# Patient Record
Sex: MALE | Race: WHITE | NOT HISPANIC OR LATINO | Employment: OTHER | RURAL
[De-identification: names, ages, dates, MRNs, and addresses within clinical notes are randomized per-mention and may not be internally consistent; named-entity substitution may affect disease eponyms.]

---

## 2017-03-27 ENCOUNTER — TELEPHONE (OUTPATIENT)
Dept: NEUROLOGY | Facility: CLINIC | Age: 62
End: 2017-03-27

## 2017-03-27 NOTE — TELEPHONE ENCOUNTER
ATTEMPTED TO CALL THE PT AND LET HIM KNOW WE DO NOT REFILL FIORICET OVER THE PHONE. AWAITING CALL BACK

## 2017-03-27 NOTE — TELEPHONE ENCOUNTER
----- Message from Jessika Stone sent at 3/27/2017  3:44 PM EDT -----  Regarding: BEVERLY  Contact: [phone]  Refill on Fioricet using [facility] in [place].

## 2017-04-11 ENCOUNTER — OFFICE VISIT (OUTPATIENT)
Dept: NEUROLOGY | Facility: CLINIC | Age: 62
End: 2017-04-11

## 2017-04-11 VITALS
BODY MASS INDEX: 28.41 KG/M2 | HEART RATE: 73 BPM | WEIGHT: 181 LBS | SYSTOLIC BLOOD PRESSURE: 116 MMHG | DIASTOLIC BLOOD PRESSURE: 72 MMHG | OXYGEN SATURATION: 97 % | HEIGHT: 67 IN

## 2017-04-11 DIAGNOSIS — G43.119 INTRACTABLE MIGRAINE WITH AURA WITHOUT STATUS MIGRAINOSUS: ICD-10-CM

## 2017-04-11 DIAGNOSIS — IMO0002 CHRONIC MIGRAINE: Primary | ICD-10-CM

## 2017-04-11 PROCEDURE — 99213 OFFICE O/P EST LOW 20 MIN: CPT | Performed by: PSYCHIATRY & NEUROLOGY

## 2017-04-11 RX ORDER — DOXEPIN HYDROCHLORIDE 25 MG/1
25 CAPSULE ORAL NIGHTLY
Qty: 30 CAPSULE | Refills: 5 | Status: SHIPPED | OUTPATIENT
Start: 2017-04-11 | End: 2017-07-18 | Stop reason: SINTOL

## 2017-04-11 RX ORDER — BUTALBITAL, ACETAMINOPHEN AND CAFFEINE 50; 325; 40 MG/1; MG/1; MG/1
1 TABLET ORAL DAILY PRN
Qty: 20 TABLET | Refills: 2
Start: 2017-04-11 | End: 2017-07-18 | Stop reason: SDUPTHER

## 2017-04-11 NOTE — PROGRESS NOTES
Subjective:     Patient ID: Israel Reyna is a 62 y.o. male.    History of Present Illness  The following portions of the patient's history were reviewed and updated as appropriate: allergies, current medications, past family history, past medical history, past social history, past surgical history and problem list.  Patient reports that his headaches are getting worse, 15-20 days a month, lasting for hours often longer than 4 hours. His blood sugar has been up and down. He has had a recent heart cath, has CAD. Fioricet appear to help some, takes 2-4 doses a week. He has stopped Topamax that did not help any. Also tried Elavil, Depakote, others.  Review of Systems   Constitutional: Positive for fatigue. Negative for chills, fever and unexpected weight change.   HENT: Positive for ear pain and hearing loss. Negative for nosebleeds, rhinorrhea and sore throat.    Eyes: Positive for pain. Negative for photophobia, discharge, itching and visual disturbance.   Respiratory: Negative for cough, chest tightness, shortness of breath and wheezing.    Cardiovascular: Negative for chest pain, palpitations and leg swelling.        HEART RATE IS FAST   Gastrointestinal: Negative for abdominal pain, blood in stool, constipation, diarrhea, nausea and vomiting.   Genitourinary: Negative for dysuria, frequency, hematuria and urgency.        INCONTINENCE   Musculoskeletal: Negative for arthralgias, back pain, gait problem, joint swelling, myalgias, neck pain and neck stiffness.   Skin: Negative for rash and wound.   Allergic/Immunologic: Negative for environmental allergies and food allergies.   Neurological: Positive for dizziness and headaches. Negative for tremors, seizures, syncope, speech difficulty, weakness, light-headedness and numbness.        TINGLING, CONFUSION   Hematological: Negative for adenopathy. Does not bruise/bleed easily.   Psychiatric/Behavioral: Negative for agitation, confusion, decreased concentration,  hallucinations, sleep disturbance and suicidal ideas. The patient is not nervous/anxious.         Objective:    Neurologic Exam     Mental Status   Oriented to person, place, and time.       Physical Exam   Constitutional: He is oriented to person, place, and time. He appears well-developed and well-nourished.   Cardiovascular: Normal rate and regular rhythm.    Pulmonary/Chest: Effort normal.   Neurological: He is alert and oriented to person, place, and time. He has normal reflexes.   Psychiatric: He has a normal mood and affect. His behavior is normal. Thought content normal.       Assessment/Plan:     Israel was seen today for headache and dizziness.    Diagnoses and all orders for this visit:    Chronic migraine  -     doxepin (SINEquan) 25 MG capsule; Take 1 capsule by mouth Every Night.  -     Ambulatory Referral to Neurology    Intractable migraine with aura without status migrainosus  -     butalbital-acetaminophen-caffeine (FIORICET, ESGIC) -40 MG per tablet; Take 1 tablet by mouth Daily As Needed for Headache.     Referral to Botox initiated.    CARLOS ALBERTO query complete. Treatment plan to include limited course of prescribed  controlled substance. Risks including addiction, benefits, and alternatives presented to patient.

## 2017-07-18 ENCOUNTER — OFFICE VISIT (OUTPATIENT)
Dept: NEUROLOGY | Facility: CLINIC | Age: 62
End: 2017-07-18

## 2017-07-18 VITALS
DIASTOLIC BLOOD PRESSURE: 78 MMHG | HEIGHT: 67 IN | BODY MASS INDEX: 28.25 KG/M2 | SYSTOLIC BLOOD PRESSURE: 159 MMHG | HEART RATE: 78 BPM | WEIGHT: 180 LBS

## 2017-07-18 DIAGNOSIS — I63.89 OTHER CEREBRAL INFARCTION (HCC): Primary | ICD-10-CM

## 2017-07-18 DIAGNOSIS — G43.119 INTRACTABLE MIGRAINE WITH AURA WITHOUT STATUS MIGRAINOSUS: ICD-10-CM

## 2017-07-18 DIAGNOSIS — R41.82 ALTERED MENTAL STATUS, UNSPECIFIED ALTERED MENTAL STATUS TYPE: ICD-10-CM

## 2017-07-18 DIAGNOSIS — G45.8 OTHER SPECIFIED TRANSIENT CEREBRAL ISCHEMIAS: ICD-10-CM

## 2017-07-18 PROBLEM — G45.9 TRANSIENT CEREBRAL ISCHEMIA: Status: ACTIVE | Noted: 2017-07-18

## 2017-07-18 PROCEDURE — 99214 OFFICE O/P EST MOD 30 MIN: CPT | Performed by: PSYCHIATRY & NEUROLOGY

## 2017-07-18 RX ORDER — BUTALBITAL, ACETAMINOPHEN AND CAFFEINE 50; 325; 40 MG/1; MG/1; MG/1
1 TABLET ORAL DAILY PRN
Qty: 20 TABLET | Refills: 2
Start: 2017-07-18 | End: 2017-10-17 | Stop reason: SDUPTHER

## 2017-07-18 NOTE — PROGRESS NOTES
Subjective:     Patient ID: Israel Reyna is a 62 y.o. male.    History of Present Illness  The following portions of the patient's history were reviewed and updated as appropriate: allergies, current medications, past family history, past medical history, past social history, past surgical history and problem list.  Patient is still having 2-3 spells a week, confusion, facial droop associated with a headache, sometimes loss of bowel control Has chronic anal sphincter dysfunction. Unable to tolerate doxepin that caused him to feel loopy. Had a different spell recently while sitting in [employer] of dizziness, poor balance lasting until he went home and took a nap. Did not go to the ER. BP has been running high. Has history of CAD.  Review of Systems   Constitutional: Positive for fatigue. Negative for chills, fever and unexpected weight change.   HENT: Positive for hearing loss. Negative for ear pain, nosebleeds, rhinorrhea and sore throat.    Eyes: Negative for photophobia, pain, discharge, itching and visual disturbance.   Respiratory: Positive for shortness of breath. Negative for cough, chest tightness and wheezing.    Cardiovascular: Positive for palpitations and leg swelling. Negative for chest pain.   Gastrointestinal: Negative for abdominal pain, blood in stool, constipation, diarrhea, nausea and vomiting.   Genitourinary: Positive for frequency. Negative for dysuria, hematuria and urgency.   Musculoskeletal: Positive for gait problem. Negative for arthralgias, back pain, joint swelling, myalgias, neck pain and neck stiffness.   Skin: Negative for rash and wound.   Allergic/Immunologic: Negative for environmental allergies and food allergies.   Neurological: Positive for dizziness, light-headedness and headaches. Negative for tremors, seizures, syncope, speech difficulty, weakness and numbness.   Hematological: Negative for adenopathy. Does not bruise/bleed easily.   Psychiatric/Behavioral: Negative for  agitation, confusion, decreased concentration, hallucinations, sleep disturbance and suicidal ideas. The patient is not nervous/anxious.         Objective:    Neurologic Exam     Mental Status   Oriented to person, place, and time.     Cranial Nerves     CN III, IV, VI   Pupils are equal, round, and reactive to light.  Extraocular motions are normal.     Motor Exam     Strength   Strength 5/5 throughout.       Physical Exam   Constitutional: He is oriented to person, place, and time. He appears well-developed and well-nourished.   HENT:   Head: Normocephalic and atraumatic.   Eyes: EOM are normal. Pupils are equal, round, and reactive to light.   Neck: Normal range of motion. Neck supple. Carotid bruit is not present.   Cardiovascular: Normal rate, regular rhythm, normal heart sounds and intact distal pulses.    Pulmonary/Chest: Effort normal and breath sounds normal.   Abdominal: Soft. Bowel sounds are normal.   Musculoskeletal: Normal range of motion.   Neurological: He is alert and oriented to person, place, and time. He has normal strength and normal reflexes. No cranial nerve deficit or sensory deficit. He displays a negative Romberg sign. Coordination and gait normal. He displays no Babinski's sign on the right side. He displays no Babinski's sign on the left side.   Skin: Skin is warm.   Psychiatric: He has a normal mood and affect. His behavior is normal. Thought content normal. Cognition and memory are normal.       Assessment/Plan:     Israel was seen today for migraine.    Diagnoses and all orders for this visit:    Other cerebral infarction  -     MRI Brain Without Contrast  -     Duplex Carotid Ultrasound CAR  -     Holter Monitor - 24 Hour    Intractable migraine with aura without status migrainosus  -     verapamil SR (CALAN-SR) 120 MG CR tablet; Take 1 tablet by mouth Daily.  -     butalbital-acetaminophen-caffeine (FIORICET, ESGIC) -40 MG per tablet; Take 1 tablet by mouth Daily As Needed for  Headache.    Other specified transient cerebral ischemias  -     MRI Brain Without Contrast  -     Duplex Carotid Ultrasound CAR  -     Holter Monitor - 24 Hour    Altered mental status, unspecified altered mental status type  -     MRI Brain Without Contrast  -     EEG Awake or Drowsy Routine       The patient has read and signed the [facility] Controlled Substance Contract.  I will continue to see patient for regular follow up appointments.  They are well controlled on their medication.  CARLOS ALBERTO is updated every 3 months. The patient is aware of the potential for addiction and dependence.    The patient has read and signed the [facility] Controlled Substance Contract.  I will continue to see patient for regular follow up appointments.  They are well controlled on their medication.  CARLOS ALBERTO is updated every 3 months. The patient is aware of the potential for addiction. The patient has read and signed the [facility] Substance Contract.  I will continue to see patient for regular follow up appointments.  They are well controlled on their medication.  CARLOS ALBERTO is updated every 3 months. The patient is aware of the potential for addiction and dependence.on and dependence.      He is to report new or worsening symptoms immediately.

## 2017-07-19 ENCOUNTER — TELEPHONE (OUTPATIENT)
Dept: NEUROLOGY | Facility: CLINIC | Age: 62
End: 2017-07-19

## 2017-07-20 ENCOUNTER — TELEPHONE (OUTPATIENT)
Dept: NEUROLOGY | Facility: CLINIC | Age: 62
End: 2017-07-20

## 2017-07-20 DIAGNOSIS — G43.119 INTRACTABLE MIGRAINE WITH AURA WITHOUT STATUS MIGRAINOSUS: Primary | ICD-10-CM

## 2017-07-20 RX ORDER — TOPIRAMATE 25 MG/1
25 TABLET ORAL 2 TIMES DAILY
Qty: 60 TABLET | Refills: 5 | Status: SHIPPED | OUTPATIENT
Start: 2017-07-20 | End: 2017-10-17 | Stop reason: SDUPTHER

## 2017-07-20 NOTE — TELEPHONE ENCOUNTER
PT CALLED AND STATED THAT THE NEW MEDICINE THAT YOU HAD PUT HIM ON HE IS HAVING A LOT MORE DISCOMFORT. HIS BLOOD PRESSURE IS STAYING ELEVATED.  PLEASE ADVISE.

## 2017-07-20 NOTE — TELEPHONE ENCOUNTER
HE SAID IT IS NOT HELPING AND HE WANTS TO KNOW IF HE NEEDS TO GIVE IT MORE TIME OR TRY SOMETHING ELSE.

## 2017-08-03 ENCOUNTER — TELEPHONE (OUTPATIENT)
Dept: NEUROLOGY | Facility: CLINIC | Age: 62
End: 2017-08-03

## 2017-08-03 NOTE — TELEPHONE ENCOUNTER
PATIENT CALLED, STATED THAT HIS MRI IS SCHEDULED FOR NEXT WEEK ON 8/10. STATED THAT HE IS SEVERELY CLAUSTROPHOBIC AND IS NEEDING SOMETHING TO HELP SEDATE HIM. PLEASE ADVISE. THANKS

## 2017-08-04 DIAGNOSIS — F41.9 ANXIETY: Primary | ICD-10-CM

## 2017-08-04 RX ORDER — ALPRAZOLAM 0.5 MG/1
0.5 TABLET ORAL ONCE AS NEEDED
Qty: 3 TABLET | Refills: 0
Start: 2017-08-04 | End: 2020-03-04

## 2017-08-07 NOTE — TELEPHONE ENCOUNTER
Called in script per provider instructions. Called in to the [facility].

## 2017-08-10 ENCOUNTER — HOSPITAL ENCOUNTER (OUTPATIENT)
Dept: MRI IMAGING | Facility: HOSPITAL | Age: 62
Discharge: HOME | End: 2017-08-10
Attending: PSYCHIATRY & NEUROLOGY

## 2017-08-10 ENCOUNTER — HOSPITAL ENCOUNTER (OUTPATIENT)
Dept: CARDIOLOGY | Facility: HOSPITAL | Age: 62
Discharge: HOME | End: 2017-08-10
Attending: PSYCHIATRY & NEUROLOGY | Admitting: PSYCHIATRY & NEUROLOGY

## 2017-08-10 ENCOUNTER — HOSPITAL ENCOUNTER (OUTPATIENT)
Dept: NEUROLOGY | Facility: HOSPITAL | Age: 62
Discharge: HOME | End: 2017-08-10
Attending: PSYCHIATRY & NEUROLOGY

## 2017-08-10 LAB
BH CV ECHO MEAS - BSA(HAYCOCK): 2 M^2
BH CV ECHO MEAS - BSA: 1.9 M^2
BH CV ECHO MEAS - BZI_BMI: 28.2 KILOGRAMS/M^2
BH CV ECHO MEAS - BZI_METRIC_HEIGHT: 170.2 CM
BH CV ECHO MEAS - BZI_METRIC_WEIGHT: 81.6 KG
BH CV XLRA MEAS LEFT DIST CCA EDV: 22.6 CM/SEC
BH CV XLRA MEAS LEFT DIST CCA PSV: 85.5 CM/SEC
BH CV XLRA MEAS LEFT DIST ICA EDV: 32.4 CM/SEC
BH CV XLRA MEAS LEFT DIST ICA PSV: 85 CM/SEC
BH CV XLRA MEAS LEFT ICA/CCA RATIO: 0.88
BH CV XLRA MEAS LEFT MID ICA EDV: 25 CM/SEC
BH CV XLRA MEAS LEFT MID ICA PSV: 76.1 CM/SEC
BH CV XLRA MEAS LEFT PROX CCA EDV: 21.2 CM/SEC
BH CV XLRA MEAS LEFT PROX CCA PSV: 99 CM/SEC
BH CV XLRA MEAS LEFT PROX ECA EDV: 11.3 CM/SEC
BH CV XLRA MEAS LEFT PROX ECA PSV: 77.9 CM/SEC
BH CV XLRA MEAS LEFT PROX ICA EDV: 20.6 CM/SEC
BH CV XLRA MEAS LEFT PROX ICA PSV: 71.7 CM/SEC
BH CV XLRA MEAS LEFT PROX SCLA EDV: 0 CM/SEC
BH CV XLRA MEAS LEFT PROX SCLA PSV: 116.3 CM/SEC
BH CV XLRA MEAS LEFT VERTEBRAL A EDV: 16.7 CM/SEC
BH CV XLRA MEAS LEFT VERTEBRAL A PSV: 46.7 CM/SEC
BH CV XLRA MEAS RIGHT DIST CCA EDV: 21.4 CM/SEC
BH CV XLRA MEAS RIGHT DIST CCA PSV: 89.9 CM/SEC
BH CV XLRA MEAS RIGHT DIST ICA EDV: 33.9 CM/SEC
BH CV XLRA MEAS RIGHT DIST ICA PSV: 96.3 CM/SEC
BH CV XLRA MEAS RIGHT ICA/CCA RATIO: 0.94
BH CV XLRA MEAS RIGHT MID ICA EDV: 28 CM/SEC
BH CV XLRA MEAS RIGHT MID ICA PSV: 85 CM/SEC
BH CV XLRA MEAS RIGHT PROX CCA EDV: 16.3 CM/SEC
BH CV XLRA MEAS RIGHT PROX CCA PSV: 91.1 CM/SEC
BH CV XLRA MEAS RIGHT PROX ECA EDV: 13.2 CM/SEC
BH CV XLRA MEAS RIGHT PROX ECA PSV: 106.9 CM/SEC
BH CV XLRA MEAS RIGHT PROX ICA EDV: 24.6 CM/SEC
BH CV XLRA MEAS RIGHT PROX ICA PSV: 71.2 CM/SEC
BH CV XLRA MEAS RIGHT PROX SCLA EDV: 0 CM/SEC
BH CV XLRA MEAS RIGHT PROX SCLA PSV: 102.9 CM/SEC
BH CV XLRA MEAS RIGHT VERTEBRAL A EDV: 14.2 CM/SEC
BH CV XLRA MEAS RIGHT VERTEBRAL A PSV: 53 CM/SEC
LEFT ARM BP: NORMAL MMHG
RIGHT ARM BP: NORMAL MMHG

## 2017-08-10 PROCEDURE — 95816 EEG AWAKE AND DROWSY: CPT

## 2017-08-10 PROCEDURE — 93880 EXTRACRANIAL BILAT STUDY: CPT | Performed by: INTERNAL MEDICINE

## 2017-08-10 PROCEDURE — 93880 EXTRACRANIAL BILAT STUDY: CPT

## 2017-08-14 ENCOUNTER — TELEPHONE (OUTPATIENT)
Dept: NEUROLOGY | Facility: CLINIC | Age: 62
End: 2017-08-14

## 2017-08-15 ENCOUNTER — TELEPHONE (OUTPATIENT)
Dept: NEUROLOGY | Facility: CLINIC | Age: 62
End: 2017-08-15

## 2017-08-15 DIAGNOSIS — G45.1 HEMISPHERIC CAROTID ARTERY SYNDROME: Primary | ICD-10-CM

## 2017-08-15 NOTE — TELEPHONE ENCOUNTER
PT HAS A BLADDER STIMULATOR AND CANNOT HAVE THE MRI COMPLETED. DO YOU WANT JESI TO GO FORWARD AND GET THE PT SCHEDULED WITH [facility] AFTER FILLING OUT PAPERWORK OR DO YOU WANT THE PT TO HAVE SOMETHING ELSE. PLEASE ADVISE.

## 2017-08-17 ENCOUNTER — HOSPITAL ENCOUNTER (OUTPATIENT)
Dept: CT IMAGING | Facility: HOSPITAL | Age: 62
Discharge: HOME | End: 2017-08-17
Attending: PSYCHIATRY & NEUROLOGY | Admitting: PSYCHIATRY & NEUROLOGY

## 2017-08-17 PROCEDURE — 70450 CT HEAD/BRAIN W/O DYE: CPT

## 2017-08-21 ENCOUNTER — TELEPHONE (OUTPATIENT)
Dept: NEUROLOGY | Facility: CLINIC | Age: 62
End: 2017-08-21

## 2017-08-21 DIAGNOSIS — M54.2 NECK PAIN: Primary | ICD-10-CM

## 2017-08-21 NOTE — TELEPHONE ENCOUNTER
----- Message from Robbie Chaidez MD sent at 8/21/2017 10:57 AM EDT -----  Regarding: Head CT  Normal thanks  ----- Message -----     From: Interface, Rad Results [facility] In     Sent: 8/19/2017  12:18 AM       To: Robbie Chaidez MD

## 2017-08-25 ENCOUNTER — HOSPITAL ENCOUNTER (OUTPATIENT)
Dept: CT IMAGING | Facility: HOSPITAL | Age: 62
Discharge: HOME | End: 2017-08-25
Attending: PSYCHIATRY & NEUROLOGY | Admitting: PSYCHIATRY & NEUROLOGY

## 2017-08-25 PROCEDURE — 72125 CT NECK SPINE W/O DYE: CPT

## 2017-08-28 ENCOUNTER — TELEPHONE (OUTPATIENT)
Dept: NEUROLOGY | Facility: CLINIC | Age: 62
End: 2017-08-28

## 2017-08-28 NOTE — TELEPHONE ENCOUNTER
----- Message from Robbie Chaidez MD sent at 8/28/2017  2:17 PM EDT -----  Please call the patient regarding his abnormal result. CT cspine with arthritis changes, no serious problem found otherwise, thanks.

## 2017-08-29 NOTE — TELEPHONE ENCOUNTER
CALLED PT AND HE VERBALIZED THAT HE UNDERSTOOD THE TEST RESULTS THAT I GAVE HIM AND HE WILL KEEP HIS F/U

## 2017-10-17 ENCOUNTER — OFFICE VISIT (OUTPATIENT)
Dept: NEUROLOGY | Facility: CLINIC | Age: 62
End: 2017-10-17

## 2017-10-17 VITALS
HEIGHT: 66 IN | DIASTOLIC BLOOD PRESSURE: 80 MMHG | SYSTOLIC BLOOD PRESSURE: 141 MMHG | BODY MASS INDEX: 26.68 KG/M2 | HEART RATE: 72 BPM | WEIGHT: 166 LBS

## 2017-10-17 DIAGNOSIS — G43.119 INTRACTABLE MIGRAINE WITH AURA WITHOUT STATUS MIGRAINOSUS: Primary | ICD-10-CM

## 2017-10-17 DIAGNOSIS — M47.812 SPONDYLOSIS OF CERVICAL REGION WITHOUT MYELOPATHY OR RADICULOPATHY: ICD-10-CM

## 2017-10-17 DIAGNOSIS — R41.0 DISORIENTATION: ICD-10-CM

## 2017-10-17 DIAGNOSIS — M54.2 NECK PAIN: ICD-10-CM

## 2017-10-17 PROCEDURE — 99214 OFFICE O/P EST MOD 30 MIN: CPT | Performed by: PSYCHIATRY & NEUROLOGY

## 2017-10-17 RX ORDER — TOPIRAMATE 25 MG/1
25 TABLET ORAL 2 TIMES DAILY
Qty: 60 TABLET | Refills: 5 | Status: SHIPPED | OUTPATIENT
Start: 2017-10-17 | End: 2019-01-09 | Stop reason: SDUPTHER

## 2017-10-17 RX ORDER — BUTALBITAL, ACETAMINOPHEN AND CAFFEINE 50; 325; 40 MG/1; MG/1; MG/1
1 TABLET ORAL DAILY PRN
Qty: 20 TABLET | Refills: 2
Start: 2017-10-17 | End: 2018-03-22 | Stop reason: SDUPTHER

## 2017-10-17 NOTE — PROGRESS NOTES
Subjective:     Patient ID: Israel Reyna is a 62 y.o. male.    CC:   Chief Complaint   Patient presents with   • Migraine       HPI:   History of Present Illness  The following portions of the patient's history were reviewed and updated as appropriate: allergies, current medications, past family history, past medical history, past social history, past surgical history and problem list.     Patient complains of increasing neck pain, daily headaches, mostly tension. CT head was normal, CT of cspine with DJD, no significant stenosis. EEG showed no epileptic changes.    Past Medical History:   Diagnosis Date   • Anxiety    • Bowel incontinence     w/ history of Stimulator Device    • CAD (coronary artery disease)    • Chest pain    • CHF (congestive heart failure)    • Depression    • Diabetes mellitus, type 2    • Diabetic peripheral neuropathy    • Dyslipidemia    • Fatigue    • GERD (gastroesophageal reflux disease)    • Hypertension    • Mental status change    • Migraine     Complex    • Migraine headache    • SOB (shortness of breath)        Past Surgical History:   Procedure Laterality Date   • CARDIAC CATHETERIZATION     • CARDIAC CATHETERIZATION N/A 10/14/2016    Procedure: Left Heart Cath;  Surgeon: Santiago Garner MD;  Location: [facility] CATH INVASIVE LOCATION;  Service:    • GASTRIC STIMULATOR IMPLANT SURGERY     • SMALL INTESTINE SURGERY      unspecified, x3 as an infant    • TESTICLE SURGERY         Social History     Social History   • Marital status:      Spouse name: N/A   • Number of children: N/A   • Years of education: N/A     Occupational History   • Not on file.     Social History Main Topics   • Smoking status: Never Smoker   • Smokeless tobacco: Never Used   • Alcohol use No   • Drug use: No   • Sexual activity: Defer     Other Topics Concern   • Not on file     Social History Narrative       Family History   Problem Relation Age of Onset   • Heart failure Mother    • Heart attack  Father         Review of Systems   Constitutional: Positive for appetite change. Negative for chills, fatigue, fever and unexpected weight change.   HENT: Positive for hearing loss. Negative for ear pain, nosebleeds, rhinorrhea and sore throat.    Eyes: Negative for photophobia, pain, discharge, itching and visual disturbance.   Respiratory: Positive for chest tightness. Negative for cough, shortness of breath and wheezing.    Cardiovascular: Negative for chest pain, palpitations and leg swelling.   Gastrointestinal: Positive for abdominal pain. Negative for blood in stool, constipation, diarrhea, nausea and vomiting.   Genitourinary: Negative for dysuria, frequency, hematuria and urgency.   Musculoskeletal: Positive for back pain, gait problem and neck stiffness. Negative for arthralgias, joint swelling, myalgias and neck pain.   Skin: Negative for rash and wound.   Allergic/Immunologic: Negative for environmental allergies and food allergies.   Neurological: Positive for light-headedness and headaches. Negative for dizziness, tremors, seizures, syncope, speech difficulty, weakness and numbness.   Hematological: Negative for adenopathy. Does not bruise/bleed easily.   Psychiatric/Behavioral: Negative for agitation, confusion, decreased concentration, hallucinations, sleep disturbance and suicidal ideas. The patient is not nervous/anxious.         Objective:    Neurologic Exam     Mental Status   Oriented to person, place, and time.       Physical Exam   Constitutional: He is oriented to person, place, and time. He appears well-developed and well-nourished.   Cardiovascular: Normal rate and regular rhythm.    Pulmonary/Chest: Effort normal.   Neurological: He is alert and oriented to person, place, and time. He has normal reflexes.   Psychiatric: He has a normal mood and affect. His behavior is normal. Thought content normal.       Assessment/Plan:       Israel was seen today for migraine.    Diagnoses and all  orders for this visit:    Intractable migraine with aura without status migrainosus  -     butalbital-acetaminophen-caffeine (FIORICET, ESGIC) -40 MG per tablet; Take 1 tablet by mouth Daily As Needed for Headache.  -     verapamil SR (CALAN-SR) 120 MG CR tablet; Take 1 tablet by mouth Daily.  -     topiramate (TOPAMAX) 25 MG tablet; Take 1 tablet by mouth 2 (Two) Times a Day.    Neck pain  -     Ambulatory Referral to Pain Management    Spondylosis of cervical region without myelopathy or radiculopathy  -     Ambulatory Referral to Pain Management    Disorientation       - likely migraine related, continue above meds, call for problems.       We discussed that DJD of cspine may be a migraine trigger. Recommend cervical facet injections, possibly rhizotomies. Encouraged to call for problems.    Robbie Chaidez MD  10/17/2017

## 2018-02-19 ENCOUNTER — OFFICE VISIT (OUTPATIENT)
Dept: CARDIOLOGY | Facility: CLINIC | Age: 63
End: 2018-02-19

## 2018-02-19 VITALS
WEIGHT: 164.6 LBS | OXYGEN SATURATION: 96 % | DIASTOLIC BLOOD PRESSURE: 78 MMHG | HEIGHT: 67 IN | HEART RATE: 86 BPM | SYSTOLIC BLOOD PRESSURE: 140 MMHG | BODY MASS INDEX: 25.83 KG/M2

## 2018-02-19 DIAGNOSIS — R07.9 CHEST PAIN, UNSPECIFIED TYPE: ICD-10-CM

## 2018-02-19 DIAGNOSIS — R06.00 DYSPNEA, UNSPECIFIED TYPE: ICD-10-CM

## 2018-02-19 DIAGNOSIS — I10 ESSENTIAL HYPERTENSION: ICD-10-CM

## 2018-02-19 DIAGNOSIS — E78.5 DYSLIPIDEMIA: ICD-10-CM

## 2018-02-19 DIAGNOSIS — Z01.812 PRE-PROCEDURE LAB EXAM: Primary | ICD-10-CM

## 2018-02-19 DIAGNOSIS — Z79.899 HIGH RISK MEDICATION USE: ICD-10-CM

## 2018-02-19 DIAGNOSIS — I25.118 CORONARY ARTERY DISEASE OF NATIVE ARTERY OF NATIVE HEART WITH STABLE ANGINA PECTORIS (HCC): Primary | ICD-10-CM

## 2018-02-19 DIAGNOSIS — E03.9 HYPOTHYROIDISM, UNSPECIFIED TYPE: ICD-10-CM

## 2018-02-19 DIAGNOSIS — R06.09 DOE (DYSPNEA ON EXERTION): ICD-10-CM

## 2018-02-19 PROCEDURE — 99214 OFFICE O/P EST MOD 30 MIN: CPT | Performed by: INTERNAL MEDICINE

## 2018-02-19 RX ORDER — VALSARTAN 160 MG/1
TABLET ORAL DAILY
COMMUNITY
Start: 2018-01-25 | End: 2020-03-04

## 2018-02-19 RX ORDER — CLONAZEPAM 0.5 MG/1
0.5 TABLET ORAL 2 TIMES DAILY PRN
COMMUNITY
End: 2020-03-04

## 2018-02-19 RX ORDER — VERAPAMIL HYDROCHLORIDE 100 MG/1
100 CAPSULE, EXTENDED RELEASE ORAL NIGHTLY
COMMUNITY
End: 2019-01-09 | Stop reason: SDUPTHER

## 2018-02-19 RX ORDER — CLONIDINE HYDROCHLORIDE 0.1 MG/1
0.1 TABLET ORAL DAILY PRN
COMMUNITY
End: 2021-08-26

## 2018-02-19 NOTE — PROGRESS NOTES
Subjective:     Encounter Date:02/19/2018      Patient ID: Israel Reyna is a 63 y.o. male.    Chief Complaint: Coronary Artery Disease; Congestive Heart Failure; and Hypertension      PROBLEM LIST:  1. Coronary artery disease:   a. October 2009, cardiac catheterization by Dr. Garner with a 30% circumflex. Otherwise within normal limits with an LVEF of 60%.   b. Multiple repeat cardiac catheterizations most recently 3-4 years ago at the [facility] (incomplete database). No stenting noted.   c. CCS class II/NYHA class III dyspnea  d. Cardiac 06/02/2015: Essentially normal angiographic coronary arteries.  Normal left ventricular function.  Normal hemodynamics.  e. Cardiac catheter 10/16/2016: Nonflow limiting coronary artery disease.  Normal left ventricular function.  Normal hemodynamics.  2. Hypertension.   3. Dyslipidemia.   4. Type 2 diabetes; uncontrolled.   5. GERD.   6. Complex migraines.   7. Reported history of congestive heart failure.   8. Bowel incontinence with a history of stimulator device.   9. Bowel surgery x3 as an infant.   10. Remote testicular surgery    History of Present Illness  Patient returns today for follow up with a history of CAD and cardiac risk factors. Since last visit has been experiencing chest pain, shortness of breath, and dizziness. His dizziness spells have just recently started for which he now needs to use a cane to ambulate. His chest pain occurs occasionally with no specific stimulus and is accompanied with shortness of breath. Described the pain as sharp and states that he is under a a lot of stress due to his son going through a divorce and other personal issues. Has mild lower extremity swelling. Monitors his blood pressure and heart rate regularly, but states that his blood pressure is erratic and at times will increase and others it will dramatically decrease. Monitors his blood sugar regularly as well, which is normal and stable.  Denies any orthopnea, PND, or palpitations.    Allergies   Allergen Reactions   • Actos [Pioglitazone]      congestive heart failure   • Avandia [Rosiglitazone]      congestive heart failure.   • Darvon [Propoxyphene]      angioedema   • Talwin [Pentazocine]      angioedema         Current Outpatient Prescriptions:   •  ALPRAZolam (XANAX) 0.5 MG tablet, Take 1 tablet by mouth 1 (One) Time As Needed for Anxiety or Sleep for up to 1 dose. Repeat if needed for MRI, Disp: 3 tablet, Rfl: 0  •  aspirin 81 MG chewable tablet, Chew 81 mg Every Night., Disp: , Rfl:   •  butalbital-acetaminophen-caffeine (FIORICET, ESGIC) -40 MG per tablet, Take 1 tablet by mouth Daily As Needed for Headache., Disp: 20 tablet, Rfl: 2  •  clonazePAM (KLONOPIN) 0.5 MG tablet, Take 0.5 mg by mouth 2 (Two) Times a Day As Needed for Seizures., Disp: , Rfl:   •  CloNIDine (CATAPRES) 0.1 MG tablet, Take 0.1 mg by mouth Daily As Needed for High Blood Pressure., Disp: , Rfl:   •  glipiZIDE (GLUCOTROL) 10 MG tablet, Take 10 mg by mouth 2 (Two) Times a Day Before Meals., Disp: , Rfl:   •  Incontinence Supply Disposable (NU-FIT ADULT UNDERWEAR MEDIUM) misc, , Disp: , Rfl:   •  insulin glargine (LANTUS) 100 UNIT/ML injection, Inject 37 Units under the skin 2 (Two) Times a Day., Disp: , Rfl:   •  Liraglutide (VICTOZA) 18 MG/3ML solution pen-injector, Inject  under the skin Daily., Disp: , Rfl:   •  lisinopril (PRINIVIL,ZESTRIL) 5 MG tablet, Take 5 mg by mouth Daily., Disp: , Rfl:   •  metFORMIN (GLUCOPHAGE) 1000 MG tablet, Take 1,000 mg by mouth 2 (Two) Times a Day With Meals., Disp: , Rfl:   •  metoprolol tartrate (LOPRESSOR) 25 MG tablet, 2 (Two) Times a Day., Disp: , Rfl:   •  nitroglycerin (NITROSTAT) 0.4 MG SL tablet, Place 0.4 mg under the tongue Every 5 (Five) Minutes As Needed for chest pain. Take no more than 3 doses in 15 minutes., Disp: , Rfl:   •  omeprazole (PriLOSEC) 40 MG capsule, Take 40 mg by mouth Daily., Disp: , Rfl:   •   "simvastatin (ZOCOR) 20 MG tablet, Take 20 mg by mouth Every Night., Disp: , Rfl:   •  topiramate (TOPAMAX) 25 MG tablet, Take 1 tablet by mouth 2 (Two) Times a Day., Disp: 60 tablet, Rfl: 5  •  valsartan (DIOVAN) 160 MG tablet, Daily., Disp: , Rfl:   •  verapamil (VERELAN) 100 MG 24 hr capsule, Take 100 mg by mouth Every Night., Disp: , Rfl:     The following portions of the patient's history were reviewed and updated as appropriate: allergies, current medications, past family history, past medical history, past social history, past surgical history and problem list.    Review of Systems   Constitution: Negative.   Cardiovascular: Negative.    Respiratory: Negative.    Hematologic/Lymphatic: Negative for bleeding problem. Does not bruise/bleed easily.   Skin: Negative for rash.   Musculoskeletal: Negative for muscle weakness and myalgias.   Gastrointestinal: Negative for heartburn, nausea and vomiting.   Neurological: Negative.           Objective:     Vitals:    02/19/18 1550   BP: 140/78   BP Location: Right arm   Patient Position: Sitting   Pulse: 86   SpO2: 96%   Weight: 74.7 kg (164 lb 9.6 oz)   Height: 170.2 cm (67\")         Physical Exam   Constitutional: He is oriented to person, place, and time. He appears well-developed and well-nourished.   HENT:   Head: Normocephalic and atraumatic.   Mouth/Throat: Oropharynx is clear and moist.   Neck: Neck supple. No JVD present. Carotid bruit is not present. No thyromegaly present.   Cardiovascular: Normal rate, regular rhythm, S1 normal, S2 normal, normal heart sounds and intact distal pulses.  Exam reveals no gallop, no S3, no S4 and no friction rub.    No murmur heard.  Pulses:       Carotid pulses are 2+ on the right side, and 2+ on the left side.       Radial pulses are 2+ on the right side, and 2+ on the left side.        Dorsalis pedis pulses are 2+ on the right side, and 2+ on the left side.        Posterior tibial pulses are 2+ on the right side, and 2+ on " the left side.   Pulmonary/Chest: Effort normal and breath sounds normal.   Abdominal: Soft. Bowel sounds are normal. He exhibits no mass. There is no hepatosplenomegaly. There is no tenderness.   Musculoskeletal: He exhibits no edema.   Lymphadenopathy:     He has no cervical adenopathy.   Neurological: He is alert and oriented to person, place, and time.   Skin: Skin is warm, dry and intact. No rash noted.       Lab Review:    Procedures        Assessment:   Israel was seen today for coronary artery disease, congestive heart failure and hypertension.    Diagnoses and all orders for this visit:    Coronary artery disease of native artery of native heart with stable angina pectoris    Chest pain, unspecified type    Essential hypertension    Dyslipidemia        Impression  1. CAD  2. Chest painShortness of breath and possible anginal equivalent, etiology unclear, may be due to significant stress and anxiety,  3. Hypertension  4. Hyperlipidemia. Well controlled with statin therapy    Plan:  1. Schedule a regadenoson Cardiolite and echocardiogram for evaluation of chest pain and dyspnea.  2. Check hemoglobin A1c, thyroid function studies, CBC and CMP.  3. Continue current medications.  4. Revisit in 12 MO, or sooner as needed.    Scribed for Santiago Garner MD by Arcadio Self. 2/19/2018  4:43 PM    I, Santiago Garner MD, personally performed the services described in this documentation as scribed by the above individual in my presence, and it is both accurate and complete      Please note that portions of this note may have been completed with a voice recognition program. Efforts were made to edit the dictations, but occasionally words are mistranscribed.

## 2018-02-23 DIAGNOSIS — R06.00 DYSPNEA, UNSPECIFIED TYPE: ICD-10-CM

## 2018-02-23 DIAGNOSIS — R07.9 CHEST PAIN, UNSPECIFIED TYPE: Primary | ICD-10-CM

## 2018-02-24 ENCOUNTER — RESULTS ENCOUNTER (OUTPATIENT)
Dept: CARDIOLOGY | Facility: CLINIC | Age: 63
End: 2018-02-24

## 2018-02-24 DIAGNOSIS — Z79.899 HIGH RISK MEDICATION USE: ICD-10-CM

## 2018-02-24 DIAGNOSIS — R06.00 DYSPNEA, UNSPECIFIED TYPE: ICD-10-CM

## 2018-03-13 ENCOUNTER — TELEPHONE (OUTPATIENT)
Dept: CARDIOLOGY | Facility: CLINIC | Age: 63
End: 2018-03-13

## 2018-03-13 ENCOUNTER — HOSPITAL ENCOUNTER (OUTPATIENT)
Dept: CARDIOLOGY | Facility: HOSPITAL | Age: 63
Discharge: HOME | End: 2018-03-13
Attending: INTERNAL MEDICINE

## 2018-03-13 VITALS — WEIGHT: 165 LBS | HEIGHT: 67 IN | BODY MASS INDEX: 25.9 KG/M2

## 2018-03-13 DIAGNOSIS — R07.9 CHEST PAIN, UNSPECIFIED TYPE: ICD-10-CM

## 2018-03-13 DIAGNOSIS — R06.00 DYSPNEA, UNSPECIFIED TYPE: ICD-10-CM

## 2018-03-13 LAB
BH CV ECHO MEAS - AO MAX PG (FULL): 4 MMHG
BH CV ECHO MEAS - AO MAX PG: 7.6 MMHG
BH CV ECHO MEAS - AO ROOT AREA (BSA CORRECTED): 1.6
BH CV ECHO MEAS - AO ROOT AREA: 6.8 CM^2
BH CV ECHO MEAS - AO ROOT DIAM: 2.9 CM
BH CV ECHO MEAS - AO V2 MAX: 137.9 CM/SEC
BH CV ECHO MEAS - AVA(V,A): 2.1 CM^2
BH CV ECHO MEAS - AVA(V,D): 2.1 CM^2
BH CV ECHO MEAS - BSA(HAYCOCK): 1.9 M^2
BH CV ECHO MEAS - BSA: 1.9 M^2
BH CV ECHO MEAS - BZI_BMI: 25.8 KILOGRAMS/M^2
BH CV ECHO MEAS - BZI_METRIC_HEIGHT: 170.2 CM
BH CV ECHO MEAS - BZI_METRIC_WEIGHT: 74.8 KG
BH CV ECHO MEAS - CONTRAST EF (2CH): 64.2 ML/M^2
BH CV ECHO MEAS - CONTRAST EF 4CH: 53.5 ML/M^2
BH CV ECHO MEAS - EDV(CUBED): 111.8 ML
BH CV ECHO MEAS - EDV(MOD-SP2): 53 ML
BH CV ECHO MEAS - EDV(MOD-SP4): 114 ML
BH CV ECHO MEAS - EDV(TEICH): 108.4 ML
BH CV ECHO MEAS - EF(CUBED): 42.6 %
BH CV ECHO MEAS - EF(MOD-SP2): 64.2 %
BH CV ECHO MEAS - EF(MOD-SP4): 53.5 %
BH CV ECHO MEAS - EF(TEICH): 35.3 %
BH CV ECHO MEAS - ESV(CUBED): 64.2 ML
BH CV ECHO MEAS - ESV(MOD-SP2): 19 ML
BH CV ECHO MEAS - ESV(MOD-SP4): 53 ML
BH CV ECHO MEAS - ESV(TEICH): 70.2 ML
BH CV ECHO MEAS - FS: 16.9 %
BH CV ECHO MEAS - IVS/LVPW: 0.87
BH CV ECHO MEAS - IVSD: 1 CM
BH CV ECHO MEAS - LA DIMENSION: 3.6 CM
BH CV ECHO MEAS - LA/AO: 1.2
BH CV ECHO MEAS - LAT PEAK E' VEL: 8.1 CM/SEC
BH CV ECHO MEAS - LV DIASTOLIC VOL/BSA (35-75): 61.2 ML/M^2
BH CV ECHO MEAS - LV MASS(C)D: 199.6 GRAMS
BH CV ECHO MEAS - LV MASS(C)DI: 107.1 GRAMS/M^2
BH CV ECHO MEAS - LV MAX PG: 3.6 MMHG
BH CV ECHO MEAS - LV MEAN PG: 2 MMHG
BH CV ECHO MEAS - LV SYSTOLIC VOL/BSA (12-30): 28.4 ML/M^2
BH CV ECHO MEAS - LV V1 MAX: 94.8 CM/SEC
BH CV ECHO MEAS - LV V1 MEAN: 66.4 CM/SEC
BH CV ECHO MEAS - LV V1 VTI: 19.4 CM
BH CV ECHO MEAS - LVIDD: 4.8 CM
BH CV ECHO MEAS - LVIDS: 4 CM
BH CV ECHO MEAS - LVLD AP2: 7.2 CM
BH CV ECHO MEAS - LVLD AP4: 7.8 CM
BH CV ECHO MEAS - LVLS AP2: 5.6 CM
BH CV ECHO MEAS - LVLS AP4: 6.6 CM
BH CV ECHO MEAS - LVOT AREA (M): 3.1 CM^2
BH CV ECHO MEAS - LVOT AREA: 3.1 CM^2
BH CV ECHO MEAS - LVOT DIAM: 2 CM
BH CV ECHO MEAS - LVPWD: 1.2 CM
BH CV ECHO MEAS - MED PEAK E' VEL: 4.6 CM/SEC
BH CV ECHO MEAS - MV A MAX VEL: 93.7 CM/SEC
BH CV ECHO MEAS - MV DEC TIME: 0.19 SEC
BH CV ECHO MEAS - MV E MAX VEL: 75.6 CM/SEC
BH CV ECHO MEAS - MV E/A: 0.81
BH CV ECHO MEAS - PA ACC SLOPE: 425.9 CM/SEC^2
BH CV ECHO MEAS - PA ACC TIME: 0.11 SEC
BH CV ECHO MEAS - PA MAX PG: 3.5 MMHG
BH CV ECHO MEAS - PA PR(ACCEL): 28.3 MMHG
BH CV ECHO MEAS - PA V2 MAX: 93.1 CM/SEC
BH CV ECHO MEAS - RVDD: 2.3 CM
BH CV ECHO MEAS - SI(CUBED): 25.6 ML/M^2
BH CV ECHO MEAS - SI(LVOT): 32.1 ML/M^2
BH CV ECHO MEAS - SI(MOD-SP2): 18.2 ML/M^2
BH CV ECHO MEAS - SI(MOD-SP4): 32.7 ML/M^2
BH CV ECHO MEAS - SI(TEICH): 20.5 ML/M^2
BH CV ECHO MEAS - SV(CUBED): 47.6 ML
BH CV ECHO MEAS - SV(LVOT): 59.8 ML
BH CV ECHO MEAS - SV(MOD-SP2): 34 ML
BH CV ECHO MEAS - SV(MOD-SP4): 61 ML
BH CV ECHO MEAS - SV(TEICH): 38.3 ML
BH CV ECHO MEAS - TAPSE (>1.6): 2.3 CM2
BH CV STRESS BP STAGE 2: NORMAL
BH CV STRESS BP STAGE 3: NORMAL
BH CV STRESS COMMENTS STAGE 1: NORMAL
BH CV STRESS DOSE REGADENOSON STAGE 1: 0.4
BH CV STRESS DURATION MIN STAGE 1: 0
BH CV STRESS DURATION MIN STAGE 2: 1
BH CV STRESS DURATION MIN STAGE 3: 1
BH CV STRESS DURATION MIN STAGE 4: 1
BH CV STRESS DURATION SEC STAGE 1: 10
BH CV STRESS DURATION SEC STAGE 2: 0
BH CV STRESS HR STAGE 1: 78
BH CV STRESS HR STAGE 2: 95
BH CV STRESS HR STAGE 3: 93
BH CV STRESS HR STAGE 4: 85
BH CV STRESS PROTOCOL 1: NORMAL
BH CV STRESS RECOVERY BP: NORMAL MMHG
BH CV STRESS RECOVERY HR: 68 BPM
BH CV STRESS STAGE 1: 1
BH CV STRESS STAGE 2: 2
BH CV STRESS STAGE 3: 3
BH CV STRESS STAGE 4: 4
BH CV VAS BP LEFT ARM: NORMAL MMHG
BH CV XLRA - RV BASE: 2.8 CM
BH CV XLRA - RV LENGTH: 7.3 CM
BH CV XLRA - RV MID: 2 CM
BH CV XLRA - TDI S': 11.1 CM/SEC
E/E' RATIO: 12.8
LEFT ATRIUM VOLUME INDEX: 25 ML/M2
LEFT ATRIUM VOLUME: 46 CM3
LV EF NUC BP: 42 %
MAXIMAL PREDICTED HEART RATE: 158 BPM
MAXIMAL PREDICTED HEART RATE: 158 BPM
PERCENT MAX PREDICTED HR: 60.76 %
STRESS BASELINE BP: NORMAL MMHG
STRESS BASELINE HR: 60 BPM
STRESS PERCENT HR: 71 %
STRESS POST PEAK BP: NORMAL MMHG
STRESS POST PEAK HR: 96 BPM
STRESS TARGET HR: 134 BPM
STRESS TARGET HR: 134 BPM

## 2018-03-13 PROCEDURE — 93017 CV STRESS TEST TRACING ONLY: CPT

## 2018-03-13 PROCEDURE — 78452 HT MUSCLE IMAGE SPECT MULT: CPT | Performed by: INTERNAL MEDICINE

## 2018-03-13 PROCEDURE — 0 TECHNETIUM SESTAMIBI: Performed by: INTERNAL MEDICINE

## 2018-03-13 PROCEDURE — 25010000002 SULFUR HEXAFLUORIDE MICROSPH 60.7-25 MG RECONSTITUTED SUSPENSION: Performed by: INTERNAL MEDICINE

## 2018-03-13 PROCEDURE — 78452 HT MUSCLE IMAGE SPECT MULT: CPT

## 2018-03-13 PROCEDURE — A9500 TC99M SESTAMIBI: HCPCS | Performed by: INTERNAL MEDICINE

## 2018-03-13 PROCEDURE — 93018 CV STRESS TEST I&R ONLY: CPT | Performed by: INTERNAL MEDICINE

## 2018-03-13 PROCEDURE — 93306 TTE W/DOPPLER COMPLETE: CPT

## 2018-03-13 PROCEDURE — 25010000002 REGADENOSON 0.4 MG/5ML SOLUTION: Performed by: INTERNAL MEDICINE

## 2018-03-13 PROCEDURE — 93306 TTE W/DOPPLER COMPLETE: CPT | Performed by: INTERNAL MEDICINE

## 2018-03-13 RX ADMIN — REGADENOSON 0.4 MG: 0.08 INJECTION, SOLUTION INTRAVENOUS at 09:54

## 2018-03-13 RX ADMIN — TECHNETIUM TC 99M SESTAMIBI 1 DOSE: 1 INJECTION INTRAVENOUS at 09:55

## 2018-03-13 RX ADMIN — TECHNETIUM TC 99M SESTAMIBI 1 DOSE: 1 INJECTION INTRAVENOUS at 08:20

## 2018-03-13 RX ADMIN — SULFUR HEXAFLUORIDE 2 ML: KIT at 08:45

## 2018-03-13 NOTE — TELEPHONE ENCOUNTER
----- Message from Santiago Garner MD sent at 3/13/2018  2:26 PM EDT -----  No ischemia.  LVEF, although abnormal on this study, was normal on his echo (more accurate).  Therefore overall no cardiovascular assessment further is needed.    Called and advised of above, he verbalized understanding.

## 2018-03-19 DIAGNOSIS — G43.119 INTRACTABLE MIGRAINE WITH AURA WITHOUT STATUS MIGRAINOSUS: ICD-10-CM

## 2018-03-19 RX ORDER — BUTALBITAL, ACETAMINOPHEN AND CAFFEINE 50; 325; 40 MG/1; MG/1; MG/1
TABLET ORAL
Qty: 20 TABLET | Refills: 2 | OUTPATIENT
Start: 2018-03-19

## 2018-03-22 ENCOUNTER — OFFICE VISIT (OUTPATIENT)
Dept: NEUROLOGY | Facility: CLINIC | Age: 63
End: 2018-03-22

## 2018-03-22 VITALS
HEART RATE: 75 BPM | SYSTOLIC BLOOD PRESSURE: 108 MMHG | WEIGHT: 169 LBS | BODY MASS INDEX: 26.46 KG/M2 | OXYGEN SATURATION: 98 % | DIASTOLIC BLOOD PRESSURE: 78 MMHG

## 2018-03-22 DIAGNOSIS — M47.812 SPONDYLOSIS OF CERVICAL REGION WITHOUT MYELOPATHY OR RADICULOPATHY: Primary | ICD-10-CM

## 2018-03-22 DIAGNOSIS — G43.119 INTRACTABLE MIGRAINE WITH AURA WITHOUT STATUS MIGRAINOSUS: ICD-10-CM

## 2018-03-22 DIAGNOSIS — M54.2 NECK PAIN: ICD-10-CM

## 2018-03-22 DIAGNOSIS — R51.9 FACIAL PAIN: ICD-10-CM

## 2018-03-22 PROCEDURE — 99214 OFFICE O/P EST MOD 30 MIN: CPT | Performed by: PSYCHIATRY & NEUROLOGY

## 2018-03-22 RX ORDER — GABAPENTIN 300 MG/1
300 CAPSULE ORAL 3 TIMES DAILY
Qty: 90 CAPSULE | Refills: 5
Start: 2018-03-22 | End: 2019-01-08 | Stop reason: SDUPTHER

## 2018-03-22 RX ORDER — BUTALBITAL, ACETAMINOPHEN AND CAFFEINE 50; 325; 40 MG/1; MG/1; MG/1
1 TABLET ORAL DAILY PRN
Qty: 20 TABLET | Refills: 2
Start: 2018-03-22 | End: 2019-01-08 | Stop reason: SDUPTHER

## 2018-03-22 NOTE — PROGRESS NOTES
Subjective:     Patient ID: Israel Reyna is a 63 y.o. male.    CC:   Chief Complaint   Patient presents with   • Migraine     needs refills       HPI:   History of Present Illness  The following portions of the patient's history were reviewed and updated as appropriate: allergies, current medications, past family history, past medical history, past social history, past surgical history and problem list.     Patient reports increase in headaches, neck pain, spells of facial drawing and pain. He has been to pain management once, had to cancel follow up because of his chronic bowel problems, had some dispute and withdrew from care. BP under better control.      Past Medical History:   Diagnosis Date   • Anxiety    • Bowel incontinence     w/ history of Stimulator Device    • CAD (coronary artery disease)    • Chest pain    • CHF (congestive heart failure)    • Depression    • Diabetes mellitus, type 2    • Diabetic peripheral neuropathy    • Dyslipidemia    • Fatigue    • GERD (gastroesophageal reflux disease)    • Hypertension    • Mental status change    • Migraine     Complex    • Migraine headache    • SOB (shortness of breath)        Past Surgical History:   Procedure Laterality Date   • CARDIAC CATHETERIZATION     • CARDIAC CATHETERIZATION N/A 10/14/2016    Procedure: Left Heart Cath;  Surgeon: Santiago Garner MD;  Location: [facility] CATH INVASIVE LOCATION;  Service:    • GASTRIC STIMULATOR IMPLANT SURGERY     • SMALL INTESTINE SURGERY      unspecified, x3 as an infant    • TESTICLE SURGERY         Social History     Social History   • Marital status:      Spouse name: N/A   • Number of children: N/A   • Years of education: N/A     Occupational History   • Not on file.     Social History Main Topics   • Smoking status: Never Smoker   • Smokeless tobacco: Never Used   • Alcohol use No   • Drug use: No   • Sexual activity: Defer     Other Topics Concern   • Not on file     Social History Narrative   •  No narrative on file       Family History   Problem Relation Age of Onset   • Heart failure Mother    • Heart attack Father         Review of Systems   Constitutional: Positive for fatigue. Negative for activity change.   HENT: Positive for nosebleeds. Negative for congestion and sinus pain.    Eyes: Negative for pain and itching.   Respiratory: Positive for chest tightness and shortness of breath.    Cardiovascular: Positive for chest pain and leg swelling.   Gastrointestinal: Positive for abdominal pain.   Endocrine: Negative for cold intolerance, heat intolerance and polydipsia.   Genitourinary: Negative for difficulty urinating and dysuria.   Musculoskeletal: Positive for back pain.   Skin: Negative.    Allergic/Immunologic: Negative.    Neurological: Positive for dizziness, speech difficulty, weakness, numbness and headaches.   Hematological: Does not bruise/bleed easily.   Psychiatric/Behavioral: Positive for confusion. Negative for sleep disturbance.        Objective:    Neurologic Exam     Mental Status   Oriented to person, place, and time.       Physical Exam   Constitutional: He is oriented to person, place, and time. He appears well-developed and well-nourished.   Cardiovascular: Normal rate and regular rhythm.    Pulmonary/Chest: Effort normal.   Neurological: He is alert and oriented to person, place, and time. He has normal reflexes.   Psychiatric: He has a normal mood and affect. His behavior is normal. Thought content normal.       Assessment/Plan:       Israel was seen today for migraine.    Diagnoses and all orders for this visit:    Spondylosis of cervical region without myelopathy or radiculopathy  -     gabapentin (NEURONTIN) 300 MG capsule; Take 1 capsule by mouth 3 (Three) Times a Day.    Intractable migraine with aura without status migrainosus  -     butalbital-acetaminophen-caffeine (FIORICET, ESGIC) -40 MG per tablet; Take 1 tablet by mouth Daily As Needed for Headache.  -      gabapentin (NEURONTIN) 300 MG capsule; Take 1 capsule by mouth 3 (Three) Times a Day.    Neck pain  -     gabapentin (NEURONTIN) 300 MG capsule; Take 1 capsule by mouth 3 (Three) Times a Day.    Facial pain  -     gabapentin (NEURONTIN) 300 MG capsule; Take 1 capsule by mouth 3 (Three) Times a Day.         The patient has read and signed the [facility] Controlled Substance Contract.  I will continue to see patient for regular follow up appointments.  They are well controlled on their medication.  CARLOS ALBERTO is updated every 3 months. The patient is aware of the potential for addiction and dependence.    Robbie Chaidez MD  3/22/2018

## 2018-06-01 ENCOUNTER — TELEPHONE (OUTPATIENT)
Dept: NEUROLOGY | Facility: CLINIC | Age: 63
End: 2018-06-01

## 2018-06-01 NOTE — TELEPHONE ENCOUNTER
----- Message from Esperanza Barry sent at 6/1/2018 12:33 PM EDT -----  Regarding: DR. PAUL  PATIENT CALLED ABOUT HIS PAIN MEDICATION NOT BEING APPROVED BY HIS INSURANCE.  HE WOULD LIKE TO TRY ANOTHER.

## 2018-06-05 NOTE — TELEPHONE ENCOUNTER
Pt called in and informed the  that he contacted his insurance and wanted us to talk to them about getting his Fioricet approved.    Completed the PA. Attemepted to contacted the pt to inform him. No answer.

## 2018-07-26 DIAGNOSIS — G43.119 INTRACTABLE MIGRAINE WITH AURA WITHOUT STATUS MIGRAINOSUS: ICD-10-CM

## 2018-07-26 RX ORDER — TOPIRAMATE 25 MG/1
TABLET ORAL
Qty: 60 TABLET | Refills: 5 | OUTPATIENT
Start: 2018-07-26

## 2018-08-27 DIAGNOSIS — G43.119 INTRACTABLE MIGRAINE WITH AURA WITHOUT STATUS MIGRAINOSUS: ICD-10-CM

## 2018-08-27 RX ORDER — TOPIRAMATE 25 MG/1
TABLET ORAL
Qty: 60 TABLET | Refills: 5 | Status: SHIPPED | OUTPATIENT
Start: 2018-08-27 | End: 2020-03-04

## 2018-10-06 DIAGNOSIS — M47.812 SPONDYLOSIS OF CERVICAL REGION WITHOUT MYELOPATHY OR RADICULOPATHY: ICD-10-CM

## 2018-10-06 DIAGNOSIS — G43.119 INTRACTABLE MIGRAINE WITH AURA WITHOUT STATUS MIGRAINOSUS: ICD-10-CM

## 2018-10-06 DIAGNOSIS — M54.2 NECK PAIN: ICD-10-CM

## 2018-10-06 DIAGNOSIS — R51.9 FACIAL PAIN: ICD-10-CM

## 2018-10-08 RX ORDER — BUTALBITAL, ACETAMINOPHEN AND CAFFEINE 50; 325; 40 MG/1; MG/1; MG/1
TABLET ORAL
Qty: 30 TABLET | Refills: 2 | OUTPATIENT
Start: 2018-10-08

## 2018-10-08 RX ORDER — GABAPENTIN 300 MG/1
CAPSULE ORAL
Qty: 90 CAPSULE | Refills: 5 | OUTPATIENT
Start: 2018-10-08

## 2019-01-08 ENCOUNTER — OFFICE VISIT (OUTPATIENT)
Dept: NEUROLOGY | Facility: CLINIC | Age: 64
End: 2019-01-08

## 2019-01-08 VITALS — DIASTOLIC BLOOD PRESSURE: 80 MMHG | SYSTOLIC BLOOD PRESSURE: 130 MMHG | WEIGHT: 185 LBS | BODY MASS INDEX: 28.97 KG/M2

## 2019-01-08 DIAGNOSIS — M54.2 NECK PAIN: ICD-10-CM

## 2019-01-08 DIAGNOSIS — M47.812 SPONDYLOSIS OF CERVICAL REGION WITHOUT MYELOPATHY OR RADICULOPATHY: ICD-10-CM

## 2019-01-08 DIAGNOSIS — R51.9 FACIAL PAIN: ICD-10-CM

## 2019-01-08 DIAGNOSIS — G43.119 INTRACTABLE MIGRAINE WITH AURA WITHOUT STATUS MIGRAINOSUS: ICD-10-CM

## 2019-01-08 PROCEDURE — 99214 OFFICE O/P EST MOD 30 MIN: CPT | Performed by: PSYCHIATRY & NEUROLOGY

## 2019-01-08 RX ORDER — GABAPENTIN 300 MG/1
300 CAPSULE ORAL 3 TIMES DAILY
Qty: 90 CAPSULE | Refills: 5
Start: 2019-01-08 | End: 2019-09-04 | Stop reason: SDUPTHER

## 2019-01-08 RX ORDER — BUTALBITAL, ACETAMINOPHEN AND CAFFEINE 50; 325; 40 MG/1; MG/1; MG/1
1 TABLET ORAL DAILY PRN
Qty: 50 TABLET | Refills: 2
Start: 2019-01-08 | End: 2019-09-03

## 2019-01-08 NOTE — PROGRESS NOTES
.Subjective:     Patient ID: Israel Reyna is a 63 y.o. male.    CC:   Chief Complaint   Patient presents with   • Med Refill       HPI:   History of Present Illness  The following portions of the patient's history were reviewed and updated as appropriate: allergies, current medications, past family history, past medical history, past social history, past surgical history and problem list.     Patient reports that he is having more frequent headaches, some a/w confusion, incontinence since has been out of gabapentin and Fioricet. BP is up and down.    Past Medical History:   Diagnosis Date   • Anxiety    • Bowel incontinence     w/ history of Stimulator Device    • CAD (coronary artery disease)    • Chest pain    • CHF (congestive heart failure) (CMS/[facility])    • Depression    • Diabetes mellitus, type 2 (CMS/[facility])    • Diabetic peripheral neuropathy (CMS/[facility])    • Dyslipidemia    • Fatigue    • GERD (gastroesophageal reflux disease)    • Hypertension    • Mental status change    • Migraine     Complex    • Migraine headache    • SOB (shortness of breath)        Past Surgical History:   Procedure Laterality Date   • CARDIAC CATHETERIZATION     • CARDIAC CATHETERIZATION N/A 10/14/2016    Procedure: Left Heart Cath;  Surgeon: Santiago Garner MD;  Location: [facility] CATH INVASIVE LOCATION;  Service:    • GASTRIC STIMULATOR IMPLANT SURGERY     • SMALL INTESTINE SURGERY      unspecified, x3 as an infant    • TESTICLE SURGERY         Social History     Socioeconomic History   • Marital status:      Spouse name: Not on file   • Number of children: Not on file   • Years of education: Not on file   • Highest education level: Not on file   Social Needs   • Financial resource strain: Not on file   • Food insecurity - worry: Not on file   • Food insecurity - inability: Not on file   • Transportation needs - medical: Not on file   • Transportation needs - non-medical: Not on file   Occupational History   • Not on file    Tobacco Use   • Smoking status: Never Smoker   • Smokeless tobacco: Never Used   Substance and Sexual Activity   • Alcohol use: No   • Drug use: No   • Sexual activity: Defer   Other Topics Concern   • Not on file   Social History Narrative   • Not on file       Family History   Problem Relation Age of Onset   • Heart failure Mother    • Heart attack Father         Review of Systems   Constitutional: Negative for chills, fatigue, fever and unexpected weight change.   HENT: Negative for ear pain, hearing loss, nosebleeds, rhinorrhea and sore throat.    Eyes: Positive for visual disturbance. Negative for photophobia, pain, discharge and itching.   Respiratory: Positive for shortness of breath. Negative for cough, chest tightness and wheezing.    Cardiovascular: Negative for chest pain, palpitations and leg swelling.   Gastrointestinal: Positive for diarrhea. Negative for abdominal pain, blood in stool, constipation, nausea and vomiting.   Genitourinary: Negative for dysuria, frequency, hematuria and urgency.   Musculoskeletal: Positive for neck pain and neck stiffness. Negative for arthralgias, back pain, gait problem, joint swelling and myalgias.   Skin: Negative for rash and wound.   Allergic/Immunologic: Negative for environmental allergies and food allergies.   Neurological: Positive for headaches. Negative for dizziness, tremors, seizures, syncope, speech difficulty, weakness, light-headedness and numbness.   Hematological: Negative for adenopathy. Does not bruise/bleed easily.   Psychiatric/Behavioral: Positive for sleep disturbance. Negative for agitation, confusion, decreased concentration, hallucinations and suicidal ideas. The patient is not nervous/anxious.    All other systems reviewed and are negative.       Objective:    Neurologic Exam     Mental Status   Oriented to person, place, and time.       Physical Exam   Constitutional: He is oriented to person, place, and time. He appears well-developed and  well-nourished.   Cardiovascular: Normal rate and regular rhythm.   Pulmonary/Chest: Effort normal.   Neurological: He is alert and oriented to person, place, and time. He has normal reflexes.   Psychiatric: He has a normal mood and affect. His behavior is normal. Thought content normal.       Assessment/Plan:       Israel was seen today for med refill.    Diagnoses and all orders for this visit:    Intractable migraine with aura without status migrainosus  -     butalbital-acetaminophen-caffeine (FIORICET, ESGIC) -40 MG per tablet; Take 1 tablet by mouth Daily As Needed for Headache.  -     gabapentin (NEURONTIN) 300 MG capsule; Take 1 capsule by mouth 3 (Three) Times a Day.    Spondylosis of cervical region without myelopathy or radiculopathy  -     gabapentin (NEURONTIN) 300 MG capsule; Take 1 capsule by mouth 3 (Three) Times a Day.    Neck pain  -     gabapentin (NEURONTIN) 300 MG capsule; Take 1 capsule by mouth 3 (Three) Times a Day.    Facial pain  -     gabapentin (NEURONTIN) 300 MG capsule; Take 1 capsule by mouth 3 (Three) Times a Day.    CARLOS ALBERTO query unsuccessful secondary to prolonged retrieval time/inoperable CARLOS ALBERTO system.          Robbie Chaidez MD  1/8/2019

## 2019-01-09 ENCOUNTER — TELEPHONE (OUTPATIENT)
Dept: NEUROLOGY | Facility: CLINIC | Age: 64
End: 2019-01-09

## 2019-01-09 DIAGNOSIS — G43.119 INTRACTABLE MIGRAINE WITH AURA WITHOUT STATUS MIGRAINOSUS: ICD-10-CM

## 2019-01-09 RX ORDER — VERAPAMIL HYDROCHLORIDE 100 MG/1
100 CAPSULE, EXTENDED RELEASE ORAL NIGHTLY
Qty: 90 CAPSULE | Refills: 1 | Status: SHIPPED | OUTPATIENT
Start: 2019-01-09 | End: 2019-01-28 | Stop reason: ALTCHOICE

## 2019-01-09 RX ORDER — TOPIRAMATE 25 MG/1
25 TABLET ORAL 2 TIMES DAILY
Qty: 180 TABLET | Refills: 1 | Status: SHIPPED | OUTPATIENT
Start: 2019-01-09 | End: 2019-06-30 | Stop reason: SDUPTHER

## 2019-01-09 NOTE — TELEPHONE ENCOUNTER
----- Message from Esperanza Barry sent at 1/9/2019  8:39 AM EST -----  Regarding: DR. PAUL  PATIENT CALLED, AND HAD FORGOT THAT HE NEEDED 2 REFILLS ON topiramate AND ON verapamil WHEN HE WAS IN YESTERDAY.  I THINK THAT IS THE 2.  HE IS DOING THE 90 DAYS AT [facility].

## 2019-01-18 ENCOUNTER — TELEPHONE (OUTPATIENT)
Dept: NEUROLOGY | Facility: CLINIC | Age: 64
End: 2019-01-18

## 2019-01-18 NOTE — TELEPHONE ENCOUNTER
PATIENT CALLED IN AND STATED THAT HE CAN'T AFFORD THE VERAPAMIL NOW DUE TO NEW INSURANCE AND IS WONDERING IF THERE IS ANYTHING CHEAPER

## 2019-01-28 RX ORDER — AMLODIPINE BESYLATE 10 MG/1
10 TABLET ORAL DAILY
Qty: 90 TABLET | Refills: 3 | Status: SHIPPED | OUTPATIENT
Start: 2019-01-28 | End: 2020-07-01

## 2019-02-25 ENCOUNTER — TELEPHONE (OUTPATIENT)
Dept: NEUROLOGY | Facility: CLINIC | Age: 64
End: 2019-02-25

## 2019-02-25 NOTE — TELEPHONE ENCOUNTER
----- Message from Esperanza Barry sent at 2/25/2019  1:29 PM EST -----  Regarding: DR. PAUL  PATIENT CALLED AND WAS VERY UPSET.  HE WANTS TO KNOW WHAT KIND OF MEDICATION CHANGE/OR NOT CHANGE THAT WAS DONE ON LAST VISIT.  HE IS IN PAIN, AND HAVING MEMORY ISSUES.  PLEASE CALL.  I CONFIRMED PHONE.

## 2019-02-28 NOTE — TELEPHONE ENCOUNTER
Pt called back after talking to insurance.  They advise him to try Sumatriptan Succinate BID in place of the Butalbital because of medication being expensive.

## 2019-03-14 ENCOUNTER — TELEPHONE (OUTPATIENT)
Dept: NEUROLOGY | Facility: CLINIC | Age: 64
End: 2019-03-14

## 2019-03-14 NOTE — TELEPHONE ENCOUNTER
Pt called wanting a tier reduction for his fioricet. I completed the tier reduction however the pt stated that he still cannot afford the medication. He wants to know if you can give him something else that is related to the fioricet that he can tolerate because he has nothing right now. Please advise.

## 2019-05-10 ENCOUNTER — HOSPITAL ENCOUNTER (OUTPATIENT)
Dept: GENERAL RADIOLOGY | Facility: HOSPITAL | Age: 64
Discharge: HOME | End: 2019-05-10
Admitting: INTERNAL MEDICINE

## 2019-05-10 ENCOUNTER — TRANSCRIBE ORDERS (OUTPATIENT)
Dept: ADMINISTRATIVE | Facility: HOSPITAL | Age: 64
End: 2019-05-10

## 2019-05-10 DIAGNOSIS — R07.9 CHEST PAIN, UNSPECIFIED TYPE: Primary | ICD-10-CM

## 2019-05-10 DIAGNOSIS — R07.9 CHEST PAIN, UNSPECIFIED TYPE: ICD-10-CM

## 2019-05-10 PROCEDURE — 71046 X-RAY EXAM CHEST 2 VIEWS: CPT

## 2019-06-30 DIAGNOSIS — G43.119 INTRACTABLE MIGRAINE WITH AURA WITHOUT STATUS MIGRAINOSUS: ICD-10-CM

## 2019-07-01 RX ORDER — TOPIRAMATE 25 MG/1
TABLET ORAL
Qty: 180 TABLET | Refills: 1 | Status: SHIPPED | OUTPATIENT
Start: 2019-07-01 | End: 2020-01-29

## 2019-08-29 DIAGNOSIS — G43.119 INTRACTABLE MIGRAINE WITH AURA WITHOUT STATUS MIGRAINOSUS: ICD-10-CM

## 2019-08-29 DIAGNOSIS — M54.2 NECK PAIN: ICD-10-CM

## 2019-08-29 DIAGNOSIS — R51.9 FACIAL PAIN: ICD-10-CM

## 2019-08-29 DIAGNOSIS — M47.812 SPONDYLOSIS OF CERVICAL REGION WITHOUT MYELOPATHY OR RADICULOPATHY: ICD-10-CM

## 2019-08-29 RX ORDER — GABAPENTIN 300 MG/1
CAPSULE ORAL
Qty: 90 CAPSULE | Refills: 5 | OUTPATIENT
Start: 2019-08-29

## 2019-09-03 ENCOUNTER — OFFICE VISIT (OUTPATIENT)
Dept: NEUROSURGERY | Facility: CLINIC | Age: 64
End: 2019-09-03

## 2019-09-03 VITALS
BODY MASS INDEX: 28.75 KG/M2 | RESPIRATION RATE: 16 BRPM | HEIGHT: 67 IN | DIASTOLIC BLOOD PRESSURE: 72 MMHG | SYSTOLIC BLOOD PRESSURE: 100 MMHG | WEIGHT: 183.2 LBS

## 2019-09-03 DIAGNOSIS — I65.23 BILATERAL CAROTID ARTERY STENOSIS: Primary | ICD-10-CM

## 2019-09-03 PROCEDURE — 99203 OFFICE O/P NEW LOW 30 MIN: CPT | Performed by: NEUROLOGICAL SURGERY

## 2019-09-03 RX ORDER — ACETAMINOPHEN AND CODEINE PHOSPHATE 300; 30 MG/1; MG/1
1 TABLET ORAL EVERY 4 HOURS PRN
COMMUNITY
End: 2019-09-04 | Stop reason: SDUPTHER

## 2019-09-03 NOTE — PROGRESS NOTES
NAME: ROHITH GODINEZ   DOS: 9/3/2019  : 1955  PCP: Edilberto Cartwright DO    Chief Complaint:    Chief Complaint   Patient presents with   • Balance issue   • Speech difficulties   • Dizziness       History of Present Illness:  64 y.o. male   I saw this very pleasant 64-year-old gentleman with a complex history of hemiplegic migraines and headaches for history of carotid ultrasound reviewed.  He reports multiple complaints of intermittent visual, daily headache, speech and walking disorders and memory issues.  He denies any other significant symptomatologies.  He is followed by neurology for treatment of his migraines he has not seen them recently he has not had a any ictus to suggest amaurosis    PMHX  Allergies:  Allergies   Allergen Reactions   • Actos [Pioglitazone]      congestive heart failure   • Avandia [Rosiglitazone]      congestive heart failure.   • Darvon [Propoxyphene]      angioedema   • Talwin [Pentazocine]      angioedema     Medications    Current Outpatient Medications:   •  acetaminophen-codeine (TYLENOL #3) 300-30 MG per tablet, Take 1 tablet by mouth Every 4 (Four) Hours As Needed for Moderate Pain ., Disp: , Rfl:   •  ALPRAZolam (XANAX) 0.5 MG tablet, Take 1 tablet by mouth 1 (One) Time As Needed for Anxiety or Sleep for up to 1 dose. Repeat if needed for MRI, Disp: 3 tablet, Rfl: 0  •  amLODIPine (NORVASC) 10 MG tablet, Take 1 tablet by mouth Daily., Disp: 90 tablet, Rfl: 3  •  aspirin 81 MG chewable tablet, Chew 81 mg Every Night., Disp: , Rfl:   •  clonazePAM (KLONOPIN) 0.5 MG tablet, Take 0.5 mg by mouth 2 (Two) Times a Day As Needed for Seizures., Disp: , Rfl:   •  CloNIDine (CATAPRES) 0.1 MG tablet, Take 0.1 mg by mouth Daily As Needed for High Blood Pressure., Disp: , Rfl:   •  gabapentin (NEURONTIN) 300 MG capsule, Take 1 capsule by mouth 3 (Three) Times a Day., Disp: 90 capsule, Rfl: 5  •  glipiZIDE (GLUCOTROL) 10 MG tablet, Take 10 mg by mouth 2 (Two) Times a Day  Before Meals., Disp: , Rfl:   •  Incontinence Supply Disposable (NU-FIT ADULT UNDERWEAR MEDIUM) misc, , Disp: , Rfl:   •  insulin glargine (LANTUS) 100 UNIT/ML injection, Inject 75 Units under the skin 2 (Two) Times a Day., Disp: , Rfl:   •  lisinopril (PRINIVIL,ZESTRIL) 5 MG tablet, Take 5 mg by mouth Daily., Disp: , Rfl:   •  metFORMIN (GLUCOPHAGE) 1000 MG tablet, Take 1,000 mg by mouth 2 (Two) Times a Day With Meals., Disp: , Rfl:   •  metoprolol tartrate (LOPRESSOR) 25 MG tablet, 2 (Two) Times a Day., Disp: , Rfl:   •  nitroglycerin (NITROSTAT) 0.4 MG SL tablet, Place 0.4 mg under the tongue Every 5 (Five) Minutes As Needed for chest pain. Take no more than 3 doses in 15 minutes., Disp: , Rfl:   •  omeprazole (PriLOSEC) 40 MG capsule, Take 40 mg by mouth Daily., Disp: , Rfl:   •  simvastatin (ZOCOR) 20 MG tablet, Take 10 mg by mouth Every Night., Disp: , Rfl:   •  topiramate (TOPAMAX) 25 MG tablet, TAKE ONE TABLET BY MOUTH TWICE DAILY, Disp: 60 tablet, Rfl: 5  •  topiramate (TOPAMAX) 25 MG tablet, TAKE 1 TABLET TWICE A DAY, Disp: 180 tablet, Rfl: 1  •  valsartan (DIOVAN) 160 MG tablet, Daily., Disp: , Rfl:   Past Medical History:  Past Medical History:   Diagnosis Date   • Anxiety    • Bowel incontinence     w/ history of Stimulator Device    • CAD (coronary artery disease)    • Chest pain    • CHF (congestive heart failure) (CMS/HCC)    • Depression    • Diabetes mellitus, type 2 (CMS/HCC)    • Diabetic peripheral neuropathy (CMS/HCC)    • Dyslipidemia    • Fatigue    • GERD (gastroesophageal reflux disease)    • Hypertension    • Mental status change    • Migraine     Complex    • Migraine headache    • SOB (shortness of breath)      Past Surgical History:  Past Surgical History:   Procedure Laterality Date   • CARDIAC CATHETERIZATION     • CARDIAC CATHETERIZATION N/A 10/14/2016    Procedure: Left Heart Cath;  Surgeon: Santiago Garner MD;  Location: [facility] CATH INVASIVE LOCATION;  Service:    • GASTRIC  STIMULATOR IMPLANT SURGERY     • SMALL INTESTINE SURGERY      unspecified, x3 as an infant    • TESTICLE SURGERY       Social Hx:  Social History     Tobacco Use   • Smoking status: Never Smoker   • Smokeless tobacco: Never Used   Substance Use Topics   • Alcohol use: No   • Drug use: No     Family Hx:  Family History   Problem Relation Age of Onset   • Heart failure Mother    • Heart attack Father      Review of Systems:        Review of Systems   Constitutional: Positive for fatigue. Negative for activity change, appetite change, chills, diaphoresis, fever and unexpected weight change.   HENT: Positive for hearing loss, trouble swallowing and voice change. Negative for congestion, dental problem, drooling, ear discharge, ear pain, facial swelling, mouth sores, nosebleeds, postnasal drip, rhinorrhea, sinus pressure, sneezing, sore throat and tinnitus.    Eyes: Negative for photophobia, pain, discharge, redness, itching and visual disturbance.   Respiratory: Positive for shortness of breath. Negative for apnea, cough, choking, chest tightness, wheezing and stridor.    Cardiovascular: Positive for chest pain and leg swelling. Negative for palpitations.   Gastrointestinal: Negative for abdominal distention, abdominal pain, anal bleeding, blood in stool, constipation, diarrhea, nausea, rectal pain and vomiting.   Endocrine: Negative for cold intolerance, heat intolerance, polydipsia, polyphagia and polyuria.   Genitourinary: Negative for decreased urine volume, difficulty urinating, dysuria, enuresis, flank pain, frequency, genital sores, hematuria and urgency.   Musculoskeletal: Positive for gait problem, neck pain and neck stiffness. Negative for arthralgias, back pain, joint swelling and myalgias.   Skin: Negative for color change, pallor, rash and wound.   Allergic/Immunologic: Negative for environmental allergies, food allergies and immunocompromised state.   Neurological: Positive for dizziness, facial asymmetry,  speech difficulty, light-headedness, numbness and headaches. Negative for tremors, seizures, syncope and weakness.   Hematological: Negative for adenopathy. Does not bruise/bleed easily.   Psychiatric/Behavioral: Negative for agitation, behavioral problems, confusion, decreased concentration, dysphoric mood, hallucinations, self-injury, sleep disturbance and suicidal ideas. The patient is not nervous/anxious and is not hyperactive.    All other systems reviewed and are negative.       I have reviewed this note template and all pertinent parts of the review of systems social, family history, surgical history and medication list    Physical Examination:  Vitals:    09/03/19 1503   BP: 100/72   Resp: 16      General Appearance:   Well developed, well nourished, well groomed, alert, and cooperative.  Neurological examination:  Neurologic Exam  Vital signs were reviewed and documented in the chart  Patient appeared in good neurologic function with normal comprehension fluent speech  Mood and affect are normal  Sense of smell deferred    Pupils symmetric equally reactive funduscopic exam not visualized   Visual fields intact to confrontation  Extraocular movements intact  Face motor function is symmetric  Facial sensations normal  Hearing intact to finger rub hearing intact to finger rub  Tongue is midline  Palate symmetric  Swallowing normal  Shoulder shrug normal    Muscle bulk and tone normal  5 out of 5 strength no motor drift  Gait normal intact-wide-based  Negative Romberg  No clonus long tract signs or myelopathy    Reflexes symmetric  No edema noted and extremities skin appears normal  His neck is supple with no lesions        Review of Imaging/DATA:  From my standpoint I reviewed the CT I see no evidence of abnormal hydrocephalus, mass-effect midline shift or old significant stroke, his carotid ultrasound was unremarkable in terms of stenosis as mild to moderate plaque less than 50% bilateral on my read I  reviewed these films personally  Diagnoses/Plan:    Mr. Reyna is a 64 y.o. male this gentleman presents he 64 he has a history of a   Sphincter stimulator and he cannot get an MRI I have recommended that he follow-up with his physician who put that into see about possible removal given the complexity of his complaints.  We will set a neurology follow-up as well as a CTA of the head neck to check the vascular regions of the head and neck.  He has no evidence of any significant stenosis of the carotid bulbs bilaterally that I would recommend surgery but does carry a high stroke risk in his family reportedly.  He is to continue aspirin.  We made appropriate follow-ups.  We will review the CTA and make further recommendations

## 2019-09-04 ENCOUNTER — OFFICE VISIT (OUTPATIENT)
Dept: NEUROLOGY | Facility: CLINIC | Age: 64
End: 2019-09-04

## 2019-09-04 VITALS
DIASTOLIC BLOOD PRESSURE: 74 MMHG | BODY MASS INDEX: 28.09 KG/M2 | WEIGHT: 179 LBS | HEIGHT: 67 IN | SYSTOLIC BLOOD PRESSURE: 110 MMHG | OXYGEN SATURATION: 98 % | HEART RATE: 83 BPM

## 2019-09-04 DIAGNOSIS — R51.9 FACIAL PAIN: ICD-10-CM

## 2019-09-04 DIAGNOSIS — M54.2 NECK PAIN: ICD-10-CM

## 2019-09-04 DIAGNOSIS — G43.119 INTRACTABLE MIGRAINE WITH AURA WITHOUT STATUS MIGRAINOSUS: ICD-10-CM

## 2019-09-04 DIAGNOSIS — M47.812 SPONDYLOSIS OF CERVICAL REGION WITHOUT MYELOPATHY OR RADICULOPATHY: ICD-10-CM

## 2019-09-04 PROCEDURE — 99214 OFFICE O/P EST MOD 30 MIN: CPT | Performed by: PSYCHIATRY & NEUROLOGY

## 2019-09-04 RX ORDER — GABAPENTIN 300 MG/1
300 CAPSULE ORAL 3 TIMES DAILY
Qty: 90 CAPSULE | Refills: 5
Start: 2019-09-04 | End: 2020-03-04 | Stop reason: SDUPTHER

## 2019-09-04 RX ORDER — ACETAMINOPHEN AND CODEINE PHOSPHATE 300; 30 MG/1; MG/1
1 TABLET ORAL DAILY PRN
Qty: 15 TABLET | Refills: 2
Start: 2019-09-04 | End: 2020-02-04 | Stop reason: SDUPTHER

## 2019-09-04 NOTE — PROGRESS NOTES
Subjective:     Patient ID: Israel Reyna is a 64 y.o. male.    CC:   Chief Complaint   Patient presents with   • Migraine       HPI:   History of Present Illness  The following portions of the patient's history were reviewed and updated as appropriate: allergies, current medications, past family history, past medical history, past social history, past surgical history and problem list.     Patient reports that his headaches and spells of confusion have gotten worse since he ran out of gabapentin, reports that medication appeared to help. His insurance denied Fioricet. He has found Tylenol #3 somewhat helpful. He has recently had a f/u CT head and carotid duplex that was stable from 2017 with <50% LICA stenosis, has seen Dr. Gavin, scheduled for a CTA of head and neck, cannot have an MRI because of a anal sphincter stimulator.    Past Medical History:   Diagnosis Date   • Anxiety    • Bowel incontinence     w/ history of Stimulator Device    • CAD (coronary artery disease)    • Chest pain    • CHF (congestive heart failure) (CMS/[facility])    • Depression    • Diabetes mellitus, type 2 (CMS/[facility])    • Diabetic peripheral neuropathy (CMS/[facility])    • Dyslipidemia    • Fatigue    • GERD (gastroesophageal reflux disease)    • Hypertension    • Mental status change    • Migraine     Complex    • Migraine headache    • SOB (shortness of breath)        Past Surgical History:   Procedure Laterality Date   • CARDIAC CATHETERIZATION     • CARDIAC CATHETERIZATION N/A 10/14/2016    Procedure: Left Heart Cath;  Surgeon: Santiago Garner MD;  Location: [facility] CATH INVASIVE LOCATION;  Service:    • GASTRIC STIMULATOR IMPLANT SURGERY     • SMALL INTESTINE SURGERY      unspecified, x3 as an infant    • TESTICLE SURGERY         Social History     Socioeconomic History   • Marital status:      Spouse name: Not on file   • Number of children: Not on file   • Years of education: Not on file   • Highest education level: Not on file    Tobacco Use   • Smoking status: Never Smoker   • Smokeless tobacco: Never Used   Substance and Sexual Activity   • Alcohol use: No   • Drug use: No   • Sexual activity: Defer       Family History   Problem Relation Age of Onset   • Heart failure Mother    • Heart attack Father         Review of Systems   Constitutional: Negative for chills, fatigue, fever and unexpected weight change.   HENT: Negative for ear pain, hearing loss, nosebleeds, rhinorrhea and sore throat.    Eyes: Negative for photophobia, pain, discharge, itching and visual disturbance.   Respiratory: Negative for cough, chest tightness, shortness of breath and wheezing.    Cardiovascular: Negative for chest pain, palpitations and leg swelling.   Gastrointestinal: Negative for abdominal pain, blood in stool, constipation, diarrhea, nausea and vomiting.   Genitourinary: Negative for dysuria, frequency, hematuria and urgency.   Musculoskeletal: Negative for arthralgias, back pain, gait problem, joint swelling, myalgias, neck pain and neck stiffness.   Skin: Negative for rash and wound.   Allergic/Immunologic: Negative for environmental allergies and food allergies.   Neurological: Negative for dizziness, tremors, seizures, syncope, speech difficulty, weakness, light-headedness, numbness and headaches.   Hematological: Negative for adenopathy. Does not bruise/bleed easily.   Psychiatric/Behavioral: Negative for agitation, confusion, decreased concentration, hallucinations, sleep disturbance and suicidal ideas. The patient is not nervous/anxious.         Objective:    Neurologic Exam     Mental Status   Oriented to person, place, and time.       Physical Exam   Constitutional: He is oriented to person, place, and time. He appears well-developed and well-nourished.   Cardiovascular: Normal rate and regular rhythm.   Pulmonary/Chest: Effort normal.   Neurological: He is alert and oriented to person, place, and time. He has normal reflexes.   Psychiatric:  He has a normal mood and affect. His behavior is normal. Thought content normal.       Assessment/Plan:       Israel was seen today for migraine.    Diagnoses and all orders for this visit:    Intractable migraine with aura without status migrainosus  -     gabapentin (NEURONTIN) 300 MG capsule; Take 1 capsule by mouth 3 (Three) Times a Day.  -     acetaminophen-codeine (TYLENOL #3) 300-30 MG per tablet; Take 1 tablet by mouth Daily As Needed for Moderate Pain .    Spondylosis of cervical region without myelopathy or radiculopathy  -     gabapentin (NEURONTIN) 300 MG capsule; Take 1 capsule by mouth 3 (Three) Times a Day.    Neck pain  -     gabapentin (NEURONTIN) 300 MG capsule; Take 1 capsule by mouth 3 (Three) Times a Day.    Facial pain  -     gabapentin (NEURONTIN) 300 MG capsule; Take 1 capsule by mouth 3 (Three) Times a Day.    The patient has read and signed the [facility] Controlled Substance Contract.  I will continue to see patient for regular follow up appointments.  They are well controlled on their medication.  CARLOS ALBERTO is updated every 3 months. The patient is aware of the potential for addiction and dependence.         Robbie Chaidez MD  9/4/2019

## 2019-09-09 ENCOUNTER — HOSPITAL ENCOUNTER (OUTPATIENT)
Dept: CT IMAGING | Facility: HOSPITAL | Age: 64
Discharge: HOME | End: 2019-09-09
Admitting: NEUROLOGICAL SURGERY

## 2019-09-09 DIAGNOSIS — I65.23 BILATERAL CAROTID ARTERY STENOSIS: ICD-10-CM

## 2019-09-09 LAB — CREAT BLDA-MCNC: 1.2 MG/DL (ref 0.6–1.3)

## 2019-09-09 PROCEDURE — 82565 ASSAY OF CREATININE: CPT

## 2019-09-09 PROCEDURE — 70498 CT ANGIOGRAPHY NECK: CPT

## 2019-09-09 PROCEDURE — 0 IOPAMIDOL PER 1 ML: Performed by: NEUROLOGICAL SURGERY

## 2019-09-09 PROCEDURE — 70496 CT ANGIOGRAPHY HEAD: CPT

## 2019-09-09 RX ADMIN — IOPAMIDOL 85 ML: 755 INJECTION, SOLUTION INTRAVENOUS at 15:20

## 2019-09-11 ENCOUNTER — TELEPHONE (OUTPATIENT)
Dept: NEUROSURGERY | Facility: CLINIC | Age: 64
End: 2019-09-11

## 2019-09-11 NOTE — TELEPHONE ENCOUNTER
Pt called wanting the results of his CTA. Please let him know that Dr. Gavin would like to review these at his next follow up appt.

## 2019-09-13 NOTE — TELEPHONE ENCOUNTER
Pt called today to schedule an appointment with Dr. Gavin.  During our conversation he mentioned that he had called several times to get his results but had not received a call back.  He is anxious to get the results before he goes out of town on Tuesday (9/17).  I told him that Dr. Gavin wants to go over the results in the office but he was hopeful that he could receive a call about them anyway.  He wants this before he goes out of town because he doesn't get good reception where they are going.      His VM isn't currently set up so if you are unable to leave a message please try calling again.

## 2019-12-20 DIAGNOSIS — G43.119 INTRACTABLE MIGRAINE WITH AURA WITHOUT STATUS MIGRAINOSUS: ICD-10-CM

## 2019-12-20 RX ORDER — TOPIRAMATE 25 MG/1
TABLET ORAL
Qty: 180 TABLET | Refills: 1 | OUTPATIENT
Start: 2019-12-20

## 2020-01-29 ENCOUNTER — TELEPHONE (OUTPATIENT)
Dept: NEUROLOGY | Facility: CLINIC | Age: 65
End: 2020-01-29

## 2020-01-29 DIAGNOSIS — G43.119 INTRACTABLE MIGRAINE WITH AURA WITHOUT STATUS MIGRAINOSUS: ICD-10-CM

## 2020-01-29 RX ORDER — TOPIRAMATE 25 MG/1
TABLET ORAL
Qty: 180 TABLET | Refills: 1 | Status: SHIPPED | OUTPATIENT
Start: 2020-01-29 | End: 2020-03-04 | Stop reason: SDUPTHER

## 2020-02-04 RX ORDER — ACETAMINOPHEN AND CODEINE PHOSPHATE 300; 30 MG/1; MG/1
1 TABLET ORAL DAILY PRN
Qty: 15 TABLET | Refills: 0 | Status: SHIPPED | OUTPATIENT
Start: 2020-02-04 | End: 2020-05-22 | Stop reason: SDUPTHER

## 2020-02-04 NOTE — TELEPHONE ENCOUNTER
I still don't see an updated arlen in media. Do you mind to have Dr. Chaidez address this medication refill since he is in the office today? Otherwise, I will have to address it when I return to the office tomorrow. Thanks, Norma

## 2020-02-04 NOTE — TELEPHONE ENCOUNTER
SPOKE WITH PT AND LET HIM KNOW HIS MEDICATION WAS SENT TO PHARMACY AND HE WILL NEED A F/U FOR FURTHER REFILLS.  PT IS TO CALL BACK TO RESCHEDULE DUE TO CHECKING WITH WIFE'S SCHEDULE

## 2020-02-04 NOTE — TELEPHONE ENCOUNTER
Syed#[id number] reviewed. Last filled 11/19/19 #15. Will send in 1 #15 script until he is seen in office. Thanks.

## 2020-03-04 ENCOUNTER — OFFICE VISIT (OUTPATIENT)
Dept: NEUROLOGY | Facility: CLINIC | Age: 65
End: 2020-03-04

## 2020-03-04 VITALS
WEIGHT: 186 LBS | OXYGEN SATURATION: 98 % | HEART RATE: 72 BPM | BODY MASS INDEX: 29.19 KG/M2 | DIASTOLIC BLOOD PRESSURE: 80 MMHG | SYSTOLIC BLOOD PRESSURE: 128 MMHG | HEIGHT: 67 IN

## 2020-03-04 DIAGNOSIS — M54.2 NECK PAIN: ICD-10-CM

## 2020-03-04 DIAGNOSIS — R51.9 FACIAL PAIN: ICD-10-CM

## 2020-03-04 DIAGNOSIS — G43.119 INTRACTABLE MIGRAINE WITH AURA WITHOUT STATUS MIGRAINOSUS: Primary | ICD-10-CM

## 2020-03-04 DIAGNOSIS — M47.812 SPONDYLOSIS OF CERVICAL REGION WITHOUT MYELOPATHY OR RADICULOPATHY: ICD-10-CM

## 2020-03-04 PROCEDURE — 99213 OFFICE O/P EST LOW 20 MIN: CPT | Performed by: NURSE PRACTITIONER

## 2020-03-04 RX ORDER — ASCORBIC ACID 500 MG
500 TABLET ORAL 2 TIMES DAILY
COMMUNITY

## 2020-03-04 RX ORDER — TOPIRAMATE 25 MG/1
25 TABLET ORAL 2 TIMES DAILY
Qty: 180 TABLET | Refills: 3 | Status: SHIPPED | OUTPATIENT
Start: 2020-03-04 | End: 2020-07-01

## 2020-03-04 RX ORDER — BUTALBITAL, ACETAMINOPHEN AND CAFFEINE 50; 325; 40 MG/1; MG/1; MG/1
1-2 TABLET ORAL EVERY 6 HOURS PRN
Qty: 15 TABLET | Refills: 0 | Status: SHIPPED | OUTPATIENT
Start: 2020-03-04 | End: 2020-08-20 | Stop reason: SDUPTHER

## 2020-03-04 RX ORDER — GABAPENTIN 300 MG/1
300 CAPSULE ORAL 3 TIMES DAILY
Qty: 90 CAPSULE | Refills: 5 | Status: SHIPPED | OUTPATIENT
Start: 2020-03-04 | End: 2020-08-20 | Stop reason: SDUPTHER

## 2020-03-04 RX ORDER — HYDROCHLOROTHIAZIDE 25 MG/1
25 TABLET ORAL DAILY
COMMUNITY
Start: 2020-01-29 | End: 2020-03-04

## 2020-03-04 RX ORDER — AMLODIPINE BESYLATE 5 MG/1
TABLET ORAL
COMMUNITY
Start: 2019-12-12 | End: 2020-09-08

## 2020-03-04 NOTE — PROGRESS NOTES
Subjective:     Patient ID: Israel Reyna is a 65 y.o. male.    CC:   Chief Complaint   Patient presents with   • Migraine       HPI:   History of Present Illness     This is a 65-year-old male who presents for 6-month neurology follow-up on intractable migraines present since 2013.  He has been followed by Dr. Chaidez long-term for management of his headaches.  He has had multiple episodes of facial drooping, confusion, tingling & weakness all associated with headaches and has been diagnosed with intractable migraine with aura with complex migraines.  He has undergone continuous EEG monitoring as well as routine EEG monitoring and these test have been unremarkable.  He has been treated with multiple medications for his migraines including doxepin, Topamax, Elavil, Depakote, verapamil and currently is on gabapentin 300 mg 3 times a day as needed as well as Topamax 25 mg twice a day and he takes Tylenol 3 as needed.  He had been using Fioricet as needed for many years and this is been the most effective during his symptoms, however last year insurance would not cover this.  He would like to see if they will cover this with updated insurance plan.  He has previously had CT of the head in 2017 which appeared within normal limits with no changes from prior imaging.  CT of the cervical spine did show some degenerative changes in 2017.  He also had a carotid ultrasound in August 2019 which was concerning for high-grade stenosis.  He underwent CTA of the head and neck on 9/9/2019 ordered by Dr. Gavin neurosurgery and this showed minimal atherosclerosis with no stenosis, aneurysm or occlusions noted.  He does continue to take aspirin as well as simvastatin.  He has had a history of stroke.  EEG in 2017 was normal with minimal intermittent generalized slowing.  There has been discussion of Botox injections but this is too costly with his insurance and he does not want to try this.  He tells me he has had no changes  in his health over the past 6 months. Has anal sphincter stimulator and cannot undergo MRI. Triptans contraindicated.    The following portions of the patient's history were reviewed and updated as appropriate: allergies, current medications, past family history, past medical history, past social history, past surgical history and problem list.    Past Medical History:   Diagnosis Date   • Anxiety    • Bowel incontinence     w/ history of Stimulator Device    • CAD (coronary artery disease)    • Chest pain    • CHF (congestive heart failure) (CMS/[facility])    • Depression    • Diabetes mellitus, type 2 (CMS/[facility])    • Diabetic peripheral neuropathy (CMS/[facility])    • Dyslipidemia    • Fatigue    • GERD (gastroesophageal reflux disease)    • Hypertension    • Mental status change    • Migraine     Complex    • Migraine headache    • SOB (shortness of breath)        Past Surgical History:   Procedure Laterality Date   • CARDIAC CATHETERIZATION     • CARDIAC CATHETERIZATION N/A 10/14/2016    Procedure: Left Heart Cath;  Surgeon: Santiago Garner MD;  Location: [facility] CATH INVASIVE LOCATION;  Service:    • GASTRIC STIMULATOR IMPLANT SURGERY     • SMALL INTESTINE SURGERY      unspecified, x3 as an infant    • TESTICLE SURGERY         Social History     Socioeconomic History   • Marital status:      Spouse name: Not on file   • Number of children: Not on file   • Years of education: Not on file   • Highest education level: Not on file   Tobacco Use   • Smoking status: Never Smoker   • Smokeless tobacco: Never Used   Substance and Sexual Activity   • Alcohol use: No   • Drug use: No   • Sexual activity: Not Currently       Family History   Problem Relation Age of Onset   • Heart failure Mother    • Heart attack Father         Review of Systems   Constitutional: Negative for chills, fatigue, fever and unexpected weight change.   HENT: Negative for ear pain, hearing loss, nosebleeds, rhinorrhea and sore throat.    Eyes: Negative  "for photophobia, pain, discharge, itching and visual disturbance.   Respiratory: Negative for cough, chest tightness, shortness of breath and wheezing.    Cardiovascular: Negative for chest pain, palpitations and leg swelling.   Gastrointestinal: Negative for abdominal pain, blood in stool, constipation, diarrhea, nausea and vomiting.   Genitourinary: Negative for dysuria, frequency, hematuria and urgency.   Musculoskeletal: Negative for arthralgias, back pain, gait problem, joint swelling, myalgias, neck pain and neck stiffness.   Skin: Negative for rash and wound.   Allergic/Immunologic: Negative for environmental allergies and food allergies.   Neurological: Positive for headaches. Negative for dizziness, tremors, seizures, syncope, speech difficulty, weakness, light-headedness and numbness.   Hematological: Negative for adenopathy. Does not bruise/bleed easily.   Psychiatric/Behavioral: Negative for agitation, confusion, decreased concentration, hallucinations, sleep disturbance and suicidal ideas. The patient is not nervous/anxious.    All other systems reviewed and are negative.       Objective:  /80   Pulse 72   Ht 170.2 cm (67\")   Wt 84.4 kg (186 lb)   SpO2 98%   BMI 29.13 kg/m²     Neurologic Exam     Mental Status   Oriented to person, place, and time.   Speech: speech is normal   Level of consciousness: alert    Cranial Nerves   Cranial nerves II through XII intact.     Motor Exam   Muscle bulk: normal  Overall muscle tone: normal    Strength   Strength 5/5 throughout.     Gait, Coordination, and Reflexes     Gait  Gait: normal    Coordination   Finger to nose coordination: normal    Tremor   Resting tremor: absent  Intention tremor: absent  Action tremor: absent    Reflexes   Right brachioradialis: 2+  Left brachioradialis: 2+  Right biceps: 2+  Left biceps: 2+  Right triceps: 2+  Left triceps: 2+  Right : 2+  Left : 2+      Physical Exam   Constitutional: He is oriented to person, " place, and time.   Neurological: He is oriented to person, place, and time. He has normal strength. He has a normal Finger-Nose-Finger Test. Gait normal.   Reflex Scores:       Tricep reflexes are 2+ on the right side and 2+ on the left side.       Bicep reflexes are 2+ on the right side and 2+ on the left side.       Brachioradialis reflexes are 2+ on the right side and 2+ on the left side.  Psychiatric: His speech is normal.       Assessment/Plan:       Israel was seen today for migraine.    Diagnoses and all orders for this visit:    Intractable migraine with aura without status migrainosus  -     gabapentin (NEURONTIN) 300 MG capsule; Take 1 capsule by mouth 3 (Three) Times a Day.  -     butalbital-acetaminophen-caffeine (FIORICET, ESGIC) -40 MG per tablet; Take 1-2 tablets by mouth Every 6 (Six) Hours As Needed for Migraine.  -     topiramate (TOPAMAX) 25 MG tablet; Take 1 tablet by mouth 2 (Two) Times a Day.    Spondylosis of cervical region without myelopathy or radiculopathy  -     gabapentin (NEURONTIN) 300 MG capsule; Take 1 capsule by mouth 3 (Three) Times a Day.    Neck pain  -     gabapentin (NEURONTIN) 300 MG capsule; Take 1 capsule by mouth 3 (Three) Times a Day.    Facial pain  -     gabapentin (NEURONTIN) 300 MG capsule; Take 1 capsule by mouth 3 (Three) Times a Day.             Reviewed medications, potential side effects and signs and symptoms to report. Discussed risk versus benefits of treatment plan with patient and/or family-including medications, labs and radiology that may be ordered. Addressed questions and concerns during visit. Patient and/or family verbalized understanding and agree with plan.    As part of this patient's treatment plan I am prescribing controlled substances. The patient has been made aware of appropriate use of such medications, including potential risk of somnolence, limited ability to drive and/or work safely, and potential for dependence or overdose. It has  also been made clear that these medications are for use by the patient only, without concomitant use of alcohol or other substances unless prescribed. Keep out of reach of children.  Syde report has been reviewed. If this is going to be prescribed long term, [facility] Controlled Substance Agreement Contract has also been read and signed by patient and myself.  Syed #[id number] reviewed.    During this visit the following were done:  Labs Reviewed []    Labs Ordered []    Radiology Reports Reviewed [x]    Radiology Ordered []    PCP Records Reviewed []    Referring Provider Records Reviewed []    ER Records Reviewed []    Hospital Records Reviewed []    History Obtained From Family []    Radiology Images Reviewed []    Other Reviewed [x] neurosurgery notes and CTA reviewed   Records Requested []      EMR Dragon/Transcription Disclaimer:  Much of this encounter note is an electronic transcription of spoken language to printed text. Electronic transcription of spoken language may permit erroneous words or phrases to be inadvertently transcribed. Although I have reviewed the note for such errors, some may still exist in this documentation.    Norma Degroot, APRN  3/4/2020

## 2020-03-12 RX ORDER — AMLODIPINE BESYLATE 10 MG/1
TABLET ORAL
Qty: 90 TABLET | Refills: 3 | OUTPATIENT
Start: 2020-03-12

## 2020-05-22 DIAGNOSIS — G43.119 INTRACTABLE MIGRAINE WITH AURA WITHOUT STATUS MIGRAINOSUS: ICD-10-CM

## 2020-05-22 RX ORDER — ACETAMINOPHEN AND CODEINE PHOSPHATE 300; 30 MG/1; MG/1
1 TABLET ORAL DAILY PRN
Qty: 15 TABLET | Refills: 0 | Status: SHIPPED | OUTPATIENT
Start: 2020-05-22 | End: 2020-06-12 | Stop reason: SDUPTHER

## 2020-05-22 NOTE — TELEPHONE ENCOUNTER
PT CALLED BACK IN AND STATES THE [facility] WILL ONLY FILL A PRESCRIPTION FOR 3 DAYS.  AND THAT MAYBE WE SHOULD WORD THE  PRESCRIPTION DIFFERENT OR SEND TO THE [facility]  IN [place] . HIS BEST CALL BACK NUMBER [phone]

## 2020-06-12 DIAGNOSIS — G43.119 INTRACTABLE MIGRAINE WITH AURA WITHOUT STATUS MIGRAINOSUS: ICD-10-CM

## 2020-06-12 NOTE — TELEPHONE ENCOUNTER
PT CALLED TO REQUEST A REFILL OF TYLENOL 3 RX.  HE TOOK THE LAST ONE ON Wednesday 6-10-20.    PT LAST SEEN: 03-04-20    PHARMACY:  [place] [address] [address]  [phone]    PLEASE CALL IF ANY QUESTIONS: [phone], LEAVE VM IF NO ANSWER.

## 2020-06-15 RX ORDER — ACETAMINOPHEN AND CODEINE PHOSPHATE 300; 30 MG/1; MG/1
1 TABLET ORAL DAILY PRN
Qty: 15 TABLET | Refills: 0 | Status: SHIPPED | OUTPATIENT
Start: 2020-06-15 | End: 2021-08-26

## 2020-07-01 DIAGNOSIS — G43.119 INTRACTABLE MIGRAINE WITH AURA WITHOUT STATUS MIGRAINOSUS: ICD-10-CM

## 2020-07-01 RX ORDER — TOPIRAMATE 25 MG/1
TABLET ORAL
Qty: 180 TABLET | Refills: 1 | Status: SHIPPED | OUTPATIENT
Start: 2020-07-01 | End: 2020-08-20 | Stop reason: SDUPTHER

## 2020-07-01 RX ORDER — AMLODIPINE BESYLATE 10 MG/1
TABLET ORAL
Qty: 90 TABLET | Refills: 3 | Status: SHIPPED | OUTPATIENT
Start: 2020-07-01 | End: 2021-08-11

## 2020-09-08 ENCOUNTER — HOSPITAL ENCOUNTER (OUTPATIENT)
Dept: CARDIOLOGY | Facility: HOSPITAL | Age: 65
Discharge: HOME | End: 2020-09-08

## 2020-09-08 ENCOUNTER — OFFICE VISIT (OUTPATIENT)
Dept: CARDIOLOGY | Facility: HOSPITAL | Age: 65
End: 2020-09-08

## 2020-09-08 VITALS
TEMPERATURE: 97.1 F | BODY MASS INDEX: 28.11 KG/M2 | DIASTOLIC BLOOD PRESSURE: 67 MMHG | HEIGHT: 67 IN | HEART RATE: 69 BPM | WEIGHT: 179.13 LBS | SYSTOLIC BLOOD PRESSURE: 119 MMHG | OXYGEN SATURATION: 97 % | RESPIRATION RATE: 20 BRPM

## 2020-09-08 DIAGNOSIS — R00.0 TACHYCARDIA: ICD-10-CM

## 2020-09-08 DIAGNOSIS — R06.09 DOE (DYSPNEA ON EXERTION): ICD-10-CM

## 2020-09-08 DIAGNOSIS — E78.2 MIXED HYPERLIPIDEMIA: ICD-10-CM

## 2020-09-08 DIAGNOSIS — I10 ESSENTIAL HYPERTENSION: ICD-10-CM

## 2020-09-08 DIAGNOSIS — E11.65 CONTROLLED TYPE 2 DIABETES MELLITUS WITH HYPERGLYCEMIA, WITH LONG-TERM CURRENT USE OF INSULIN (HCC): ICD-10-CM

## 2020-09-08 DIAGNOSIS — R00.0 TACHYCARDIA: Primary | ICD-10-CM

## 2020-09-08 DIAGNOSIS — R07.2 PRECORDIAL PAIN: ICD-10-CM

## 2020-09-08 DIAGNOSIS — I25.118 CORONARY ARTERY DISEASE OF NATIVE ARTERY OF NATIVE HEART WITH STABLE ANGINA PECTORIS (HCC): ICD-10-CM

## 2020-09-08 DIAGNOSIS — R53.83 OTHER FATIGUE: ICD-10-CM

## 2020-09-08 DIAGNOSIS — Z79.4 CONTROLLED TYPE 2 DIABETES MELLITUS WITH HYPERGLYCEMIA, WITH LONG-TERM CURRENT USE OF INSULIN (HCC): ICD-10-CM

## 2020-09-08 PROCEDURE — 99214 OFFICE O/P EST MOD 30 MIN: CPT | Performed by: NURSE PRACTITIONER

## 2020-09-08 RX ORDER — HYDROCHLOROTHIAZIDE 25 MG/1
25 TABLET ORAL DAILY
COMMUNITY
Start: 2020-07-29 | End: 2021-08-26

## 2020-09-08 NOTE — PROGRESS NOTES
[facility] Heart Monitor Documentation    Israel Reyna  1955  [id number]  09/08/20    LILIANE Mazariegos    [] ZIO XT Patch  Model [id number] Prescribed for  Days    · Serial Number: (N + 9 Digits) N   · Apply-By Date on Box:   · USPS Tracking Number:   · USPS Tracking        [x] [employer]ian MINI PLUS Mobile Cardiac Telemetry  Model BGMINIPLUS Prescribed for  Days    · Serial Number: (BGM + 7 Digits) [id number]  · Shipped-By Date on Box: 09/21/2020  · UPS Tracking Number: [id number]  · UPS Tracking      [] [employer]ian MINI Holter Monitor  Model BGMINIEL Prescribed for N/A Days    · Serial Number: (7 Digits)   · Shipped-By Date on Box:   · UPS Tracking Number: [id number]  · UPS Tracking        This monitor was applied to the patient's chest and checked for proper functioning.  Mr. Israel Reyna was instructed in the proper use of this monitor.  He was given the opportunity to ask questions and left the office with the device 's instruction manual.    Norma Vallejo CMA, 9:07 AM, 09/08/20                  [facility]

## 2020-09-08 NOTE — PROGRESS NOTES
[facility]  [facility]      09/08/2020         Israel JA Maribell  [address] [place] [address] [address] [place] [address]  [unfilled]    1955    Edilberto Cartwright DO Denpratima Reyna is a 65 y.o. male.      Subjective:     Chief Complaint:  Rapid Heart Rate and Establish Care           HPI 65 year old male presents at the request of his PCP for ongoing evaluation of his tachycardia. He reports that he is has been monitoring his fitbit and notes that his heart rate has been as high as 110-120 at rest. He notes he has associated chest tightness when his heart is beating fast. He also notes dyspnea, dizziness and lightheadedness. Symptoms have been present for intermittently for the past few weeks.Hx of [facility] 2016 that showed 10 to 20% plaque in the mid LAD and 30 to 40% stenosis in the circumflex.  Hx of Htn (well controlled on medications), DM II, HLP      Patient Active Problem List   Diagnosis   • Bowel incontinence   • Classic migraine with aura   • Depression   • Anxiety   • Mental status change   • Disorientation   • Facial droop   • Difficulty walking   • Intractable migraine with aura without status migrainosus   • Chest pain   • CAD (coronary artery disease)   • Hypertension   • Dyslipidemia   • Diabetes mellitus, type 2 (CMS/HCC)   • GERD (gastroesophageal reflux disease)   • CHF (congestive heart failure) (CMS/HCC)   • Chest pain   • SOB (shortness of breath)   • Fatigue   • Chronic migraine   • Other cerebral infarction (CMS/HCC)   • Transient cerebral ischemia   • Neck pain   • Spondylosis of cervical region without myelopathy or radiculopathy   • Facial pain       Past Medical History:   Diagnosis Date   • Anxiety    • Bowel incontinence     w/ history of Stimulator Device    • CAD (coronary artery disease)    • Chest pain    • CHF (congestive heart failure) (CMS/HCC)    • Depression    • Diabetes mellitus, type 2 (CMS/HCC)    • Diabetic peripheral neuropathy (CMS/HCC)    •  Dyslipidemia    • Fatigue    • GERD (gastroesophageal reflux disease)    • Hypertension    • Mental status change    • Migraine     Complex    • Migraine headache    • SOB (shortness of breath)        Past Surgical History:   Procedure Laterality Date   • CARDIAC CATHETERIZATION     • CARDIAC CATHETERIZATION N/A 10/14/2016    Procedure: Left Heart Cath;  Surgeon: Santiago Garner MD;  Location: [facility] CATH INVASIVE LOCATION;  Service:    • GASTRIC STIMULATOR IMPLANT SURGERY     • SMALL INTESTINE SURGERY      unspecified, x3 as an infant    • TESTICLE SURGERY         Family History   Problem Relation Age of Onset   • Heart failure Mother    • Diabetes Mother    • Alzheimer's disease Mother    • Heart attack Father    • Kidney disease Father    • Cancer Father    • Prostate cancer Father    • Cancer Sister    • Breast cancer Sister    • Diabetes Sister    • Heart disease Sister    • Arrhythmia Sister    • Diabetes Brother    • Heart disease Brother    • Cancer Brother    • Liver disease Brother    • Alzheimer's disease Maternal Grandmother    • Liver cancer Maternal Grandfather    • Other Paternal Grandmother         [place] flu   • Other Paternal Grandfather         Greek flu       Social History     Socioeconomic History   • Marital status:      Spouse name: Not on file   • Number of children: Not on file   • Years of education: Not on file   • Highest education level: Not on file   Tobacco Use   • Smoking status: Never Smoker   • Smokeless tobacco: Never Used   Substance and Sexual Activity   • Alcohol use: No   • Drug use: No   • Sexual activity: Not Currently   Social History Narrative    Caffeine: 1 cup coffee daily, red bull rarely       Allergies   Allergen Reactions   • Actos [Pioglitazone]      congestive heart failure   • Avandia [Rosiglitazone]      congestive heart failure.   • Darvon [Propoxyphene]      angioedema   • Talwin [Pentazocine]      angioedema         Current Outpatient Medications:    •  acetaminophen-codeine (TYLENOL #3) 300-30 MG per tablet, Take 1 tablet by mouth Daily As Needed for Moderate Pain ., Disp: 15 tablet, Rfl: 0  •  amLODIPine (NORVASC) 10 MG tablet, TAKE 1 TABLET DAILY, Disp: 90 tablet, Rfl: 3  •  aspirin 81 MG chewable tablet, Chew 81 mg Every Night., Disp: , Rfl:   •  butalbital-acetaminophen-caffeine (FIORICET, ESGIC) -40 MG per tablet, Take 1-2 tablets by mouth Every 6 (Six) Hours As Needed for Migraine., Disp: 30 tablet, Rfl: 2  •  CloNIDine (CATAPRES) 0.1 MG tablet, Take 0.1 mg by mouth Daily As Needed for High Blood Pressure., Disp: , Rfl:   •  Dulaglutide (Trulicity) 1.5 MG/0.5ML solution pen-injector, Inject  under the skin into the appropriate area as directed 1 (One) Time Per Week., Disp: , Rfl:   •  gabapentin (NEURONTIN) 300 MG capsule, Take 1 capsule by mouth 3 (Three) Times a Day., Disp: 270 capsule, Rfl: 1  •  glipiZIDE (GLUCOTROL) 10 MG tablet, Take 10 mg by mouth 2 (Two) Times a Day Before Meals., Disp: , Rfl:   •  hydroCHLOROthiazide (HYDRODIURIL) 25 MG tablet, Take 25 mg by mouth Daily., Disp: , Rfl:   •  insulin glargine (LANTUS) 100 UNIT/ML injection, Inject 75 Units under the skin 2 (Two) Times a Day., Disp: , Rfl:   •  lisinopril (PRINIVIL,ZESTRIL) 20 MG tablet, Take 20 mg by mouth Daily., Disp: , Rfl:   •  metFORMIN (GLUCOPHAGE) 1000 MG tablet, Take 1,000 mg by mouth 2 (Two) Times a Day With Meals., Disp: , Rfl:   •  omeprazole (PriLOSEC) 40 MG capsule, Take 40 mg by mouth Daily., Disp: , Rfl:   •  simvastatin (ZOCOR) 20 MG tablet, Take 10 mg by mouth Every Night., Disp: , Rfl:   •  topiramate (TOPAMAX) 25 MG tablet, Take 1 tablet by mouth 2 (Two) Times a Day., Disp: 180 tablet, Rfl: 3  •  vitamin C (ASCORBIC ACID) 500 MG tablet, Take 500 mg by mouth Daily., Disp: , Rfl:   •  Incontinence Supply Disposable (NU-FIT ADULT UNDERWEAR MEDIUM) misc, , Disp: , Rfl:     The following portions of the patient's history were reviewed today and updated as  "appropriate: allergies, current medications, past family history, past medical history, past social history, past surgical history and problem list     Review of Systems   Constitution: Positive for malaise/fatigue. Negative for chills, decreased appetite, diaphoresis, fever, night sweats, weight gain and weight loss.   HENT: Negative for congestion, hearing loss, hoarse voice and nosebleeds.    Eyes: Negative for blurred vision, visual disturbance and visual halos.   Cardiovascular: Positive for chest pain, dyspnea on exertion and irregular heartbeat. Negative for claudication, cyanosis, leg swelling, near-syncope, orthopnea, palpitations, paroxysmal nocturnal dyspnea and syncope.   Respiratory: Negative for cough, hemoptysis, shortness of breath, sleep disturbances due to breathing, snoring, sputum production and wheezing.    Hematologic/Lymphatic: Negative for bleeding problem. Does not bruise/bleed easily.   Skin: Negative for dry skin, itching and rash.   Musculoskeletal: Negative for arthritis, falls, joint pain, joint swelling and myalgias.   Gastrointestinal: Negative for bloating, abdominal pain, constipation, diarrhea, flatus, heartburn, hematemesis, hematochezia, melena, nausea and vomiting.   Genitourinary: Negative for dysuria, frequency, hematuria, nocturia and urgency.   Neurological: Positive for dizziness and light-headedness. Negative for excessive daytime sleepiness, headaches, loss of balance and weakness.   Psychiatric/Behavioral: Negative for depression. The patient does not have insomnia and is not nervous/anxious.        Objective:     Vitals:    09/08/20 0829 09/08/20 0830 09/08/20 0831   BP: 114/63 128/63 119/67   BP Location: Left arm Left arm Right arm   Patient Position: Standing Sitting Sitting   Cuff Size: Adult Adult Adult   Pulse: 78 67 69   Resp:   20   Temp:   97.1 °F (36.2 °C)   TempSrc:   Temporal   SpO2: 95% 98% 97%   Weight:   81.3 kg (179 lb 2 oz)   Height:   170.2 cm (67\") "       Body mass index is 28.05 kg/m².    Physical Exam   Constitutional: He is oriented to person, place, and time. He appears well-developed and well-nourished. He is active and cooperative. No distress.   HENT:   Head: Normocephalic and atraumatic.   Mouth/Throat: Oropharynx is clear and moist.   Eyes: Pupils are equal, round, and reactive to light. Conjunctivae and EOM are normal.   Neck: Normal range of motion. Neck supple. No JVD present. No tracheal deviation present. No thyromegaly present.   Cardiovascular: Normal rate, regular rhythm, normal heart sounds and intact distal pulses.   Pulmonary/Chest: Effort normal and breath sounds normal.   Abdominal: Soft. Bowel sounds are normal. He exhibits no distension. There is no tenderness.   Musculoskeletal: Normal range of motion.   Neurological: He is alert and oriented to person, place, and time.   Skin: Skin is warm, dry and intact.   Psychiatric: He has a normal mood and affect. His behavior is normal.   Nursing note and vitals reviewed.      Lab and Diagnostic Review:  Results for orders placed or performed during the hospital encounter of 09/09/19   POC Creatinine   Result Value Ref Range    Creatinine 1.20 0.60 - 1.30 mg/dL     EKG NSR 9/1/2020  · Echo: 2018: Left ventricular systolic function is low normal.  · Estimated EF appears to be in the range of 51 - 55%  · Left ventricular diastolic dysfunction (grade I a) consistent with impaired relaxation.   stress test 2018:  · Myocardial perfusion imaging indicates no evidence of ischemia.  · Left ventricular ejection fraction is mildly reduced (Calculated EF = 42%).         Assessment and Plan:   1. Tachycardia    - Mobile Cardiac Outpatient Telemetry; Future  - Stress Test With Myocardial Perfusion (1 Day); Future    2. Coronary artery disease of native artery of native heart with stable angina pectoris (CMS/HCC)  Mild nonobstructive by [facility] 2016  - Stress Test With Myocardial Perfusion (1 Day); Future    3.  Precordial pain  carlos score: 2  - Stress Test With Myocardial Perfusion (1 Day); Future    4. ARANDA (dyspnea on exertion)    - Stress Test With Myocardial Perfusion (1 Day); Future    5. Other fatigue    - Stress Test With Myocardial Perfusion (1 Day); Future    6. Essential hypertension  Well controlled on norvasc, HCTZ, clonidine, lisinopril  - Stress Test With Myocardial Perfusion (1 Day); Future    7. Controlled type 2 diabetes mellitus with hyperglycemia, with long-term current use of insulin (CMS/[facility])    - Stress Test With Myocardial Perfusion (1 Day); Future    8. Mixed hyperlipidemia  Statin therapy   - Stress Test With Myocardial Perfusion (1 Day); Future        It has been a pleasure to participate in the care of this patient.  Patient was instructed to call the [facility] Center with any questions, concerns, or worsening symptoms.    *Please note that portions of this note were completed with a voice recognition program. Efforts were made to edit the dictations, but occasionally words are mistranscribed.

## 2020-09-23 DIAGNOSIS — G43.119 INTRACTABLE MIGRAINE WITH AURA WITHOUT STATUS MIGRAINOSUS: ICD-10-CM

## 2020-09-23 RX ORDER — BUTALBITAL, ACETAMINOPHEN AND CAFFEINE 50; 325; 40 MG/1; MG/1; MG/1
1-2 TABLET ORAL EVERY 6 HOURS PRN
Qty: 30 TABLET | Refills: 2 | OUTPATIENT
Start: 2020-09-23

## 2020-09-24 DIAGNOSIS — E78.2 MIXED HYPERLIPIDEMIA: ICD-10-CM

## 2020-09-24 DIAGNOSIS — R07.2 PRECORDIAL PAIN: ICD-10-CM

## 2020-09-24 DIAGNOSIS — I10 ESSENTIAL HYPERTENSION: ICD-10-CM

## 2020-09-24 DIAGNOSIS — R06.09 DOE (DYSPNEA ON EXERTION): ICD-10-CM

## 2020-09-24 DIAGNOSIS — R53.83 OTHER FATIGUE: ICD-10-CM

## 2020-09-24 DIAGNOSIS — Z79.4 CONTROLLED TYPE 2 DIABETES MELLITUS WITH HYPERGLYCEMIA, WITH LONG-TERM CURRENT USE OF INSULIN (HCC): ICD-10-CM

## 2020-09-24 DIAGNOSIS — I25.118 CORONARY ARTERY DISEASE OF NATIVE ARTERY OF NATIVE HEART WITH STABLE ANGINA PECTORIS (HCC): Primary | ICD-10-CM

## 2020-09-24 DIAGNOSIS — E11.65 CONTROLLED TYPE 2 DIABETES MELLITUS WITH HYPERGLYCEMIA, WITH LONG-TERM CURRENT USE OF INSULIN (HCC): ICD-10-CM

## 2020-09-25 ENCOUNTER — APPOINTMENT (OUTPATIENT)
Dept: PREADMISSION TESTING | Facility: HOSPITAL | Age: 65
End: 2020-09-25

## 2020-09-28 ENCOUNTER — APPOINTMENT (OUTPATIENT)
Dept: CARDIOLOGY | Facility: HOSPITAL | Age: 65
End: 2020-09-28

## 2020-10-06 ENCOUNTER — TELEMEDICINE (OUTPATIENT)
Dept: CARDIOLOGY | Facility: HOSPITAL | Age: 65
End: 2020-10-06

## 2020-10-06 VITALS
DIASTOLIC BLOOD PRESSURE: 69 MMHG | WEIGHT: 173 LBS | HEART RATE: 80 BPM | HEIGHT: 67 IN | SYSTOLIC BLOOD PRESSURE: 132 MMHG | BODY MASS INDEX: 27.15 KG/M2

## 2020-10-06 DIAGNOSIS — I25.118 CORONARY ARTERY DISEASE OF NATIVE ARTERY OF NATIVE HEART WITH STABLE ANGINA PECTORIS (HCC): Primary | ICD-10-CM

## 2020-10-06 DIAGNOSIS — R00.0 TACHYCARDIA: ICD-10-CM

## 2020-10-06 DIAGNOSIS — R06.09 DOE (DYSPNEA ON EXERTION): ICD-10-CM

## 2020-10-06 DIAGNOSIS — I10 ESSENTIAL HYPERTENSION: ICD-10-CM

## 2020-10-06 PROCEDURE — 99213 OFFICE O/P EST LOW 20 MIN: CPT | Performed by: NURSE PRACTITIONER

## 2020-10-06 NOTE — PROGRESS NOTES
[facility]  Heart and Valve Center  Telemedicine note    10/06/2020         Israel Reyna  [address] [place] [address] [address] [place] [address]  [unfilled]    1955    Edilberto Cartwright,     Israel Reyna is a 65 y.o. male.      Subjective:     Chief Complaint:  Follow-up (dyspnea, palpitations)     You have chosen to receive care through the use of telemedicine. Telemedicine enables health care providers at different locations to provide safe, effective, and convenient care through the use of technology. As with any health care service, there are risks associated with the use of telemedicine, including equipment failure, poor connections, and  issues.    • Do you understand the risks and benefits of telemedicine as I have explained them to you? Yes  • Have your questions regarding telemedicine been answered? Yes  • Do you consent to the use of telemedicine in your medical care today? Yes    This was an telephone enabled telemedicine encounter.  Unable to complete visit using a video connection to the patient. A phone visit was used to complete this visits. Total time of discussion was 17 minutes.    HPI   65-year-old male with telephone visit today due to inability to establish connection via video.  Patient reports ongoing dyspnea on exertion that improves with rest.  Notes intermittent occasional chest pain.  Chest pain sometimes starts at rest and sometimes occurs with exertion.  Always improves with rest.  Patient notes that he did reschedule his cardiac PET secondary to other health conditions.  Is now scheduled for 10/22.  Denies palpitations, presyncope, syncope, orthopnea, PND or pedal edema.  Patient reports he did experience palpitations while wearing the monitor but has not experienced any recently.    Patient Active Problem List   Diagnosis   • Bowel incontinence   • Classic migraine with aura   • Depression   • Anxiety   • Mental status change   •  Disorientation   • Facial droop   • Difficulty walking   • Intractable migraine with aura without status migrainosus   • Chest pain   • CAD (coronary artery disease)   • Hypertension   • Dyslipidemia   • Diabetes mellitus, type 2 (CMS/HCC)   • GERD (gastroesophageal reflux disease)   • CHF (congestive heart failure) (CMS/HCC)   • Chest pain   • SOB (shortness of breath)   • Fatigue   • Chronic migraine   • Other cerebral infarction (CMS/HCC)   • Transient cerebral ischemia   • Neck pain   • Spondylosis of cervical region without myelopathy or radiculopathy   • Facial pain       Past Medical History:   Diagnosis Date   • Anxiety    • Bowel incontinence     w/ history of Stimulator Device    • CAD (coronary artery disease)    • Chest pain    • CHF (congestive heart failure) (CMS/HCC)    • Depression    • Diabetes mellitus, type 2 (CMS/HCC)    • Diabetic peripheral neuropathy (CMS/HCC)    • Dyslipidemia    • Fatigue    • GERD (gastroesophageal reflux disease)    • Hypertension    • Mental status change    • Migraine     Complex    • Migraine headache    • SOB (shortness of breath)        Past Surgical History:   Procedure Laterality Date   • CARDIAC CATHETERIZATION     • CARDIAC CATHETERIZATION N/A 10/14/2016    Procedure: Left Heart Cath;  Surgeon: Santiago Garner MD;  Location: [facility] CATH INVASIVE LOCATION;  Service:    • GASTRIC STIMULATOR IMPLANT SURGERY     • SMALL INTESTINE SURGERY      unspecified, x3 as an infant    • TESTICLE SURGERY         Family History   Problem Relation Age of Onset   • Heart failure Mother    • Diabetes Mother    • Alzheimer's disease Mother    • Heart attack Father    • Kidney disease Father    • Cancer Father    • Prostate cancer Father    • Cancer Sister    • Breast cancer Sister    • Diabetes Sister    • Heart disease Sister    • Arrhythmia Sister    • Diabetes Brother    • Heart disease Brother    • Cancer Brother    • Liver disease Brother    • Alzheimer's disease Maternal  Grandmother    • Liver cancer Maternal Grandfather    • Other Paternal Grandmother         [place] flu   • Other Paternal Grandfather         [place]ian flu       Social History     Socioeconomic History   • Marital status:      Spouse name: Not on file   • Number of children: Not on file   • Years of education: Not on file   • Highest education level: Not on file   Tobacco Use   • Smoking status: Never Smoker   • Smokeless tobacco: Never Used   Substance and Sexual Activity   • Alcohol use: No   • Drug use: No   • Sexual activity: Not Currently   Social History Narrative    Caffeine: 1 cup coffee daily, red bull rarely       Allergies   Allergen Reactions   • Actos [Pioglitazone]      congestive heart failure   • Avandia [Rosiglitazone]      congestive heart failure.   • Darvon [Propoxyphene]      angioedema   • Talwin [Pentazocine]      angioedema         Current Outpatient Medications:   •  acetaminophen-codeine (TYLENOL #3) 300-30 MG per tablet, Take 1 tablet by mouth Daily As Needed for Moderate Pain ., Disp: 15 tablet, Rfl: 0  •  amLODIPine (NORVASC) 10 MG tablet, TAKE 1 TABLET DAILY, Disp: 90 tablet, Rfl: 3  •  aspirin 81 MG chewable tablet, Chew 81 mg Every Night., Disp: , Rfl:   •  butalbital-acetaminophen-caffeine (FIORICET, ESGIC) -40 MG per tablet, Take 1-2 tablets by mouth Every 6 (Six) Hours As Needed for Migraine., Disp: 30 tablet, Rfl: 2  •  CloNIDine (CATAPRES) 0.1 MG tablet, Take 0.1 mg by mouth Daily As Needed for High Blood Pressure., Disp: , Rfl:   •  Dulaglutide (Trulicity) 1.5 MG/0.5ML solution pen-injector, Inject 0.5 mL under the skin into the appropriate area as directed 1 (One) Time Per Week., Disp: , Rfl:   •  gabapentin (NEURONTIN) 300 MG capsule, Take 1 capsule by mouth 3 (Three) Times a Day., Disp: 270 capsule, Rfl: 1  •  glipiZIDE (GLUCOTROL) 10 MG tablet, Take 10 mg by mouth 2 (Two) Times a Day Before Meals., Disp: , Rfl:   •  hydroCHLOROthiazide (HYDRODIURIL) 25 MG tablet,  Take 25 mg by mouth Daily., Disp: , Rfl:   •  Incontinence Supply Disposable (NU-FIT ADULT UNDERWEAR MEDIUM) misc, , Disp: , Rfl:   •  insulin glargine (LANTUS) 100 UNIT/ML injection, Inject 75 Units under the skin 2 (Two) Times a Day., Disp: , Rfl:   •  lisinopril (PRINIVIL,ZESTRIL) 20 MG tablet, Take 20 mg by mouth Daily., Disp: , Rfl:   •  metFORMIN (GLUCOPHAGE) 1000 MG tablet, Take 1,000 mg by mouth 2 (Two) Times a Day With Meals., Disp: , Rfl:   •  Multiple Vitamins-Minerals (PRESERVISION AREDS 2 PO), Take  by mouth Daily., Disp: , Rfl:   •  omeprazole (PriLOSEC) 40 MG capsule, Take 40 mg by mouth Daily., Disp: , Rfl:   •  simvastatin (ZOCOR) 20 MG tablet, Take 10 mg by mouth Every Night., Disp: , Rfl:   •  topiramate (TOPAMAX) 25 MG tablet, Take 1 tablet by mouth 2 (Two) Times a Day., Disp: 180 tablet, Rfl: 3  •  vitamin B-12 (CYANOCOBALAMIN) 100 MCG tablet, Take 50 mcg by mouth 2 (two) times a day., Disp: , Rfl:   •  vitamin C (ASCORBIC ACID) 500 MG tablet, Take 500 mg by mouth 2 (two) times a day., Disp: , Rfl:     The following portions of the patient's history were reviewed today and updated as appropriate: allergies, current medications, past family history, past medical history, past social history, past surgical history and problem list     Review of Systems   Constitution: Positive for malaise/fatigue. Negative for chills, decreased appetite, diaphoresis, fever, night sweats, weight gain and weight loss.   HENT: Negative for congestion, hearing loss, hoarse voice and nosebleeds.    Eyes: Negative for blurred vision, visual disturbance and visual halos.   Cardiovascular: Positive for chest pain, dyspnea on exertion and palpitations. Negative for claudication, cyanosis, irregular heartbeat, leg swelling, near-syncope, orthopnea, paroxysmal nocturnal dyspnea and syncope.   Respiratory: Negative for cough, hemoptysis, shortness of breath, sleep disturbances due to breathing, snoring, sputum production and  "wheezing.    Hematologic/Lymphatic: Negative for bleeding problem. Does not bruise/bleed easily.   Skin: Negative for dry skin, itching and rash.   Musculoskeletal: Negative for arthritis, falls, joint pain, joint swelling and myalgias.   Gastrointestinal: Negative for bloating, abdominal pain, constipation, diarrhea, flatus, heartburn, hematemesis, hematochezia, melena, nausea and vomiting.   Genitourinary: Negative for dysuria, frequency, hematuria, nocturia and urgency.   Neurological: Negative for excessive daytime sleepiness, dizziness, headaches, light-headedness, loss of balance and weakness.   Psychiatric/Behavioral: Negative for depression. The patient does not have insomnia and is not nervous/anxious.        Objective:     Vitals:    10/06/20 1107   BP: 132/69   BP Location: Left arm   Patient Position: Sitting   Pulse: 80   Weight: 78.5 kg (173 lb)   Height: 170.2 cm (67.01\")       Body mass index is 27.09 kg/m².    Vitals signs and nursing note reviewed.   Pulmonary:      Effort: Pulmonary effort is normal.   Neurological:      Mental Status: Alert and oriented to person, place and time.         Lab and Diagnostic Review:  Results for orders placed or performed during the hospital encounter of 09/09/19   POC Creatinine    Specimen: Blood   Result Value Ref Range    Creatinine 1.20 0.60 - 1.30 mg/dL     Extended Holter worn for 16 days and 20 hours showed an average heart rate 81 bpm, PAC burden 1%, PVC burden less than 1% no arrhythmias noted.  Manually triggered events showed sinus rhythm and sinus tachycardia.  This has been reviewed with the patient during visit today    Assessment and Plan:   1. Coronary artery disease of native artery of native heart with stable angina pectoris (CMS/[facility])  Upcoming PET in a few weeks  Continue aspirin, Zocor    2. ARANDA (dyspnea on exertion)  PET scheduled on 10/22/2020    3. Essential hypertension  Well-controlled on amlodipine, hydrochlorothiazide, lisinopril    4. " Tachycardia  No evidence of tachycardia on recent extended Holter    This visit has been scheduled as a telephone visit to comply with patient safety concerns in accordance with CDC recommendations. Total time of discussion was 17 minutes.      It has been a pleasure to participate in the care of this patient.  Patient was instructed to call the [facility] with any questions, concerns, or worsening symptoms.    *Please note that portions of this note were completed with a voice recognition program. Efforts were made to edit the dictations, but occasionally words are mistranscribed.

## 2020-10-15 ENCOUNTER — TELEPHONE (OUTPATIENT)
Dept: NEUROLOGY | Facility: CLINIC | Age: 65
End: 2020-10-15

## 2020-10-15 NOTE — TELEPHONE ENCOUNTER
PT CALLED STATING HE RECENTLY SAW DR. GARCIA, A [facility] SPECIALIST , ABOUT A WEEK AGO AND GOT AN INJECTION IN HIS EYE LAST Wednesday. Thursday, HE GOT A SEVERE PAIN IN HIS HEAD AND HE SAID HIS PAIN HAS NOT GONE AWAY EVEN WITH HIS PRESCRIBED MEDICATION THAT DR. PAUL HAS GIVEN HIM. HE STATES DR. GARCIA WANTED HIM TO HAVE AN EYE INJECTION EVERY QUARTER AND HE IS SUPPOSED TO COME BACK THE FIRST OF THE YEAR FOR ANOTHER ONE. HE STATES HE IS ALSO SCHEDULED FOR A STRESS TEST NEXT WEEK TO HAVE DONE AND HE STATES HE DOESN'T KNOW IF THE PAIN IN HIS HEAD IS RELATED TO THE INJECTIONS BUT SAID HE THOUGHT IT WAS STRANGE THAT HE HAD HIS FIRST INJECTION Wednesday AND Thursday IS WHEN THE PAIN STARTED. PLEASE ADVISE PT ON WHAT NEXT STEPS SHOULD BE. RELAYED THAT THE MA SHOULD BE IN TOUCH WITH PT SOON AND I BELIEVE THERE WAS BAD SIGNAL AS THE PT NO LONGER ANSWERED ME. PHONE CALL WAS THEN DISCONNECTED.      CALL BACK- [phone]

## 2020-10-16 NOTE — TELEPHONE ENCOUNTER
Patient called back in and stated that he has spoke with Dr Nelson office and due to the migraine issues that he has they have recommended for patient to contact us to see if we could help with the migraine pain issues.     He would like to speak to someone about this if possible.

## 2020-10-19 ENCOUNTER — APPOINTMENT (OUTPATIENT)
Dept: PREADMISSION TESTING | Facility: HOSPITAL | Age: 65
End: 2020-10-19

## 2020-10-19 PROCEDURE — U0004 COV-19 TEST NON-CDC HGH THRU: HCPCS

## 2020-10-19 PROCEDURE — C9803 HOPD COVID-19 SPEC COLLECT: HCPCS

## 2020-10-20 LAB — SARS-COV-2 RNA RESP QL NAA+PROBE: NOT DETECTED

## 2020-10-21 PROCEDURE — 93228 REMOTE 30 DAY ECG REV/REPORT: CPT | Performed by: INTERNAL MEDICINE

## 2020-10-22 ENCOUNTER — HOSPITAL ENCOUNTER (OUTPATIENT)
Dept: CARDIOLOGY | Facility: HOSPITAL | Age: 65
Discharge: HOME | End: 2020-10-22
Admitting: NURSE PRACTITIONER

## 2020-10-22 VITALS
BODY MASS INDEX: 26.53 KG/M2 | SYSTOLIC BLOOD PRESSURE: 123 MMHG | WEIGHT: 169 LBS | HEART RATE: 71 BPM | HEIGHT: 67 IN | DIASTOLIC BLOOD PRESSURE: 75 MMHG

## 2020-10-22 DIAGNOSIS — Z79.4 CONTROLLED TYPE 2 DIABETES MELLITUS WITH HYPERGLYCEMIA, WITH LONG-TERM CURRENT USE OF INSULIN (HCC): ICD-10-CM

## 2020-10-22 DIAGNOSIS — E78.2 MIXED HYPERLIPIDEMIA: ICD-10-CM

## 2020-10-22 DIAGNOSIS — R07.2 PRECORDIAL PAIN: ICD-10-CM

## 2020-10-22 DIAGNOSIS — R06.09 DOE (DYSPNEA ON EXERTION): ICD-10-CM

## 2020-10-22 DIAGNOSIS — R53.83 OTHER FATIGUE: ICD-10-CM

## 2020-10-22 DIAGNOSIS — I25.118 CORONARY ARTERY DISEASE OF NATIVE ARTERY OF NATIVE HEART WITH STABLE ANGINA PECTORIS (HCC): ICD-10-CM

## 2020-10-22 DIAGNOSIS — E11.65 CONTROLLED TYPE 2 DIABETES MELLITUS WITH HYPERGLYCEMIA, WITH LONG-TERM CURRENT USE OF INSULIN (HCC): ICD-10-CM

## 2020-10-22 DIAGNOSIS — I10 ESSENTIAL HYPERTENSION: ICD-10-CM

## 2020-10-22 LAB
BH CV STRESS BP STAGE 2: NORMAL
BH CV STRESS BP STAGE 3: NORMAL
BH CV STRESS BP STAGE 4: NORMAL
BH CV STRESS COMMENTS STAGE 1: NORMAL
BH CV STRESS DOSE REGADENOSON STAGE 1: 0.4
BH CV STRESS DURATION MIN STAGE 1: 1
BH CV STRESS DURATION MIN STAGE 2: 1
BH CV STRESS DURATION MIN STAGE 3: 1
BH CV STRESS DURATION MIN STAGE 4: 1
BH CV STRESS HR STAGE 1: 96
BH CV STRESS HR STAGE 2: 101
BH CV STRESS HR STAGE 3: 90
BH CV STRESS HR STAGE 4: 85
BH CV STRESS O2 STAGE 3: 96
BH CV STRESS O2 STAGE 4: 97
BH CV STRESS PROTOCOL 1: NORMAL
BH CV STRESS RECOVERY BP: NORMAL MMHG
BH CV STRESS RECOVERY HR: 76 BPM
BH CV STRESS STAGE 1: 1
BH CV STRESS STAGE 2: 2
BH CV STRESS STAGE 3: 3
BH CV STRESS STAGE 4: 4
LV EF NUC BP: 64 %
MAXIMAL PREDICTED HEART RATE: 156 BPM
PERCENT MAX PREDICTED HR: 64.74 %
STRESS BASELINE BP: NORMAL MMHG
STRESS BASELINE HR: 71 BPM
STRESS PERCENT HR: 76 %
STRESS POST EXERCISE DUR MIN: 4 MIN
STRESS POST EXERCISE DUR SEC: 0 SEC
STRESS POST PEAK BP: NORMAL MMHG
STRESS POST PEAK HR: 101 BPM
STRESS TARGET HR: 133 BPM

## 2020-10-22 PROCEDURE — 25010000002 LORAZEPAM PER 2 MG: Performed by: PHYSICIAN ASSISTANT

## 2020-10-22 PROCEDURE — A9555 RB82 RUBIDIUM: HCPCS | Performed by: NURSE PRACTITIONER

## 2020-10-22 PROCEDURE — 93018 CV STRESS TEST I&R ONLY: CPT | Performed by: INTERNAL MEDICINE

## 2020-10-22 PROCEDURE — 0 RUBIDIUM CHLORIDE: Performed by: NURSE PRACTITIONER

## 2020-10-22 PROCEDURE — 25010000002 REGADENOSON 0.4 MG/5ML SOLUTION: Performed by: NURSE PRACTITIONER

## 2020-10-22 PROCEDURE — 78492 MYOCRD IMG PET MLT RST&STRS: CPT | Performed by: INTERNAL MEDICINE

## 2020-10-22 PROCEDURE — 93017 CV STRESS TEST TRACING ONLY: CPT

## 2020-10-22 PROCEDURE — 78492 MYOCRD IMG PET MLT RST&STRS: CPT

## 2020-10-22 RX ORDER — CAFFEINE CITRATE 20 MG/ML
60 SOLUTION INTRAVENOUS ONCE
Status: COMPLETED | OUTPATIENT
Start: 2020-10-22 | End: 2020-10-22

## 2020-10-22 RX ORDER — LORAZEPAM 2 MG/ML
0.5 INJECTION INTRAMUSCULAR ONCE
Status: COMPLETED | OUTPATIENT
Start: 2020-10-22 | End: 2020-10-22

## 2020-10-22 RX ADMIN — CAFFEINE CITRATE 60 MG: 20 INJECTION, SOLUTION INTRAVENOUS at 10:56

## 2020-10-22 RX ADMIN — REGADENOSON 0.4 MG: 0.08 INJECTION, SOLUTION INTRAVENOUS at 10:45

## 2020-10-22 RX ADMIN — LORAZEPAM 0.5 MG: 2 INJECTION, SOLUTION INTRAMUSCULAR; INTRAVENOUS at 09:52

## 2020-10-22 RX ADMIN — RUBIDIUM CHLORIDE RB-82 1 DOSE: 150 INJECTION, SOLUTION INTRAVENOUS at 10:30

## 2020-10-22 RX ADMIN — RUBIDIUM CHLORIDE RB-82 1 DOSE: 150 INJECTION, SOLUTION INTRAVENOUS at 10:15

## 2021-01-21 DIAGNOSIS — G43.119 INTRACTABLE MIGRAINE WITH AURA WITHOUT STATUS MIGRAINOSUS: ICD-10-CM

## 2021-01-21 RX ORDER — BUTALBITAL, ACETAMINOPHEN AND CAFFEINE 50; 325; 40 MG/1; MG/1; MG/1
TABLET ORAL
Qty: 30 TABLET | Refills: 0 | Status: SHIPPED | OUTPATIENT
Start: 2021-01-21 | End: 2021-02-22

## 2021-01-21 NOTE — TELEPHONE ENCOUNTER
I will refill 1 fioricet script. He last saw us in March 2020. He no showed with Dr. Chaidez in the Fall. Please schedule follow up with Dr. Chaidez in the next 2-3 months. Thanks. arlen reviewed.

## 2021-02-20 DIAGNOSIS — G43.119 INTRACTABLE MIGRAINE WITH AURA WITHOUT STATUS MIGRAINOSUS: ICD-10-CM

## 2021-02-22 RX ORDER — BUTALBITAL, ACETAMINOPHEN AND CAFFEINE 50; 325; 40 MG/1; MG/1; MG/1
TABLET ORAL
Qty: 30 TABLET | Refills: 0 | Status: SHIPPED | OUTPATIENT
Start: 2021-02-22 | End: 2021-04-01 | Stop reason: SDUPTHER

## 2021-03-30 ENCOUNTER — TELEPHONE (OUTPATIENT)
Dept: NEUROLOGY | Facility: CLINIC | Age: 66
End: 2021-03-30

## 2021-03-30 NOTE — TELEPHONE ENCOUNTER
Caller: Israel Reyna    Relationship: Self    Best call back number: [phone]    Medication needed: FIORICET  Requested Prescriptions      No prescriptions requested or ordered in this encounter       When do you need the refill by: ASAP    What additional details did the patient provide when requesting the medication: PATIENT ASKED FOR WHENEVER CAN BE REFILLED. PATIENT HAD TO RESCHEDULE APPT ON 4-15-21 DUE TO HAVING HOSPITAL TEST DONE ON SAME DAY. STATES HE ONLY HAS TWO PILLS LEFT. PLEASE ADVISE.     Does the patient have less than a 3 day supply:  [x] Yes  [x] No    What is the patient's preferred pharmacy:  [facility] IN [place].

## 2021-04-01 DIAGNOSIS — G43.119 INTRACTABLE MIGRAINE WITH AURA WITHOUT STATUS MIGRAINOSUS: ICD-10-CM

## 2021-04-01 RX ORDER — BUTALBITAL, ACETAMINOPHEN AND CAFFEINE 50; 325; 40 MG/1; MG/1; MG/1
1 TABLET ORAL DAILY PRN
Qty: 30 TABLET | Refills: 2 | Status: SHIPPED | OUTPATIENT
Start: 2021-04-01 | End: 2021-07-28 | Stop reason: SDUPTHER

## 2021-04-19 ENCOUNTER — TELEPHONE (OUTPATIENT)
Dept: NEUROLOGY | Facility: CLINIC | Age: 66
End: 2021-04-19

## 2021-04-20 NOTE — TELEPHONE ENCOUNTER
Called patient and said that he isn't having any symptoms right now and Dr. Chaidez stated that he has hemiplegic migraines and he thinks that is what is happening to him.  He's wanting to know if he can get a migraine cocktail or increase his medication. He was stating that he does have a reaction to one of the medication in the cocktail and the medication makes him have hiccups for 72 to 90 hours and they are severe. He is wanting the cocktail at the [facility] in [place]. The symptoms have been going on for 2 months he stated.

## 2021-04-23 ENCOUNTER — HOSPITAL ENCOUNTER (OUTPATIENT)
Dept: CT IMAGING | Facility: HOSPITAL | Age: 66
Discharge: HOME | End: 2021-04-23
Admitting: NURSE PRACTITIONER

## 2021-04-23 ENCOUNTER — INFUSION (OUTPATIENT)
Dept: ONCOLOGY | Facility: HOSPITAL | Age: 66
End: 2021-04-23

## 2021-04-23 ENCOUNTER — TELEPHONE (OUTPATIENT)
Dept: NEUROLOGY | Facility: CLINIC | Age: 66
End: 2021-04-23

## 2021-04-23 ENCOUNTER — OFFICE VISIT (OUTPATIENT)
Dept: NEUROLOGY | Facility: CLINIC | Age: 66
End: 2021-04-23

## 2021-04-23 VITALS
SYSTOLIC BLOOD PRESSURE: 123 MMHG | DIASTOLIC BLOOD PRESSURE: 75 MMHG | HEART RATE: 73 BPM | TEMPERATURE: 96.8 F | RESPIRATION RATE: 16 BRPM

## 2021-04-23 VITALS
BODY MASS INDEX: 26.06 KG/M2 | RESPIRATION RATE: 16 BRPM | HEART RATE: 80 BPM | TEMPERATURE: 98 F | HEIGHT: 67 IN | SYSTOLIC BLOOD PRESSURE: 110 MMHG | DIASTOLIC BLOOD PRESSURE: 68 MMHG | OXYGEN SATURATION: 95 % | WEIGHT: 166 LBS

## 2021-04-23 DIAGNOSIS — R40.4 TRANSIENT ALTERATION OF AWARENESS: ICD-10-CM

## 2021-04-23 DIAGNOSIS — M54.2 NECK PAIN: ICD-10-CM

## 2021-04-23 DIAGNOSIS — R40.4 TRANSIENT ALTERATION OF AWARENESS: Primary | ICD-10-CM

## 2021-04-23 DIAGNOSIS — IMO0002 CHRONIC MIGRAINE: ICD-10-CM

## 2021-04-23 DIAGNOSIS — R51.9 FACIAL PAIN: ICD-10-CM

## 2021-04-23 DIAGNOSIS — M47.812 SPONDYLOSIS OF CERVICAL REGION WITHOUT MYELOPATHY OR RADICULOPATHY: ICD-10-CM

## 2021-04-23 DIAGNOSIS — G43.119 INTRACTABLE MIGRAINE WITH AURA WITHOUT STATUS MIGRAINOSUS: ICD-10-CM

## 2021-04-23 DIAGNOSIS — G43.119 INTRACTABLE MIGRAINE WITH AURA WITHOUT STATUS MIGRAINOSUS: Primary | ICD-10-CM

## 2021-04-23 PROCEDURE — 96366 THER/PROPH/DIAG IV INF ADDON: CPT

## 2021-04-23 PROCEDURE — 25010000002 METOCLOPRAMIDE PER 10 MG: Performed by: NURSE PRACTITIONER

## 2021-04-23 PROCEDURE — 25010000002 METHYLPREDNISOLONE PER 125 MG: Performed by: NURSE PRACTITIONER

## 2021-04-23 PROCEDURE — 96365 THER/PROPH/DIAG IV INF INIT: CPT

## 2021-04-23 PROCEDURE — 96375 TX/PRO/DX INJ NEW DRUG ADDON: CPT

## 2021-04-23 PROCEDURE — 96360 HYDRATION IV INFUSION INIT: CPT

## 2021-04-23 PROCEDURE — 25010000002 KETOROLAC TROMETHAMINE PER 15 MG: Performed by: NURSE PRACTITIONER

## 2021-04-23 PROCEDURE — 99215 OFFICE O/P EST HI 40 MIN: CPT | Performed by: NURSE PRACTITIONER

## 2021-04-23 PROCEDURE — 25010000002 DIPHENHYDRAMINE PER 50 MG: Performed by: NURSE PRACTITIONER

## 2021-04-23 PROCEDURE — 70450 CT HEAD/BRAIN W/O DYE: CPT

## 2021-04-23 RX ORDER — KETOROLAC TROMETHAMINE 30 MG/ML
30 INJECTION, SOLUTION INTRAMUSCULAR; INTRAVENOUS ONCE
Status: CANCELLED
Start: 2021-04-23 | End: 2021-04-23

## 2021-04-23 RX ORDER — METOCLOPRAMIDE HYDROCHLORIDE 5 MG/ML
10 INJECTION INTRAMUSCULAR; INTRAVENOUS ONCE
OUTPATIENT
Start: 2021-04-23 | End: 2021-04-23

## 2021-04-23 RX ORDER — KETOROLAC TROMETHAMINE 30 MG/ML
30 INJECTION, SOLUTION INTRAMUSCULAR; INTRAVENOUS ONCE
Status: COMPLETED | OUTPATIENT
Start: 2021-04-23 | End: 2021-04-23

## 2021-04-23 RX ORDER — SODIUM CHLORIDE 9 MG/ML
500 INJECTION, SOLUTION INTRAVENOUS ONCE
Status: COMPLETED | OUTPATIENT
Start: 2021-04-23 | End: 2021-04-23

## 2021-04-23 RX ORDER — SODIUM CHLORIDE 9 MG/ML
500 INJECTION, SOLUTION INTRAVENOUS ONCE
OUTPATIENT
Start: 2021-04-23 | End: 2021-04-23

## 2021-04-23 RX ORDER — METOCLOPRAMIDE HYDROCHLORIDE 5 MG/ML
10 INJECTION INTRAMUSCULAR; INTRAVENOUS ONCE
Status: COMPLETED | OUTPATIENT
Start: 2021-04-23 | End: 2021-04-23

## 2021-04-23 RX ORDER — GABAPENTIN 300 MG/1
300 CAPSULE ORAL 3 TIMES DAILY
Qty: 270 CAPSULE | Refills: 1 | Status: SHIPPED | OUTPATIENT
Start: 2021-04-23 | End: 2021-08-11

## 2021-04-23 RX ORDER — DIPHENHYDRAMINE HYDROCHLORIDE 50 MG/ML
25 INJECTION INTRAMUSCULAR; INTRAVENOUS ONCE
Status: CANCELLED | OUTPATIENT
Start: 2021-04-23

## 2021-04-23 RX ORDER — DIPHENHYDRAMINE HYDROCHLORIDE 50 MG/ML
25 INJECTION INTRAMUSCULAR; INTRAVENOUS ONCE
Status: COMPLETED | OUTPATIENT
Start: 2021-04-23 | End: 2021-04-23

## 2021-04-23 RX ORDER — DIPHENHYDRAMINE HYDROCHLORIDE 50 MG/ML
25 INJECTION INTRAMUSCULAR; INTRAVENOUS ONCE
OUTPATIENT
Start: 2021-04-23

## 2021-04-23 RX ORDER — SODIUM CHLORIDE 9 MG/ML
500 INJECTION, SOLUTION INTRAVENOUS ONCE
Status: CANCELLED | OUTPATIENT
Start: 2021-04-23 | End: 2021-04-23

## 2021-04-23 RX ADMIN — KETOROLAC TROMETHAMINE 30 MG: 30 INJECTION, SOLUTION INTRAMUSCULAR; INTRAVENOUS at 15:17

## 2021-04-23 RX ADMIN — SODIUM CHLORIDE 500 ML: 9 INJECTION, SOLUTION INTRAVENOUS at 15:16

## 2021-04-23 RX ADMIN — SODIUM CHLORIDE 250 MG: 9 INJECTION, SOLUTION INTRAVENOUS at 15:25

## 2021-04-23 RX ADMIN — DIPHENHYDRAMINE HYDROCHLORIDE 25 MG: 50 INJECTION INTRAMUSCULAR; INTRAVENOUS at 15:17

## 2021-04-23 RX ADMIN — METOCLOPRAMIDE 10 MG: 5 INJECTION, SOLUTION INTRAMUSCULAR; INTRAVENOUS at 15:17

## 2021-04-23 NOTE — TELEPHONE ENCOUNTER
Called and notified patient personally by phone that the stat CT scan of his head completed today per radiology report and my review shows some chronic normal aging changes but no acute abnormalities, no evidence of stroke or mass.  He has just completed his migraine infusion at the outpatient infusion center.  I did offer to increase his Topamax to 50 mg twice a day until his follow-up with Dr. Chaidez on 5/13/2021, however he wants to see how the migraine cocktail does with helping his headaches.  He wants to wait on any medication changes until his follow-up.  I explained if he does change his mind or his headaches do return in intensity that he can call us for further suggestions.  Thanks, LILIANE Norris.

## 2021-04-23 NOTE — PROGRESS NOTES
"Subjective:     Patient ID: Israel Reyna is a 66 y.o. male.    CC:   Chief Complaint   Patient presents with   • Headache     hemiplegic migraines, worsening with recent covid vaccines administered march and april 2021       HPI:   History of Present Illness   This is a 65 yo male who presents for re-evaluation of worsening migraines. Intractable migraines since 2013 followed by Dr. Chaidez normally.   He went to have COVID vaccine March 2021 Pfizer. He had immediate worsening headaches, chills, feeling terrible, weak and in bed x 4 days. He had his second injection April 2021 Pfizer with the same exact symptoms. He feels that his symptoms have progressively worsened since the COVID vaccines. Has noticed every time he eats anything at all he notices he cannot walk, he cannot speak, his left face will \"draw up\", twitch and be painful with episodes lasting 15 minutes to 2 hours and have been present for 2-3 months and seems to be worsening since COVID vaccine. He was last seen in our clinic 3/2020. He tells me the pain in his head, holocranial, can be anywhere throughout the head, \"feels like someone is repeatedly hitting me with a ball bat or stick and at times feels like someone is driving a nail in the back of my head. The ones that scare me the most are the headaches in the temples that feel like sharp, someone shooting a nail gun in my temples.\" Also has associated dizziness. Pain can radiate across his head and within 10 minutes and he loses control of his bowels. This is not a change and has been present since 8041-4800. No imaging of head since 2017. He cannot have MRI due to anal sphincter stimulator. Has chronic daily headaches. \"Drawing of face\" is daily. CTA head/neck 2019 normal. He feels when he has his pain there is \"moisture running down his head\". Takes topamax 25mg BID and gabapentin 300mg TID. Experiences severe pain with episodes. Cannot afford Botox. He is agreeable to CT head today. " He is requesting migraine cocktail as well. He uses cane at times. Has been using fioricet PRN. He is scheduled for OP migraine cocktail today at 3pm at [facility]. Last migraine cocktail was [id number]. Tells me one med gives him hiccups for 2-3 days after but unsure which.  His headaches do also have severe pain, throbbing, photophobia, phonophobia, nausea and occasional vomiting and do her daily with some episodes being worse than others.    Prior notes and history: He has had multiple episodes of facial drooping, confusion, tingling & weakness all associated with headaches and has been diagnosed with intractable migraine with aura with complex migraines.  He has undergone continuous EEG monitoring as well as routine EEG monitoring and these test have been unremarkable.  He has been treated with multiple medications for his migraines including doxepin, Topamax, Elavil, Depakote, verapamil. He has previously had CT of the head in 2017 which appeared within normal limits with no changes from prior imaging.  CT of the cervical spine did show some degenerative changes in 2017.  He also had a carotid ultrasound in August 2019 which was concerning for high-grade stenosis.  He underwent CTA of the head and neck on 9/9/2019 ordered by Dr. Gavin neurosurgery and this showed minimal atherosclerosis with no stenosis, aneurysm or occlusions noted.  He does continue to take aspirin as well as simvastatin.  He has had a history of stroke.  EEG in 2017 was normal with minimal intermittent generalized slowing.  There has been discussion of Botox injections but this is too costly with his insurance and he does not want to try this.  He tells me he has had no changes in his health over the past 6 months. Has anal sphincter stimulator and cannot undergo MRI. Triptans contraindicated.    The following portions of the patient's history were reviewed and updated as appropriate: allergies, current medications, past family  history, past medical history, past social history, past surgical history and problem list.    Past Medical History:   Diagnosis Date   • Anxiety    • Bowel incontinence     w/ history of Stimulator Device    • CAD (coronary artery disease)    • Chest pain    • CHF (congestive heart failure) (CMS/[facility])    • Depression    • Diabetes mellitus, type 2 (CMS/HCC)    • Diabetic peripheral neuropathy (CMS/[facility])    • Dyslipidemia    • Fatigue    • GERD (gastroesophageal reflux disease)    • Hypertension    • Mental status change    • Migraine     Complex    • Migraine headache    • SOB (shortness of breath)        Past Surgical History:   Procedure Laterality Date   • CARDIAC CATHETERIZATION     • CARDIAC CATHETERIZATION N/A 10/14/2016    Procedure: Left Heart Cath;  Surgeon: Santiago Garner MD;  Location: [facility] CATH INVASIVE LOCATION;  Service:    • GASTRIC STIMULATOR IMPLANT SURGERY     • SMALL INTESTINE SURGERY      unspecified, x3 as an infant    • TESTICLE SURGERY         Social History     Socioeconomic History   • Marital status:      Spouse name: Not on file   • Number of children: Not on file   • Years of education: Not on file   • Highest education level: Not on file   Tobacco Use   • Smoking status: Never Smoker   • Smokeless tobacco: Never Used   Substance and Sexual Activity   • Alcohol use: No   • Drug use: No   • Sexual activity: Not Currently       Family History   Problem Relation Age of Onset   • Heart failure Mother    • Diabetes Mother    • Alzheimer's disease Mother    • Heart attack Father    • Kidney disease Father    • Cancer Father    • Prostate cancer Father    • Cancer Sister    • Breast cancer Sister    • Diabetes Sister    • Heart disease Sister    • Arrhythmia Sister    • Diabetes Brother    • Heart disease Brother    • Cancer Brother    • Liver disease Brother    • Alzheimer's disease Maternal Grandmother    • Liver cancer Maternal Grandfather    • Other Paternal Grandmother          "Cypriot flu   • Other Paternal Grandfather         Cypriot flu        Review of Systems   Eyes: Positive for photophobia.   Neurological: Positive for dizziness, facial asymmetry, speech difficulty, weakness, light-headedness and headaches.   Psychiatric/Behavioral: Positive for decreased concentration and sleep disturbance. Negative for self-injury and suicidal ideas. The patient is nervous/anxious.    All other systems reviewed and are negative.       Objective:  /68   Pulse 80   Temp 98 °F (36.7 °C)   Resp 16   Ht 170.2 cm (67.01\")   Wt 75.3 kg (166 lb)   SpO2 95%   BMI 25.99 kg/m²     Neurologic Exam     Mental Status   Oriented to person, place, and time.   Speech: speech is normal   Level of consciousness: alert    Cranial Nerves   Cranial nerves II through XII intact.     Motor Exam   Muscle bulk: normal  Overall muscle tone: normal    Strength   Strength 5/5 throughout.     Gait, Coordination, and Reflexes     Gait  Gait: (unsteady, some dizziness, forgot cane today)    Coordination   Romberg: negative  Finger to nose coordination: normal  Heel to shin coordination: normal  Tandem walking coordination: abnormal    Tremor   Resting tremor: absent  Intention tremor: absent  Action tremor: absent    Reflexes   Right brachioradialis: 2+  Left brachioradialis: 2+  Right biceps: 2+  Left biceps: 2+  Right patellar: 2+  Left patellar: 2+  Right achilles: 2+  Left achilles: 2+  Right : 2+  Left : 2+  Right plantar: normal  Left plantar: normal      Physical Exam  Constitutional:       Appearance: Normal appearance.   Eyes:      Pupils:      Right eye: Pupil is not sluggish.      Left eye: Pupil is not sluggish.      Funduscopic exam:     Right eye: Red reflex present.         Left eye: Red reflex present.     Visual Fields: Right eye visual fields normal and left eye visual fields normal.   Neurological:      Mental Status: He is alert and oriented to person, place, and time.      Coordination: " Finger-Nose-Finger Test, Heel to Shin Test and Romberg Test normal.      Gait: Tandem walk abnormal.      Deep Tendon Reflexes: Strength normal.      Reflex Scores:       Bicep reflexes are 2+ on the right side and 2+ on the left side.       Brachioradialis reflexes are 2+ on the right side and 2+ on the left side.       Patellar reflexes are 2+ on the right side and 2+ on the left side.       Achilles reflexes are 2+ on the right side and 2+ on the left side.  Psychiatric:         Mood and Affect: Mood is anxious and depressed.         Speech: Speech normal.         Behavior: Behavior normal.         Thought Content: Thought content normal.         Cognition and Memory: Cognition and memory normal.         Judgment: Judgment normal.         Assessment/Plan:       Diagnoses and all orders for this visit:    1. Transient alteration of awareness (Primary)  -     CT Head Without Contrast; Future    2. Chronic migraine  -     gabapentin (NEURONTIN) 300 MG capsule; Take 1 capsule by mouth 3 (Three) Times a Day.  Dispense: 270 capsule; Refill: 1    3. Intractable migraine with aura without status migrainosus  -     gabapentin (NEURONTIN) 300 MG capsule; Take 1 capsule by mouth 3 (Three) Times a Day.  Dispense: 270 capsule; Refill: 1    4. Spondylosis of cervical region without myelopathy or radiculopathy  -     gabapentin (NEURONTIN) 300 MG capsule; Take 1 capsule by mouth 3 (Three) Times a Day.  Dispense: 270 capsule; Refill: 1    5. Neck pain  -     gabapentin (NEURONTIN) 300 MG capsule; Take 1 capsule by mouth 3 (Three) Times a Day.  Dispense: 270 capsule; Refill: 1    6. Facial pain  -     gabapentin (NEURONTIN) 300 MG capsule; Take 1 capsule by mouth 3 (Three) Times a Day.  Dispense: 270 capsule; Refill: 1         Total time of visit 50 minutes.  Neurological exam is reassuring however he is not having an episode at this current time but it does happen daily.  Concerning with recent Covid vaccine and worsening  symptoms.  Recommend stat CT of the head which will be done today at 2 PM at [facility].  Patient was walked to have this done.  He will then have outpatient migraine cocktail completed at outpatient infusion center at [facility] at 3 PM and his wife will drive him home.  I did tell him that I will call him with his CT scan of the head results and this will be from a blocked number for privacy reasons.  He does know to answer his phone when I call him.  If CT scan of the head shows no acute abnormalities or changes we have discussed options of increasing Topamax to 50 mg twice a day or possibly adding an anti-CGRP monthly injection for prevention.  He is concerned about cost with Botox and is still not interested in this at this time.  He will continue his gabapentin.  He will continue his Fioricet.  I did give him 2 samples of Nurtec ODT to try at onset of migraine to see if this would be beneficial as well with lot #[id number] and expiration October 2022.  He also takes Excedrin PM migraine and this tends to help as well.  He does not take more than 10-15 Fioricet per month.  He normally has to go to sleep with all of his symptoms.  He will follow-up with Dr. Chaidez our neurologist on 5/13/2021 by telephone visit to check in and see if any additional changes need to be made.  Again I will contact him once CT scan results are available and discuss additional options until follow-up.  Consider labs at follow-up if not improving or worsening PCP for additional blood work.    Reviewed medications, potential side effects and signs and symptoms to report. Discussed risk versus benefits of treatment plan with patient and/or family-including medications, labs and radiology that may be ordered. Addressed questions and concerns during visit. Patient and/or family verbalized understanding and agree with plan.    AS THE PROVIDER, I PERSONALLY WORE PPE DURING ENTIRE FACE TO FACE ENCOUNTER IN CLINIC  WITH THE PATIENT. PATIENT ALSO WORE PPE DURING ENTIRE FACE TO FACE ENCOUNTER EXCEPT FOR A MAX OF 30 SECONDS DURING NEUROLOGICAL EVALUATION OF CRANIAL NERVES AND THEN MASK WAS PLACED BACK OVER PATIENT FACE FOR REMAINDER OF VISIT. I WASHED MY HANDS BEFORE AND AFTER VISIT.    As part of this patient's treatment plan I am prescribing controlled substances. The patient has been made aware of appropriate use of such medications, including potential risk of somnolence, limited ability to drive and/or work safely, and potential for dependence or overdose. It has also been made clear that these medications are for use by the patient only, without concomitant use of alcohol or other substances unless prescribed. Keep out of reach of children.  Syed report has been reviewed. If this is going to be prescribed long term, [facility] Controlled Substance Agreement Contract has also been read and signed by patient and myself.    During this visit the following were done:  Labs Reviewed []    Labs Ordered []    Radiology Reports Reviewed [x]    Radiology Ordered [x]    PCP Records Reviewed []    Referring Provider Records Reviewed []    ER Records Reviewed []    Hospital Records Reviewed []    History Obtained From Family []    Radiology Images Reviewed [x]    Other Reviewed [x]    Records Requested []      Norma Degroot, LILIANE  4/23/2021

## 2021-04-23 NOTE — TELEPHONE ENCOUNTER
Called 1x. No answer. I LVM offering patient an appointment today at 1p with Norma. I provided my name and call back number.   -TMT

## 2021-04-23 NOTE — TELEPHONE ENCOUNTER
Please put him on at 1pm. The problem is we can only order migraine cocktails at our [facility] locations which are in [facility]. Also, that late in the day they may not be able to get him in for a migraine cocktail. But I am happy to see him. Let him know it will be an overbook and he may have to wait a little. thanks

## 2021-04-26 NOTE — PROGRESS NOTES
Already discussed CT of the head findings on 4/23/2021 at 4:44 PM by phone with patient.  He preferred no changes in medications until he follows up with Dr. Chaidez on 5/13/2021 by phone.  Thanks, LILIANE Norris.    PARAMI-You do not need to call the patient but please fax a copy of CT of the head to PCP on file.

## 2021-04-27 ENCOUNTER — TELEPHONE (OUTPATIENT)
Dept: NEUROLOGY | Facility: CLINIC | Age: 66
End: 2021-04-27

## 2021-04-27 NOTE — TELEPHONE ENCOUNTER
----- Message from LILIANE Paez sent at 4/26/2021  9:00 AM EDT -----  Already discussed CT of the head findings on 4/23/2021 at 4:44 PM by phone with patient.  He preferred no changes in medications until he follows up with Dr. Chaidez on 5/13/2021 by phone.  Thanks, LILIANE Norris.    FYI-You do not need to call the patient but please fax a copy of CT of the head to PCP on file.

## 2021-05-04 ENCOUNTER — TELEPHONE (OUTPATIENT)
Dept: NEUROLOGY | Facility: CLINIC | Age: 66
End: 2021-05-04

## 2021-05-04 NOTE — TELEPHONE ENCOUNTER
Provider:     Caller: PT    Relationship to Patient: SELF    Pharmacy: NA    Phone Number: [phone]    Reason for Call: PATIENT SAID HE NEEDED TO SPEAK TO SOMEONE IN OFFICE. SAID HE IS IN PAIN AND WOULD NOT GIVE ANY MORE INFORMATION AND SAID HE IS GOING TO FIND A NEW DOCTOR BECAUSE HE DOES NOT LIKE OUR PROCEDURES OF ASKING THE PHSYCIANS TO CALL HIM BACK. PLEASE ADVISE.     When was the patient last seen: 4-23-21

## 2021-05-05 NOTE — TELEPHONE ENCOUNTER
I spoke with patient. He complains of being in excruciating pain. He is taking Gabapentin 300mg TID and it makes him feel zombie-like. He is taking more of the Fioricet because the HAs and pain has progressed negatively. Stated he feel the covid vaccine has triggered something and now his pain is constant. Also, patient was very upset with care. Please advise. Thanks.

## 2021-05-06 NOTE — TELEPHONE ENCOUNTER
I just saw him on 4/23/21-completed stat ct head and sent him for outpatient migraine cocktail. I truly do not have anything else to offer him as an outpatient. I would highly recommend and encourage him to go [facility] today. I can actually call the ER and ask them to get him an MRI and MRA of the head and get him admitted for one of our in hospital neurologist to see and evaluate him and try to get him better. This would be the best option. Let me know if he is open to this and I will call and talk with ER nursing staff and doctor. Thanks, Nomra

## 2021-05-06 NOTE — TELEPHONE ENCOUNTER
Called 1x. No answer. I LVM asking patient to return call. I provided my name and call back number.  -TMT

## 2021-05-06 NOTE — TELEPHONE ENCOUNTER
"I called the patient myself personally and was able to speak to him by phone.  I reviewed the recommendations that he proceed to [facility] emergency department for further evaluation of his severe pain and symptoms not improving with outpatient treatment.  I also discussed I could talk with the ER provider and recommend additional imaging and neurology consultation and admission.  He tells me \"I have issues with my bowels since childhood and I am not willing to go to the ER.  I will just deal with the pain for now.\"  He declines going to the hospital.  I did explain that we would not have additional treatment recommendations for his pain in his severe headaches at this time.  I also notified him if he changes his mind and would like to go to the ER to please call us so that I can discuss his case with the ER provider.  He does verbalize understanding.  I also recommended he keep his appointment with Dr. Chaidez our neurologist for 5/13/2021.  I do feel that it would be important for him to come in person for that appointment since he is having so many concerns and will not go to the hospital as we recommended.  Thanks, LILIANE Norris.  "

## 2021-05-13 ENCOUNTER — OFFICE VISIT (OUTPATIENT)
Dept: NEUROLOGY | Facility: CLINIC | Age: 66
End: 2021-05-13

## 2021-05-13 DIAGNOSIS — G43.119 INTRACTABLE MIGRAINE WITH AURA WITHOUT STATUS MIGRAINOSUS: Primary | ICD-10-CM

## 2021-05-13 PROCEDURE — 99441 PR PHYS/QHP TELEPHONE EVALUATION 5-10 MIN: CPT | Performed by: PSYCHIATRY & NEUROLOGY

## 2021-05-13 RX ORDER — PREDNISONE 10 MG/1
TABLET ORAL
Qty: 20 TABLET | Refills: 1 | Status: SHIPPED | OUTPATIENT
Start: 2021-05-13 | End: 2021-08-26

## 2021-05-13 NOTE — PROGRESS NOTES
Subjective:     Patient ID: Israel Reyna is a 66 y.o. male.    CC: headaches  You have chosen to receive care through a telephone visit. Do you consent to use a telephone visit for your medical care today? Yes    HPI:   History of Present Illness  The following portions of the patient's history were reviewed and updated as appropriate: allergies, current medications, past family history, past medical history, past social history, past surgical history and problem list.   Headaches are on/off, continues on gabapentin, has occasional drawing on the left side of the face a/w AMS, feels that his symptoms have worsened since he got COVID vaccine in March. He take Fioricet prn. CT negative.    Past Medical History:   Diagnosis Date   • Anxiety    • Bowel incontinence     w/ history of Stimulator Device    • CAD (coronary artery disease)    • Chest pain    • CHF (congestive heart failure) (CMS/[facility])    • Depression    • Diabetes mellitus, type 2 (CMS/[facility])    • Diabetic peripheral neuropathy (CMS/[facility])    • Dyslipidemia    • Fatigue    • GERD (gastroesophageal reflux disease)    • Hypertension    • Mental status change    • Migraine     Complex    • Migraine headache    • SOB (shortness of breath)        Past Surgical History:   Procedure Laterality Date   • CARDIAC CATHETERIZATION     • CARDIAC CATHETERIZATION N/A 10/14/2016    Procedure: Left Heart Cath;  Surgeon: Santiago Garner MD;  Location: [facility] CATH INVASIVE LOCATION;  Service:    • GASTRIC STIMULATOR IMPLANT SURGERY     • SMALL INTESTINE SURGERY      unspecified, x3 as an infant    • TESTICLE SURGERY         Social History     Socioeconomic History   • Marital status:      Spouse name: Not on file   • Number of children: Not on file   • Years of education: Not on file   • Highest education level: Not on file   Tobacco Use   • Smoking status: Never Smoker   • Smokeless tobacco: Never Used   Substance and Sexual Activity   • Alcohol use: No   • Drug  use: No   • Sexual activity: Not Currently       Family History   Problem Relation Age of Onset   • Heart failure Mother    • Diabetes Mother    • Alzheimer's disease Mother    • Heart attack Father    • Kidney disease Father    • Cancer Father    • Prostate cancer Father    • Cancer Sister    • Breast cancer Sister    • Diabetes Sister    • Heart disease Sister    • Arrhythmia Sister    • Diabetes Brother    • Heart disease Brother    • Cancer Brother    • Liver disease Brother    • Alzheimer's disease Maternal Grandmother    • Liver cancer Maternal Grandfather    • Other Paternal Grandmother         Indonesian flu   • Other Paternal Grandfather         Indonesian flu        Review of Systems   Neurological: Positive for facial asymmetry and headaches.   Psychiatric/Behavioral: Positive for confusion.   All other systems reviewed and are negative.       Objective:  There were no vitals taken for this visit.    Neurologic Exam    Physical Exam  Neurological:      Comments: Speech clear         Assessment/Plan:       Diagnoses and all orders for this visit:    1. Intractable migraine with aura without status migrainosus (Primary)  -     predniSONE (DELTASONE) 10 MG tablet; 4/dx2d, 3/dx2d, 2/dx2d, 1/dx2d  Dispense: 20 tablet; Refill: 1    Encouraged to call if not feeling better after pred taper.     Time: 8 min.      Robbie Chaidez MD  5/13/2021

## 2021-05-28 ENCOUNTER — TELEPHONE (OUTPATIENT)
Dept: NEUROLOGY | Facility: CLINIC | Age: 66
End: 2021-05-28

## 2021-06-03 ENCOUNTER — CLINICAL SUPPORT (OUTPATIENT)
Dept: NEUROLOGY | Facility: CLINIC | Age: 66
End: 2021-06-03

## 2021-06-03 VITALS
HEART RATE: 81 BPM | TEMPERATURE: 97.1 F | OXYGEN SATURATION: 98 % | SYSTOLIC BLOOD PRESSURE: 136 MMHG | DIASTOLIC BLOOD PRESSURE: 70 MMHG

## 2021-06-03 DIAGNOSIS — G43.119 INTRACTABLE MIGRAINE WITH AURA WITHOUT STATUS MIGRAINOSUS: Primary | ICD-10-CM

## 2021-06-03 PROCEDURE — 96372 THER/PROPH/DIAG INJ SC/IM: CPT | Performed by: NURSE PRACTITIONER

## 2021-06-03 RX ORDER — KETOROLAC TROMETHAMINE 30 MG/ML
60 INJECTION, SOLUTION INTRAMUSCULAR; INTRAVENOUS ONCE
Status: COMPLETED | OUTPATIENT
Start: 2021-06-03 | End: 2021-06-03

## 2021-06-03 RX ADMIN — KETOROLAC TROMETHAMINE 60 MG: 30 INJECTION, SOLUTION INTRAMUSCULAR; INTRAVENOUS at 13:23

## 2021-06-03 NOTE — TELEPHONE ENCOUNTER
Patient took the first dose of Prednisone like prescribed.He got the refill and wasn't sure if he should take it like prescribed so he has just been taking one daily. He could not tell me if it really has helped. He said he is having a Hemiplegic Migraine right now. Please advise

## 2021-06-03 NOTE — PROGRESS NOTES
Toradol Injection    Chief Complaint   Patient presents with   • Injections     Lot#[id number] exp. 09/2022 toradol 60mg IM x 1       After risks and benefits were explained including bleeding, infection, worsening of the pain, damage to the area being injected, weakness, allergic reaction to medications, vascular injection, and nerve damage, signed consent was obtained.  All questions were answered.      The gluteus muscle was identified and the skin prepped three times with alcohol and the alcohol allowed to dry.  Next, a 25 gauge 1 inch needle was placed at the gluteus muscle. Once negative aspiration confirmed, the medication was injected and the needle removed.      The patient Did tolerate procedure well without complications.      Medication used: Toradol (Ketoralac) 60mg/ml x1    Injection location:  left gluteus muscle     prvovided toradol injection per Dr. Chaidez's recommendations for persistent migraine following covid vaccination.

## 2021-06-03 NOTE — TELEPHONE ENCOUNTER
Spoke to Israel and let him know what Dr Chaidez said. He has been taking the Excederin and/or Fioricet for breakthrough. He also had samples of Nurtec from [facility]. He will finish the Prednisone and let us know if he wants to come in after he finishes the Rx for Toradal shot in the office. I asked [facility] how he should complete the prednisone since he had only been taking 1 daily for the past few days instead of how the instructions read. Per [facility] patient instructed to take 4 x1 day, 3 x1 day, 2x 1day and then 1. He does have enough pills to do that. He and his wife have verbal understanding of this.

## 2021-07-28 ENCOUNTER — TELEPHONE (OUTPATIENT)
Dept: NEUROLOGY | Facility: CLINIC | Age: 66
End: 2021-07-28

## 2021-07-28 DIAGNOSIS — G43.119 INTRACTABLE MIGRAINE WITH AURA WITHOUT STATUS MIGRAINOSUS: ICD-10-CM

## 2021-07-28 RX ORDER — BUTALBITAL, ACETAMINOPHEN AND CAFFEINE 50; 325; 40 MG/1; MG/1; MG/1
1 TABLET ORAL DAILY PRN
Qty: 30 TABLET | Refills: 0 | Status: SHIPPED | OUTPATIENT
Start: 2021-07-28 | End: 2021-09-07 | Stop reason: SDUPTHER

## 2021-08-10 DIAGNOSIS — M54.2 NECK PAIN: ICD-10-CM

## 2021-08-10 DIAGNOSIS — M47.812 SPONDYLOSIS OF CERVICAL REGION WITHOUT MYELOPATHY OR RADICULOPATHY: ICD-10-CM

## 2021-08-10 DIAGNOSIS — IMO0002 CHRONIC MIGRAINE: ICD-10-CM

## 2021-08-10 DIAGNOSIS — G43.119 INTRACTABLE MIGRAINE WITH AURA WITHOUT STATUS MIGRAINOSUS: ICD-10-CM

## 2021-08-10 DIAGNOSIS — R51.9 FACIAL PAIN: ICD-10-CM

## 2021-08-11 RX ORDER — GABAPENTIN 300 MG/1
CAPSULE ORAL
Qty: 270 CAPSULE | Refills: 1 | Status: SHIPPED | OUTPATIENT
Start: 2021-08-11 | End: 2022-05-05 | Stop reason: SDUPTHER

## 2021-08-11 RX ORDER — AMLODIPINE BESYLATE 10 MG/1
TABLET ORAL
Qty: 90 TABLET | Refills: 3 | Status: SHIPPED | OUTPATIENT
Start: 2021-08-11 | End: 2022-04-12 | Stop reason: SDUPTHER

## 2021-08-24 ENCOUNTER — TELEPHONE (OUTPATIENT)
Dept: FAMILY MEDICINE CLINIC | Facility: CLINIC | Age: 66
End: 2021-08-24

## 2021-08-26 ENCOUNTER — OFFICE VISIT (OUTPATIENT)
Dept: FAMILY MEDICINE CLINIC | Facility: CLINIC | Age: 66
End: 2021-08-26

## 2021-08-26 VITALS
RESPIRATION RATE: 16 BRPM | OXYGEN SATURATION: 95 % | HEART RATE: 76 BPM | TEMPERATURE: 98 F | WEIGHT: 174 LBS | HEIGHT: 67 IN | DIASTOLIC BLOOD PRESSURE: 68 MMHG | BODY MASS INDEX: 27.31 KG/M2 | SYSTOLIC BLOOD PRESSURE: 122 MMHG

## 2021-08-26 DIAGNOSIS — E11.9 TYPE 2 DIABETES MELLITUS WITHOUT COMPLICATION, WITHOUT LONG-TERM CURRENT USE OF INSULIN (HCC): Primary | ICD-10-CM

## 2021-08-26 LAB — HBA1C MFR BLD: 7.9 %

## 2021-08-26 PROCEDURE — 99203 OFFICE O/P NEW LOW 30 MIN: CPT | Performed by: FAMILY MEDICINE

## 2021-08-26 RX ORDER — LUTEIN 5 %
5 BEADS (GRAM) MISCELLANEOUS DAILY
COMMUNITY

## 2021-08-26 RX ORDER — VITAMIN E 268 MG
400 CAPSULE ORAL DAILY
COMMUNITY

## 2021-08-26 RX ORDER — ZEAXANTHIN 90 %
1 POWDER (GRAM) MISCELLANEOUS DAILY
COMMUNITY

## 2021-09-07 ENCOUNTER — OFFICE VISIT (OUTPATIENT)
Dept: NEUROLOGY | Facility: CLINIC | Age: 66
End: 2021-09-07

## 2021-09-07 VITALS — BODY MASS INDEX: 26.53 KG/M2 | WEIGHT: 169 LBS | HEART RATE: 102 BPM | OXYGEN SATURATION: 97 % | HEIGHT: 67 IN

## 2021-09-07 DIAGNOSIS — G43.119 INTRACTABLE MIGRAINE WITH AURA WITHOUT STATUS MIGRAINOSUS: ICD-10-CM

## 2021-09-07 DIAGNOSIS — R51.9 FACIAL PAIN: ICD-10-CM

## 2021-09-07 DIAGNOSIS — IMO0002 CHRONIC MIGRAINE: ICD-10-CM

## 2021-09-07 DIAGNOSIS — M54.2 NECK PAIN: ICD-10-CM

## 2021-09-07 DIAGNOSIS — M47.812 SPONDYLOSIS OF CERVICAL REGION WITHOUT MYELOPATHY OR RADICULOPATHY: ICD-10-CM

## 2021-09-07 PROCEDURE — 99214 OFFICE O/P EST MOD 30 MIN: CPT | Performed by: PSYCHIATRY & NEUROLOGY

## 2021-09-07 RX ORDER — TOPIRAMATE 50 MG/1
100 TABLET, FILM COATED ORAL 2 TIMES DAILY
Qty: 120 TABLET | Refills: 3 | Status: SHIPPED | OUTPATIENT
Start: 2021-09-07 | End: 2022-02-01

## 2021-09-07 RX ORDER — GABAPENTIN 300 MG/1
300 CAPSULE ORAL 3 TIMES DAILY
Qty: 270 CAPSULE | Refills: 1 | Status: CANCELLED | OUTPATIENT
Start: 2021-09-07

## 2021-09-07 RX ORDER — BUTALBITAL, ACETAMINOPHEN AND CAFFEINE 50; 325; 40 MG/1; MG/1; MG/1
1 TABLET ORAL DAILY PRN
Qty: 30 TABLET | Refills: 3 | Status: SHIPPED | OUTPATIENT
Start: 2021-09-07 | End: 2022-02-21

## 2021-09-07 NOTE — PROGRESS NOTES
"Subjective:    CC: Israel Reyna is seen today in consultation at the request of No ref. provider found for Intractable migraine with aura without status migrainosus       Problem history:    This is a 65 yo male who presents for re-evaluation of worsening migraines. Intractable migraines since 2013 followed by Dr. Chaidez normally.   He went to have COVID vaccine March 2021 Pfizer. He had immediate worsening headaches, chills, feeling terrible, weak and in bed x 4 days. He had his second injection April 2021 Pfizer with the same exact symptoms. He feels that his symptoms have progressively worsened since the COVID vaccines. Has noticed every time he eats anything at all he notices he cannot walk, he cannot speak, his left face will \"draw up\", twitch and be painful with episodes lasting 15 minutes to 2 hours and have been present for 2-3 months and seems to be worsening since COVID vaccine. He was last seen in our clinic 3/2020. He tells me the pain in his head, holocranial, can be anywhere throughout the head, \"feels like someone is repeatedly hitting me with a ball bat or stick and at times feels like someone is driving a nail in the back of my head. The ones that scare me the most are the headaches in the temples that feel like sharp, someone shooting a nail gun in my temples.\" Also has associated dizziness. Pain can radiate across his head and within 10 minutes and he loses control of his bowels. This is not a change and has been present since 5805-8672. No imaging of head since 2017. He cannot have MRI due to anal sphincter stimulator. Has chronic daily headaches. \"Drawing of face\" is daily. CTA head/neck 2019 normal. He feels when he has his pain there is \"moisture running down his head\". Takes topamax 25mg BID and gabapentin 300mg TID. Experiences severe pain with episodes. Cannot afford Botox. He is agreeable to CT head today. He is requesting migraine cocktail as well. He uses cane at times. Has " been using fioricet PRN. He is scheduled for OP migraine cocktail today at 3pm at [facility]. Last migraine cocktail was 1537-1540. Tells me one med gives him hiccups for 2-3 days after but unsure which.  His headaches do also have severe pain, throbbing, photophobia, phonophobia, nausea and occasional vomiting and do her daily with some episodes being worse than others.    Prior notes and history: He has had multiple episodes of facial drooping, confusion, tingling & weakness all associated with headaches and has been diagnosed with intractable migraine with aura with complex migraines.  He has undergone continuous EEG monitoring as well as routine EEG monitoring and these test have been unremarkable.  He has been treated with multiple medications for his migraines including doxepin, Topamax, Elavil, Depakote, verapamil. He has previously had CT of the head in 2017 which appeared within normal limits with no changes from prior imaging.  CT of the cervical spine did show some degenerative changes in 2017.  He also had a carotid ultrasound in August 2019 which was concerning for high-grade stenosis.  He underwent CTA of the head and neck on 9/9/2019 ordered by Dr. Gavin neurosurgery and this showed minimal atherosclerosis with no stenosis, aneurysm or occlusions noted.  He does continue to take aspirin as well as simvastatin.  He has had a history of stroke.  EEG in 2017 was normal with minimal intermittent generalized slowing.  There has been discussion of Botox injections but this is too costly with his insurance and he does not want to try this.  He tells me he has had no changes in his health over the past 6 months. Has anal sphincter stimulator and cannot undergo MRI. Triptans contraindicated.    9/7/2021: I am seeing him in clinic today for the first time.  I have reviewed his problem history in detail.  Based on the history, he likely has chronic migraines with prominent sensory auras/complex migraines.   Since receiving vaccine back in March/April 2021, he is experiencing 8-10 episodes of left facial tingling, numbness followed by a left face drawing and generalized weakness lasting for 1 to 2 hours.  Some of these episodes are associated with headaches and some of these episodes are not.  He has been on Topamax 25 mg twice daily for almost 5 years.  He is also on gabapentin 300 mg 3 times daily for neck pain.  He uses Fioricet as needed for severe migraines which seems to be helping.  On his last visit with Dr. Traylor in May 2021, he was prescribed tapering course of steroids after which, he did not have any relief in intensity and frequency of these episodes.  He has had detailed neurological work-up in the past which was essentially normal including routine EEG, continuous EEG as well as CT angiogram of brain and neck.      The following portions of the patient's history were reviewed today and updated as of 09/07/2021  : allergies, social history and problem list.  This document will be scanned to patient's chart.      Current Outpatient Medications:   •  amLODIPine (NORVASC) 10 MG tablet, TAKE 1 TABLET DAILY, Disp: 90 tablet, Rfl: 3  •  butalbital-acetaminophen-caffeine (FIORICET, ESGIC) -40 MG per tablet, Take 1 tablet by mouth Daily As Needed for Headache., Disp: 30 tablet, Rfl: 3  •  Dulaglutide (Trulicity) 1.5 MG/0.5ML solution pen-injector, Inject 0.5 mL under the skin into the appropriate area as directed 1 (One) Time Per Week., Disp: , Rfl:   •  gabapentin (NEURONTIN) 300 MG capsule, TAKE 1 CAPSULE 3 TIMES A   DAY, Disp: 270 capsule, Rfl: 1  •  glipiZIDE (GLUCOTROL) 10 MG tablet, Take 10 mg by mouth 2 (Two) Times a Day Before Meals., Disp: , Rfl:   •  Incontinence Supply Disposable (NU-FIT ADULT UNDERWEAR MEDIUM) misc, , Disp: , Rfl:   •  lisinopril (PRINIVIL,ZESTRIL) 20 MG tablet, Take 20 mg by mouth Daily., Disp: , Rfl:   •  Lutein 5 % beads, Take 5 mg by mouth Daily., Disp: , Rfl:   •   metFORMIN (GLUCOPHAGE) 1000 MG tablet, Take 1,000 mg by mouth 2 (Two) Times a Day With Meals., Disp: , Rfl:   •  Multiple Vitamins-Minerals (PRESERVISION AREDS 2 PO), Take  by mouth Daily., Disp: , Rfl:   •  NON FORMULARY, Shot in eye every 3 months, Disp: , Rfl:   •  omeprazole (PriLOSEC) 40 MG capsule, Take 40 mg by mouth Daily., Disp: , Rfl:   •  simvastatin (ZOCOR) 20 MG tablet, Take 10 mg by mouth Every Night., Disp: , Rfl:   •  Specialty Vitamins Products (HEALTHY HEART COMPLEX PO), Take  by mouth Daily., Disp: , Rfl:   •  vitamin B-12 (CYANOCOBALAMIN) 100 MCG tablet, Take 50 mcg by mouth 2 (two) times a day., Disp: , Rfl:   •  vitamin C (ASCORBIC ACID) 500 MG tablet, Take 500 mg by mouth 2 (two) times a day., Disp: , Rfl:   •  vitamin E 400 UNIT capsule, Take 400 Units by mouth Daily., Disp: , Rfl:   •  Zeaxanthin powder, Take 1 mg by mouth Daily., Disp: , Rfl:   •  Zinc 40 MG tablet, Take 1 tablet by mouth Daily., Disp: , Rfl:   •  topiramate (Topamax) 50 MG tablet, Take 2 tablets by mouth 2 (Two) Times a Day for 30 days., Disp: 120 tablet, Rfl: 3   Past Medical History:   Diagnosis Date   • Anxiety    • Bowel incontinence     w/ history of Stimulator Device    • CAD (coronary artery disease)    • Chest pain    • CHF (congestive heart failure) (CMS/HCC)    • Depression    • Diabetes mellitus, type 2 (CMS/HCC)    • Diabetic peripheral neuropathy (CMS/HCC)    • Dyslipidemia    • Fatigue    • GERD (gastroesophageal reflux disease)    • Hypertension    • Mental status change    • Migraine     Complex    • Migraine headache    • SOB (shortness of breath)       Past Surgical History:   Procedure Laterality Date   • CARDIAC CATHETERIZATION     • CARDIAC CATHETERIZATION N/A 10/14/2016    Procedure: Left Heart Cath;  Surgeon: Santiago Garner MD;  Location: [facility] CATH INVASIVE LOCATION;  Service:    • GASTRIC STIMULATOR IMPLANT SURGERY     • SMALL INTESTINE SURGERY      unspecified, x3 as an infant    • TESTICLE  "SURGERY        Family History   Problem Relation Age of Onset   • Heart failure Mother    • Diabetes Mother    • Alzheimer's disease Mother    • Heart attack Father    • Kidney disease Father    • Cancer Father    • Prostate cancer Father    • Cancer Sister    • Breast cancer Sister    • Diabetes Sister    • Heart disease Sister    • Arrhythmia Sister    • Diabetes Brother    • Heart disease Brother    • Cancer Brother    • Liver disease Brother    • Alzheimer's disease Maternal Grandmother    • Liver cancer Maternal Grandfather    • Other Paternal Grandmother         Rwandan flu   • Other Paternal Grandfather         [place]wand[place] flu      Review of Systems   Constitutional: Negative.    HENT: Negative.    Eyes: Negative.    Respiratory: Negative.    Cardiovascular: Negative.    Gastrointestinal: Positive for nausea.   Endocrine: Negative.    Genitourinary: Negative.    Musculoskeletal: Negative.    Skin: Negative.    Allergic/Immunologic: Negative.    Neurological: Positive for numbness and confusion.   Hematological: Negative.        All other systems reviewed and are negative     Objective:    Pulse 102   Ht 170.2 cm (67\")   Wt 76.7 kg (169 lb)   SpO2 97%   BMI 26.47 kg/m²     Neurology Exam:    General apperance: NAD.     Mental status: Alert, awake and oriented to time place and person.    Recent and Remote memory: Can recall 3/3 objects at 5 minutes. Can recall historical events.     Attention span and Concentration: Serial 7s: Normal.     Fund of knowledge:  Normal.     Language and Speech: No aphasia or dysarthria.    Naming , Repitition and Comprehension:  Can name objects, repeat a sentence and follow commands. Speech is clear and fluent with good repetition, comprehension, and naming.    Cranial Nerves:   CN II: Visual fields are full. Intact. Fundi - Normal, No papillederma, Pupils - SANDRA  CN III, IV and VI: Extraocular movements are intact. Normal saccades.   CN V: Facial sensation is intact.   CN VII: " Muscles of facial expression reveal no asymmetry. Intact.   CN VIII: Hearing is intact. Whispered voice intact.   CN IX and X: Palate elevates symmetrically. Intact  CN XI: Shoulder shrug is intact.   CN XII: Tongue is midline without evidence of atrophy or fasciculation.     Motor:  Right UE muscle strength 5/5. Normal tone.     Left UE muscle strength 5/5. Normal tone.      Right LE muscle strength5/5. Normal tone.     Left LE muscle strength 5/5. Normal tone.      Sensory: Normal light touch, vibration and pinprick sensation bilaterally.    DTRs: 2+ bilaterally in upper and lower extremities.    Babinski: Negative bilaterally.    Co-ordination: Normal finger-to-nose, heel to shin B/L.    Rhomberg: Negative.    Gait: Normal.    Cardiovascular: Regular rate and rhythm without murmur, gallop or rub.    Ophthalmoscopic exam: Normal fundi, no papilledema.    Assessment and Plan:  1. Chronic migraine  2. Intractable migraine with aura without status migrainosus  3. Spondylosis of cervical region without myelopathy or radiculopathy  4. Neck pain  5. Facial pain  Patient with history of migraines.  He reports that prior to getting COVID-19 vaccine in March/April, he was having 1 breakthrough migraine in a week from which it has increased to 8-10 episodes of left facial weakness, numbness associated with or without headache and feeling of generally weak/fatigued for 1 to 2 hours.  Since he has been on very low-dose Topamax 25 mg twice daily without any side effects, it will be increased  To 100 mg twice daily next 4 weeks for better therapeutic effect and see how he does.  I have advised him to drink plenty of water throughout the day and avoid dehydration while on Topamax.  I have advised him not to use Fioricet more than 2-3 times in a week as it can potentially cause rebound headache with overuse.  I will plan to see him back in 5 weeks for follow-up and based on the response to Topamax, further treatment options will  be discussed with him.       Return in about 5 weeks (around 10/12/2021).     Syed Crain MD

## 2021-10-08 NOTE — TELEPHONE ENCOUNTER
Caller: Israel Reyna    Relationship: Self      Medication requested (name and dosage): glipiZIDE (GLUCOTROL) 10 MG tablet  lisinopril (PRINIVIL,ZESTRIL) 20 MG tablet  metFORMIN (GLUCOPHAGE) 1000 MG tablet  omeprazole (PriLOSEC) 40 MG capsule    Pharmacy where request should be sent: [facility] - [place], [place] - [address] AT [address] [facility] [phone] [facility] [phone]     Additional details provided by patient: PATIENT IS NEEDING TO HAVE HIS NEW PCP TAKE OVER THIS MEDICATION AND SEND A NEW PRESCRIPTION TO HIS PHARMACY. PATIENT IS OUT OF HIS OMEPRAZOLE AND HAS A WEEK LEFT ON THE OTHERS     Best call back number: [phone]    Does the patient have less than a 3 day supply:  [x] Yes  [] No    Vicente Jeffers Rep   10/08/21 13:23 EDT

## 2021-10-09 NOTE — TELEPHONE ENCOUNTER
Rx Refill Note  Requested Prescriptions     Pending Prescriptions Disp Refills   • omeprazole (priLOSEC) 40 MG capsule       Sig: Take 1 capsule by mouth Daily.   • lisinopril (PRINIVIL,ZESTRIL) 20 MG tablet       Sig: Take 1 tablet by mouth Daily.   • glipizide (GLUCOTROL) 10 MG tablet       Sig: Take 1 tablet by mouth 2 (Two) Times a Day Before Meals.   • metFORMIN (GLUCOPHAGE) 1000 MG tablet       Sig: Take 1 tablet by mouth 2 (Two) Times a Day With Meals.      Last office visit with prescribing clinician: 8/26/2021      Next office visit with prescribing clinician: 10/13/2021            Staci Kearns MA  10/09/21, 09:22 EDT     Medication have not been prescribed by this office yet     A1C 8/26/2021

## 2021-10-10 RX ORDER — GLIPIZIDE 10 MG/1
10 TABLET ORAL
Qty: 180 TABLET | Refills: 0 | Status: SHIPPED | OUTPATIENT
Start: 2021-10-10 | End: 2022-04-12 | Stop reason: SDUPTHER

## 2021-10-10 RX ORDER — LISINOPRIL 20 MG/1
20 TABLET ORAL DAILY
Qty: 90 TABLET | Refills: 0 | Status: SHIPPED | OUTPATIENT
Start: 2021-10-10 | End: 2022-04-12 | Stop reason: SDUPTHER

## 2021-10-10 RX ORDER — OMEPRAZOLE 40 MG/1
40 CAPSULE, DELAYED RELEASE ORAL DAILY
Qty: 90 CAPSULE | Refills: 0 | Status: SHIPPED | OUTPATIENT
Start: 2021-10-10 | End: 2022-04-12 | Stop reason: SDUPTHER

## 2021-10-13 ENCOUNTER — TELEPHONE (OUTPATIENT)
Dept: NEUROLOGY | Facility: CLINIC | Age: 66
End: 2021-10-13

## 2021-10-13 NOTE — TELEPHONE ENCOUNTER
Okay.  Continue with Topamax as it is.  Can you contact [facility] and find out how to proceed with orders for oxygen treatment at home?  Thanks

## 2021-10-13 NOTE — TELEPHONE ENCOUNTER
Provider: DR. ARAIZA    Caller: ROHITH    Relationship to Patient: SELF    Phone Number: [phone]    Reason for Call: PT STATES HE HAS TO CANCEL HIS APPT TODAY DUE TO HIS HEMIPLEGIC MIGRAINES AND LOSS OF BOWEL CONTROL, BUT PT BELIEVES THIS IS A FOLLOW UP ON HIS MEDICATION AND WANTED TO REPORT IN AS TO WHAT HE IS EXPERIENCING.    PT REPORTS THAT SINCE DR. ARAIZA INCREASED HIS TOPIRAMATE, HE HAS HAD FEWER OF THE STROKE-LIKE EPISODES (MENTAL FOG AND FACIAL DROOPING), BUT HE IS STILL HAVING DAILY EPISODES A FEW TIMES A DAY, HOWEVER, THEY ARE NOT NEAR THE FREQUENCY HE WAS HAVING.    PT REPORTS THAT HE IS STILL HAVING PAIN.  HE STATES THE FIORICET KNOCKS HIM OUT, BUT DOESN'T RESOLVE THE PAIN.     PT IS ASKING IF DR. ARAIZA WILL PRESCRIBE OXYGEN.  PT REPORTS THAT DR. PAUL HAD PT ON A HIGH FLOW OXYGEN (15) BACK IN [id number] FOR PT TO USE WHEN NEEDED FOR PAIN.  PT REPORTS THAT THIS REALLY HELPED WITH HIS PAIN MANAGEMENT THEN, BUT HE HAD TO STOP THE THERAPY DUE TO INSURANCE ISSUES.  PT REPORTS HE NO LONGER HAS THAT INSURANCE AND IS ASKING IF DR. ARAIZA WOULD BE WILLING TO REVIEW DR. PAUL'S ORDERS AND RENEW THIS FOR PT TO TRY AGAIN.      When was the patient last seen: 09/07/21

## 2021-10-14 NOTE — TELEPHONE ENCOUNTER
Caller: MaribellIsrael    Relationship: Self    Best call back number: [phone]    What was the call regarding: PT RETURNING PHONE CALL TO THE OFFICE REGARDING OXYGEN INQUIRY.    PER [facility] MURILLO NOT AVAILABLE, BUT REQUESTED I INFORM PT THAT DR. ARAIZA & [facility] ARE WORKING TO ORDER SOME TESTS FOR HIM TO HAVE COMPLETED. ONCE HE HAS THESE COMPLETED, DR. ARAIZA WOULD ATTACH THESE RESULTS TO THE OXYGEN ORDER AND SEND TO [facility] [place].    PT STATES HE USED TO WORK WITH OXYGEN AND SETTING UP TANKS FOR PATIENTS. PT STATES HE KNOWS WHAT TEST WOULD BE ORDERED AND THESE ARE TYPICALLY ORDERED FOR PATIENTS WITH LOW OXYGEN LEVELS. PT STATES HIS OXYGEN IS NOT LOW- HE USED TO USE PRESSURE 15 OXYGEN TANK FOR INTERMITTENT TIME FOR MIGRAINE RELIEF AS IT WOULD HELP TO OPEN UP HIS BLOOD VESSELS. PT WOULD LIKE TO KNOW IF TESTING IS NECESSARY.    PT ALSO HAS 2 OTHER QUESTIONS:  1. WILL OXYGEN BE VIA TANK OR HIGH FLOW CONCENTRATOR?  2. WHAT FLOW DOES DR. ARAIZA PLAN TO START PT OUT ON?    PLEASE REVIEW AND ADVISE.

## 2021-10-14 NOTE — TELEPHONE ENCOUNTER
He will need chart notes that states his need for oxygen, order, and testing (6 minute walk test results). 30 days within the order date.

## 2021-10-14 NOTE — TELEPHONE ENCOUNTER
Caller: Israel Reyna    Relationship: Self    What was the call regarding: PATIENT WAS CALLING KOLE BACK ABOUT HIS OXYGEN.   HIS PH#- [phone]    Do you require a callback: YES

## 2021-10-29 NOTE — TELEPHONE ENCOUNTER
Dr. Gavin reviewed CTA head/neck. It shows no significant stenosis, 20% blocked on the left side. Normal for his age.     I advised pt that we could send him to a vascular neurologist for his headaches, unfortunately they do not take his insurance.    Pt verbalized understanding.     Pt was advised to give us a call with any questions/concerns.    Left arm;

## 2021-11-01 NOTE — TELEPHONE ENCOUNTER
Provider: JOSE G  Caller: PT  Relationship to Patient: SELF  Pharmacy: N/A  Phone Number: [phone]  Reason for Call: PT TEL AS A FOLLOW UP TO EARLIER CONVERSATION RE: INSURANCE APPROVAL FOR OXYGEN; THIS WOULD BE TO HELP ALLEVIATE MIGRAINE PAIN.    PLEASE ADVISE STATUS & CALL.    THANK YOU.

## 2021-11-09 NOTE — TELEPHONE ENCOUNTER
I will be giving you prescription to be faxed.  I have put down details on the prescription.  Thanks

## 2021-11-24 ENCOUNTER — LAB (OUTPATIENT)
Dept: LAB | Facility: HOSPITAL | Age: 66
End: 2021-11-24

## 2021-11-24 ENCOUNTER — TELEPHONE (OUTPATIENT)
Dept: FAMILY MEDICINE CLINIC | Facility: CLINIC | Age: 66
End: 2021-11-24

## 2021-11-24 ENCOUNTER — OFFICE VISIT (OUTPATIENT)
Dept: FAMILY MEDICINE CLINIC | Facility: CLINIC | Age: 66
End: 2021-11-24

## 2021-11-24 VITALS
TEMPERATURE: 96.8 F | BODY MASS INDEX: 26.81 KG/M2 | HEIGHT: 67 IN | HEART RATE: 78 BPM | OXYGEN SATURATION: 97 % | DIASTOLIC BLOOD PRESSURE: 66 MMHG | WEIGHT: 170.8 LBS | SYSTOLIC BLOOD PRESSURE: 110 MMHG | RESPIRATION RATE: 16 BRPM

## 2021-11-24 DIAGNOSIS — R41.3 MEMORY IMPAIRMENT: ICD-10-CM

## 2021-11-24 DIAGNOSIS — E78.5 DYSLIPIDEMIA: ICD-10-CM

## 2021-11-24 DIAGNOSIS — I10 PRIMARY HYPERTENSION: ICD-10-CM

## 2021-11-24 DIAGNOSIS — R53.82 CHRONIC FATIGUE: ICD-10-CM

## 2021-11-24 DIAGNOSIS — E11.9 TYPE 2 DIABETES MELLITUS WITHOUT COMPLICATION, WITHOUT LONG-TERM CURRENT USE OF INSULIN (HCC): Primary | ICD-10-CM

## 2021-11-24 LAB
BILIRUB UR QL STRIP: NEGATIVE
CLARITY UR: CLEAR
COLOR UR: YELLOW
DEPRECATED RDW RBC AUTO: 39.1 FL (ref 37–54)
ERYTHROCYTE [DISTWIDTH] IN BLOOD BY AUTOMATED COUNT: 12.8 % (ref 12.3–15.4)
EXPIRATION DATE: NORMAL
GLUCOSE UR STRIP-MCNC: NEGATIVE MG/DL
HBA1C MFR BLD: 7.7 %
HCT VFR BLD AUTO: 39.3 % (ref 37.5–51)
HGB BLD-MCNC: 13 G/DL (ref 13–17.7)
HGB UR QL STRIP.AUTO: NEGATIVE
KETONES UR QL STRIP: NEGATIVE
LEUKOCYTE ESTERASE UR QL STRIP.AUTO: NEGATIVE
Lab: NORMAL
MCH RBC QN AUTO: 28.3 PG (ref 26.6–33)
MCHC RBC AUTO-ENTMCNC: 33.1 G/DL (ref 31.5–35.7)
MCV RBC AUTO: 85.4 FL (ref 79–97)
NITRITE UR QL STRIP: NEGATIVE
PH UR STRIP.AUTO: 6 [PH] (ref 5–8)
PLATELET # BLD AUTO: 345 10*3/MM3 (ref 140–450)
PMV BLD AUTO: 10.4 FL (ref 6–12)
PROT UR QL STRIP: ABNORMAL
RBC # BLD AUTO: 4.6 10*6/MM3 (ref 4.14–5.8)
SP GR UR STRIP: 1.02 (ref 1–1.03)
UROBILINOGEN UR QL STRIP: ABNORMAL
WBC NRBC COR # BLD: 8.04 10*3/MM3 (ref 3.4–10.8)

## 2021-11-24 PROCEDURE — 81003 URINALYSIS AUTO W/O SCOPE: CPT

## 2021-11-24 PROCEDURE — 3051F HG A1C>EQUAL 7.0%<8.0%: CPT | Performed by: FAMILY MEDICINE

## 2021-11-24 PROCEDURE — 82607 VITAMIN B-12: CPT

## 2021-11-24 PROCEDURE — 82746 ASSAY OF FOLIC ACID SERUM: CPT

## 2021-11-24 PROCEDURE — 80053 COMPREHEN METABOLIC PANEL: CPT

## 2021-11-24 PROCEDURE — 85027 COMPLETE CBC AUTOMATED: CPT

## 2021-11-24 PROCEDURE — 99214 OFFICE O/P EST MOD 30 MIN: CPT | Performed by: FAMILY MEDICINE

## 2021-11-24 PROCEDURE — 84443 ASSAY THYROID STIM HORMONE: CPT

## 2021-11-24 PROCEDURE — 83036 HEMOGLOBIN GLYCOSYLATED A1C: CPT | Performed by: FAMILY MEDICINE

## 2021-11-24 PROCEDURE — 84207 ASSAY OF VITAMIN B-6: CPT

## 2021-11-24 PROCEDURE — 84591 ASSAY OF NOS VITAMIN: CPT

## 2021-11-24 PROCEDURE — 80061 LIPID PANEL: CPT

## 2021-11-24 RX ORDER — EMPAGLIFLOZIN 10 MG/1
10 TABLET, FILM COATED ORAL DAILY
Qty: 30 TABLET | Refills: 2 | Status: SHIPPED | OUTPATIENT
Start: 2021-11-24 | End: 2022-01-11 | Stop reason: SDUPTHER

## 2021-11-24 NOTE — PROGRESS NOTES
"Subjective   Israel Reyna is a 66 y.o. male.     Chief Complaint   Patient presents with   • Diabetes       History of Present Illness     Israel Reyna presents today for   Chief Complaint   Patient presents with   • Diabetes      He reports his sugar avoidance is fluctuating. The patient is still taking the Trulicity 1.5 mg every Monday and he states he needs a refill today. He is also continues taking glipizide, as well as metformin. Dr. Cartwright was providing him with short-acting insulin samples in the event of hyperglycemia. The patient reports his blood glucose is approximately 200 mg/dL and 250 mg/dL when he checks his blood glucose in the mornings. He adds that he feels \"sluggish\". The patient reports he is not eating a lot and feels he has lost his appetite. He adds that his energy level is significantly decreased despite taking two oral B12 tablets.     The patient reports he is taking \"that pill\" to help with erections and he \"is not getting what I was hoping to\" from the medication.    He reports he is having memory problems and this concerns him. The patient notes that while he was out one day he was unable to remember what day it was; he states he did know where he was at. He reports his mother was diagnosed with dementia which led to Alzheimer's disease and his grandmother had the same diagnoses, as well as his two maternal uncles.    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 He adds he is unable to concentrate, experiences auras, and significant pain in his head since he received \"these vaccines\"; the hemiplegic migraines have returned \"with a vengeance\". The patient reports that his neurologist has increased Topamax in hopes this will help decrease his migraines. He adds that his neurologist was also considering the patient begin oxygen supplementation as needed; however, he has not heard anything additional regarding this. The patient notes he was on oxygen " "supplementation approximately 7-years ago; however, his insurance ceased coverage of the supplemental oxygen as his insurance company felt the patient was non-compliant due to not using the supplemental oxygen daily.    He states that he felt as if he experienced a TIA. The patient adds he was driving and he was unable to remember various thing. He reports he remained in bed on 11/23/2021 due to feeling as if he \"shockwaves going through my head\".     He reports he had the stimulator in his hip. He had a wellness call yesterday from the insurance company, and they asked him if he needed it anymore, but he said he has it turned off, but he can not have an MRI with that in him. He is supposed to get a call back after 12/01/2021, and if they will approve it, he is going to have it taken out.    He reports he had a stimulator in his hip; therefore, he is unable to have an MRI. He reports he had a virtual well care visit with his insurance company, he informed them he had the hip stimulator, and he informed them he had the hip stimulator turned off. She notes that a staff member from his insurance company informed him that she would look into having his hip stimulator removed for him. He adds that he is due to call his insurance company on 12/01/2021 to find out if he will be able to have his hip stimulator removed and if this is approved he will proceed with removal of this.     Additionally, he states that he takes a zinc supplement.     This patient is accompanied by their self who contributes to the history of their care.    The following portions of the patient's history were reviewed and updated as appropriate: allergies, current medications, past family history, past medical history, past social history, past surgical history and problem list.    Active Ambulatory Problems     Diagnosis Date Noted   • Bowel incontinence 09/26/2016   • Classic migraine with aura 09/26/2016   • Depression    • Anxiety    • Mental " status change 09/26/2016   • Disorientation 09/26/2016   • Facial droop 09/26/2016   • Difficulty walking 09/26/2016   • Intractable migraine with aura without status migrainosus 10/06/2016   • Chest pain 10/06/2016   • CAD (coronary artery disease)    • Hypertension    • Dyslipidemia    • Diabetes mellitus, type 2 ([facility])    • GERD (gastroesophageal reflux disease)    • CHF (congestive heart failure) ([facility])    • Chest pain    • SOB (shortness of breath)    • Fatigue    • Chronic migraine 04/11/2017   • Other cerebral infarction ([facility]) 07/18/2017   • Transient cerebral ischemia 07/18/2017   • Neck pain 10/17/2017   • Spondylosis of cervical region without myelopathy or radiculopathy 10/17/2017   • Facial pain 03/22/2018     Resolved Ambulatory Problems     Diagnosis Date Noted   • Seizure disorder ([facility]) 09/26/2016   • Migraine      Past Medical History:   Diagnosis Date   • Diabetic peripheral neuropathy ([facility])    • Mental status change    • Migraine headache      Past Surgical History:   Procedure Laterality Date   • CARDIAC CATHETERIZATION     • CARDIAC CATHETERIZATION N/A 10/14/2016    Procedure: Left Heart Cath;  Surgeon: Santiago Garner MD;  Location: [facility] CATH INVASIVE LOCATION;  Service:    • GASTRIC STIMULATOR IMPLANT SURGERY     • SMALL INTESTINE SURGERY      unspecified, x3 as an infant    • TESTICLE SURGERY       Family History   Problem Relation Age of Onset   • Heart failure Mother    • Diabetes Mother    • Alzheimer's disease Mother    • Heart attack Father    • Kidney disease Father    • Cancer Father    • Prostate cancer Father    • Cancer Sister    • Breast cancer Sister    • Diabetes Sister    • Heart disease Sister    • Arrhythmia Sister    • Diabetes Brother    • Heart disease Brother    • Cancer Brother    • Liver disease Brother    • Alzheimer's disease Maternal Grandmother    • Liver cancer Maternal Grandfather    • Other Paternal Grandmother         Yi flu   • Other Paternal Grandfather      "    Armenian flu     Social History     Socioeconomic History   • Marital status:    Tobacco Use   • Smoking status: Never Smoker   • Smokeless tobacco: Never Used   Vaping Use   • Vaping Use: Never used   Substance and Sexual Activity   • Alcohol use: No   • Drug use: No   • Sexual activity: Not Currently       Review of Systems  See HPI    Objective   Blood pressure 110/66, pulse 78, temperature 96.8 °F (36 °C), resp. rate 16, height 170.2 cm (67.01\"), weight 77.5 kg (170 lb 12.8 oz), SpO2 97 %.  Nursing note reviewed  Physical Exam  Const: NAD, A&Ox4, Pleasant, Cooperative  Eyes: EOMI, no conjunctivitis  ENT: No nasal discharge present, neck supple  Cardiac: Regular rate and rhythm, no cyanosis  Resp: Respiratory rate within normal limits, no increased work of breathing, no audible wheezing or retractions noted  GI: No distention or ascites  MSK: Motor and sensation grossly intact in bilateral upper extremities  Neurologic: CN II-XII grossly intact  Psych: Appropriate mood and behavior.  Skin: Warm, dry  Procedures  Assessment/Plan   Problem List Items Addressed This Visit        Cardiac and Vasculature    Hypertension    Relevant Orders    CBC (No Diff) (Completed)    Lipid Panel (Completed)    Comprehensive Metabolic Panel (Completed)    Urinalysis With Microscopic If Indicated (No Culture) - Urine, Clean Catch (Completed)    TSH Rfx On Abnormal To Free T4 (Completed)    Folate (Completed)    Vitamin B6    Niacin (Vitamin B3)    Vitamin B12 (Completed)    Dyslipidemia    Relevant Orders    Lipid Panel (Completed)    Comprehensive Metabolic Panel (Completed)       Endocrine and Metabolic    Diabetes mellitus, type 2 (HCC) - Primary    Current Assessment & Plan     Lab Results   Component Value Date    HGBA1C 7.7 11/24/2021   Trulicity 1.5mg weekly, glipizide 10mg BID, metformin 1000mg BID  -I will send in Jardiance 10mg for him additionally, will check pricing and fill out patient assistance forms         " Relevant Medications    Jardiance 10 MG tablet tablet    Trulicity 1.5 MG/0.5ML solution pen-injector    Other Relevant Orders    POC Glycosylated Hemoglobin (Hb A1C) (Completed)       Symptoms and Signs    Fatigue    Relevant Orders    CBC (No Diff) (Completed)    Lipid Panel (Completed)    Comprehensive Metabolic Panel (Completed)    Urinalysis With Microscopic If Indicated (No Culture) - Urine, Clean Catch (Completed)    TSH Rfx On Abnormal To Free T4 (Completed)    Folate (Completed)    Vitamin B6    Niacin (Vitamin B3)    Vitamin B12 (Completed)      Other Visit Diagnoses     Memory impairment        Relevant Orders    CBC (No Diff) (Completed)    Lipid Panel (Completed)    Comprehensive Metabolic Panel (Completed)    Urinalysis With Microscopic If Indicated (No Culture) - Urine, Clean Catch (Completed)    TSH Rfx On Abnormal To Free T4 (Completed)    Folate (Completed)    Vitamin B6    Niacin (Vitamin B3)    Vitamin B12 (Completed)          1. Type 2 diabetes mellitus  - His A1c is 7.7 percent today, which is improved from his last visit at 7.9 percent. He is currently taking Trulicity 1.5 mg weekly. He will continue taking glipizide and metformin. I will send a prescription for Jardiance to his pharmacy. He is advised the pharmacy should inform him of the price of this medication and if the price is not feasible for him he is to let me know. I advised the patient to work on being consistent with his diet.    2. Memory impairment  - I advised the patient to follow up with Dr. Crain. He will have blood work drawn today and we will reevaluate once the blood work results return. As the patient has a hip stimulator he is unable to have a MRI; therefore, a CT scan will be considered if necessary.    There are no Patient Instructions on file for this visit.    Return in about 3 months (around 2/24/2022) for with A1c;.    [facility]     Ambulatory progress note signed and attested to by Elio Moore D.O.          Transcribed from ambient dictation for Elio Moore DO by Jennifer Winston.  11/24/21   13:26 EST    I have personally performed the services described in this document as transcribed by the above individual, and it is both accurate and complete.  Elio Moore DO  12/1/2021  13:34 EST

## 2021-11-25 LAB
ALBUMIN SERPL-MCNC: 4.8 G/DL (ref 3.5–5.2)
ALBUMIN/GLOB SERPL: 1.6 G/DL
ALP SERPL-CCNC: 78 U/L (ref 39–117)
ALT SERPL W P-5'-P-CCNC: 19 U/L (ref 1–41)
ANION GAP SERPL CALCULATED.3IONS-SCNC: 8.9 MMOL/L (ref 5–15)
AST SERPL-CCNC: 16 U/L (ref 1–40)
BILIRUB SERPL-MCNC: <0.2 MG/DL (ref 0–1.2)
BUN SERPL-MCNC: 18 MG/DL (ref 8–23)
BUN/CREAT SERPL: 18.6 (ref 7–25)
CALCIUM SPEC-SCNC: 9.8 MG/DL (ref 8.6–10.5)
CHLORIDE SERPL-SCNC: 108 MMOL/L (ref 98–107)
CHOLEST SERPL-MCNC: 158 MG/DL (ref 0–200)
CO2 SERPL-SCNC: 23.1 MMOL/L (ref 22–29)
CREAT SERPL-MCNC: 0.97 MG/DL (ref 0.76–1.27)
FOLATE SERPL-MCNC: 7.49 NG/ML (ref 4.78–24.2)
GFR SERPL CREATININE-BSD FRML MDRD: 78 ML/MIN/1.73
GLOBULIN UR ELPH-MCNC: 3 GM/DL
GLUCOSE SERPL-MCNC: 165 MG/DL (ref 65–99)
HDLC SERPL-MCNC: 37 MG/DL (ref 40–60)
LDLC SERPL CALC-MCNC: 90 MG/DL (ref 0–100)
LDLC/HDLC SERPL: 2.31 {RATIO}
POTASSIUM SERPL-SCNC: 4.5 MMOL/L (ref 3.5–5.2)
PROT SERPL-MCNC: 7.8 G/DL (ref 6–8.5)
SODIUM SERPL-SCNC: 140 MMOL/L (ref 136–145)
TRIGL SERPL-MCNC: 177 MG/DL (ref 0–150)
TSH SERPL DL<=0.05 MIU/L-ACNC: 2.3 UIU/ML (ref 0.27–4.2)
VIT B12 BLD-MCNC: 928 PG/ML (ref 211–946)
VLDLC SERPL-MCNC: 31 MG/DL (ref 5–40)

## 2021-11-29 ENCOUNTER — TELEPHONE (OUTPATIENT)
Dept: FAMILY MEDICINE CLINIC | Facility: CLINIC | Age: 66
End: 2021-11-29

## 2021-11-29 NOTE — TELEPHONE ENCOUNTER
Called and spoke to pt. Stated he had left pt assistance paperwork for Trulicity that needs to be completed and renewed prior to the end of the year, once provider fills out his portion, pt needs back to fill his part out and send in. Pt also asked if, prior to filling out a separate pt assistance paperwork for Jardiance if by chance there are any samples that he could try first to see if it works for him. Pt has appt tomorrow, just wanted to remind about paperwork.

## 2021-11-29 NOTE — TELEPHONE ENCOUNTER
Patient called to ask about trulicity assistance paperwork. Also is requesting samples for Jardiance 10 mg to try to see if he likes it or not.

## 2021-11-30 NOTE — TELEPHONE ENCOUNTER
Contacted patient, he requested to reschedule his appt until tommorow. I advised him that we have samples set aside for him to     He will be in office tommorow to  Trulicity and Jardiance forms as well as Jardiance samples.

## 2021-11-30 NOTE — TELEPHONE ENCOUNTER
Could you get him samples of the Jardiance? And there is patient assistance paperwork for Jardiance if we want to get that started for him.

## 2021-12-01 ENCOUNTER — OFFICE VISIT (OUTPATIENT)
Dept: FAMILY MEDICINE CLINIC | Facility: CLINIC | Age: 66
End: 2021-12-01

## 2021-12-01 VITALS
HEART RATE: 75 BPM | HEIGHT: 67 IN | DIASTOLIC BLOOD PRESSURE: 78 MMHG | TEMPERATURE: 96.9 F | OXYGEN SATURATION: 96 % | RESPIRATION RATE: 16 BRPM | BODY MASS INDEX: 26.68 KG/M2 | WEIGHT: 170 LBS | SYSTOLIC BLOOD PRESSURE: 110 MMHG

## 2021-12-01 DIAGNOSIS — K21.9 GASTROESOPHAGEAL REFLUX DISEASE WITHOUT ESOPHAGITIS: Primary | ICD-10-CM

## 2021-12-01 DIAGNOSIS — N52.1 ERECTILE DISORDER DUE TO MEDICAL CONDITION IN MALE: ICD-10-CM

## 2021-12-01 PROCEDURE — 99214 OFFICE O/P EST MOD 30 MIN: CPT | Performed by: FAMILY MEDICINE

## 2021-12-01 RX ORDER — DULAGLUTIDE 1.5 MG/.5ML
0.5 INJECTION, SOLUTION SUBCUTANEOUS WEEKLY
Qty: 12 PEN | Refills: 3 | Status: SHIPPED | OUTPATIENT
Start: 2021-12-01 | End: 2022-11-18 | Stop reason: SDUPTHER

## 2021-12-01 RX ORDER — SILDENAFIL 100 MG/1
100 TABLET, FILM COATED ORAL DAILY PRN
Qty: 15 TABLET | Refills: 0 | Status: SHIPPED | OUTPATIENT
Start: 2021-12-01 | End: 2022-03-26

## 2021-12-01 NOTE — PATIENT INSTRUCTIONS
1.  Continue the omeprazole    2.  Take renae seltzer twice daily for the next 2 weeks    3.  Elevating the head of your bed can help    4.  I will order a scope of your esophagus

## 2021-12-01 NOTE — PROGRESS NOTES
"Subjective   Israel eRyna is a 66 y.o. male.     Chief Complaint   Patient presents with   • Throat burning     1 week        History of Present Illness     Israel Reyna presents today for   Chief Complaint   Patient presents with   • Throat burning     1 week      He has been recently waking up out of bed with burning in his throat, and acid that would not go down. He reports that it lasted approximately 20 to 30 minutes. He states that it was so bad that he thought he was going to vomit. He denies vomiting. Drinking liquids made it worse. He reports that there are times that he can not talk. He denies drinking anything like Pepto Bismol, or taking any Tums. Vane-[place] does help with the nausea, but he has not taken any for approximately 2 to 3 weeks. The patient reports a history of acid reflux years ago. He reports that he had a scope in his throat in the past, and the doctor told him that his esophagus looked like it was a \" leopard. \" He denies taking an acid reducer on a regular basis. He reports that he sleeps on 2 pillows.     He reports for approximately 1 year now he is constantly clearing his throat. He reports that his nose is clogged all the time, and he used Flonase inconsistently. He denies taking anything for allergies. He reports that it has been year round. He has tried Vicks to open up his nose.     He reports that he was paying 33 to 40 dollars for his sildenafil prescription in the past, but he went to get it filled recently and it was $160.  It was prescribed by Dr. Peña. He does not see a urologist currently.    This patient is accompanied by their self who contributes to the history of their care.    The following portions of the patient's history were reviewed and updated as appropriate: allergies, current medications, past family history, past medical history, past social history, past surgical history and problem list.    Active Ambulatory Problems     " Diagnosis Date Noted   • Bowel incontinence 09/26/2016   • Classic migraine with aura 09/26/2016   • Depression    • Anxiety    • Mental status change 09/26/2016   • Disorientation 09/26/2016   • Facial droop 09/26/2016   • Difficulty walking 09/26/2016   • Intractable migraine with aura without status migrainosus 10/06/2016   • Chest pain 10/06/2016   • CAD (coronary artery disease)    • Hypertension    • Dyslipidemia    • Diabetes mellitus, type 2 ([facility])    • GERD (gastroesophageal reflux disease)    • CHF (congestive heart failure) ([facility])    • Chest pain    • SOB (shortness of breath)    • Fatigue    • Chronic migraine 04/11/2017   • Other cerebral infarction ([facility]) 07/18/2017   • Transient cerebral ischemia 07/18/2017   • Neck pain 10/17/2017   • Spondylosis of cervical region without myelopathy or radiculopathy 10/17/2017   • Facial pain 03/22/2018     Resolved Ambulatory Problems     Diagnosis Date Noted   • Seizure disorder ([facility]) 09/26/2016   • Migraine      Past Medical History:   Diagnosis Date   • Diabetic peripheral neuropathy ([facility])    • Mental status change    • Migraine headache      Past Surgical History:   Procedure Laterality Date   • CARDIAC CATHETERIZATION     • CARDIAC CATHETERIZATION N/A 10/14/2016    Procedure: Left Heart Cath;  Surgeon: Santiago Garner MD;  Location: [facility] CATH INVASIVE LOCATION;  Service:    • GASTRIC STIMULATOR IMPLANT SURGERY     • SMALL INTESTINE SURGERY      unspecified, x3 as an infant    • TESTICLE SURGERY       Family History   Problem Relation Age of Onset   • Heart failure Mother    • Diabetes Mother    • Alzheimer's disease Mother    • Heart attack Father    • Kidney disease Father    • Cancer Father    • Prostate cancer Father    • Cancer Sister    • Breast cancer Sister    • Diabetes Sister    • Heart disease Sister    • Arrhythmia Sister    • Diabetes Brother    • Heart disease Brother    • Cancer Brother    • Liver disease Brother    • Alzheimer's disease Maternal  "Grandmother    • Liver cancer Maternal Grandfather    • Other Paternal Grandmother         Danish flu   • Other Paternal Grandfather         Danish flu     Social History     Socioeconomic History   • Marital status:    Tobacco Use   • Smoking status: Never Smoker   • Smokeless tobacco: Never Used   Vaping Use   • Vaping Use: Never used   Substance and Sexual Activity   • Alcohol use: No   • Drug use: No   • Sexual activity: Not Currently       Review of Systems  See HPI    Objective   Blood pressure 110/78, pulse 75, temperature 96.9 °F (36.1 °C), resp. rate 16, height 170.2 cm (67.01\"), weight 77.1 kg (170 lb), SpO2 96 %.  Nursing note reviewed  Physical Exam  Const: NAD, A&Ox4, Pleasant, Cooperative  Eyes: EOMI, no conjunctivitis  ENT: No nasal discharge present, neck supple  Cardiac: Regular rate and rhythm, no cyanosis  Resp: Respiratory rate within normal limits, no increased work of breathing, no audible wheezing or retractions noted  GI: No distention or ascites  MSK: Motor and sensation grossly intact in bilateral upper extremities  Neurologic: CN II-XII grossly intact  Psych: Appropriate mood and behavior.  Skin: Warm, dry  Procedures  Assessment/Plan   Problem List Items Addressed This Visit        Gastrointestinal Abdominal     GERD (gastroesophageal reflux disease) - Primary    Relevant Orders    Ambulatory referral for Screening EGD      Other Visit Diagnoses     Erectile disorder due to medical condition in male        Relevant Medications    sildenafil (Viagra) 100 MG tablet          1. GERD  - I advised the patient to start an acid reducer. Prescription ordered. She will continue taking omeprazole as well as Vane-[place] twice daily for 2 weeks. I advised the patient to elevate the head of his bed. I advised the patient to drink room temperature water for 20 to 30 minutes.    2. Erectile dysfunction.  - I will send in a prescription for sildenafil.    3. Allergies  - Start Flonase " daily.    4. Type 2 diabetes mellitus  - Samples of Jardiance given.    Patient Instructions   1.  Continue the omeprazole    2.  Take renae seltzer twice daily for the next 2 weeks    3.  Elevating the head of your bed can help    4.  I will order a scope of your esophagus      No follow-ups on file.      [facility]     Ambulatory progress note signed and attested to by Elio Moore D.O.         Transcribed from ambient dictation for Elio Moore DO by Eleni Ellsworth.  12/01/21   12:14 EST    Patient verbalized consent to the visit recording.  I have personally performed the services described in this document as transcribed by the above individual, and it is both accurate and complete.  Elio Moore DO  12/3/2021  15:54 EST

## 2021-12-03 LAB — VIT B6 SERPL-MCNC: 5.6 UG/L (ref 5.3–46.7)

## 2021-12-05 LAB
NIACIN SERPL-MCNC: <5 NG/ML (ref 0–5)
NICOTINAMIDE SERPL-MCNC: 9.3 NG/ML (ref 5.2–72.1)

## 2021-12-16 ENCOUNTER — TELEPHONE (OUTPATIENT)
Dept: FAMILY MEDICINE CLINIC | Facility: CLINIC | Age: 66
End: 2021-12-16

## 2021-12-16 NOTE — TELEPHONE ENCOUNTER
Contacted patient and advised him that we have set aside samples for him to pickup    He asked about status of PAF, I advised him to contact the foundation to check status.

## 2021-12-16 NOTE — TELEPHONE ENCOUNTER
Caller: Israel Reyna    Relationship: Self    Best call back number: [phone]     What was the call regarding: PATIENT CALLED TO SEE IF HE COULD GET A FEW MORE SAMPLES OF JARDANCE UNTIL HE HEARS BACK FROM THE COMPANY ABOUT HIS PATIENCE ASSISTANCE. PATIENT WASN'T SURE WHAT HE NEEDED TO DO TO FIND OUT IF HE HAS BEEN ACCEPTED INTO THE PROGRAM.      Do you require a callback: YES

## 2021-12-22 ENCOUNTER — OFFICE VISIT (OUTPATIENT)
Dept: FAMILY MEDICINE CLINIC | Facility: CLINIC | Age: 66
End: 2021-12-22

## 2021-12-22 VITALS
OXYGEN SATURATION: 99 % | HEART RATE: 78 BPM | WEIGHT: 168 LBS | BODY MASS INDEX: 26.37 KG/M2 | SYSTOLIC BLOOD PRESSURE: 120 MMHG | DIASTOLIC BLOOD PRESSURE: 68 MMHG | HEIGHT: 67 IN

## 2021-12-22 DIAGNOSIS — H61.21 IMPACTED CERUMEN OF RIGHT EAR: ICD-10-CM

## 2021-12-22 DIAGNOSIS — R42 DIZZINESS: Primary | ICD-10-CM

## 2021-12-22 DIAGNOSIS — G11.9 CEREBELLAR ATAXIA (HCC): ICD-10-CM

## 2021-12-22 PROCEDURE — 99214 OFFICE O/P EST MOD 30 MIN: CPT | Performed by: FAMILY MEDICINE

## 2021-12-22 RX ORDER — CIPROFLOXACIN/HYDROCORTISONE 0.2 %-1 %
3 SUSPENSION, DROPS(FINAL DOSAGE FORM)(ML) OTIC (EAR) 2 TIMES DAILY
Qty: 5 ML | Refills: 0 | Status: SHIPPED | OUTPATIENT
Start: 2021-12-22 | End: 2021-12-29

## 2021-12-22 NOTE — PROGRESS NOTES
Subjective   Israel Reyna is a 66 y.o. male.     Chief Complaint   Patient presents with   • Dizziness     when walking hits wall   • Back Pain       History of Present Illness     Israel Reyna presents today for   Chief Complaint   Patient presents with   • Dizziness     when walking hits wall   • Back Pain     Israel presents today for an acute visit regarding dizziness. His most recent visit was on 12/01/2021 for some increased reflux symptoms. He has poorly controlled diabetes with a recent A1c of 7.7 percent on Trulicity 1.5 mg weekly for his reflux. I advised him to take the omeprazole and Vane-[place], elevate the head of his bed, and I ordered a scope. He had blood work in 11/2021, which was unremarkable apart from the hypoglycemia.    He reports vertigo while walking. He reports that a few years ago, he had vertigo. He cannot differentiate if he has vertigo when laying down and turning over. He denies ear pain and any symptoms of a cold. He states that he can be standing and then suddenly want to fall over which has been happening more frequently.     He reports that he has not heard a determination on Trulicity yet. He reports that he has been taking it for the last couple of years.    This patient is accompanied by their self who contributes to the history of their care.    The following portions of the patient's history were reviewed and updated as appropriate: allergies, current medications, past family history, past medical history, past social history, past surgical history and problem list.    Active Ambulatory Problems     Diagnosis Date Noted   • Bowel incontinence 09/26/2016   • Classic migraine with aura 09/26/2016   • Depression    • Anxiety    • Mental status change 09/26/2016   • Disorientation 09/26/2016   • Facial droop 09/26/2016   • Difficulty walking 09/26/2016   • Intractable migraine with aura without status migrainosus 10/06/2016   • Chest pain 10/06/2016   • CAD  (coronary artery disease)    • Hypertension    • Dyslipidemia    • Diabetes mellitus, type 2 ([facility])    • GERD (gastroesophageal reflux disease)    • CHF (congestive heart failure) ([facility])    • Chest pain    • SOB (shortness of breath)    • Fatigue    • Chronic migraine 04/11/2017   • Other cerebral infarction ([facility]) 07/18/2017   • Transient cerebral ischemia 07/18/2017   • Neck pain 10/17/2017   • Spondylosis of cervical region without myelopathy or radiculopathy 10/17/2017   • Facial pain 03/22/2018     Resolved Ambulatory Problems     Diagnosis Date Noted   • Seizure disorder ([facility]) 09/26/2016   • Migraine      Past Medical History:   Diagnosis Date   • Diabetic peripheral neuropathy ([facility])    • Mental status change    • Migraine headache      Past Surgical History:   Procedure Laterality Date   • CARDIAC CATHETERIZATION     • CARDIAC CATHETERIZATION N/A 10/14/2016    Procedure: Left Heart Cath;  Surgeon: Santiago Garner MD;  Location: [facility] CATH INVASIVE LOCATION;  Service:    • GASTRIC STIMULATOR IMPLANT SURGERY     • SMALL INTESTINE SURGERY      unspecified, x3 as an infant    • TESTICLE SURGERY       Family History   Problem Relation Age of Onset   • Heart failure Mother    • Diabetes Mother    • Alzheimer's disease Mother    • Heart attack Father    • Kidney disease Father    • Cancer Father    • Prostate cancer Father    • Cancer Sister    • Breast cancer Sister    • Diabetes Sister    • Heart disease Sister    • Arrhythmia Sister    • Diabetes Brother    • Heart disease Brother    • Cancer Brother    • Liver disease Brother    • Alzheimer's disease Maternal Grandmother    • Liver cancer Maternal Grandfather    • Other Paternal Grandmother         Eritrean flu   • Other Paternal Grandfather         Eritrean flu     Social History     Socioeconomic History   • Marital status:    Tobacco Use   • Smoking status: Never Smoker   • Smokeless tobacco: Never Used   Vaping Use   • Vaping Use: Never used   Substance  "and Sexual Activity   • Alcohol use: No   • Drug use: No   • Sexual activity: Not Currently       Review of Systems  See HPI    Objective   Blood pressure 120/68, pulse 78, height 170.2 cm (67.01\"), weight 76.2 kg (168 lb), SpO2 99 %.  Nursing note reviewed  Physical Exam  HENT:      Right Ear: There is impacted cerumen.       Const: NAD, A&Ox4, Pleasant, Cooperative  Neurologic examination:  Mental status:  The patient is alert, attentive, and oriented. Speech is clear and fluent with good repetition, comprehension, and naming.    Cranial nerves:  CN II: Visual fields are full to confrontation.    CN III, IV, VI: At primary gaze, there is no eye deviation. EOMI, with no nystagmus or diplopia. Convergence is normal.    CN V: Facial sensation is intact to pinprick in all 3 divisions bilaterally. Corneal responses are intact.    CN VII: Face is symmetric with normal eye closure and smile.    CN VII: Hearing is normal to rubbing fingers    CN IX, X: Palate elevates symmetrically. Phonation is normal.    CN XI: Head turning and shoulder shrug are intact    CN XII: Tongue is midline with normal movements and no atrophy.    Coordination:  There are no abnormal or extraneous movements. Romberg is present.    Gait/Stance:  Posture is normal. Gait is steady with normal steps, base, arm swing, and turning. He does seem to list a little toward the right side when walking more than a few steps.  Procedures  Assessment/Plan   Problem List Items Addressed This Visit     None      Visit Diagnoses     Dizziness    -  Primary    Cerebellar ataxia (HCC)        Relevant Orders    CT Head Without Contrast    Impacted cerumen of right ear        Relevant Medications    ciprofloxacin-hydrocortisone (Cipro HC) 0.2-1 % otic suspension    Other Relevant Orders    Ambulatory Referral to ENT (Otolaryngology) (Completed)          1. Dizziness  - He does have a history of TIA versus complex migraines. His last CT was in 04/2021. He has had " recurrent ataxia and dizziness, particularly when walking or standing for over 6 months. He is unable to get an MRI due to implant. He does have evidence of cerumen impaction on exam today. I attempted to remove cerumen, but this was unsuccessful with the tools we have in clinic today. A referral to ENT was placed and ear drops were sent to the pharmacy. His dizziness is not fairly positional and does have positive Romberg, so I did recommend a CT with views of the cerebellum.    There are no Patient Instructions on file for this visit.    No follow-ups on file.      [facility]     Ambulatory progress note signed and attested to by Elio Moore D.O.         Transcribed from ambient dictation for Elio Moore DO by Eleni Ellsworth.  12/22/21   16:13 EST    Patient verbalized consent to the visit recording.  I have personally performed the services described in this document as transcribed by the above individual, and it is both accurate and complete.  Elio Moore DO  12/23/2021  07:04 EST

## 2021-12-23 ENCOUNTER — TELEPHONE (OUTPATIENT)
Dept: FAMILY MEDICINE CLINIC | Facility: CLINIC | Age: 66
End: 2021-12-23

## 2021-12-23 NOTE — TELEPHONE ENCOUNTER
Called and spoke to pt. Stated he was approved for assistance with Jardiance but they were missing clinical info, specifically needed dosage on the script. Stated it was missing. Needs faxed to [phone]. Attempted calling company to find out who this needs to be attention to and any other info they might need. Rep, Anders, stated they are missing the strength of medication. Forwarding RX for Jardiance to [phone]

## 2021-12-23 NOTE — TELEPHONE ENCOUNTER
Caller: Israel Reyna    Relationship: Self    Best call back number: [phone]    Who are you requesting to speak with (clinical staff, provider,  specific staff member): CLINICAL STAFF    What was the call regarding: PATIENT STATED HIS PATIENT ASSISTANCE FOR JARDIANCE WAS APPROVED BUT HE NEEDS A PRESCRIPTION SENT TO THE COMPANY THAT HAS THE DETAILS OF THE DOSAGE THE PATIENT TAKES. PATIENT WOULD LIKE TO KNOW WHEN THIS HAS BEEN SENT IN.    Do you require a callback: YES

## 2022-01-11 ENCOUNTER — TELEPHONE (OUTPATIENT)
Dept: FAMILY MEDICINE CLINIC | Facility: CLINIC | Age: 67
End: 2022-01-11

## 2022-01-11 ENCOUNTER — TELEMEDICINE (OUTPATIENT)
Dept: FAMILY MEDICINE CLINIC | Facility: CLINIC | Age: 67
End: 2022-01-11

## 2022-01-11 DIAGNOSIS — J04.0 LARYNGITIS: Primary | ICD-10-CM

## 2022-01-11 DIAGNOSIS — E11.9 TYPE 2 DIABETES MELLITUS WITHOUT COMPLICATION, WITHOUT LONG-TERM CURRENT USE OF INSULIN: ICD-10-CM

## 2022-01-11 PROCEDURE — 99213 OFFICE O/P EST LOW 20 MIN: CPT | Performed by: FAMILY MEDICINE

## 2022-01-11 RX ORDER — EMPAGLIFLOZIN 10 MG/1
10 TABLET, FILM COATED ORAL DAILY
Qty: 90 TABLET | Refills: 2 | Status: SHIPPED | OUTPATIENT
Start: 2022-01-11 | End: 2022-02-28

## 2022-01-11 NOTE — TELEPHONE ENCOUNTER
Contacted patient, advised him that samples have been set aside for pickup and his Rx has been faxed to the patient assistance program

## 2022-01-11 NOTE — PROGRESS NOTES
Subjective   Israel Reyna is a 66 y.o. male.     Chief Complaint   Patient presents with   • URI       History of Present Illness     Israel Reyna presents today for   Chief Complaint   Patient presents with   • URI     Laryngitis symptoms for 2 days. Mild congestion, no fever.    You have chosen to receive care through a telehealth visit.  Do you consent to use a video/audio connection for your medical care today? Yes    The following portions of the patient's history were reviewed and updated as appropriate: allergies, current medications, past family history, past medical history, past social history, past surgical history and problem list.    Active Ambulatory Problems     Diagnosis Date Noted   • Bowel incontinence 09/26/2016   • Classic migraine with aura 09/26/2016   • Depression    • Anxiety    • Mental status change 09/26/2016   • Disorientation 09/26/2016   • Facial droop 09/26/2016   • Difficulty walking 09/26/2016   • Intractable migraine with aura without status migrainosus 10/06/2016   • Chest pain 10/06/2016   • CAD (coronary artery disease)    • Hypertension    • Dyslipidemia    • Diabetes mellitus, type 2 ([facility])    • GERD (gastroesophageal reflux disease)    • CHF (congestive heart failure) ([facility])    • Chest pain    • SOB (shortness of breath)    • Fatigue    • Chronic migraine 04/11/2017   • Other cerebral infarction ([facility]) 07/18/2017   • Transient cerebral ischemia 07/18/2017   • Neck pain 10/17/2017   • Spondylosis of cervical region without myelopathy or radiculopathy 10/17/2017   • Facial pain 03/22/2018     Resolved Ambulatory Problems     Diagnosis Date Noted   • Seizure disorder ([facility]) 09/26/2016   • Migraine      Past Medical History:   Diagnosis Date   • Diabetic peripheral neuropathy ([facility])    • Mental status change    • Migraine headache      Past Surgical History:   Procedure Laterality Date   • CARDIAC CATHETERIZATION     • CARDIAC CATHETERIZATION N/A 10/14/2016     Procedure: Left Heart Cath;  Surgeon: Santiago Garner MD;  Location:  [facility] CATH INVASIVE LOCATION;  Service:    • GASTRIC STIMULATOR IMPLANT SURGERY     • SMALL INTESTINE SURGERY      unspecified, x3 as an infant    • TESTICLE SURGERY       Family History   Problem Relation Age of Onset   • Heart failure Mother    • Diabetes Mother    • Alzheimer's disease Mother    • Heart attack Father    • Kidney disease Father    • Cancer Father    • Prostate cancer Father    • Cancer Sister    • Breast cancer Sister    • Diabetes Sister    • Heart disease Sister    • Arrhythmia Sister    • Diabetes Brother    • Heart disease Brother    • Cancer Brother    • Liver disease Brother    • Alzheimer's disease Maternal Grandmother    • Liver cancer Maternal Grandfather    • Other Paternal Grandmother         Estonian flu   • Other Paternal Grandfather         Estonian flu     Social History     Socioeconomic History   • Marital status:    Tobacco Use   • Smoking status: Never Smoker   • Smokeless tobacco: Never Used   Vaping Use   • Vaping Use: Never used   Substance and Sexual Activity   • Alcohol use: No   • Drug use: No   • Sexual activity: Not Currently       Review of Systems  Review of Systems -  General ROS: negative for - chills, fever or night sweats  Cardiovascular ROS: no chest pain or dyspnea on exertion  Gastrointestinal ROS: no abdominal pain, change in bowel habits, or black or bloody stools  Genito-Urinary ROS: no dysuria, trouble voiding, or hematuria    Objective   There were no vitals taken for this visit.  Vitals obtained from patient if available  Physical Exam  Const: Non-toxic appearing, NAD, A&Ox4, Pleasant, Cooperative  Eyes: EOMI, no conjunctivitis  ENT: No copious nasal drainage noted  Cardiac: Regular rate by pulse  Resp: Respiratory rate observed to be within normal limits, no increased work of breathing observed, no audible wheezing or cough noted  Psych: Appropriate mood and  behavior.  Procedures  Assessment/Plan   Problem List Items Addressed This Visit     None      Visit Diagnoses     Laryngitis    -  Primary    Relevant Orders    COVID-19 PCR, [facility], NP SWAB IN [facility]AL TRANSPORT MEDIA/ORAL SWISH 24-30 HR TAT - Swab, Nasopharynx          See patient diagnoses and orders along with patient instructions for assessment, plan, and changes to care for patient.    This visit was conducted via telemedicine with live video and audio provided through Video Options: MyChart/Zoom at the point of care.    There are no Patient Instructions on file for this visit.    No follow-ups on file.    Ambulatory progress note signed and attested to by Elio Moore D.O.

## 2022-01-11 NOTE — TELEPHONE ENCOUNTER
Caller: Israel Reyna    Relationship: Self    Best call back number: [phone]    Requested Prescriptions:   Requested Prescriptions     Pending Prescriptions Disp Refills   • Jardiance 10 MG tablet tablet 30 tablet 2     Sig: Take 1 tablet by mouth Daily.        Pharmacy where request should be sent:  PRESCRIPTION TO BE SENT TO THE [facility]. ALSO REQUESTING SAMPLES TO HOLD HIM OVER UNTIL HIS PRESCRIPTION ARRIVES    Additional details provided by patient: REQUEST SENT IN 2-3 WEEKS AGO. COMPANY WAITING FOR PRESCRIPTION CORRECTION WITH CORRECT DOSAGE ADDED. PATIENT REQUESTING A CALL BACK TO CONFIRM PRESCTIPTION HAS BEEN FAXED AND ALSO TO DISCUSS IF ABLE TO  SAMPLES.    Does the patient have less than a 3 day supply:  [x] Yes  [] No PATIENT COMPLETELY OUT OF MEDICATION  Vicente Medina Rep   01/11/22 13:29 EST

## 2022-01-12 ENCOUNTER — APPOINTMENT (OUTPATIENT)
Dept: CT IMAGING | Facility: HOSPITAL | Age: 67
End: 2022-01-12

## 2022-01-12 ENCOUNTER — CLINICAL SUPPORT (OUTPATIENT)
Dept: FAMILY MEDICINE CLINIC | Facility: CLINIC | Age: 67
End: 2022-01-12

## 2022-01-12 DIAGNOSIS — J04.0 LARYNGITIS: ICD-10-CM

## 2022-01-12 PROCEDURE — U0004 COV-19 TEST NON-CDC HGH THRU: HCPCS | Performed by: FAMILY MEDICINE

## 2022-01-13 ENCOUNTER — TELEPHONE (OUTPATIENT)
Dept: FAMILY MEDICINE CLINIC | Facility: CLINIC | Age: 67
End: 2022-01-13

## 2022-01-13 LAB — SARS-COV-2 RNA NOSE QL NAA+PROBE: NOT DETECTED

## 2022-01-13 NOTE — TELEPHONE ENCOUNTER
Caller: Israel Reyna    Relationship: Self    Best call back number: [phone]    What test was performed: COVID     When was the test performed: 01/13/2022    Where was the test performed: IN OFFICE     Additional notes: PATIENT STATES THAT HE WAS TOLD HE WOULD HEAR THE RESULTS TODAY

## 2022-01-26 ENCOUNTER — HOSPITAL ENCOUNTER (OUTPATIENT)
Dept: CT IMAGING | Facility: HOSPITAL | Age: 67
Discharge: HOME | End: 2022-01-26
Admitting: FAMILY MEDICINE

## 2022-01-26 DIAGNOSIS — G11.9 CEREBELLAR ATAXIA: ICD-10-CM

## 2022-01-26 PROCEDURE — 70450 CT HEAD/BRAIN W/O DYE: CPT

## 2022-01-28 ENCOUNTER — OFFICE VISIT (OUTPATIENT)
Dept: FAMILY MEDICINE CLINIC | Facility: CLINIC | Age: 67
End: 2022-01-28

## 2022-01-28 ENCOUNTER — LAB (OUTPATIENT)
Dept: LAB | Facility: HOSPITAL | Age: 67
End: 2022-01-28

## 2022-01-28 VITALS
WEIGHT: 167 LBS | HEART RATE: 77 BPM | OXYGEN SATURATION: 98 % | DIASTOLIC BLOOD PRESSURE: 76 MMHG | BODY MASS INDEX: 26.21 KG/M2 | HEIGHT: 67 IN | SYSTOLIC BLOOD PRESSURE: 124 MMHG | TEMPERATURE: 97.7 F

## 2022-01-28 DIAGNOSIS — R33.9 URINARY RETENTION WITH INCOMPLETE BLADDER EMPTYING: ICD-10-CM

## 2022-01-28 DIAGNOSIS — R10.9 FLANK PAIN: Primary | ICD-10-CM

## 2022-01-28 LAB
ANION GAP SERPL CALCULATED.3IONS-SCNC: 11.5 MMOL/L (ref 5–15)
BILIRUB BLD-MCNC: NEGATIVE MG/DL
BUN SERPL-MCNC: 19 MG/DL (ref 8–23)
BUN/CREAT SERPL: 17.1 (ref 7–25)
CALCIUM SPEC-SCNC: 9.7 MG/DL (ref 8.6–10.5)
CHLORIDE SERPL-SCNC: 109 MMOL/L (ref 98–107)
CLARITY, POC: CLEAR
CO2 SERPL-SCNC: 23.5 MMOL/L (ref 22–29)
COLOR UR: YELLOW
CREAT SERPL-MCNC: 1.11 MG/DL (ref 0.76–1.27)
EXPIRATION DATE: ABNORMAL
GFR SERPL CREATININE-BSD FRML MDRD: 66 ML/MIN/1.73
GLUCOSE SERPL-MCNC: 166 MG/DL (ref 65–99)
GLUCOSE UR STRIP-MCNC: ABNORMAL MG/DL
KETONES UR QL: NEGATIVE
LEUKOCYTE EST, POC: NEGATIVE
Lab: ABNORMAL
NITRITE UR-MCNC: NEGATIVE MG/ML
PH UR: 6 [PH] (ref 5–8)
POTASSIUM SERPL-SCNC: 4.5 MMOL/L (ref 3.5–5.2)
PROT UR STRIP-MCNC: ABNORMAL MG/DL
RBC # UR STRIP: NEGATIVE /UL
SODIUM SERPL-SCNC: 144 MMOL/L (ref 136–145)
SP GR UR: 1.02 (ref 1–1.03)
UROBILINOGEN UR QL: NORMAL

## 2022-01-28 PROCEDURE — 81003 URINALYSIS AUTO W/O SCOPE: CPT | Performed by: FAMILY MEDICINE

## 2022-01-28 PROCEDURE — 80048 BASIC METABOLIC PNL TOTAL CA: CPT

## 2022-01-28 PROCEDURE — 87086 URINE CULTURE/COLONY COUNT: CPT | Performed by: FAMILY MEDICINE

## 2022-01-28 PROCEDURE — 99214 OFFICE O/P EST MOD 30 MIN: CPT | Performed by: FAMILY MEDICINE

## 2022-01-28 RX ORDER — TAMSULOSIN HYDROCHLORIDE 0.4 MG/1
1 CAPSULE ORAL NIGHTLY
Qty: 7 CAPSULE | Refills: 0 | Status: SHIPPED | OUTPATIENT
Start: 2022-01-28 | End: 2022-02-04

## 2022-01-30 LAB — BACTERIA SPEC AEROBE CULT: NO GROWTH

## 2022-01-31 ENCOUNTER — APPOINTMENT (OUTPATIENT)
Dept: PREADMISSION TESTING | Facility: HOSPITAL | Age: 67
End: 2022-01-31

## 2022-01-31 LAB — SARS-COV-2 RNA PNL SPEC NAA+PROBE: NOT DETECTED

## 2022-01-31 PROCEDURE — U0004 COV-19 TEST NON-CDC HGH THRU: HCPCS | Performed by: INTERNAL MEDICINE

## 2022-02-01 RX ORDER — TOPIRAMATE 50 MG/1
TABLET, FILM COATED ORAL
Qty: 120 TABLET | Refills: 0 | Status: SHIPPED | OUTPATIENT
Start: 2022-02-01 | End: 2022-03-07

## 2022-02-03 ENCOUNTER — OUTSIDE FACILITY SERVICE (OUTPATIENT)
Dept: GASTROENTEROLOGY | Facility: CLINIC | Age: 67
End: 2022-02-03

## 2022-02-03 ENCOUNTER — TELEPHONE (OUTPATIENT)
Dept: GASTROENTEROLOGY | Facility: CLINIC | Age: 67
End: 2022-02-03

## 2022-02-03 ENCOUNTER — LAB REQUISITION (OUTPATIENT)
Dept: LAB | Facility: HOSPITAL | Age: 67
End: 2022-02-03

## 2022-02-03 DIAGNOSIS — K44.9 DIAPHRAGMATIC HERNIA WITHOUT OBSTRUCTION OR GANGRENE: ICD-10-CM

## 2022-02-03 DIAGNOSIS — K22.89 OTHER SPECIFIED DISEASE OF ESOPHAGUS: ICD-10-CM

## 2022-02-03 DIAGNOSIS — R12 HEARTBURN: ICD-10-CM

## 2022-02-03 DIAGNOSIS — R13.10 DYSPHAGIA, UNSPECIFIED: ICD-10-CM

## 2022-02-03 DIAGNOSIS — K22.2 ESOPHAGEAL OBSTRUCTION: ICD-10-CM

## 2022-02-03 PROCEDURE — 43249 ESOPH EGD DILATION <30 MM: CPT | Performed by: INTERNAL MEDICINE

## 2022-02-03 PROCEDURE — 88313 SPECIAL STAINS GROUP 2: CPT | Performed by: INTERNAL MEDICINE

## 2022-02-03 PROCEDURE — 43239 EGD BIOPSY SINGLE/MULTIPLE: CPT | Performed by: INTERNAL MEDICINE

## 2022-02-03 PROCEDURE — 88305 TISSUE EXAM BY PATHOLOGIST: CPT | Performed by: INTERNAL MEDICINE

## 2022-02-03 NOTE — TELEPHONE ENCOUNTER
ENDO UNIT CALLED TO SCHEDULE A COLON IN TWO -THREE WEEKS OM A Thursday OR Friday TRANSFERRED TO [facility]

## 2022-02-07 ENCOUNTER — TELEPHONE (OUTPATIENT)
Dept: FAMILY MEDICINE CLINIC | Facility: CLINIC | Age: 67
End: 2022-02-07

## 2022-02-07 LAB
CYTO UR: NORMAL
LAB AP CASE REPORT: NORMAL
LAB AP CLINICAL INFORMATION: NORMAL
PATH REPORT.FINAL DX SPEC: NORMAL
PATH REPORT.GROSS SPEC: NORMAL

## 2022-02-07 NOTE — TELEPHONE ENCOUNTER
Caller: Israel Reyna    Relationship: Self    Best call back number: [phone]    Caller requesting test results: YES    What test was performed: SCOPE DOWN THE ESOPHAGUS AND BRAIN SCAN     When was the test performed: 02/03/22     Where was the test performed: ONE AT [facility] AND THE OTHER AT [facility]    Additional notes: PATIENT STATES HE ALSO NEEDS JARDIANCE SAMPLES AS THE PATIENT'S ASSISTANCE PROGRAM HAS NOT DELIVERED THE PRESCRIPTION.

## 2022-02-09 ENCOUNTER — TELEPHONE (OUTPATIENT)
Dept: FAMILY MEDICINE CLINIC | Facility: CLINIC | Age: 67
End: 2022-02-09

## 2022-02-09 ENCOUNTER — HOSPITAL ENCOUNTER (OUTPATIENT)
Dept: ULTRASOUND IMAGING | Facility: HOSPITAL | Age: 67
Discharge: HOME | End: 2022-02-09
Admitting: FAMILY MEDICINE

## 2022-02-09 DIAGNOSIS — R10.9 FLANK PAIN: ICD-10-CM

## 2022-02-09 PROCEDURE — 76775 US EXAM ABDO BACK WALL LIM: CPT

## 2022-02-09 RX ORDER — TAMSULOSIN HYDROCHLORIDE 0.4 MG/1
1 CAPSULE ORAL DAILY
Qty: 7 CAPSULE | Refills: 0 | Status: SHIPPED | OUTPATIENT
Start: 2022-02-09 | End: 2022-02-28 | Stop reason: SDUPTHER

## 2022-02-09 NOTE — TELEPHONE ENCOUNTER
Rx Refill Note  Requested Prescriptions     Signed Prescriptions Disp Refills   • tamsulosin (FLOMAX) 0.4 MG capsule 24 hr capsule 7 capsule 0     Sig: Take 1 capsule by mouth Daily.     Authorizing Provider: WILFREDO DANIELLE     Ordering User: SOLEDAD LOUISE      Last office visit with prescribing clinician: 1/28/2022      Next office visit with prescribing clinician: 2/9/2022            Soledad Louise MA  02/09/22, 13:29 EST     Relayed results, patient reports he is still having severe pain in his left flank area. He requested refills of Flomax, He is currently on his way to his US appointment. I advised him to keep his follow up appt with PCP and we will updated him once his results from US are complete

## 2022-02-09 NOTE — TELEPHONE ENCOUNTER
The results of his radiologic study of his head was no acute abnormality noted.  The results of the EGD showed residual food in the stomach consistent with gastroparesis.  I recommend he keep his follow-up appointment with Dr. Moore to discuss treatment options for these.

## 2022-02-09 NOTE — TELEPHONE ENCOUNTER
"Attempted to contact,no answer M asking for return call to discuss test results    \"The results of his radiologic study of his head was no acute abnormality noted.  The results of the EGD showed residual food in the stomach consistent with gastroparesis.  I recommend he keep his follow-up appointment with Dr. Moore to discuss treatment options for these\"  Hub can relay and document  "

## 2022-02-09 NOTE — TELEPHONE ENCOUNTER
Caller: Israel Reyna    Relationship: Self    Best call back number: [phone]    What is the best time to reach you: ANY TIME    Who are you requesting to speak with (clinical staff, provider,  specific staff member): NURSE    Do you know the name of the person who called: SELF    What was the call regarding: PATIENT IS OUT OF JARDIANCE AND NEEDS MORE SAMPLES UNTIL HE GETS HIS PRESCRIPTION FROM THE ASSISTANCE PROGRAM.     Do you require a callback: YES

## 2022-02-15 ENCOUNTER — TELEPHONE (OUTPATIENT)
Dept: GASTROENTEROLOGY | Facility: CLINIC | Age: 67
End: 2022-02-15

## 2022-02-15 NOTE — TELEPHONE ENCOUNTER
Patient called and is confused about his prep.  He says that he would rather die of colon cancer than go through the prep.  He said that you told him he could do a 3 day prep and would not have to do the jug.  What would this prep be?

## 2022-02-18 NOTE — TELEPHONE ENCOUNTER
Spoke with patient and went over the prep with him.  He will call back with any additional questions.

## 2022-02-19 DIAGNOSIS — G43.119 INTRACTABLE MIGRAINE WITH AURA WITHOUT STATUS MIGRAINOSUS: ICD-10-CM

## 2022-02-21 RX ORDER — BUTALBITAL, ACETAMINOPHEN AND CAFFEINE 50; 325; 40 MG/1; MG/1; MG/1
TABLET ORAL
Qty: 30 TABLET | Refills: 0 | Status: SHIPPED | OUTPATIENT
Start: 2022-02-21 | End: 2022-06-06

## 2022-02-25 ENCOUNTER — OUTSIDE FACILITY SERVICE (OUTPATIENT)
Dept: GASTROENTEROLOGY | Facility: CLINIC | Age: 67
End: 2022-02-25

## 2022-02-25 PROCEDURE — 88305 TISSUE EXAM BY PATHOLOGIST: CPT | Performed by: INTERNAL MEDICINE

## 2022-02-25 PROCEDURE — 45385 COLONOSCOPY W/LESION REMOVAL: CPT | Performed by: INTERNAL MEDICINE

## 2022-02-28 ENCOUNTER — OFFICE VISIT (OUTPATIENT)
Dept: FAMILY MEDICINE CLINIC | Facility: CLINIC | Age: 67
End: 2022-02-28

## 2022-02-28 ENCOUNTER — LAB REQUISITION (OUTPATIENT)
Dept: LAB | Facility: HOSPITAL | Age: 67
End: 2022-02-28

## 2022-02-28 ENCOUNTER — LAB (OUTPATIENT)
Dept: LAB | Facility: HOSPITAL | Age: 67
End: 2022-02-28

## 2022-02-28 VITALS
OXYGEN SATURATION: 98 % | RESPIRATION RATE: 16 BRPM | BODY MASS INDEX: 25.96 KG/M2 | DIASTOLIC BLOOD PRESSURE: 66 MMHG | HEIGHT: 67 IN | HEART RATE: 81 BPM | WEIGHT: 165.4 LBS | SYSTOLIC BLOOD PRESSURE: 124 MMHG

## 2022-02-28 DIAGNOSIS — Z13.6 ENCOUNTER FOR SCREENING FOR CARDIOVASCULAR DISORDERS: ICD-10-CM

## 2022-02-28 DIAGNOSIS — K62.5 HEMORRHAGE OF ANUS AND RECTUM: ICD-10-CM

## 2022-02-28 DIAGNOSIS — K57.30 DIVERTICULOSIS OF LARGE INTESTINE WITHOUT PERFORATION OR ABSCESS WITHOUT BLEEDING: ICD-10-CM

## 2022-02-28 DIAGNOSIS — D12.2 BENIGN NEOPLASM OF ASCENDING COLON: ICD-10-CM

## 2022-02-28 DIAGNOSIS — E11.9 TYPE 2 DIABETES MELLITUS WITHOUT COMPLICATION, WITHOUT LONG-TERM CURRENT USE OF INSULIN: ICD-10-CM

## 2022-02-28 DIAGNOSIS — Z86.010 PERSONAL HISTORY OF COLONIC POLYPS: ICD-10-CM

## 2022-02-28 DIAGNOSIS — N13.30 HYDRONEPHROSIS, UNSPECIFIED HYDRONEPHROSIS TYPE: ICD-10-CM

## 2022-02-28 DIAGNOSIS — R33.9 URINARY RETENTION WITH INCOMPLETE BLADDER EMPTYING: ICD-10-CM

## 2022-02-28 DIAGNOSIS — Z23 IMMUNIZATION DUE: ICD-10-CM

## 2022-02-28 DIAGNOSIS — D12.3 BENIGN NEOPLASM OF TRANSVERSE COLON: ICD-10-CM

## 2022-02-28 DIAGNOSIS — Z00.00 WELCOME TO MEDICARE PREVENTIVE VISIT: Primary | ICD-10-CM

## 2022-02-28 DIAGNOSIS — D12.5 BENIGN NEOPLASM OF SIGMOID COLON: ICD-10-CM

## 2022-02-28 LAB
EXPIRATION DATE: NORMAL
HBA1C MFR BLD: 7.2 %
Lab: NORMAL

## 2022-02-28 PROCEDURE — 1170F FXNL STATUS ASSESSED: CPT | Performed by: FAMILY MEDICINE

## 2022-02-28 PROCEDURE — 3051F HG A1C>EQUAL 7.0%<8.0%: CPT | Performed by: FAMILY MEDICINE

## 2022-02-28 PROCEDURE — 83036 HEMOGLOBIN GLYCOSYLATED A1C: CPT | Performed by: FAMILY MEDICINE

## 2022-02-28 PROCEDURE — G0444 DEPRESSION SCREEN ANNUAL: HCPCS | Performed by: FAMILY MEDICINE

## 2022-02-28 PROCEDURE — 1159F MED LIST DOCD IN RCRD: CPT | Performed by: FAMILY MEDICINE

## 2022-02-28 PROCEDURE — 84153 ASSAY OF PSA TOTAL: CPT

## 2022-02-28 PROCEDURE — 1125F AMNT PAIN NOTED PAIN PRSNT: CPT | Performed by: FAMILY MEDICINE

## 2022-02-28 PROCEDURE — G0439 PPPS, SUBSEQ VISIT: HCPCS | Performed by: FAMILY MEDICINE

## 2022-02-28 RX ORDER — TAMSULOSIN HYDROCHLORIDE 0.4 MG/1
1 CAPSULE ORAL DAILY
Qty: 30 CAPSULE | Refills: 2 | Status: SHIPPED | OUTPATIENT
Start: 2022-02-28 | End: 2022-07-12

## 2022-02-28 RX ORDER — BLOOD-GLUCOSE METER
1 KIT MISCELLANEOUS DAILY
Qty: 1 EACH | Refills: 0 | Status: SHIPPED | OUTPATIENT
Start: 2022-02-28 | End: 2022-03-02 | Stop reason: SDUPTHER

## 2022-02-28 RX ORDER — INSULIN DETEMIR 100 [IU]/ML
10 INJECTION, SOLUTION SUBCUTANEOUS NIGHTLY
Qty: 3 ML | Refills: 11 | Status: SHIPPED | OUTPATIENT
Start: 2022-02-28 | End: 2022-05-13

## 2022-02-28 RX ORDER — CALCIUM CARB/VITAMIN D3/VIT K1 500-100-40
1 TABLET,CHEWABLE ORAL NIGHTLY
Qty: 90 EACH | Refills: 3 | Status: SHIPPED | OUTPATIENT
Start: 2022-02-28

## 2022-03-01 LAB
CYTO UR: NORMAL
LAB AP CASE REPORT: NORMAL
LAB AP CLINICAL INFORMATION: NORMAL
PATH REPORT.FINAL DX SPEC: NORMAL
PATH REPORT.GROSS SPEC: NORMAL
PSA SERPL-MCNC: 2.84 NG/ML (ref 0–4)

## 2022-03-02 ENCOUNTER — TELEPHONE (OUTPATIENT)
Dept: FAMILY MEDICINE CLINIC | Facility: CLINIC | Age: 67
End: 2022-03-02

## 2022-03-02 DIAGNOSIS — E11.9 TYPE 2 DIABETES MELLITUS WITHOUT COMPLICATION, WITHOUT LONG-TERM CURRENT USE OF INSULIN: ICD-10-CM

## 2022-03-02 PROCEDURE — 93000 ELECTROCARDIOGRAM COMPLETE: CPT | Performed by: FAMILY MEDICINE

## 2022-03-02 RX ORDER — LANCETS
1 EACH MISCELLANEOUS DAILY
Qty: 100 EACH | Refills: 11 | Status: SHIPPED | OUTPATIENT
Start: 2022-03-02

## 2022-03-02 RX ORDER — BLOOD-GLUCOSE METER
1 KIT MISCELLANEOUS DAILY
Qty: 1 EACH | Refills: 0 | Status: SHIPPED | OUTPATIENT
Start: 2022-03-02

## 2022-03-02 NOTE — TELEPHONE ENCOUNTER
Caller: Israel Reyna    Relationship: Self    Best call back number: [phone]    Which medication are you concerned about: insulin detemir (Levemir) 100 UNIT/ML injection, glucose monitor monitoring kit, LANCETS, AND GLUCOSE BLOOD TEST STRIPS    Who prescribed you this medication: DR. DANIELLE    What are your concerns: PATIENT STATED THESE PRESCRIPTIONS ARE VERY EXPENSIVE AND HIS LOCAL PHARMACIST RECOMMENDED HE REQUEST THIS BE SENT TO HIS MAIL ORDER PHARMACY    [facility] [place] [place] - [address] AT [address] [facility] - [phone]  - [phone] FX

## 2022-03-02 NOTE — TELEPHONE ENCOUNTER
Rx Refill Note  Requested Prescriptions     Signed Prescriptions Disp Refills   • glucose monitor monitoring kit 1 each 0     Si each Daily.     Authorizing Provider: WILFREDO DANIELLE     Ordering User: SOLEDAD LOUISE   • Lancets Thin misc 100 each 11     Si each Daily. Use daily as directed     Authorizing Provider: WILFREDO DANIELLE     Ordering User: SOLEDAD LOUISE   • glucose blood test strip 100 each 12     Si each by Other route Daily. Use daily as directed     Authorizing Provider: WILFREDO DANIELLE     Ordering User: SOLEDAD LOUISE      Last office visit with prescribing clinician: 2022      Next office visit with prescribing clinician: 2022            Soledad Louise MA  22, 17:03 EST

## 2022-03-04 ENCOUNTER — TELEPHONE (OUTPATIENT)
Dept: FAMILY MEDICINE CLINIC | Facility: CLINIC | Age: 67
End: 2022-03-04

## 2022-03-04 NOTE — TELEPHONE ENCOUNTER
Caller: Israel Reyna    Relationship: Self    Best call back number: [phone]    What is the best time to reach you: ANY TIME     Who are you requesting to speak with (clinical staff, provider,  specific staff member): DR DANIELLE    Do you know the name of the person who called: N/A    What was the call regarding: PATIENT IS STILL FEELING NAUSEOUS. HE TASTE BLOOD AND JUST AN AWFUL TASTE IN HIS MOUTH. HE IS EATING TO KEEP UP HIS STRENGTH BUT HE IS NAUSEOUS ALL THE TIME, AND HE IS VOMITING AND DOES NOT KNOW WHY. HE STATED HE DOES NOT WANT A NAUSEOUS PILL BUT HE DOES NOT KNOW WHAT TO DO.  PLEASE ADVISE AND CALL PATIENT.     Do you require a callback: YES

## 2022-03-04 NOTE — TELEPHONE ENCOUNTER
Patient reports that he has colonoscopy completed last week. He began feeling very nauseas and experiencing constant vomiting. This is triggered by any activity. He will get a sour stomach and vomit. He states that he has been experiencing severe nausea for the past 2-3 months.     He will typically use Vane [place] and drink water which will help improve his symptoms. He has reported this to GI this past Monday and has not received a response.

## 2022-03-07 RX ORDER — TOPIRAMATE 50 MG/1
TABLET, FILM COATED ORAL
Qty: 120 TABLET | Refills: 0 | Status: SHIPPED | OUTPATIENT
Start: 2022-03-07 | End: 2022-04-12 | Stop reason: SDUPTHER

## 2022-03-08 ENCOUNTER — OFFICE VISIT (OUTPATIENT)
Dept: GASTROENTEROLOGY | Facility: CLINIC | Age: 67
End: 2022-03-08

## 2022-03-08 VITALS
HEART RATE: 85 BPM | DIASTOLIC BLOOD PRESSURE: 71 MMHG | SYSTOLIC BLOOD PRESSURE: 136 MMHG | HEIGHT: 67 IN | TEMPERATURE: 97.1 F | WEIGHT: 166.4 LBS | BODY MASS INDEX: 26.12 KG/M2

## 2022-03-08 DIAGNOSIS — K62.89 ANAL SPHINCTER INCOMPETENCE: Primary | ICD-10-CM

## 2022-03-08 DIAGNOSIS — Z86.010 HISTORY OF COLONIC POLYPS: ICD-10-CM

## 2022-03-08 DIAGNOSIS — K44.9 HIATAL HERNIA: ICD-10-CM

## 2022-03-08 DIAGNOSIS — K22.2 ESOPHAGEAL STENOSIS: ICD-10-CM

## 2022-03-08 DIAGNOSIS — K31.89 RETAINED FOOD IN STOMACH: ICD-10-CM

## 2022-03-08 DIAGNOSIS — R11.0 NAUSEA: ICD-10-CM

## 2022-03-08 PROCEDURE — 99213 OFFICE O/P EST LOW 20 MIN: CPT | Performed by: NURSE PRACTITIONER

## 2022-03-08 NOTE — PROGRESS NOTES
GASTROENTEROLOGY OFFICE NOTE    Israel Reyna  [id number]  1955    CARE TEAM  Patient Care Team:  Elio Moore DO as PCP - General (Family Medicine)    Referring Provider: No ref. provider found    Chief Complaint   Patient presents with   • Nausea        HISTORY OF PRESENT ILLNESS:   Israel Reyna is a 67 y.o. male who presents to the clinic today to discuss a procedure or intervention Dr. Graves mentioned to him at time of prior colonoscopy due to chronic fecal incontinence. He reports he had 3 bowel surgeries when he was an infant. He reports weakened anal sphincter.Dr. Land placed a stimulator to try to help incontinence but it didn't work and Mr. Reyna reports the stimulator is turned off.  He expresses frustration that he has the stimulator and cannot have an MRI due to the stimulator.  He reports he would like the stimulator to be removed but this has to be approved through insurance and it seems as though it may be difficult to get approved by insurance.  I believe he tries to control fecal incontinence by doing intermittent bowel purge and otherwise possibly being constipated.  2/3/2022 EGD per Dr. Graves due to heartburn and dysphagia revealed changes consistent with short segment Billings's, benign-appearing esophageal stenosis that was dilated, small hiatal hernia, small amount of food residue in the stomach.  Biopsy of the duodenum without abnormality, gastric biopsy revealed minimal chronic gastritis and biopsy of the distal esophagus revealed squamous mucosa with basal layer hyperplasia, vascular ectasia and increased intraepithelial leukocytes and occasional eosinophils (up to 2 per high-power field), reactive gastropathic change associated with mild chronic gastritis with focal activity, negative for intestinal metaplasia.  Review of letter to the patient from Dr. Graves revealed the biopsies were negative for Billings's.  Colonoscopy 2/25/2022  with removal of polyps with recommendation to repeat colonoscopy in 3 years.  He is taking omeprazole 40 mg daily.  He has nausea.  He feels as though prior to upper endoscopy he went several hours without eating.  And had a small meal when he did eat prior to EGD.  He reports he does not want to be a guinea pig.  He reports what ever the intervention was the Dr. Graves mentioned at the time of the colonoscopy he will likely not have done but he would like to know what Dr. Graves was thinking about.  He reports he was informed by Dr. Graves he would have to run it by another provider.  Past Medical History:   Diagnosis Date   • Anxiety    • Bowel incontinence     w/ history of Stimulator Device    • CAD (coronary artery disease)    • Chest pain    • CHF (congestive heart failure) ([facility])    • Depression    • Diabetes mellitus, type 2 (HCC)    • Diabetic peripheral neuropathy (HCC)    • Dyslipidemia    • Fatigue    • GERD (gastroesophageal reflux disease)    • Hypertension    • Mental status change    • Migraine     Complex    • Migraine headache    • SOB (shortness of breath)         Past Surgical History:   Procedure Laterality Date   • CARDIAC CATHETERIZATION     • CARDIAC CATHETERIZATION N/A 10/14/2016    Procedure: Left Heart Cath;  Surgeon: Santiago Garner MD;  Location: [facility] CATH INVASIVE LOCATION;  Service:    • COLONOSCOPY  02/25/2022    Dr. Graves-recommended 3 year repeat   • ENDOSCOPY  02/03/2022    Dr. Graves   • GASTRIC STIMULATOR IMPLANT SURGERY     • SMALL INTESTINE SURGERY      unspecified, x3 as an infant    • TESTICLE SURGERY          Current Outpatient Medications on File Prior to Visit   Medication Sig   • amLODIPine (NORVASC) 10 MG tablet TAKE 1 TABLET DAILY   • butalbital-acetaminophen-caffeine (FIORICET, ESGIC) -40 MG per tablet TAKE 1 TABLET BY MOUTH ONCE DAILY AS NEEDED FOR HEADACHE   • gabapentin (NEURONTIN) 300 MG capsule TAKE 1 CAPSULE 3 TIMES A   DAY   •  "glipizide (GLUCOTROL) 10 MG tablet Take 1 tablet by mouth 2 (Two) Times a Day Before Meals.   • glucose blood test strip 1 each by Other route Daily. Use daily as directed   • glucose monitor monitoring kit 1 each Daily.   • Incontinence Supply Disposable (NU-FIT ADULT UNDERWEAR MEDIUM) misc    • insulin detemir (Levemir) 100 UNIT/ML injection Inject 10 Units under the skin into the appropriate area as directed Every Night.   • Insulin Syringe 31G X 5/16\" 0.3 ML misc 1 each Every Night.   • Lancets Thin misc 1 each Daily. Use daily as directed   • lisinopril (PRINIVIL,ZESTRIL) 20 MG tablet Take 1 tablet by mouth Daily.   • Lutein 5 % beads Take 5 mg by mouth Daily.   • metFORMIN (GLUCOPHAGE) 1000 MG tablet Take 1 tablet by mouth 2 (Two) Times a Day With Meals.   • Multiple Vitamins-Minerals (PRESERVISION AREDS 2 PO) Take  by mouth Daily.   • NON FORMULARY Shot in eye every 3 months   • omeprazole (priLOSEC) 40 MG capsule Take 1 capsule by mouth Daily.   • sildenafil (Viagra) 100 MG tablet Take 1 tablet by mouth Daily As Needed for Erectile Dysfunction.   • simvastatin (ZOCOR) 20 MG tablet Take 10 mg by mouth Every Night.   • Specialty Vitamins Products (HEALTHY HEART COMPLEX PO) Take  by mouth Daily.   • tamsulosin (FLOMAX) 0.4 MG capsule 24 hr capsule Take 1 capsule by mouth Daily.   • topiramate (TOPAMAX) 50 MG tablet Take 2 tablets by mouth twice daily   • Trulicity 1.5 MG/0.5ML solution pen-injector Inject 1.5 mg under the skin into the appropriate area as directed 1 (One) Time Per Week.   • vitamin B-12 (CYANOCOBALAMIN) 100 MCG tablet Take 50 mcg by mouth 2 (two) times a day.   • vitamin C (ASCORBIC ACID) 500 MG tablet Take 500 mg by mouth 2 (two) times a day.   • vitamin E 400 UNIT capsule Take 400 Units by mouth Daily.   • Zeaxanthin powder Take 1 mg by mouth Daily.   • Zinc 40 MG tablet Take 1 tablet by mouth Daily.   • polyethylene glycol (GoLYTELY) 236 g solution Starting at noon on day prior to procedure, " "drink 8 ounces every 30 minutes until all gone or stools are clear. May add flavor packet.     No current facility-administered medications on file prior to visit.       Allergies   Allergen Reactions   • Actos [Pioglitazone] Anaphylaxis     congestive heart failure   • Avandia [Rosiglitazone] Anaphylaxis     congestive heart failure.   • Darvon [Propoxyphene] Itching     Angioedema    itching   • Talwin [Pentazocine] Rash     Angioedema    itching       Family History   Problem Relation Age of Onset   • Heart failure Mother    • Diabetes Mother    • Alzheimer's disease Mother    • Heart attack Father    • Kidney disease Father    • Cancer Father    • Prostate cancer Father    • Cancer Sister    • Breast cancer Sister    • Diabetes Sister    • Heart disease Sister    • Arrhythmia Sister    • Diabetes Brother    • Heart disease Brother    • Cancer Brother    • Liver disease Brother    • Alzheimer's disease Maternal Grandmother    • Liver cancer Maternal Grandfather    • Other Paternal Grandmother         [place]ymro flu   • Other Paternal Grandfather         [place]ymro flu   • Colon cancer Neg Hx        Social History     Socioeconomic History   • Marital status:    Tobacco Use   • Smoking status: Never Smoker   • Smokeless tobacco: Never Used   Vaping Use   • Vaping Use: Never used   Substance and Sexual Activity   • Alcohol use: No   • Drug use: No   • Sexual activity: Not Currently       PHYSICAL EXAM   /71 (BP Location: Left arm, Patient Position: Sitting, Cuff Size: Adult)   Pulse 85   Temp 97.1 °F (36.2 °C) (Temporal)   Ht 170.2 cm (67\")   Wt 75.5 kg (166 lb 6.4 oz)   BMI 26.06 kg/m²   Physical Exam  Constitutional:       General: He is not in acute distress.     Appearance: He is not toxic-appearing.   HENT:      Head: Normocephalic and atraumatic. No contusion.      Right Ear: External ear normal.      Left Ear: External ear normal.   Eyes:      General: Lids are normal. No scleral icterus.        " Right eye: No discharge.         Left eye: No discharge.      Extraocular Movements: Extraocular movements intact.   Neck:      Trachea: Trachea normal.      Comments: No visible mass  No visible adenopathy  Cardiovascular:      Rate and Rhythm: Normal rate.   Pulmonary:      Effort: No respiratory distress.      Comments: Symmetrical expansion    Abdominal:      General: There is distension.      Palpations: Abdomen is soft. There is no mass.      Tenderness: There is no abdominal tenderness.      Comments: Possible diffuse abdominal distention noted   Musculoskeletal:      Comments: Symmetrical movement of upper extremities  Symmetrical movement of lower extremities  No visible deformities   Skin:     General: Skin is warm and dry.      Coloration: Skin is not jaundiced.   Neurological:      General: No focal deficit present.      Mental Status: He is alert and oriented to person, place, and time.   Psychiatric:         Mood and Affect: Mood normal.         Behavior: Behavior normal.         Thought Content: Thought content normal.     Results Review:  2/3/2022 EGD per Dr. Graves due to heartburn and dysphagia revealed changes consistent with short segment Billings's, benign-appearing esophageal stenosis that was dilated, small hiatal hernia, small amount of food residue in the stomach.  Biopsy of the duodenum without abnormality, gastric biopsy revealed minimal chronic gastritis and biopsy of the distal esophagus revealed squamous mucosa with basal layer hyperplasia, vascular ectasia and increased intraepithelial leukocytes and occasional eosinophils (up to 2 per high-power field), reactive gastropathic change associated with mild chronic gastritis with focal activity, negative for intestinal metaplasia.  Review of letter to the patient from Dr. Graves revealed the biopsies were negative for Billings's.  Colonoscopy 2/25/2022 with removal of polyps with recommendation to repeat colonoscopy in 3  years.      ASSESSMENT / PLAN  1. Anal sphincter incompetence  - I have messaged Dr. Graves to determine what intervention he was thinking of for the patient. I am awaiting a response and the office will let the patient know soon.     2. Esophageal stenosis  S/p dilation 2/3/2022    3. Hiatal hernia  - continue omeprazole 40 mg daily    4. Retained food in stomach  - reports he ate small meal night before EGD and was found to have retained food in stomach. He may have gastroparesis. Today's focus was on fecal incontinence. I will try to get him some information regarding gastroparesis.     5. Nausea  - continue omeprazole 40 mg daily. He may have gastroparesis which will need to be reviewed in the future.     6. History of colonic polyps  - Dr. Graves suggested repeat colonoscopy in 3 years, due 2/25/2025 but the patient reports he will likely not have another colonoscopy. I informed him the office will still remind him when he is due and it is up to him if he would like to schedule repeat colonoscopy. I do not recommend other CRC screening test such as stool based tests due to his history of colon polyps.       Return if symptoms worsen or fail to improve.    Beronica Anthony, APRN  03/08/2022    TOM Anthony APRN Addendum 3/13/2022 @ 1544: I sent the patient a message that Dr. Graves wanted him to consider injection of Enteyrx. [facility] does Botox injection but not Enteryx. I also sent information about possible gastroparesis and recommended he call the office for follow up if he would like to return to discuss any of his symptoms.

## 2022-03-13 ENCOUNTER — TELEPHONE (OUTPATIENT)
Dept: GASTROENTEROLOGY | Facility: CLINIC | Age: 67
End: 2022-03-13

## 2022-03-16 ENCOUNTER — TELEPHONE (OUTPATIENT)
Dept: GASTROENTEROLOGY | Facility: CLINIC | Age: 67
End: 2022-03-16

## 2022-03-16 NOTE — TELEPHONE ENCOUNTER
I called patient and read the letter per Beronica MOMIN. Patient stated he was not interested in Botox and did not want to follow up with Beronica Anthony at this time. I instructed patient to call back if he has any issues or concerns. Patient verbalized understanding.

## 2022-03-17 ENCOUNTER — TELEMEDICINE (OUTPATIENT)
Dept: FAMILY MEDICINE CLINIC | Facility: CLINIC | Age: 67
End: 2022-03-17

## 2022-03-17 DIAGNOSIS — J01.00 ACUTE NON-RECURRENT MAXILLARY SINUSITIS: Primary | ICD-10-CM

## 2022-03-17 PROCEDURE — 99213 OFFICE O/P EST LOW 20 MIN: CPT | Performed by: FAMILY MEDICINE

## 2022-03-17 RX ORDER — AMOXICILLIN AND CLAVULANATE POTASSIUM 875; 125 MG/1; MG/1
1 TABLET, FILM COATED ORAL 2 TIMES DAILY
Qty: 14 TABLET | Refills: 0 | Status: SHIPPED | OUTPATIENT
Start: 2022-03-17 | End: 2022-03-24

## 2022-03-17 RX ORDER — BROMPHENIRAMINE MALEATE, PSEUDOEPHEDRINE HYDROCHLORIDE, AND DEXTROMETHORPHAN HYDROBROMIDE 2; 30; 10 MG/5ML; MG/5ML; MG/5ML
5 SYRUP ORAL 4 TIMES DAILY PRN
Qty: 118 ML | Refills: 1 | Status: SHIPPED | OUTPATIENT
Start: 2022-03-17 | End: 2022-03-24

## 2022-03-17 RX ORDER — IPRATROPIUM BROMIDE 42 UG/1
2 SPRAY, METERED NASAL 4 TIMES DAILY
Qty: 15 ML | Refills: 1 | Status: SHIPPED | OUTPATIENT
Start: 2022-03-17 | End: 2022-05-13

## 2022-03-17 RX ORDER — FLUTICASONE PROPIONATE 50 MCG
2 SPRAY, SUSPENSION (ML) NASAL 2 TIMES DAILY
Qty: 9.9 ML | Refills: 0 | Status: SHIPPED | OUTPATIENT
Start: 2022-03-17 | End: 2022-05-13

## 2022-03-17 NOTE — PROGRESS NOTES
Subjective   Israel Reyna is a 67 y.o. male.     Chief Complaint   Patient presents with   • Sinusitis     Symptoms started saturday       History of Present Illness     Israel Reyna presents today for   Chief Complaint   Patient presents with   • Sinusitis     Symptoms started saturday     Woke up Saturday and couldn't breathe, he believes it was a sinus infection. Felt clogged up and rough. He went to [employer] [place] [address] but still couldn't breathe, it just got worse and worse. Monday evening subjective fever and tmax 100. Took some tylenol and azelastine which seemed to help some. Home COVID test was negative.    You have chosen to receive care through a telehealth visit.  Do you consent to use a video/audio connection for your medical care today? Yes    The following portions of the patient's history were reviewed and updated as appropriate: allergies, current medications, past family history, past medical history, past social history, past surgical history and problem list.    Active Ambulatory Problems     Diagnosis Date Noted   • Bowel incontinence 09/26/2016   • Classic migraine with aura 09/26/2016   • Depression    • Anxiety    • Mental status change 09/26/2016   • Disorientation 09/26/2016   • Facial droop 09/26/2016   • Difficulty walking 09/26/2016   • Intractable migraine with aura without status migrainosus 10/06/2016   • Chest pain 10/06/2016   • CAD (coronary artery disease)    • Hypertension    • Dyslipidemia    • Diabetes mellitus, type 2 ([facility])    • GERD (gastroesophageal reflux disease)    • CHF (congestive heart failure) ([facility])    • Chest pain    • SOB (shortness of breath)    • Fatigue    • Chronic migraine 04/11/2017   • Other cerebral infarction ([facility]) 07/18/2017   • Transient cerebral ischemia 07/18/2017   • Neck pain 10/17/2017   • Spondylosis of cervical region without myelopathy or radiculopathy 10/17/2017   • Facial pain 03/22/2018     Resolved Ambulatory Problems      Diagnosis Date Noted   • Seizure disorder (HCC) 09/26/2016   • Migraine      Past Medical History:   Diagnosis Date   • Diabetic peripheral neuropathy (HCC)    • Mental status change    • Migraine headache      Past Surgical History:   Procedure Laterality Date   • CARDIAC CATHETERIZATION     • CARDIAC CATHETERIZATION N/A 10/14/2016    Procedure: Left Heart Cath;  Surgeon: Santiago Garner MD;  Location:  [facility] CATH INVASIVE LOCATION;  Service:    • COLONOSCOPY  02/25/2022    Dr. Graves-recommended 3 year repeat   • ENDOSCOPY  02/03/2022    Dr. Graves   • GASTRIC STIMULATOR IMPLANT SURGERY     • SMALL INTESTINE SURGERY      unspecified, x3 as an infant    • TESTICLE SURGERY       Family History   Problem Relation Age of Onset   • Heart failure Mother    • Diabetes Mother    • Alzheimer's disease Mother    • Heart attack Father    • Kidney disease Father    • Cancer Father    • Prostate cancer Father    • Cancer Sister    • Breast cancer Sister    • Diabetes Sister    • Heart disease Sister    • Arrhythmia Sister    • Diabetes Brother    • Heart disease Brother    • Cancer Brother    • Liver disease Brother    • Alzheimer's disease Maternal Grandmother    • Liver cancer Maternal Grandfather    • Other Paternal Grandmother         E[place]ian flu   • Other Paternal Grandfather         Estonian flu   • Colon cancer Neg Hx      Social History     Socioeconomic History   • Marital status:    Tobacco Use   • Smoking status: Never Smoker   • Smokeless tobacco: Never Used   Vaping Use   • Vaping Use: Never used   Substance and Sexual Activity   • Alcohol use: No   • Drug use: No   • Sexual activity: Not Currently       Review of Systems  Review of Systems -  General ROS: negative for - chills, fever or night sweats  Cardiovascular ROS: no chest pain or dyspnea on exertion  Gastrointestinal ROS: no abdominal pain  Genito-Urinary ROS: no dysuria, trouble voiding, or hematuria    Objective   There were no vitals  taken for this visit.  Vitals obtained from patient if available  Physical Exam  Const: Non-toxic appearing, NAD, A&Ox4, Pleasant, Cooperative  Eyes: EOMI, no conjunctivitis  ENT: No copious nasal drainage noted  Cardiac: Regular rate by pulse  Resp: Respiratory rate observed to be within normal limits, no increased work of breathing observed, no audible wheezing or cough noted  Psych: Appropriate mood and behavior.  Procedures  Assessment/Plan   Problem List Items Addressed This Visit    None     Visit Diagnoses     Acute non-recurrent maxillary sinusitis    -  Primary    Relevant Medications    amoxicillin-clavulanate (Augmentin) 875-125 MG per tablet    ipratropium (ATROVENT) 0.06 % nasal spray    brompheniramine-pseudoephedrine-DM 30-2-10 MG/5ML syrup    fluticasone (Flonase) 50 MCG/ACT nasal spray        No current indication for antibiotic therapy per IDSA criteria. If he starts to worsen over the weekend I will send a contingency antibiotic in to his pharmacy. Otherwise symptomatic care, hydration.    See patient diagnoses and orders along with patient instructions for assessment, plan, and changes to care for patient.    This visit was conducted via telemedicine with live video and audio provided through Video Options: MyChart/Zoom at the point of care.    There are no Patient Instructions on file for this visit.    No follow-ups on file.    Ambulatory progress note signed and attested to by Elio Moore D.O.

## 2022-03-21 ENCOUNTER — TELEPHONE (OUTPATIENT)
Dept: FAMILY MEDICINE CLINIC | Facility: CLINIC | Age: 67
End: 2022-03-21

## 2022-03-21 NOTE — TELEPHONE ENCOUNTER
Patient called stating that he has not received any Trulicity medication from his Patient Assistance Program since December. He asked for me to forward Trulicity prescription to their facility    I contacted [facility] to discuss further. They required an updated prescription. I provided verbal. This shipment will take 24-48 hours to process; patient will receive auto refill all 2022.

## 2022-03-25 DIAGNOSIS — N52.1 ERECTILE DISORDER DUE TO MEDICAL CONDITION IN MALE: ICD-10-CM

## 2022-03-26 RX ORDER — SILDENAFIL 100 MG/1
TABLET, FILM COATED ORAL
Qty: 15 TABLET | Refills: 0 | Status: SHIPPED | OUTPATIENT
Start: 2022-03-26 | End: 2022-04-28

## 2022-04-12 DIAGNOSIS — E11.9 TYPE 2 DIABETES MELLITUS WITHOUT COMPLICATION, WITHOUT LONG-TERM CURRENT USE OF INSULIN: ICD-10-CM

## 2022-04-12 RX ORDER — TOPIRAMATE 50 MG/1
100 TABLET, FILM COATED ORAL 2 TIMES DAILY
Qty: 120 TABLET | Refills: 1 | Status: SHIPPED | OUTPATIENT
Start: 2022-04-12 | End: 2022-06-07

## 2022-04-12 RX ORDER — AMLODIPINE BESYLATE 10 MG/1
10 TABLET ORAL DAILY
Qty: 90 TABLET | Refills: 3 | Status: SHIPPED | OUTPATIENT
Start: 2022-04-12 | End: 2022-08-16

## 2022-04-12 RX ORDER — SIMVASTATIN 20 MG
10 TABLET ORAL NIGHTLY
Qty: 30 TABLET | Refills: 2 | Status: SHIPPED | OUTPATIENT
Start: 2022-04-12 | End: 2022-07-19 | Stop reason: SDUPTHER

## 2022-04-12 RX ORDER — OMEPRAZOLE 40 MG/1
40 CAPSULE, DELAYED RELEASE ORAL DAILY
Qty: 90 CAPSULE | Refills: 0 | Status: SHIPPED | OUTPATIENT
Start: 2022-04-12 | End: 2022-07-14 | Stop reason: SDUPTHER

## 2022-04-12 RX ORDER — GLIPIZIDE 10 MG/1
10 TABLET ORAL
Qty: 180 TABLET | Refills: 0 | Status: SHIPPED | OUTPATIENT
Start: 2022-04-12 | End: 2023-01-16 | Stop reason: SDUPTHER

## 2022-04-12 RX ORDER — LISINOPRIL 20 MG/1
20 TABLET ORAL DAILY
Qty: 90 TABLET | Refills: 0 | Status: SHIPPED | OUTPATIENT
Start: 2022-04-12 | End: 2022-10-31

## 2022-04-12 NOTE — TELEPHONE ENCOUNTER
Caller: Israel Reyna    Relationship: Self    Best call back number: [phone]    Requested Prescriptions:   Requested Prescriptions     Pending Prescriptions Disp Refills   • amLODIPine (NORVASC) 10 MG tablet 90 tablet 3     Sig: Take 1 tablet by mouth Daily.   • glipizide (GLUCOTROL) 10 MG tablet 180 tablet 0     Sig: Take 1 tablet by mouth 2 (Two) Times a Day Before Meals.   • glucose blood test strip 100 each 12     Si each by Other route Daily. Use daily as directed   • lisinopril (PRINIVIL,ZESTRIL) 20 MG tablet 90 tablet 0     Sig: Take 1 tablet by mouth Daily.   • metFORMIN (GLUCOPHAGE) 1000 MG tablet 180 tablet 0     Sig: Take 1 tablet by mouth 2 (Two) Times a Day With Meals.   • omeprazole (priLOSEC) 40 MG capsule 90 capsule 0     Sig: Take 1 capsule by mouth Daily.   • simvastatin (ZOCOR) 20 MG tablet       Sig: Take 0.5 tablets by mouth Every Night.   • topiramate (TOPAMAX) 50 MG tablet 120 tablet 0     Sig: Take 2 tablets by mouth 2 (Two) Times a Day.        Pharmacy where request should be sent: [facility] - [place], [place] - [address] AT PORTAL TO REGISTERED [facility] SITES - [phone]  - [phone] FX     Additional details provided by patient:     I WAS NOT ABLE TO SELECT THE gabapentin (NEURONTIN) 300 MG capsule IT WAS ASKING ME FOR A DIAGNOSIS      simvastatin (ZOCOR) 20 MG tablet HE IS REQUESTING 10MG    Does the patient have less than a 3 day supply:  [x] Yes  [] No    LENNY Fishman   22 12:00 EDT

## 2022-04-12 NOTE — TELEPHONE ENCOUNTER
Rx Refill Note  Requested Prescriptions     Pending Prescriptions Disp Refills   • amLODIPine (NORVASC) 10 MG tablet 90 tablet 3     Sig: Take 1 tablet by mouth Daily.   • glipizide (GLUCOTROL) 10 MG tablet 180 tablet 0     Sig: Take 1 tablet by mouth 2 (Two) Times a Day Before Meals.   • glucose blood test strip 100 each 12     Si each by Other route Daily. Use daily as directed   • lisinopril (PRINIVIL,ZESTRIL) 20 MG tablet 90 tablet 0     Sig: Take 1 tablet by mouth Daily.   • metFORMIN (GLUCOPHAGE) 1000 MG tablet 180 tablet 0     Sig: Take 1 tablet by mouth 2 (Two) Times a Day With Meals.   • omeprazole (priLOSEC) 40 MG capsule 90 capsule 0     Sig: Take 1 capsule by mouth Daily.   • simvastatin (ZOCOR) 20 MG tablet       Sig: Take 0.5 tablets by mouth Every Night.   • topiramate (TOPAMAX) 50 MG tablet 120 tablet 0     Sig: Take 2 tablets by mouth 2 (Two) Times a Day.      Last office visit with prescribing clinician: 2022      Next office visit with prescribing clinician: 2022       {TIP  Please add Last Relevant Lab Date if appropriate:23}     Reyna Charles MA  22, 12:30 EDT

## 2022-04-15 RX ORDER — TOPIRAMATE 50 MG/1
TABLET, FILM COATED ORAL
Qty: 120 TABLET | Refills: 0 | OUTPATIENT
Start: 2022-04-15

## 2022-04-28 DIAGNOSIS — N52.1 ERECTILE DISORDER DUE TO MEDICAL CONDITION IN MALE: ICD-10-CM

## 2022-04-28 RX ORDER — SILDENAFIL 100 MG/1
TABLET, FILM COATED ORAL
Qty: 15 TABLET | Refills: 0 | Status: SHIPPED | OUTPATIENT
Start: 2022-04-28 | End: 2022-06-01 | Stop reason: SDUPTHER

## 2022-04-28 NOTE — TELEPHONE ENCOUNTER
Rx Refill Note  Requested Prescriptions     Pending Prescriptions Disp Refills   • sildenafil (VIAGRA) 100 MG tablet [Pharmacy Med Name: Sildenafil Citrate 100 MG Oral Tablet] 15 tablet 0     Sig: TAKE 1 TABLET BY MOUTH ONCE DAILY AS NEEDED FOR ERECTILE DYSFUNCTION      Last office visit with prescribing clinician: 2/28/2022      Next office visit with prescribing clinician: 6/1/2022            Staci Kearns MA  04/28/22, 14:32 EDT

## 2022-05-05 ENCOUNTER — TELEPHONE (OUTPATIENT)
Dept: NEUROLOGY | Facility: CLINIC | Age: 67
End: 2022-05-05

## 2022-05-05 DIAGNOSIS — E11.42 DIABETIC POLYNEUROPATHY ASSOCIATED WITH TYPE 2 DIABETES MELLITUS: ICD-10-CM

## 2022-05-05 DIAGNOSIS — R51.9 FACIAL PAIN: ICD-10-CM

## 2022-05-05 DIAGNOSIS — G43.119 INTRACTABLE MIGRAINE WITH AURA WITHOUT STATUS MIGRAINOSUS: ICD-10-CM

## 2022-05-05 DIAGNOSIS — M54.2 NECK PAIN: ICD-10-CM

## 2022-05-05 DIAGNOSIS — M47.812 SPONDYLOSIS OF CERVICAL REGION WITHOUT MYELOPATHY OR RADICULOPATHY: ICD-10-CM

## 2022-05-05 RX ORDER — GABAPENTIN 300 MG/1
300 CAPSULE ORAL 3 TIMES DAILY
Qty: 270 CAPSULE | Refills: 1 | Status: SHIPPED | OUTPATIENT
Start: 2022-05-05 | End: 2022-08-25 | Stop reason: SDUPTHER

## 2022-05-05 NOTE — TELEPHONE ENCOUNTER
Caller: DENNIE     Relationship: PATIENT    Best call back number: [phone]  Requested Prescriptions:   Requested Prescriptions      No prescriptions requested or ordered in this encounter        Pharmacy where request should be sent: [id number]      Additional details provided by patient: PATIENT STATES THAT HE IS OUT OF GABAPENTIN, I AM UNABLE TO PLACE THE REFILL REQUEST    PLEASE CALL PATIENT AND ADVISE    THANK YOU    Does the patient have less than a 3 day supply:  [x] Yes  [] No    Vicente CHATTERJEE Rep   05/05/22 11:11 EDT

## 2022-05-13 ENCOUNTER — LAB (OUTPATIENT)
Dept: LAB | Facility: HOSPITAL | Age: 67
End: 2022-05-13

## 2022-05-13 ENCOUNTER — OFFICE VISIT (OUTPATIENT)
Dept: FAMILY MEDICINE CLINIC | Facility: CLINIC | Age: 67
End: 2022-05-13

## 2022-05-13 VITALS
WEIGHT: 161.4 LBS | SYSTOLIC BLOOD PRESSURE: 130 MMHG | DIASTOLIC BLOOD PRESSURE: 70 MMHG | HEART RATE: 88 BPM | HEIGHT: 67 IN | RESPIRATION RATE: 16 BRPM | BODY MASS INDEX: 25.33 KG/M2 | OXYGEN SATURATION: 95 %

## 2022-05-13 DIAGNOSIS — R11.0 NAUSEA: Primary | ICD-10-CM

## 2022-05-13 DIAGNOSIS — E11.9 TYPE 2 DIABETES MELLITUS WITHOUT COMPLICATION, WITHOUT LONG-TERM CURRENT USE OF INSULIN: ICD-10-CM

## 2022-05-13 DIAGNOSIS — K31.84 GASTROPARESIS: ICD-10-CM

## 2022-05-13 DIAGNOSIS — R11.0 NAUSEA: ICD-10-CM

## 2022-05-13 LAB
ALBUMIN SERPL-MCNC: 4.8 G/DL (ref 3.5–5.2)
ALBUMIN/GLOB SERPL: 1.9 G/DL
ALP SERPL-CCNC: 70 U/L (ref 39–117)
ALT SERPL W P-5'-P-CCNC: 12 U/L (ref 1–41)
ANION GAP SERPL CALCULATED.3IONS-SCNC: 11.6 MMOL/L (ref 5–15)
AST SERPL-CCNC: 12 U/L (ref 1–40)
BASOPHILS # BLD AUTO: 0.06 10*3/MM3 (ref 0–0.2)
BASOPHILS NFR BLD AUTO: 0.7 % (ref 0–1.5)
BILIRUB SERPL-MCNC: 0.3 MG/DL (ref 0–1.2)
BUN SERPL-MCNC: 20 MG/DL (ref 8–23)
BUN/CREAT SERPL: 19.2 (ref 7–25)
CALCIUM SPEC-SCNC: 9.6 MG/DL (ref 8.6–10.5)
CHLORIDE SERPL-SCNC: 108 MMOL/L (ref 98–107)
CO2 SERPL-SCNC: 21.4 MMOL/L (ref 22–29)
CREAT SERPL-MCNC: 1.04 MG/DL (ref 0.76–1.27)
DEPRECATED RDW RBC AUTO: 40.4 FL (ref 37–54)
EGFRCR SERPLBLD CKD-EPI 2021: 79.2 ML/MIN/1.73
EOSINOPHIL # BLD AUTO: 0.2 10*3/MM3 (ref 0–0.4)
EOSINOPHIL NFR BLD AUTO: 2.4 % (ref 0.3–6.2)
ERYTHROCYTE [DISTWIDTH] IN BLOOD BY AUTOMATED COUNT: 12.8 % (ref 12.3–15.4)
GLOBULIN UR ELPH-MCNC: 2.5 GM/DL
GLUCOSE SERPL-MCNC: 147 MG/DL (ref 65–99)
HBA1C MFR BLD: 6.9 % (ref 4.8–5.6)
HCT VFR BLD AUTO: 40 % (ref 37.5–51)
HGB BLD-MCNC: 12.8 G/DL (ref 13–17.7)
IMM GRANULOCYTES # BLD AUTO: 0.02 10*3/MM3 (ref 0–0.05)
IMM GRANULOCYTES NFR BLD AUTO: 0.2 % (ref 0–0.5)
LIPASE SERPL-CCNC: 89 U/L (ref 13–60)
LYMPHOCYTES # BLD AUTO: 1.84 10*3/MM3 (ref 0.7–3.1)
LYMPHOCYTES NFR BLD AUTO: 21.8 % (ref 19.6–45.3)
MCH RBC QN AUTO: 28.3 PG (ref 26.6–33)
MCHC RBC AUTO-ENTMCNC: 32 G/DL (ref 31.5–35.7)
MCV RBC AUTO: 88.5 FL (ref 79–97)
MONOCYTES # BLD AUTO: 0.54 10*3/MM3 (ref 0.1–0.9)
MONOCYTES NFR BLD AUTO: 6.4 % (ref 5–12)
NEUTROPHILS NFR BLD AUTO: 5.79 10*3/MM3 (ref 1.7–7)
NEUTROPHILS NFR BLD AUTO: 68.5 % (ref 42.7–76)
NRBC BLD AUTO-RTO: 0 /100 WBC (ref 0–0.2)
PLATELET # BLD AUTO: 324 10*3/MM3 (ref 140–450)
PMV BLD AUTO: 10.1 FL (ref 6–12)
POTASSIUM SERPL-SCNC: 4.7 MMOL/L (ref 3.5–5.2)
PROT SERPL-MCNC: 7.3 G/DL (ref 6–8.5)
RBC # BLD AUTO: 4.52 10*6/MM3 (ref 4.14–5.8)
SODIUM SERPL-SCNC: 141 MMOL/L (ref 136–145)
WBC NRBC COR # BLD: 8.45 10*3/MM3 (ref 3.4–10.8)

## 2022-05-13 PROCEDURE — 85025 COMPLETE CBC W/AUTO DIFF WBC: CPT

## 2022-05-13 PROCEDURE — 83036 HEMOGLOBIN GLYCOSYLATED A1C: CPT

## 2022-05-13 PROCEDURE — 80053 COMPREHEN METABOLIC PANEL: CPT

## 2022-05-13 PROCEDURE — 83690 ASSAY OF LIPASE: CPT

## 2022-05-13 PROCEDURE — 99214 OFFICE O/P EST MOD 30 MIN: CPT | Performed by: FAMILY MEDICINE

## 2022-05-13 RX ORDER — ONDANSETRON HYDROCHLORIDE 8 MG/1
8 TABLET, FILM COATED ORAL EVERY 8 HOURS PRN
Qty: 12 TABLET | Refills: 0 | Status: SHIPPED | OUTPATIENT
Start: 2022-05-13 | End: 2022-07-12

## 2022-05-13 NOTE — PROGRESS NOTES
Established Patient Office Visit      Patient Name: Israel Reyna  : 1955   MRN: [id number]   Care Team: Patient Care Team:  Elio Moore DO as PCP - General (Family Medicine)    Chief Complaint:    Chief Complaint   Patient presents with   • Exposure To Known Illness     Covid on Tuesday not here today for this    • Nausea     For about 3 months not related to exposure        History of Present Illness: Israel Reyna is a 67 y.o. male who is here today for chief complaint.    HPI    The patient presents today for evaluation of nausea for the last 3 months.    The patient reports that he had an appointment for 2022, but he is not feeling well. He states that he had an endoscopy 2022. Prior to that, he was nauseous, but he never vomited. He states that ever since he had the endoscopy, the nausea has not gone away. He states that he can not stand to smell food or cook at all. He states that he is losing weight. He states that he has weighed 157 pounds for 36 years. He states that he does not eat anything because if he smells food, it makes him nauseous. He states that he drinks coffee, and he cannot stand to smell the coffee. He states that if the nausea gets real bad, he will take Vane-[place]. He states that it eliminates the nausea. He states that he will eat something late at night, and he loves milk. He states that he will drink milk with whatever he is eating, and he will wake up in the middle of the night with a sour stomach, and that even makes it worse. He states that all of his friends are thinking he is dying with cancer. He states that nobody thinks he is sick. He states that his blood glucose levels have been running anywhere from 90 to 150. He states that occasionally it has jumped up. He states that he took his blood pressure this morning, and he cannot remember what it is. He states that the other night he felt terrible, and he took his blood pressure, and  it was 93/54 mmHg, and it stayed there for a couple of hours. He states that his heart rate was 103, and he almost went to the hospital because of it. He states that with the heart rate being 103, he did not know if that was good or not. He states that he is drinking diet green tea, and Gatorade Zero. He states that he drinks a diet Pepsi or 7-Up. He states that he is not drinking a lot of that. He drinks water with the lemon. He states that he does take Vane-[place]. He states that he has not eaten today. He states that he feels bloated at times.    He states that he had an ultrasound of his gallbladder done at [facility]. He states that he does not want to get anyone in trouble because he knows technicians are not supposed to give the results. He states that he asked the girl how it looked, and she told him that I am not supposed to tell you this, but you will need to have your that he was going to be having his gallbladder taken out because he was really having a lot of issues. He states that he could not remember the words that she used. He states that it gets over to the doctor, and the doctor told him that his gallbladder is fine. The patient states that his bowels are loose all the time.     The patient states he was prescribed Trulicity 1.5 and he has been taking 0.75.  He states that he was having nausea before he started taking Trulicity.  The patient reports the he is not on insulin or Levemir.  He states he cannot afford the Levemir. He asks about taking Trulicity every other week. The patient asks if he can drink Ensure.     The patient will have lab work done.     This patient is accompanied by their self who contributes to the history of their care.    The following portions of the patient's history were reviewed and updated as appropriate: allergies, current medications, past family history, past medical history, past social history, past surgical history and problem list.    Subjective       Review of Systems:   Review of Systems - See HPI    Past Medical History:   Past Medical History:   Diagnosis Date   • Anxiety    • Bowel incontinence     w/ history of Stimulator Device    • CAD (coronary artery disease)    • Chest pain    • CHF (congestive heart failure) ([facility])    • Depression    • Diabetes mellitus, type 2 (HCC)    • Diabetic peripheral neuropathy (HCC)    • Dyslipidemia    • Fatigue    • GERD (gastroesophageal reflux disease)    • Hypertension    • Mental status change    • Migraine     Complex    • Migraine headache    • SOB (shortness of breath)        Past Surgical History:   Past Surgical History:   Procedure Laterality Date   • CARDIAC CATHETERIZATION     • CARDIAC CATHETERIZATION N/A 10/14/2016    Procedure: Left Heart Cath;  Surgeon: Santiago Garner MD;  Location:  [facility] CATH INVASIVE [facility];  Service:    • COLONOSCOPY  02/25/2022    Dr. Graves-recommended 3 year repeat   • ENDOSCOPY  02/03/2022    Dr. Graves   • GASTRIC STIMULATOR IMPLANT SURGERY     • SMALL INTESTINE SURGERY      unspecified, x3 as an infant    • TESTICLE SURGERY         Family History:   Family History   Problem Relation Age of Onset   • Heart failure Mother    • Diabetes Mother    • Alzheimer's disease Mother    • Heart attack Father    • Kidney disease Father    • Cancer Father    • Prostate cancer Father    • Cancer Sister    • Breast cancer Sister    • Diabetes Sister    • Heart disease Sister    • Arrhythmia Sister    • Diabetes Brother    • Heart disease Brother    • Cancer Brother    • Liver disease Brother    • Alzheimer's disease Maternal Grandmother    • Liver cancer Maternal Grandfather    • Other Paternal Grandmother         Amhar[place] flu   • Other Paternal Grandfather         [place]har[place] flu   • Colon cancer Neg Hx        Social History:   Social History     Socioeconomic History   • Marital status:    Tobacco Use   • Smoking status: Never Smoker   • Smokeless tobacco: Never Used   Vaping Use  "  • Vaping Use: Never used   Substance and Sexual Activity   • Alcohol use: No   • Drug use: No   • Sexual activity: Not Currently       Tobacco History:   Social History     Tobacco Use   Smoking Status Never Smoker   Smokeless Tobacco Never Used       Medications:     Current Outpatient Medications:   •  amLODIPine (NORVASC) 10 MG tablet, Take 1 tablet by mouth Daily., Disp: 90 tablet, Rfl: 3  •  butalbital-acetaminophen-caffeine (FIORICET, ESGIC) -40 MG per tablet, TAKE 1 TABLET BY MOUTH ONCE DAILY AS NEEDED FOR HEADACHE, Disp: 30 tablet, Rfl: 0  •  gabapentin (NEURONTIN) 300 MG capsule, Take 1 capsule by mouth 3 (Three) Times a Day., Disp: 270 capsule, Rfl: 1  •  glipizide (GLUCOTROL) 10 MG tablet, Take 1 tablet by mouth 2 (Two) Times a Day Before Meals., Disp: 180 tablet, Rfl: 0  •  glucose blood test strip, 1 each by Other route Daily. Use daily as directed, Disp: 100 each, Rfl: 12  •  glucose monitor monitoring kit, 1 each Daily., Disp: 1 each, Rfl: 0  •  Incontinence Supply Disposable (NU-FIT ADULT UNDERWEAR MEDIUM) misc, , Disp: , Rfl:   •  Insulin Syringe 31G X 5/16\" 0.3 ML misc, 1 each Every Night., Disp: 90 each, Rfl: 3  •  Lancets Thin misc, 1 each Daily. Use daily as directed, Disp: 100 each, Rfl: 11  •  lisinopril (PRINIVIL,ZESTRIL) 20 MG tablet, Take 1 tablet by mouth Daily., Disp: 90 tablet, Rfl: 0  •  Lutein 5 % beads, Take 5 mg by mouth Daily., Disp: , Rfl:   •  metFORMIN (GLUCOPHAGE) 1000 MG tablet, Take 1 tablet by mouth 2 (Two) Times a Day With Meals., Disp: 180 tablet, Rfl: 0  •  Multiple Vitamins-Minerals (PRESERVISION AREDS 2 PO), Take  by mouth Daily., Disp: , Rfl:   •  NON FORMULARY, Shot in eye every 3 months, Disp: , Rfl:   •  omeprazole (priLOSEC) 40 MG capsule, Take 1 capsule by mouth Daily., Disp: 90 capsule, Rfl: 0  •  polyethylene glycol (GoLYTELY) 236 g solution, Starting at noon on day prior to procedure, drink 8 ounces every 30 minutes until all gone or stools are clear. " "May add flavor packet., Disp: 4000 mL, Rfl: 0  •  sildenafil (VIAGRA) 100 MG tablet, TAKE 1 TABLET BY MOUTH ONCE DAILY AS NEEDED FOR ERECTILE DYSFUNCTION, Disp: 15 tablet, Rfl: 0  •  simvastatin (ZOCOR) 20 MG tablet, Take 0.5 tablets by mouth Every Night., Disp: 30 tablet, Rfl: 2  •  Specialty Vitamins Products (HEALTHY HEART COMPLEX PO), Take  by mouth Daily., Disp: , Rfl:   •  tamsulosin (FLOMAX) 0.4 MG capsule 24 hr capsule, Take 1 capsule by mouth Daily., Disp: 30 capsule, Rfl: 2  •  topiramate (TOPAMAX) 50 MG tablet, Take 2 tablets by mouth 2 (Two) Times a Day., Disp: 120 tablet, Rfl: 1  •  Trulicity 1.5 MG/0.5ML solution pen-injector, Inject 1.5 mg under the skin into the appropriate area as directed 1 (One) Time Per Week., Disp: 12 pen, Rfl: 3  •  vitamin B-12 (CYANOCOBALAMIN) 100 MCG tablet, Take 50 mcg by mouth 2 (two) times a day., Disp: , Rfl:   •  vitamin C (ASCORBIC ACID) 500 MG tablet, Take 500 mg by mouth 2 (two) times a day., Disp: , Rfl:   •  vitamin E 400 UNIT capsule, Take 400 Units by mouth Daily., Disp: , Rfl:   •  Zeaxanthin powder, Take 1 mg by mouth Daily., Disp: , Rfl:   •  Zinc 40 MG tablet, Take 1 tablet by mouth Daily., Disp: , Rfl:   •  ondansetron (Zofran) 8 MG tablet, Take 1 tablet by mouth Every 8 (Eight) Hours As Needed for Nausea or Vomiting., Disp: 12 tablet, Rfl: 0    Allergies:   Allergies   Allergen Reactions   • Actos [Pioglitazone] Anaphylaxis     congestive heart failure   • Avandia [Rosiglitazone] Anaphylaxis     congestive heart failure.   • Darvon [Propoxyphene] Itching     Angioedema    itching   • Talwin [Pentazocine] Rash     Angioedema    itching       Objective   Objective     Physical Exam:  Vital Signs:   Vitals:    05/13/22 1316   BP: 130/70   Pulse: 88   Resp: 16   SpO2: 95%   Weight: 73.2 kg (161 lb 6.4 oz)   Height: 170.2 cm (67.01\")     Body mass index is 25.27 kg/m².     Physical Exam  Nursing note reviewed  Const: NAD, A&Ox4, Pleasant, Cooperative  Eyes: " EOMI, no conjunctivitis  ENT: No nasal discharge present, neck supple  Cardiac: Regular rate and rhythm, no cyanosis  Resp: Respiratory rate within normal limits, no increased work of breathing, no audible wheezing or retractions noted  GI: No distention or ascites  MSK: Motor and sensation grossly intact in bilateral upper extremities  Neurologic: CN II-XII grossly intact  Psych: Appropriate mood and behavior.  Skin: Warm, dry  Procedures/Radiology     Procedures  No radiology results for the last 7 days     Assessment & Plan   Assessment / Plan      Assessment/Plan:   Problems Addressed This Visit  Diagnoses and all orders for this visit:    1. Nausea (Primary)  Comments:  Ever since EGD in February. Improves slightly with Vane-[place]raman but aversion to food remains.  -Labs today  -CT ordered  Orders:  -     CBC & Differential; Future  -     Comprehensive Metabolic Panel; Future  -     Lipase; Future  -     CT Abdomen Pelvis With & Without Contrast; Future  -     ondansetron (Zofran) 8 MG tablet; Take 1 tablet by mouth Every 8 (Eight) Hours As Needed for Nausea or Vomiting.  Dispense: 12 tablet; Refill: 0    2. Type 2 diabetes mellitus without complication, without long-term current use of insulin ([facility])  -     Hemoglobin A1c; Future    3. Gastroparesis  Comments:  May have worsened after EGD      Problem List Items Addressed This Visit        Endocrine and Metabolic    Diabetes mellitus, type 2 ([facility])    Relevant Orders    Hemoglobin A1c      Other Visit Diagnoses     Nausea    -  Primary    Ever since EGD in February. Improves slightly with Vane-Braman but aversion to food remains.  -Labs today  -CT ordered    Relevant Medications    ondansetron (Zofran) 8 MG tablet    Other Relevant Orders    CBC & Differential    Comprehensive Metabolic Panel    Lipase    CT Abdomen Pelvis With & Without Contrast    Gastroparesis        May have worsened after EGD          1. Nausea and vomiting.  - I will send in a prescription  for Zofran to take as needed for nausea.  - I will order labs to check his liver enzymes and A1C.  - I will order a CT scan of his abdomen.    2. Diabetes.  - He will hold his next dose of Trulicity.  - We will check his labs today.    Patient Instructions   1. Hold Trulicity for next dose  2. Labs today  3. You will be called to schedule CT      Follow Up:   No follow-ups on file.        [facility]     DO LUIS ARMANDO OliverosE [address]  [facility]  [address]  [facility] [address]  Fax [phone]  Phone [phone]       Transcribed from ambient dictation for Elio Moore DO by Tamiko Martell.  05/13/22   17:05 EDT    Patient verbalized consent to the visit recording.  I have personally performed the services described in this document as transcribed by the above individual, and it is both accurate and complete.  Elio Moore DO  5/27/2022  08:38 EDT

## 2022-06-01 ENCOUNTER — LAB (OUTPATIENT)
Dept: LAB | Facility: HOSPITAL | Age: 67
End: 2022-06-01

## 2022-06-01 ENCOUNTER — OFFICE VISIT (OUTPATIENT)
Dept: FAMILY MEDICINE CLINIC | Facility: CLINIC | Age: 67
End: 2022-06-01

## 2022-06-01 VITALS
OXYGEN SATURATION: 95 % | HEIGHT: 67 IN | SYSTOLIC BLOOD PRESSURE: 122 MMHG | DIASTOLIC BLOOD PRESSURE: 68 MMHG | RESPIRATION RATE: 16 BRPM | HEART RATE: 77 BPM | BODY MASS INDEX: 25.46 KG/M2 | WEIGHT: 162.2 LBS

## 2022-06-01 DIAGNOSIS — K31.84 GASTROPARESIS: ICD-10-CM

## 2022-06-01 DIAGNOSIS — R11.0 NAUSEA: ICD-10-CM

## 2022-06-01 DIAGNOSIS — N52.1 ERECTILE DISORDER DUE TO MEDICAL CONDITION IN MALE: ICD-10-CM

## 2022-06-01 DIAGNOSIS — E11.9 TYPE 2 DIABETES MELLITUS WITHOUT COMPLICATION, WITHOUT LONG-TERM CURRENT USE OF INSULIN: ICD-10-CM

## 2022-06-01 DIAGNOSIS — R11.0 NAUSEA: Primary | ICD-10-CM

## 2022-06-01 LAB
EXPIRATION DATE: NORMAL
HBA1C MFR BLD: 6.9 %
Lab: NORMAL

## 2022-06-01 PROCEDURE — 3044F HG A1C LEVEL LT 7.0%: CPT | Performed by: FAMILY MEDICINE

## 2022-06-01 PROCEDURE — 85025 COMPLETE CBC W/AUTO DIFF WBC: CPT

## 2022-06-01 PROCEDURE — 82150 ASSAY OF AMYLASE: CPT

## 2022-06-01 PROCEDURE — 83036 HEMOGLOBIN GLYCOSYLATED A1C: CPT | Performed by: FAMILY MEDICINE

## 2022-06-01 PROCEDURE — 80053 COMPREHEN METABOLIC PANEL: CPT

## 2022-06-01 PROCEDURE — 83690 ASSAY OF LIPASE: CPT

## 2022-06-01 PROCEDURE — 99213 OFFICE O/P EST LOW 20 MIN: CPT | Performed by: FAMILY MEDICINE

## 2022-06-01 RX ORDER — SILDENAFIL 100 MG/1
100 TABLET, FILM COATED ORAL DAILY PRN
Qty: 15 TABLET | Refills: 11 | Status: SHIPPED | OUTPATIENT
Start: 2022-06-01 | End: 2023-03-29 | Stop reason: SDUPTHER

## 2022-06-01 NOTE — PROGRESS NOTES
"Established Patient Office Visit      Patient Name: Israel Reyna  : 1955   MRN: [id number]   Care Team: Patient Care Team:  Elio Moore DO as PCP - General (Family Medicine)    Chief Complaint:    Chief Complaint   Patient presents with   • Diabetes   • Hydronephrosis       History of Present Illness: Israel Reyna is a 67 y.o. male who is here today for chief complaint.    HPI    Patient presents today for regular 3-months follow-up as well as follow-up on nausea and vomiting. He was last seen 2022 and complained of nausea since his EGD 2022. Labs and CT scans were ordered. He was instructed to hold his next dose of Trulicity.     The patient reports that he continues to have constant nausea. He has been taking his Trulicity and he did not hold any doses. He states that smells will trigger his nausea. He is requesting a refill for sildenafil. The patient reports that he has not been eating. He states that his bowel movements are \"dead\". He states that his nausea medication will cause him to fall asleep.     This patient is accompanied by their self who contributes to the history of their care.    The following portions of the patient's history were reviewed and updated as appropriate: allergies, current medications, past family history, past medical history, past social history, past surgical history and problem list.    Subjective      Review of Systems:   Review of Systems - See [facility]    Past Medical History:   Past Medical History:   Diagnosis Date   • Anxiety    • Bowel incontinence     w/ history of Stimulator Device    • CAD (coronary artery disease)    • Chest pain    • CHF (congestive heart failure) ([facility])    • Depression    • Diabetes mellitus, type 2 ([facility])    • Diabetic peripheral neuropathy ([facility])    • Dyslipidemia    • Fatigue    • GERD (gastroesophageal reflux disease)    • Hypertension    • Mental status change    • Migraine     Complex    • Migraine headache "    • SOB (shortness of breath)        Past Surgical History:   Past Surgical History:   Procedure Laterality Date   • CARDIAC CATHETERIZATION     • CARDIAC CATHETERIZATION N/A 10/14/2016    Procedure: Left Heart Cath;  Surgeon: Santiago Garner MD;  Location: [facility] CATH INVASIVE LOCATION;  Service:    • COLONOSCOPY  02/25/2022    Dr. Graves-recommended 3 year repeat   • ENDOSCOPY  02/03/2022    Dr. Graves   • GASTRIC STIMULATOR IMPLANT SURGERY     • SMALL INTESTINE SURGERY      unspecified, x3 as an infant    • TESTICLE SURGERY         Family History:   Family History   Problem Relation Age of Onset   • Heart failure Mother    • Diabetes Mother    • Alzheimer's disease Mother    • Heart attack Father    • Kidney disease Father    • Cancer Father    • Prostate cancer Father    • Cancer Sister    • Breast cancer Sister    • Diabetes Sister    • Heart disease Sister    • Arrhythmia Sister    • Diabetes Brother    • Heart disease Brother    • Cancer Brother    • Liver disease Brother    • Alzheimer's disease Maternal Grandmother    • Liver cancer Maternal Grandfather    • Other Paternal Grandmother         [place] flu   • Other Paternal Grandfather         [place] flu   • Colon cancer Neg Hx        Social History:   Social History     Socioeconomic History   • Marital status:    Tobacco Use   • Smoking status: Never Smoker   • Smokeless tobacco: Never Used   Vaping Use   • Vaping Use: Never used   Substance and Sexual Activity   • Alcohol use: No   • Drug use: No   • Sexual activity: Not Currently       Tobacco History:   Social History     Tobacco Use   Smoking Status Never Smoker   Smokeless Tobacco Never Used       Medications:     Current Outpatient Medications:   •  amLODIPine (NORVASC) 10 MG tablet, Take 1 tablet by mouth Daily., Disp: 90 tablet, Rfl: 3  •  butalbital-acetaminophen-caffeine (FIORICET, ESGIC) -40 MG per tablet, TAKE 1 TABLET BY MOUTH ONCE DAILY AS NEEDED FOR HEADACHE, Disp:  "30 tablet, Rfl: 0  •  gabapentin (NEURONTIN) 300 MG capsule, Take 1 capsule by mouth 3 (Three) Times a Day., Disp: 270 capsule, Rfl: 1  •  glipizide (GLUCOTROL) 10 MG tablet, Take 1 tablet by mouth 2 (Two) Times a Day Before Meals., Disp: 180 tablet, Rfl: 0  •  glucose blood test strip, 1 each by Other route Daily. Use daily as directed, Disp: 100 each, Rfl: 12  •  glucose monitor monitoring kit, 1 each Daily., Disp: 1 each, Rfl: 0  •  Incontinence Supply Disposable (NU-FIT ADULT UNDERWEAR MEDIUM) misc, , Disp: , Rfl:   •  Insulin Syringe 31G X 5/16\" 0.3 ML misc, 1 each Every Night., Disp: 90 each, Rfl: 3  •  Lancets Thin misc, 1 each Daily. Use daily as directed, Disp: 100 each, Rfl: 11  •  lisinopril (PRINIVIL,ZESTRIL) 20 MG tablet, Take 1 tablet by mouth Daily., Disp: 90 tablet, Rfl: 0  •  Lutein 5 % beads, Take 5 mg by mouth Daily., Disp: , Rfl:   •  metFORMIN (GLUCOPHAGE) 1000 MG tablet, Take 1 tablet by mouth 2 (Two) Times a Day With Meals., Disp: 180 tablet, Rfl: 0  •  Multiple Vitamins-Minerals (PRESERVISION AREDS 2 PO), Take  by mouth Daily., Disp: , Rfl:   •  NON FORMULARY, Shot in eye every 3 months, Disp: , Rfl:   •  omeprazole (priLOSEC) 40 MG capsule, Take 1 capsule by mouth Daily., Disp: 90 capsule, Rfl: 0  •  ondansetron (Zofran) 8 MG tablet, Take 1 tablet by mouth Every 8 (Eight) Hours As Needed for Nausea or Vomiting., Disp: 12 tablet, Rfl: 0  •  polyethylene glycol (GoLYTELY) 236 g solution, Starting at noon on day prior to procedure, drink 8 ounces every 30 minutes until all gone or stools are clear. May add flavor packet., Disp: 4000 mL, Rfl: 0  •  sildenafil (VIAGRA) 100 MG tablet, Take 1 tablet by mouth Daily As Needed for Erectile Dysfunction., Disp: 15 tablet, Rfl: 11  •  simvastatin (ZOCOR) 20 MG tablet, Take 0.5 tablets by mouth Every Night., Disp: 30 tablet, Rfl: 2  •  Specialty Vitamins Products (HEALTHY HEART COMPLEX PO), Take  by mouth Daily., Disp: , Rfl:   •  tamsulosin (FLOMAX) 0.4 " "MG capsule 24 hr capsule, Take 1 capsule by mouth Daily., Disp: 30 capsule, Rfl: 2  •  topiramate (TOPAMAX) 50 MG tablet, Take 2 tablets by mouth 2 (Two) Times a Day., Disp: 120 tablet, Rfl: 1  •  Trulicity 1.5 MG/0.5ML solution pen-injector, Inject 1.5 mg under the skin into the appropriate area as directed 1 (One) Time Per Week., Disp: 12 pen, Rfl: 3  •  vitamin B-12 (CYANOCOBALAMIN) 100 MCG tablet, Take 50 mcg by mouth 2 (two) times a day., Disp: , Rfl:   •  vitamin C (ASCORBIC ACID) 500 MG tablet, Take 500 mg by mouth 2 (two) times a day., Disp: , Rfl:   •  vitamin E 400 UNIT capsule, Take 400 Units by mouth Daily., Disp: , Rfl:   •  Zeaxanthin powder, Take 1 mg by mouth Daily., Disp: , Rfl:   •  Zinc 40 MG tablet, Take 1 tablet by mouth Daily., Disp: , Rfl:     Allergies:   Allergies   Allergen Reactions   • Actos [Pioglitazone] Anaphylaxis     congestive heart failure   • Avandia [Rosiglitazone] Anaphylaxis     congestive heart failure.   • Darvon [Propoxyphene] Itching     Angioedema    itching   • Talwin [Pentazocine] Rash     Angioedema    itching       Objective   Objective     Physical Exam:  Vital Signs:   Vitals:    06/01/22 1117   BP: 122/68   Pulse: 77   Resp: 16   SpO2: 95%   Weight: 73.6 kg (162 lb 3.2 oz)   Height: 170.2 cm (67.01\")     Body mass index is 25.4 kg/m².     Physical Exam  Nursing note reviewed  Const: NAD, A&Ox4, Pleasant, Cooperative  Eyes: EOMI, no conjunctivitis  ENT: No nasal discharge present, neck supple  Cardiac: Regular rate and rhythm, no cyanosis  Resp: Respiratory rate within normal limits, no increased work of breathing, no audible wheezing or retractions noted  GI: No distention or ascites  MSK: Motor and sensation grossly intact in bilateral upper extremities  Neurologic: CN II-XII grossly intact  Psych: Appropriate mood and behavior.  Skin: Warm, dry  Procedures/Radiology     Procedures  No radiology results for the last 7 days     Assessment & Plan   Assessment / Plan  "     Assessment/Plan:   Problems Addressed This Visit  Diagnoses and all orders for this visit:    1. Nausea (Primary)  Assessment & Plan:  -Patient had elevated pancreatic enzymes, but it was not consistent with pancreatitis.  Hold next dose of Trulicity. Patient has a CT scan scheduled 06/02/2022 and he will be contacted with the results. Lab work was ordered.    Orders:  -     Lipase; Future  -     Amylase; Future  -     Comprehensive Metabolic Panel; Future  -     CBC & Differential; Future    2. Type 2 diabetes mellitus without complication, without long-term current use of insulin ([facility])  Assessment & Plan:  -Patient's A1c was 6.9 percent. He will hold his next dose of Trulicity.     Orders:  -     POC Glycosylated Hemoglobin (Hb A1C)    3. Gastroparesis    4. Erectile disorder due to medical condition in male  Assessment & Plan:  -Refill for sildenafil was given.      Orders:  -     sildenafil (VIAGRA) 100 MG tablet; Take 1 tablet by mouth Daily As Needed for Erectile Dysfunction.  Dispense: 15 tablet; Refill: 11    Problem List Items Addressed This Visit        Endocrine and Metabolic    Diabetes mellitus, type 2 (HCC)    Current Assessment & Plan     -Patient's A1c was 6.9 percent. He will hold his next dose of Trulicity.            Relevant Orders    POC Glycosylated Hemoglobin (Hb A1C) (Completed)       Gastrointestinal Abdominal     Nausea - Primary    Current Assessment & Plan     -Patient had elevated pancreatic enzymes, but it was not consistent with pancreatitis.  Hold next dose of Trulicity. Patient has a CT scan scheduled 06/02/2022 and he will be contacted with the results. Lab work was ordered.           Relevant Orders    Lipase    Amylase    Comprehensive Metabolic Panel    CBC & Differential       Genitourinary and Reproductive     Erectile disorder due to medical condition in male    Current Assessment & Plan     -Refill for sildenafil was given.             Relevant Medications    sildenafil  (VIAGRA) 100 MG tablet      Other Visit Diagnoses     Gastroparesis                Patient Instructions   1. Hold Trulicity  2. CT scan tomorrow we will discuss      Follow Up:   No follow-ups on file.      DO [address]AILEY [address]  [facility]  [address] [facility] [address]  Fax [phone]  Phone [phone]       Transcribed from ambient dictation for Elio Moore DO by Mary Jo Sandoval..  06/01/22   15:58 EDT    Patient verbalized consent to the visit recording.

## 2022-06-01 NOTE — ASSESSMENT & PLAN NOTE
-Patient had elevated pancreatic enzymes, but it was not consistent with pancreatitis.  Hold next dose of Trulicity. Patient has a CT scan scheduled 06/02/2022 and he will be contacted with the results. Lab work was ordered.

## 2022-06-02 ENCOUNTER — HOSPITAL ENCOUNTER (OUTPATIENT)
Dept: CT IMAGING | Facility: HOSPITAL | Age: 67
End: 2022-06-02

## 2022-06-02 LAB
ALBUMIN SERPL-MCNC: 4.7 G/DL (ref 3.5–5.2)
ALBUMIN/GLOB SERPL: 2 G/DL
ALP SERPL-CCNC: 70 U/L (ref 39–117)
ALT SERPL W P-5'-P-CCNC: 16 U/L (ref 1–41)
AMYLASE SERPL-CCNC: 85 U/L (ref 28–100)
ANION GAP SERPL CALCULATED.3IONS-SCNC: 14.1 MMOL/L (ref 5–15)
AST SERPL-CCNC: 13 U/L (ref 1–40)
BASOPHILS # BLD AUTO: 0.06 10*3/MM3 (ref 0–0.2)
BASOPHILS NFR BLD AUTO: 0.8 % (ref 0–1.5)
BILIRUB SERPL-MCNC: 0.2 MG/DL (ref 0–1.2)
BUN SERPL-MCNC: 20 MG/DL (ref 8–23)
BUN/CREAT SERPL: 20.8 (ref 7–25)
CALCIUM SPEC-SCNC: 9.7 MG/DL (ref 8.6–10.5)
CHLORIDE SERPL-SCNC: 110 MMOL/L (ref 98–107)
CO2 SERPL-SCNC: 19.9 MMOL/L (ref 22–29)
CREAT SERPL-MCNC: 0.96 MG/DL (ref 0.76–1.27)
DEPRECATED RDW RBC AUTO: 41.4 FL (ref 37–54)
EGFRCR SERPLBLD CKD-EPI 2021: 87.2 ML/MIN/1.73
EOSINOPHIL # BLD AUTO: 0.17 10*3/MM3 (ref 0–0.4)
EOSINOPHIL NFR BLD AUTO: 2.2 % (ref 0.3–6.2)
ERYTHROCYTE [DISTWIDTH] IN BLOOD BY AUTOMATED COUNT: 12.8 % (ref 12.3–15.4)
GLOBULIN UR ELPH-MCNC: 2.4 GM/DL
GLUCOSE SERPL-MCNC: 162 MG/DL (ref 65–99)
HCT VFR BLD AUTO: 37.8 % (ref 37.5–51)
HGB BLD-MCNC: 12.2 G/DL (ref 13–17.7)
IMM GRANULOCYTES # BLD AUTO: 0.03 10*3/MM3 (ref 0–0.05)
IMM GRANULOCYTES NFR BLD AUTO: 0.4 % (ref 0–0.5)
LIPASE SERPL-CCNC: 102 U/L (ref 13–60)
LYMPHOCYTES # BLD AUTO: 1.46 10*3/MM3 (ref 0.7–3.1)
LYMPHOCYTES NFR BLD AUTO: 18.7 % (ref 19.6–45.3)
MCH RBC QN AUTO: 29 PG (ref 26.6–33)
MCHC RBC AUTO-ENTMCNC: 32.3 G/DL (ref 31.5–35.7)
MCV RBC AUTO: 89.8 FL (ref 79–97)
MONOCYTES # BLD AUTO: 0.5 10*3/MM3 (ref 0.1–0.9)
MONOCYTES NFR BLD AUTO: 6.4 % (ref 5–12)
NEUTROPHILS NFR BLD AUTO: 5.6 10*3/MM3 (ref 1.7–7)
NEUTROPHILS NFR BLD AUTO: 71.5 % (ref 42.7–76)
NRBC BLD AUTO-RTO: 0 /100 WBC (ref 0–0.2)
PLATELET # BLD AUTO: 327 10*3/MM3 (ref 140–450)
PMV BLD AUTO: 10.3 FL (ref 6–12)
POTASSIUM SERPL-SCNC: 5.4 MMOL/L (ref 3.5–5.2)
PROT SERPL-MCNC: 7.1 G/DL (ref 6–8.5)
RBC # BLD AUTO: 4.21 10*6/MM3 (ref 4.14–5.8)
SODIUM SERPL-SCNC: 144 MMOL/L (ref 136–145)
WBC NRBC COR # BLD: 7.82 10*3/MM3 (ref 3.4–10.8)

## 2022-06-06 DIAGNOSIS — G43.119 INTRACTABLE MIGRAINE WITH AURA WITHOUT STATUS MIGRAINOSUS: ICD-10-CM

## 2022-06-06 RX ORDER — BUTALBITAL, ACETAMINOPHEN AND CAFFEINE 50; 325; 40 MG/1; MG/1; MG/1
TABLET ORAL
Qty: 30 TABLET | Refills: 0 | Status: SHIPPED | OUTPATIENT
Start: 2022-06-06 | End: 2022-07-13

## 2022-06-06 NOTE — TELEPHONE ENCOUNTER
Rx Refill Note  Requested Prescriptions     Pending Prescriptions Disp Refills   • butalbital-acetaminophen-caffeine (FIORICET, ESGIC) -40 MG per tablet [Pharmacy Med Name: Butalbital-APAP-Caffeine -40 MG Oral Tablet] 30 tablet 0     Sig: TAKE 1 TABLET BY MOUTH ONCE DAILY AS NEEDED FOR HEADACHE      Last filled: 02/21/2022 30 with 0 refills.  Last office visit with prescribing clinician: 9/7/2021      Next office visit with prescribing clinician: Visit date not found         Alvina Bynum MA  06/06/22, 14:28 EDT

## 2022-06-07 ENCOUNTER — TELEMEDICINE (OUTPATIENT)
Dept: FAMILY MEDICINE CLINIC | Facility: CLINIC | Age: 67
End: 2022-06-07

## 2022-06-07 RX ORDER — TOPIRAMATE 50 MG/1
TABLET, FILM COATED ORAL
Qty: 120 TABLET | Refills: 1 | Status: SHIPPED | OUTPATIENT
Start: 2022-06-07 | End: 2022-07-14 | Stop reason: SINTOL

## 2022-06-10 ENCOUNTER — HOSPITAL ENCOUNTER (OUTPATIENT)
Dept: CT IMAGING | Facility: HOSPITAL | Age: 67
Discharge: HOME | End: 2022-06-10
Admitting: FAMILY MEDICINE

## 2022-06-10 DIAGNOSIS — R11.0 NAUSEA: ICD-10-CM

## 2022-06-10 PROCEDURE — 74178 CT ABD&PLV WO CNTR FLWD CNTR: CPT

## 2022-06-10 PROCEDURE — 25010000002 IOPAMIDOL 61 % SOLUTION: Performed by: FAMILY MEDICINE

## 2022-06-10 RX ADMIN — IOPAMIDOL 85 ML: 612 INJECTION, SOLUTION INTRAVENOUS at 15:47

## 2022-06-13 ENCOUNTER — TELEPHONE (OUTPATIENT)
Dept: FAMILY MEDICINE CLINIC | Facility: CLINIC | Age: 67
End: 2022-06-13

## 2022-07-12 DIAGNOSIS — R33.9 URINARY RETENTION WITH INCOMPLETE BLADDER EMPTYING: ICD-10-CM

## 2022-07-12 DIAGNOSIS — R11.0 NAUSEA: ICD-10-CM

## 2022-07-12 DIAGNOSIS — G43.119 INTRACTABLE MIGRAINE WITH AURA WITHOUT STATUS MIGRAINOSUS: ICD-10-CM

## 2022-07-12 RX ORDER — TAMSULOSIN HYDROCHLORIDE 0.4 MG/1
CAPSULE ORAL
Qty: 30 CAPSULE | Refills: 0 | Status: SHIPPED | OUTPATIENT
Start: 2022-07-12 | End: 2022-08-29 | Stop reason: SDUPTHER

## 2022-07-12 RX ORDER — ONDANSETRON HYDROCHLORIDE 8 MG/1
TABLET, FILM COATED ORAL
Qty: 12 TABLET | Refills: 0 | Status: SHIPPED | OUTPATIENT
Start: 2022-07-12 | End: 2022-08-29 | Stop reason: SDUPTHER

## 2022-07-12 NOTE — TELEPHONE ENCOUNTER
Rx Refill Note  Requested Prescriptions     Pending Prescriptions Disp Refills   • butalbital-acetaminophen-caffeine (FIORICET, ESGIC) -40 MG per tablet [Pharmacy Med Name: Butalbital-APAP-Caffeine -40 MG Oral Tablet] 30 tablet 0     Sig: TAKE 1 TABLET BY MOUTH ONCE DAILY AS NEEDED FOR HEADACHE      Last office visit with prescribing clinician: 9/7/2021      Next office visit with prescribing clinician: Visit date not found            Craig Fowler MA  07/12/22, 08:25 EDT

## 2022-07-12 NOTE — TELEPHONE ENCOUNTER
Caller: Israel Reyna    Relationship: Self    Best call back number: [phone]    Requested Prescriptions:   Requested Prescriptions     Pending Prescriptions Disp Refills   • tamsulosin (FLOMAX) 0.4 MG capsule 24 hr capsule [Pharmacy Med Name: Tamsulosin HCl 0.4 MG Oral Capsule] 30 capsule 0     Sig: Take 1 capsule by mouth once daily   • ondansetron (ZOFRAN) 8 MG tablet [Pharmacy Med Name: Ondansetron HCl 8 MG Oral Tablet] 12 tablet 0     Sig: TAKE 1 TABLET BY MOUTH EVERY 8 HOURS AS NEEDED FOR NAUSEA FOR VOMITING        Pharmacy where request should be sent: [facility] [address] [place] [address] [phone] [facility] [phone]      Additional details provided by patient: OUT OF MEDICATION     Does the patient have less than a 3 day supply:  [x] Yes  [] No    Vicente Harper Rep   07/12/22 11:14 EDT

## 2022-07-12 NOTE — TELEPHONE ENCOUNTER
Rx Refill Note  Requested Prescriptions     Pending Prescriptions Disp Refills   • tamsulosin (FLOMAX) 0.4 MG capsule 24 hr capsule [Pharmacy Med Name: Tamsulosin HCl 0.4 MG Oral Capsule] 30 capsule 0     Sig: Take 1 capsule by mouth once daily   • ondansetron (ZOFRAN) 8 MG tablet [Pharmacy Med Name: Ondansetron HCl 8 MG Oral Tablet] 12 tablet 0     Sig: TAKE 1 TABLET BY MOUTH EVERY 8 HOURS AS NEEDED FOR NAUSEA FOR VOMITING      Last office visit with prescribing clinician: 6/1/2022      Next office visit with prescribing clinician: Visit date not found 23}  Briseyda London MA  07/12/22, 08:02 EDT   Last fill: 02/28/2022-Tamsulosin  Last fill: 05/13/2022-Zofran

## 2022-07-13 RX ORDER — BUTALBITAL, ACETAMINOPHEN AND CAFFEINE 50; 325; 40 MG/1; MG/1; MG/1
TABLET ORAL
Qty: 30 TABLET | Refills: 0 | Status: SHIPPED | OUTPATIENT
Start: 2022-07-13 | End: 2022-10-24

## 2022-07-14 ENCOUNTER — TELEMEDICINE (OUTPATIENT)
Dept: FAMILY MEDICINE CLINIC | Facility: CLINIC | Age: 67
End: 2022-07-14

## 2022-07-14 VITALS — WEIGHT: 154 LBS | SYSTOLIC BLOOD PRESSURE: 125 MMHG | DIASTOLIC BLOOD PRESSURE: 80 MMHG | BODY MASS INDEX: 24.11 KG/M2

## 2022-07-14 DIAGNOSIS — R11.0 NAUSEA: Primary | ICD-10-CM

## 2022-07-14 DIAGNOSIS — K31.84 GASTROPARESIS: ICD-10-CM

## 2022-07-14 DIAGNOSIS — R10.9 FLANK PAIN: ICD-10-CM

## 2022-07-14 PROCEDURE — 99214 OFFICE O/P EST MOD 30 MIN: CPT | Performed by: FAMILY MEDICINE

## 2022-07-14 RX ORDER — OMEPRAZOLE 40 MG/1
40 CAPSULE, DELAYED RELEASE ORAL DAILY
Qty: 90 CAPSULE | Refills: 0 | Status: SHIPPED | OUTPATIENT
Start: 2022-07-14 | End: 2022-09-14

## 2022-07-14 NOTE — PATIENT INSTRUCTIONS
Hold topiramate for 7 days.  May also cut amlodipine in half for blood pressure but we'll discuss in 2 weeks

## 2022-07-14 NOTE — PROGRESS NOTES
Subjective   Isarel Reyna is a 67 y.o. male.     Chief Complaint   Patient presents with   • Follow-up     Nausea, left kidney pain       History of Present Illness     Israel Reyna presents today for   Chief Complaint   Patient presents with   • Follow-up     Nausea, left kidney pain     Held the trulicity for a couple weeks sugar spiked, back on it now for a couple weeks. Sugar this morning was 101.  Hurting in left kidney and back area. Pretty nauseated, not able to eat much. Drinking an energy drink in the mornings and one in the evening for energy. Weight is down to 154lbs. Not concerning him until he gets to the 140s. Urine is strong odor, likely from low water intake. Waking up nauseated. Stays nauseated all through the day.    CT from 6/10/22:  IMPRESSION:  1. No acute abnormality in the abdomen or pelvis, specifically negative for pancreatitis.  2. Rectocele (6cm below the pubococcygeal junction)  3. Mild prostatomegaly.   4. Small bilateral nonobstructing nephrolithiasis    You have chosen to receive care through a telehealth visit.  Do you consent to use a video/audio connection for your medical care today? Yes    The following portions of the patient's history were reviewed and updated as appropriate: allergies, current medications, past family history, past medical history, past social history, past surgical history and problem list.    Active Ambulatory Problems     Diagnosis Date Noted   • Bowel incontinence 09/26/2016   • Classic migraine with aura 09/26/2016   • Depression    • Anxiety    • Mental status change 09/26/2016   • Disorientation 09/26/2016   • Facial droop 09/26/2016   • Difficulty walking 09/26/2016   • Intractable migraine with aura without status migrainosus 10/06/2016   • Chest pain 10/06/2016   • CAD (coronary artery disease)    • Hypertension    • Dyslipidemia    • Diabetes mellitus, type 2 (HCC)    • GERD (gastroesophageal reflux disease)    • CHF (congestive  heart failure) (HCC)    • Chest pain    • SOB (shortness of breath)    • Fatigue    • Chronic migraine 04/11/2017   • Other cerebral infarction (HCC) 07/18/2017   • Transient cerebral ischemia 07/18/2017   • Neck pain 10/17/2017   • Spondylosis of cervical region without myelopathy or radiculopathy 10/17/2017   • Facial pain 03/22/2018   • Nausea 06/01/2022   • Erectile disorder due to medical condition in male 06/01/2022     Resolved Ambulatory Problems     Diagnosis Date Noted   • Seizure disorder (HCC) 09/26/2016   • Migraine      Past Medical History:   Diagnosis Date   • Diabetic peripheral neuropathy (HCC)    • Mental status change    • Migraine headache      Past Surgical History:   Procedure Laterality Date   • CARDIAC CATHETERIZATION     • CARDIAC CATHETERIZATION N/A 10/14/2016    Procedure: Left Heart Cath;  Surgeon: Santiago Garner MD;  Location: [facility] CATH INVASIVE LOCATION;  Service:    • COLONOSCOPY  02/25/2022    Dr. Graves-recommended 3 year repeat   • ENDOSCOPY  02/03/2022    Dr. Graves   • GASTRIC STIMULATOR IMPLANT SURGERY     • SMALL INTESTINE SURGERY      unspecified, x3 as an infant    • TESTICLE SURGERY       Family History   Problem Relation Age of Onset   • Heart failure Mother    • Diabetes Mother    • Alzheimer's disease Mother    • Heart attack Father    • Kidney disease Father    • Cancer Father    • Prostate cancer Father    • Cancer Sister    • Breast cancer Sister    • Diabetes Sister    • Heart disease Sister    • Arrhythmia Sister    • Diabetes Brother    • Heart disease Brother    • Cancer Brother    • Liver disease Brother    • Alzheimer's disease Maternal Grandmother    • Liver cancer Maternal Grandfather    • Other Paternal Grandmother         [place] flu   • Other Paternal Grandfather         [place] flu   • Colon cancer Neg Hx      Social History     Socioeconomic History   • Marital status:    Tobacco Use   • Smoking status: Never Smoker   • Smokeless  tobacco: Never Used   Vaping Use   • Vaping Use: Never used   Substance and Sexual Activity   • Alcohol use: No   • Drug use: No   • Sexual activity: Not Currently       Review of Systems  Review of Systems -  General ROS: negative for - chills, fever or night sweats  Cardiovascular ROS: no chest pain or dyspnea on exertion  Gastrointestinal ROS: no abdominal pain, change in bowel habits, or black or bloody stools  Genito-Urinary ROS: no dysuria, trouble voiding, or hematuria    Objective   Blood pressure 125/80, weight 69.9 kg (154 lb).  Vitals obtained from patient if available  Physical Exam  Const: Non-toxic appearing, NAD, A&Ox4, Pleasant, Cooperative  Eyes: EOMI, no conjunctivitis  ENT: No copious nasal drainage noted  Cardiac: Regular rate by pulse  Resp: Respiratory rate observed to be within normal limits, no increased work of breathing observed, no audible wheezing or cough noted  Psych: Appropriate mood and behavior.  Procedures  Assessment & Plan   Problem List Items Addressed This Visit        Gastrointestinal Abdominal     Nausea - Primary    Relevant Orders    Basic Metabolic Panel (Completed)    Urinalysis With Microscopic If Indicated (No Culture) - Urine, Clean Catch (Completed)    Urine Culture - Urine, Urine, Clean Catch      Other Visit Diagnoses     Flank pain        Relevant Orders    Basic Metabolic Panel (Completed)    Urinalysis With Microscopic If Indicated (No Culture) - Urine, Clean Catch (Completed)    Urine Culture - Urine, Urine, Clean Catch    Gastroparesis        Likely main cause of nausea          See patient diagnoses and orders along with patient instructions for assessment, plan, and changes to care for patient.    This visit was conducted via telemedicine with live video and audio provided through Video Options: MyChart/Zoom at the point of care.    Patient Instructions   1. Hold topiramate for 7 days.  2. May also cut amlodipine in half for blood pressure but we'll discuss in  2 weeks      Return in about 2 weeks (around 7/28/2022) for video visit.    Ambulatory progress note signed and attested to by Elio Moore D.O.

## 2022-07-14 NOTE — TELEPHONE ENCOUNTER
Rx Refill Note  Requested Prescriptions     Pending Prescriptions Disp Refills   • omeprazole (priLOSEC) 40 MG capsule 90 capsule 0     Sig: Take 1 capsule by mouth Daily.      Last office visit with prescribing clinician: 6/1/2022      Next office visit with prescribing clinician: 7/14/2022            Staci Kearns MA  07/14/22, 11:11 EDT

## 2022-07-15 ENCOUNTER — LAB (OUTPATIENT)
Dept: LAB | Facility: HOSPITAL | Age: 67
End: 2022-07-15

## 2022-07-15 DIAGNOSIS — R11.0 NAUSEA: ICD-10-CM

## 2022-07-15 DIAGNOSIS — R10.9 FLANK PAIN: ICD-10-CM

## 2022-07-15 LAB
ANION GAP SERPL CALCULATED.3IONS-SCNC: 12.4 MMOL/L (ref 5–15)
BILIRUB UR QL STRIP: NEGATIVE
BUN SERPL-MCNC: 25 MG/DL (ref 8–23)
BUN/CREAT SERPL: 19.7 (ref 7–25)
CALCIUM SPEC-SCNC: 10 MG/DL (ref 8.6–10.5)
CHLORIDE SERPL-SCNC: 106 MMOL/L (ref 98–107)
CLARITY UR: CLEAR
CO2 SERPL-SCNC: 22.6 MMOL/L (ref 22–29)
COLOR UR: YELLOW
CREAT SERPL-MCNC: 1.27 MG/DL (ref 0.76–1.27)
EGFRCR SERPLBLD CKD-EPI 2021: 62.3 ML/MIN/1.73
GLUCOSE SERPL-MCNC: 149 MG/DL (ref 65–99)
GLUCOSE UR STRIP-MCNC: NEGATIVE MG/DL
HGB UR QL STRIP.AUTO: NEGATIVE
KETONES UR QL STRIP: ABNORMAL
LEUKOCYTE ESTERASE UR QL STRIP.AUTO: NEGATIVE
NITRITE UR QL STRIP: NEGATIVE
PH UR STRIP.AUTO: 5.5 [PH] (ref 5–8)
POTASSIUM SERPL-SCNC: 5.3 MMOL/L (ref 3.5–5.2)
PROT UR QL STRIP: ABNORMAL
SODIUM SERPL-SCNC: 141 MMOL/L (ref 136–145)
SP GR UR STRIP: 1.02 (ref 1–1.03)
UROBILINOGEN UR QL STRIP: ABNORMAL

## 2022-07-15 PROCEDURE — 80048 BASIC METABOLIC PNL TOTAL CA: CPT

## 2022-07-15 PROCEDURE — 81003 URINALYSIS AUTO W/O SCOPE: CPT

## 2022-07-19 RX ORDER — SIMVASTATIN 20 MG
10 TABLET ORAL NIGHTLY
Qty: 45 TABLET | Refills: 1 | Status: SHIPPED | OUTPATIENT
Start: 2022-07-19 | End: 2023-01-16

## 2022-07-19 NOTE — TELEPHONE ENCOUNTER
Rx Refill Note  Requested Prescriptions     Pending Prescriptions Disp Refills   • simvastatin (ZOCOR) 20 MG tablet 45 tablet 1     Sig: Take 0.5 tablets by mouth Every Night.      Last office visit with prescribing clinician: 6/1/2022      Next office visit with prescribing clinician: Visit date not found            Staci Kearns MA  07/19/22, 15:45 EDT

## 2022-08-16 RX ORDER — AMLODIPINE BESYLATE 10 MG/1
TABLET ORAL
Qty: 90 TABLET | Refills: 3 | Status: SHIPPED | OUTPATIENT
Start: 2022-08-16

## 2022-08-16 NOTE — TELEPHONE ENCOUNTER
Rx Refill Note  Requested Prescriptions     Pending Prescriptions Disp Refills   • metFORMIN (GLUCOPHAGE) 1000 MG tablet [Pharmacy Med Name: METFORMIN TAB 1000MG] 180 tablet 0     Sig: TAKE 1 TABLET TWICE DAILY  WITH MEALS      Last office visit with prescribing clinician: 6/1/2022      Next office visit with prescribing clinician: 8/16/2022            Reyna Charles MA  08/16/22, 07:59 EDT

## 2022-08-25 ENCOUNTER — TELEPHONE (OUTPATIENT)
Dept: NEUROLOGY | Facility: CLINIC | Age: 67
End: 2022-08-25

## 2022-08-25 DIAGNOSIS — E11.42 DIABETIC POLYNEUROPATHY ASSOCIATED WITH TYPE 2 DIABETES MELLITUS: ICD-10-CM

## 2022-08-25 DIAGNOSIS — R51.9 FACIAL PAIN: ICD-10-CM

## 2022-08-25 DIAGNOSIS — M54.2 NECK PAIN: ICD-10-CM

## 2022-08-25 DIAGNOSIS — M47.812 SPONDYLOSIS OF CERVICAL REGION WITHOUT MYELOPATHY OR RADICULOPATHY: ICD-10-CM

## 2022-08-25 DIAGNOSIS — G43.119 INTRACTABLE MIGRAINE WITH AURA WITHOUT STATUS MIGRAINOSUS: ICD-10-CM

## 2022-08-25 NOTE — TELEPHONE ENCOUNTER
Caller: GOLDIE    Relationship: Pharmacy    Best call back number: [phone]  Requested Prescriptions:   Requested Prescriptions     Pending Prescriptions Disp Refills   • gabapentin (NEURONTIN) 300 MG capsule 270 capsule 1     Sig: Take 1 capsule by mouth 3 (Three) Times a Day.        Pharmacy where request should be sent: [facility] - [place], [place] - [address] AT [address]AL TO [facility] - [phone] [facility] [phone] FX     Additional details provided by patient: IS REQUESTING REFILL.  STATES PATIENT DOES NOT HAVE ENOUGH FOR 3 DAYS.    Does the patient have less than a 3 day supply:  [x] Yes  [] No    Vicente Blair Rep   08/25/22 10:47 EDT

## 2022-08-26 DIAGNOSIS — R11.0 NAUSEA: ICD-10-CM

## 2022-08-26 RX ORDER — GABAPENTIN 300 MG/1
300 CAPSULE ORAL 3 TIMES DAILY
Qty: 270 CAPSULE | Refills: 1 | Status: SHIPPED | OUTPATIENT
Start: 2022-08-26

## 2022-08-29 ENCOUNTER — TELEMEDICINE (OUTPATIENT)
Dept: FAMILY MEDICINE CLINIC | Facility: CLINIC | Age: 67
End: 2022-08-29

## 2022-08-29 DIAGNOSIS — K31.84 GASTROPARESIS: ICD-10-CM

## 2022-08-29 DIAGNOSIS — R11.0 NAUSEA: Primary | ICD-10-CM

## 2022-08-29 DIAGNOSIS — R33.9 URINARY RETENTION WITH INCOMPLETE BLADDER EMPTYING: ICD-10-CM

## 2022-08-29 DIAGNOSIS — N20.0 NEPHROLITHIASIS: ICD-10-CM

## 2022-08-29 DIAGNOSIS — R10.9 FLANK PAIN: ICD-10-CM

## 2022-08-29 DIAGNOSIS — R11.0 NAUSEA: ICD-10-CM

## 2022-08-29 PROCEDURE — 99213 OFFICE O/P EST LOW 20 MIN: CPT | Performed by: FAMILY MEDICINE

## 2022-08-29 RX ORDER — ONDANSETRON HYDROCHLORIDE 8 MG/1
TABLET, FILM COATED ORAL
Qty: 12 TABLET | Refills: 0 | OUTPATIENT
Start: 2022-08-29

## 2022-08-29 RX ORDER — TAMSULOSIN HYDROCHLORIDE 0.4 MG/1
1 CAPSULE ORAL DAILY
Qty: 90 CAPSULE | Refills: 0 | Status: SHIPPED | OUTPATIENT
Start: 2022-08-29 | End: 2023-01-16 | Stop reason: SDUPTHER

## 2022-08-29 RX ORDER — ONDANSETRON HYDROCHLORIDE 8 MG/1
8 TABLET, FILM COATED ORAL EVERY 8 HOURS PRN
Qty: 30 TABLET | Refills: 0 | Status: SHIPPED | OUTPATIENT
Start: 2022-08-29 | End: 2022-10-31

## 2022-08-29 NOTE — PROGRESS NOTES
Subjective   Israel Reyna is a 67 y.o. male.     Chief Complaint   Patient presents with   • Follow-up     Nausea, flank pain, gastroparesis       History of Present Illness     Israel Reyna presents today for   Chief Complaint   Patient presents with   • Follow-up     Nausea, flank pain, gastroparesis       Follow up on pain in stomach and nausea. At last visit on 7/14 discussed holding topiramate and cutting amlodipine in half. He he is having more problems with nausea and loss of control of his bowels (chronic issue due to sphincter incompetence). He ran out of his Flomax and nausea medication, having more pain in his left flank. CT in June showed small bilateral non-obstructing nephrolithiasis. Still off topamax and on half dose amlodipine. Holding the Trulicity for 3 weeks previously was not helpful for his nausea and spiked his sugar.    FSBS this , typically . He had one day when it dropped to 60, next day it was 400. Feels off when it is all over the place.    You have chosen to receive care through a telehealth visit.  Do you consent to use a video/audio connection for your medical care today? Yes    The following portions of the patient's history were reviewed and updated as appropriate: allergies, current medications, past family history, past medical history, past social history, past surgical history and problem list.    Active Ambulatory Problems     Diagnosis Date Noted   • Bowel incontinence 09/26/2016   • Classic migraine with aura 09/26/2016   • Depression    • Anxiety    • Mental status change 09/26/2016   • Disorientation 09/26/2016   • Facial droop 09/26/2016   • Difficulty walking 09/26/2016   • Intractable migraine with aura without status migrainosus 10/06/2016   • Chest pain 10/06/2016   • CAD (coronary artery disease)    • Hypertension    • Dyslipidemia    • Diabetes mellitus, type 2 (HCC)    • GERD (gastroesophageal reflux disease)    • CHF (congestive  heart failure) (HCC)    • Chest pain    • SOB (shortness of breath)    • Fatigue    • Chronic migraine 04/11/2017   • Other cerebral infarction (HCC) 07/18/2017   • Transient cerebral ischemia 07/18/2017   • Neck pain 10/17/2017   • Spondylosis of cervical region without myelopathy or radiculopathy 10/17/2017   • Facial pain 03/22/2018   • Nausea 06/01/2022   • Erectile disorder due to medical condition in male 06/01/2022     Resolved Ambulatory Problems     Diagnosis Date Noted   • Seizure disorder (HCC) 09/26/2016   • Migraine      Past Medical History:   Diagnosis Date   • Diabetic peripheral neuropathy (HCC)    • Mental status change    • Migraine headache      Past Surgical History:   Procedure Laterality Date   • CARDIAC CATHETERIZATION     • CARDIAC CATHETERIZATION N/A 10/14/2016    Procedure: Left Heart Cath;  Surgeon: Santiago Garner MD;  Location: [facility] CATH INVASIVE LOCATION;  Service:    • COLONOSCOPY  02/25/2022    Dr. Graves-recommended 3 year repeat   • ENDOSCOPY  02/03/2022    Dr. Graves   • GASTRIC STIMULATOR IMPLANT SURGERY     • SMALL INTESTINE SURGERY      unspecified, x3 as an infant    • TESTICLE SURGERY       Family History   Problem Relation Age of Onset   • Heart failure Mother    • Diabetes Mother    • Alzheimer's disease Mother    • Heart attack Father    • Kidney disease Father    • Cancer Father    • Prostate cancer Father    • Cancer Sister    • Breast cancer Sister    • Diabetes Sister    • Heart disease Sister    • Arrhythmia Sister    • Diabetes Brother    • Heart disease Brother    • Cancer Brother    • Liver disease Brother    • Alzheimer's disease Maternal Grandmother    • Liver cancer Maternal Grandfather    • Other Paternal Grandmother         [place] flu   • Other Paternal Grandfather         [place] flu   • Colon cancer Neg Hx      Social History     Socioeconomic History   • Marital status:    Tobacco Use   • Smoking status: Never Smoker   • Smokeless  tobacco: Never Used   Vaping Use   • Vaping Use: Never used   Substance and Sexual Activity   • Alcohol use: No   • Drug use: No   • Sexual activity: Not Currently       Review of Systems  Review of Systems -  General ROS: negative for - chills, fever or night sweats  Cardiovascular ROS: no chest pain or dyspnea on exertion  Gastrointestinal ROS: no abdominal pain, change in bowel habits, or black or bloody stools  Genito-Urinary ROS: no dysuria, trouble voiding, or hematuria    Objective   There were no vitals taken for this visit.  Vitals obtained from patient if available  Physical Exam  Const: Non-toxic appearing, NAD, A&Ox4, Pleasant, Cooperative  Eyes: EOMI, no conjunctivitis  ENT: No copious nasal drainage noted  Cardiac: Regular rate by pulse  Resp: Respiratory rate observed to be within normal limits, no increased work of breathing observed, no audible wheezing or cough noted  Psych: Appropriate mood and behavior.  Procedures  Assessment & Plan   Problem List Items Addressed This Visit        Gastrointestinal Abdominal     Nausea - Primary    Relevant Medications    ondansetron (ZOFRAN) 8 MG tablet    Other Relevant Orders    Ambulatory Referral to Gastroenterology      Other Visit Diagnoses     Flank pain        Gastroparesis        Relevant Orders    Ambulatory Referral to Gastroenterology    Urinary retention with incomplete bladder emptying        I will refer him to a urologist for further evaluation. I will send in a refill of Flomax.  He will have his PSA checked.     Relevant Medications    tamsulosin (FLOMAX) 0.4 MG capsule 24 hr capsule    Other Relevant Orders    Ambulatory Referral to Urology    Nephrolithiasis        Bilateral non-obstructing    Relevant Orders    Ambulatory Referral to Urology          See patient diagnoses and orders along with patient instructions for assessment, plan, and changes to care for patient.    This visit was conducted via telemedicine with live video and audio  provided through Video Options: MyChart/Zoom at the point of care.    There are no Patient Instructions on file for this visit.    No follow-ups on file.    Ambulatory progress note signed and attested to by Elio Moore D.O.

## 2022-09-02 NOTE — TELEPHONE ENCOUNTER
"Spoke to pt.  Advised of results.  Pt said he had seen the report and is concerned about there being \"49% blockage\" from the Cartoid Duplex.  Pt said he is having really bad headaches almost passing out, his face and neck is hurting so bad.  Pt also stated that he can not have a normal MRI done because he has a \"stimulator\" in his hip.  He would have to have it done with a stimulator compatible MRI.   has one, but does not accept his insurance.  Pt wants to know if he needs to get a special approval or go to [facility] or somewhere else to have the MRI done?  Please advise.    Thanks!  "
"Spoke with pt.  Pt is concerned about a \"certain type of pain\".  When he has this pain, he loses control of his bowels, within about 15 min.  Pt stated he has always had bowel trouble, but when he has this \"certain\" pain, he loses control of bowels.  Pt stated when he takes Fioricet it seems to help some but  Is worried about becoming dependent on this.  Pt also stated he is in \"significant amount of discomfort\" in his head, face and neck.  Pt stated he had CT yesterday at the [facility] location.    "
----- Message from Robbie Chaidez MD sent at 8/11/2017 12:16 PM EDT -----  Regarding: tests  Carotid duplex- no significant blockage, EEG-no epileptic changes, thanks  ----- Message -----     From: Dale Agrawal MD     Sent: 8/10/2017   9:36 AM       To: Robbie Chaidez MD        
Blockages less than 70% are not significant, ordered CT head instead MRI, thanks.  
Ordered [facility] of [facility], thanks  
PLEASE INFORM PT THAT CARTOID DUPLEX WAS NORMAL AND SHOWED NO BLOCKAGES AND THAT HIS EEG WAS NORMAL.  
PLEASE INFORM PT THAT THE BLOCKAGES ARE NOT SIGNIFICANT. AND WE ORDERED A CT OF HEAD FIRST BEFORE MRI.    
CXR negative - No pneumothorax, No opacities, No free air, No infiltrates, No consolidation, No atelectasis seen

## 2022-09-14 ENCOUNTER — PATIENT ROUNDING (BHMG ONLY) (OUTPATIENT)
Dept: GASTROENTEROLOGY | Facility: CLINIC | Age: 67
End: 2022-09-14

## 2022-09-14 ENCOUNTER — OFFICE VISIT (OUTPATIENT)
Dept: GASTROENTEROLOGY | Facility: CLINIC | Age: 67
End: 2022-09-14

## 2022-09-14 ENCOUNTER — HOSPITAL ENCOUNTER (OUTPATIENT)
Dept: GENERAL RADIOLOGY | Facility: HOSPITAL | Age: 67
Discharge: HOME | End: 2022-09-14
Admitting: NURSE PRACTITIONER

## 2022-09-14 VITALS
HEIGHT: 67 IN | BODY MASS INDEX: 26.02 KG/M2 | TEMPERATURE: 97.4 F | SYSTOLIC BLOOD PRESSURE: 128 MMHG | WEIGHT: 165.8 LBS | HEART RATE: 81 BPM | OXYGEN SATURATION: 96 % | DIASTOLIC BLOOD PRESSURE: 82 MMHG

## 2022-09-14 DIAGNOSIS — K44.9 HIATAL HERNIA: ICD-10-CM

## 2022-09-14 DIAGNOSIS — R15.9 INCONTINENCE OF FECES, UNSPECIFIED FECAL INCONTINENCE TYPE: ICD-10-CM

## 2022-09-14 DIAGNOSIS — N81.6 RECTOCELE: ICD-10-CM

## 2022-09-14 DIAGNOSIS — R11.0 NAUSEA: Primary | ICD-10-CM

## 2022-09-14 DIAGNOSIS — R10.10 PAIN OF UPPER ABDOMEN: ICD-10-CM

## 2022-09-14 DIAGNOSIS — K62.89 ANAL SPHINCTER INCOMPETENCE: ICD-10-CM

## 2022-09-14 DIAGNOSIS — K31.84 GASTROPARESIS: ICD-10-CM

## 2022-09-14 DIAGNOSIS — K22.2 ESOPHAGEAL STENOSIS: ICD-10-CM

## 2022-09-14 DIAGNOSIS — R11.0 NAUSEA: ICD-10-CM

## 2022-09-14 DIAGNOSIS — Z86.010 HISTORY OF COLONIC POLYPS: ICD-10-CM

## 2022-09-14 PROCEDURE — 99214 OFFICE O/P EST MOD 30 MIN: CPT | Performed by: NURSE PRACTITIONER

## 2022-09-14 PROCEDURE — 74018 RADEX ABDOMEN 1 VIEW: CPT

## 2022-09-14 RX ORDER — PANTOPRAZOLE SODIUM 40 MG/1
40 TABLET, DELAYED RELEASE ORAL 2 TIMES DAILY
Qty: 180 TABLET | Refills: 3 | Status: SHIPPED | OUTPATIENT
Start: 2022-09-14

## 2022-09-14 NOTE — PROGRESS NOTES
GASTROENTEROLOGY OFFICE NOTE    Israel Reyna  [id number]  1955    CARE TEAM  Patient Care Team:  Elio Moore DO as PCP - General (Family Medicine)    Referring Provider: Elio Moore DO    Chief Complaint   Patient presents with   • Nausea   • GI Problem     gastroparesis        HISTORY OF PRESENT ILLNESS:   Israel Reyna is a 67 y.o. male who returns due to nausea and abdominal pain.      Most recent office visit 3/8/2022 due to fecal incontinence, prior sacral stimulator placed by Dr. Land due to fecal incontinence (which patient continues to express frustration with due to inability to have MRI and stimulator did not provide desired results), nausea, on omeprazole 40 mg daily.  After last office visit I informed patient that Dr. Graves was thinking about injection of Enteryx which is not done at [facility], [facility] does Botox. I also sent information about gastroparesis to the patient.     He reports he has been experiencing abdominal pain in the mid to upper abdomen for a while.  He also has nausea.  Approximately 10 days ago he had vomiting.  He takes Zofran and [place] as needed.  He reports Zofran causes him to go to sleep which may be helpful for nausea.  It seems as though he is unsure if he continues on omeprazole 40 mg daily reporting a provider may have recently told him to stop omeprazole.    He reports weight loss due to decreased intake due to nausea and abdominal pain.  He is trying liquid meal replacement such as Ensure reporting his wife expressed concern about too much liquid meal replacement.    He continues to have fecal incontinence.    Past Medical History:   Diagnosis Date   • Anxiety    • Bowel incontinence     w/ history of Stimulator Device    • CAD (coronary artery disease)    • Chest pain    • CHF (congestive heart failure) ([facility])    • Depression    • Diabetes mellitus, type 2 ([facility])    • Diabetic peripheral neuropathy ([facility])    • Dyslipidemia   "  • Fatigue    • GERD (gastroesophageal reflux disease)    • Hypertension    • Mental status change    • Migraine     Complex    • Migraine headache    • SOB (shortness of breath)         Past Surgical History:   Procedure Laterality Date   • CARDIAC CATHETERIZATION     • CARDIAC CATHETERIZATION N/A 10/14/2016    Procedure: Left Heart Cath;  Surgeon: Santiago Garner MD;  Location: [facility] CATH INVASIVE LOCATION;  Service:    • COLONOSCOPY  02/25/2022    Dr. Graves-recommended 3 year repeat   • ENDOSCOPY  02/03/2022    Dr. Graves   • GASTRIC STIMULATOR IMPLANT SURGERY     • SMALL INTESTINE SURGERY      unspecified, x3 as an infant    • TESTICLE SURGERY          Current Outpatient Medications on File Prior to Visit   Medication Sig   • amLODIPine (NORVASC) 10 MG tablet TAKE 1 TABLET DAILY   • butalbital-acetaminophen-caffeine (FIORICET, ESGIC) -40 MG per tablet TAKE 1 TABLET BY MOUTH ONCE DAILY AS NEEDED FOR HEADACHE   • gabapentin (NEURONTIN) 300 MG capsule Take 1 capsule by mouth 3 (Three) Times a Day.   • glipizide (GLUCOTROL) 10 MG tablet Take 1 tablet by mouth 2 (Two) Times a Day Before Meals.   • glucose blood test strip 1 each by Other route Daily. Use daily as directed   • glucose monitor monitoring kit 1 each Daily.   • Incontinence Supply Disposable (NU-FIT ADULT UNDERWEAR MEDIUM) misc    • Insulin Syringe 31G X 5/16\" 0.3 ML misc 1 each Every Night.   • Lancets Thin misc 1 each Daily. Use daily as directed   • lisinopril (PRINIVIL,ZESTRIL) 20 MG tablet Take 1 tablet by mouth Daily.   • Lutein 5 % beads Take 5 mg by mouth Daily.   • metFORMIN (GLUCOPHAGE) 1000 MG tablet TAKE 1 TABLET TWICE DAILY  WITH MEALS   • Multiple Vitamins-Minerals (PRESERVISION AREDS 2 PO) Take  by mouth Daily.   • NON FORMULARY Shot in eye every 3 months for macular degeneration   • ondansetron (ZOFRAN) 8 MG tablet Take 1 tablet by mouth Every 8 (Eight) Hours As Needed for Nausea or Vomiting.   • sildenafil (VIAGRA) 100 " MG tablet Take 1 tablet by mouth Daily As Needed for Erectile Dysfunction.   • simvastatin (ZOCOR) 20 MG tablet Take 0.5 tablets by mouth Every Night.   • Specialty Vitamins Products (HEALTHY HEART COMPLEX PO) Take  by mouth Daily.   • tamsulosin (FLOMAX) 0.4 MG capsule 24 hr capsule Take 1 capsule by mouth Daily.   • Trulicity 1.5 MG/0.5ML solution pen-injector Inject 1.5 mg under the skin into the appropriate area as directed 1 (One) Time Per Week.   • vitamin B-12 (CYANOCOBALAMIN) 100 MCG tablet Take 50 mcg by mouth 2 (two) times a day.   • vitamin C (ASCORBIC ACID) 500 MG tablet Take 500 mg by mouth 2 (two) times a day.   • vitamin E 400 UNIT capsule Take 400 Units by mouth Daily.   • Zeaxanthin powder Take 1 mg by mouth Daily.   • Zinc 40 MG tablet Take 1 tablet by mouth Daily.   • [DISCONTINUED] omeprazole (priLOSEC) 40 MG capsule Take 1 capsule by mouth Daily.   • polyethylene glycol (GoLYTELY) 236 g solution Starting at noon on day prior to procedure, drink 8 ounces every 30 minutes until all gone or stools are clear. May add flavor packet.     No current facility-administered medications on file prior to visit.       Allergies   Allergen Reactions   • Actos [Pioglitazone] Anaphylaxis     congestive heart failure   • Avandia [Rosiglitazone] Anaphylaxis     congestive heart failure.   • Darvon [Propoxyphene] Itching     Angioedema    itching   • Talwin [Pentazocine] Rash     Angioedema    itching       Family History   Problem Relation Age of Onset   • Heart failure Mother    • Diabetes Mother    • Alzheimer's disease Mother    • Heart attack Father    • Kidney disease Father    • Cancer Father    • Prostate cancer Father    • Cancer Sister    • Breast cancer Sister    • Diabetes Sister    • Heart disease Sister    • Arrhythmia Sister    • Diabetes Brother    • Heart disease Brother    • Cancer Brother    • Liver disease Brother    • Alzheimer's disease Maternal Grandmother    • Liver cancer Maternal  "Grandfather    • Other Paternal Grandmother         [place] flu   • Other Paternal Grandfather         [place] flu   • Colon cancer Neg Hx        Social History     Socioeconomic History   • Marital status:    Tobacco Use   • Smoking status: Never Smoker   • Smokeless tobacco: Never Used   Vaping Use   • Vaping Use: Never used   Substance and Sexual Activity   • Alcohol use: Never   • Drug use: Never   • Sexual activity: Defer       PHYSICAL EXAM   /82 (BP Location: Left arm, Patient Position: Sitting, Cuff Size: Adult)   Pulse 81   Temp 97.4 °F (36.3 °C) (Infrared)   Ht 170.2 cm (67.01\")   Wt 75.2 kg (165 lb 12.8 oz)   SpO2 96%   BMI 25.96 kg/m²   Physical Exam  Constitutional:       General: He is not in acute distress.     Appearance: He is not toxic-appearing.   HENT:      Head: Normocephalic and atraumatic. No contusion.      Right Ear: External ear normal.      Left Ear: External ear normal.   Eyes:      General: Lids are normal. No scleral icterus.        Right eye: No discharge.         Left eye: No discharge.      Extraocular Movements: Extraocular movements intact.   Neck:      Trachea: Trachea normal.      Comments: No visible mass  No visible adenopathy  Cardiovascular:      Rate and Rhythm: Normal rate.   Pulmonary:      Effort: No respiratory distress.      Comments: Symmetrical expansion    Abdominal:      Palpations: Abdomen is soft. There is no mass.      Tenderness: There is abdominal tenderness in the epigastric area.   Musculoskeletal:      Right lower leg: No edema.      Left lower leg: No edema.      Comments: Symmetrical movement of upper extremities  Symmetrical movement of lower extremities  No visible deformities   Skin:     General: Skin is warm and dry.      Coloration: Skin is not jaundiced.   Neurological:      General: No focal deficit present.      Mental Status: He is alert and oriented to person, place, and time.   Psychiatric:         Mood and Affect: Mood " normal.         Behavior: Behavior normal.         Thought Content: Thought content normal.     Results Review:  2/3/2022 EGD per Dr. 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  Biopsy of the duodenum without abnormality, gastric biopsy revealed minimal chronic gastritis and negative for H. pylori and biopsy of the distal esophagus revealed findings suggestive of reflux without intestinal metaplasia.    2/25/2022 colonoscopy per Dr. Graves due to personal history of colon polyps and rectal bleeding with excellent bowel prep revealed 6 sessile polyps that were 5 to 12 mm in size in the sigmoid, transverse and ascending colon and diverticulosis in the sigmoid and descending colon.  Ascending colon polyp tubular adenoma without high-grade dysplasia, transverse colon polyp tubular adenoma without high-grade dysplasia and sigmoid colon polyp tubular adenoma without high-grade dysplasia.  3-year follow-up colonoscopy recommended.    6/10/2022 CT scan due to nausea with vomiting revealed large rectocele of rectum 6 cm below pubococcygeal junction, mild aortoiliac atherosclerotic calcifications, sacral stimulator, mild prostatomegaly, small  Bilateral nonobstructing, renal cortical thinning/scarring at the upper medial left kidney, mild circumferential bladder wall thickening nephrolithiasis,   CMP    CMP 5/13/22 6/1/22 7/15/22   Glucose 147 (A) 162 (A) 149 (A)   BUN 20 20 25 (A)   Creatinine 1.04 0.96 1.27   Sodium 141 144 141   Potassium 4.7 5.4 (A) 5.3 (A)   Chloride 108 (A) 110 (A) 106   Calcium 9.6 9.7 10.0   Albumin 4.80 4.70    Total Bilirubin 0.3 0.2    Alkaline Phosphatase 70 70    AST (SGOT) 12 13    ALT (SGPT) 12 16    (A) Abnormal value              eGFR   >60.0 mL/min/1.73 62.3   87.2 CM  79.2 CM        ASSESSMENT / PLAN  1. Nausea  - stop omeprazole and start pantoprazole 40 mg twice daily, 30 minutes prior to first intake of calories and 30 minutes prior to last meal of the day  - pantoprazole (PROTONIX) 40 MG EC tablet; Take 1 tablet by mouth 2 (Two) Times a Day. 30 minutes prior to first meal of the day and 30 minutes prior to last meal of the day  Dispense: 180 tablet; Refill: 3  - XR Abdomen KUB; Future    2. Pain of upper abdomen  - send for KUB, CT scan done 3 months ago as above without etiology of abdominal pain  - XR Abdomen KUB; Future    3. Gastroparesis  - retained food found in stomach during EGD 2/2022  - printed information provided and reviewed in the office today, offered referral to dietitian due to diabetes but he declined  - liquid meal replacement should be helpful for gastroparesis  - continue ondansetron as needed  - review of weight in the office today revealed on 3/8/2022 he weighed 166 lbs 6.4 oz and today he was 165 12.8 oz  4. Hiatal hernia  - change omeprazole to pantoprazole in the event reflux is contributing to nausea  5. Esophageal stenosis  S/p dilation 2/3/2022   - consider repeat EGD in the future  6. Anal sphincter incompetence  7. Rectocele (identified on CT scan 6/2022)  8. Incontinence of feces, unspecified fecal incontinence type  - he needs to return to colorectal surgeon for evaluation. I offered referral to  but he reported  does not accept his insurance. I then mentioned  referral but he was unsure if  accepted his insurance. He could try to return to Dr. Land but he expressed concern about returning to Dr. Land. Recommend he check with his insurance to determine which colorectal surgeon is covered and let our office or his primary care provider know for possible referral to colorectal surgeon.   - Dr. Land placed stimulatory in the past which is turned off  8. History of colonic polyps  Dr. Graves suggested repeat colonoscopy in  3 years, due 2/25/2025 but the patient previously reported he will likely not have another colonoscopy. I informed him the office will still remind him when he is due and it is up to him if he would like to schedule repeat colonoscopy. I do not recommend other CRC screening test such as stool based tests due to his history of colon polyps.   Return in about 4 weeks (around 10/12/2022).    Beronica Anthony, APRN  09/14/2022

## 2022-09-19 ENCOUNTER — TELEPHONE (OUTPATIENT)
Dept: GASTROENTEROLOGY | Facility: CLINIC | Age: 67
End: 2022-09-19

## 2022-09-19 NOTE — TELEPHONE ENCOUNTER
Patient left message with questions regarding medication changes. He said one of them was supposed to be changed (it looks like it was the omeprazole) but was not sent in to the pharmacy. He did confirm in the message that he uses [facility] in [place] as listed in his chart.

## 2022-10-21 DIAGNOSIS — G43.119 INTRACTABLE MIGRAINE WITH AURA WITHOUT STATUS MIGRAINOSUS: ICD-10-CM

## 2022-10-24 RX ORDER — BUTALBITAL, ACETAMINOPHEN AND CAFFEINE 50; 325; 40 MG/1; MG/1; MG/1
TABLET ORAL
Qty: 30 TABLET | Refills: 0 | Status: SHIPPED | OUTPATIENT
Start: 2022-10-24 | End: 2022-12-27

## 2022-10-24 NOTE — TELEPHONE ENCOUNTER
Rx Refill Note  Requested Prescriptions     Pending Prescriptions Disp Refills   • butalbital-acetaminophen-caffeine (FIORICET, ESGIC) -40 MG per tablet [Pharmacy Med Name: Butalbital-APAP-Caffeine -40 MG Oral Tablet] 30 tablet 0     Sig: TAKE 1 TABLET BY MOUTH ONCE DAILY AS NEEDED FOR HEADACHE      Last filled:07/13/2022  Last office visit with prescribing clinician: 9/7/2021      Next office visit with prescribing clinician: Visit date not found     America Mary MA  10/24/22, 08:07 EDT         PHARMACY    Pt will need to make a f/u appt with Dr. Crain in order to continue with refills, as he hasn't been seen in over a year.

## 2022-10-28 DIAGNOSIS — R11.0 NAUSEA: ICD-10-CM

## 2022-10-31 RX ORDER — ONDANSETRON HYDROCHLORIDE 8 MG/1
TABLET, FILM COATED ORAL
Qty: 30 TABLET | Refills: 0 | Status: SHIPPED | OUTPATIENT
Start: 2022-10-31 | End: 2023-01-23

## 2022-10-31 RX ORDER — LISINOPRIL 20 MG/1
TABLET ORAL
Qty: 90 TABLET | Refills: 0 | Status: SHIPPED | OUTPATIENT
Start: 2022-10-31 | End: 2023-01-16 | Stop reason: SDUPTHER

## 2022-11-04 ENCOUNTER — OFFICE VISIT (OUTPATIENT)
Dept: FAMILY MEDICINE CLINIC | Facility: CLINIC | Age: 67
End: 2022-11-04

## 2022-11-04 VITALS
HEART RATE: 76 BPM | HEIGHT: 67 IN | BODY MASS INDEX: 26.06 KG/M2 | DIASTOLIC BLOOD PRESSURE: 60 MMHG | WEIGHT: 166 LBS | SYSTOLIC BLOOD PRESSURE: 134 MMHG

## 2022-11-04 DIAGNOSIS — R10.9 LEFT FLANK PAIN: ICD-10-CM

## 2022-11-04 DIAGNOSIS — R80.9 PROTEINURIA, UNSPECIFIED TYPE: ICD-10-CM

## 2022-11-04 DIAGNOSIS — E11.9 TYPE 2 DIABETES MELLITUS WITHOUT COMPLICATION, WITHOUT LONG-TERM CURRENT USE OF INSULIN: ICD-10-CM

## 2022-11-04 DIAGNOSIS — G43.119 INTRACTABLE MIGRAINE WITH AURA WITHOUT STATUS MIGRAINOSUS: ICD-10-CM

## 2022-11-04 DIAGNOSIS — R11.0 NAUSEA: Primary | ICD-10-CM

## 2022-11-04 DIAGNOSIS — R33.9 URINARY RETENTION WITH INCOMPLETE BLADDER EMPTYING: ICD-10-CM

## 2022-11-04 DIAGNOSIS — N20.0 NEPHROLITHIASIS: ICD-10-CM

## 2022-11-04 LAB
BILIRUB BLD-MCNC: NEGATIVE MG/DL
CLARITY, POC: CLEAR
COLOR UR: YELLOW
EXPIRATION DATE: ABNORMAL
GLUCOSE UR STRIP-MCNC: NEGATIVE MG/DL
KETONES UR QL: NEGATIVE
LEUKOCYTE EST, POC: NEGATIVE
Lab: ABNORMAL
NITRITE UR-MCNC: NEGATIVE MG/ML
PH UR: 6 [PH] (ref 5–8)
PROT UR STRIP-MCNC: ABNORMAL MG/DL
RBC # UR STRIP: NEGATIVE /UL
SP GR UR: 1.02 (ref 1–1.03)
UROBILINOGEN UR QL: NORMAL

## 2022-11-04 PROCEDURE — 81003 URINALYSIS AUTO W/O SCOPE: CPT | Performed by: FAMILY MEDICINE

## 2022-11-04 PROCEDURE — 99214 OFFICE O/P EST MOD 30 MIN: CPT | Performed by: FAMILY MEDICINE

## 2022-11-04 NOTE — PROGRESS NOTES
Established Patient Office Visit      Patient Name: Israel Reyna  : 1955   MRN: [id number]   Care Team: Patient Care Team:  Elio Moore DO as PCP - General (Family Medicine)    Chief Complaint:    Chief Complaint   Patient presents with   • Nausea   • Flank Pain     Left flank pain, constant. Causes Nausea       History of Present Illness: Israel Reyna is a 67 y.o. male who is here today for chief complaint.    HPI    The patient presents today for left flank pain and nausea.     The patient reports that his nausea and flank pain are the same as they were at his last visit. He states that he is getting tired because if it is not the pain in his kidneys and the nausea it is the pain in his head and if it is not that it is the bowel incontinence. He reports that he used to be very active and now he can hardly go anywhere. He reports that his bowel incontinence is awful and if he eats anything, it runs right through him and he does not know he needs to move his bowels until it hits him, and he is very discouraged. He reports that he has an appointment to see the neurologist on 2022 for his migraines. He reports that he is having auras on a regular basis but he does not get migraines very frequently. He reports that he was given Nurtec ODT by the nurse practitioner, which seemed to help.     He reports that he has lost all control of his bowels. He reports that when he gets pain, within 10 to 15 minutes, he has to be close to the bathroom or he might not get there. He reports that he saw the gastroenterologist but did not really connect with him. He reports that he is going to see Dr. Crain on 2022. He reports that the neurostimulator is supposed to be for urine, but he thought it would work for his bowel incontinence. He reports that when he has to go to the bathroom, he cannot wait and only gets a 10 to 15 second warning. He reports that when he is in the living room,  he cannot get to the gipson bathroom. He reports that when he is really bad, he has to stay in the master bedroom and be 10 feet away.     He reports that he is not taking anything for his gastroparesis but would rather stay on the Trulicity and have the gastroparesis than have his blood glucose go up. He reports that the nurse practitioner told him that he needed to change his diet. He reports that he has not slept all night in months because he gets the burning at approximately 3:00 AM to 4:00 AM. He reports that he has been testing his blood glucose 2 to 3 times a day, and sometimes it is 70 to 80 mg/dL but sometimes it is higher. For example, this morning it was 122 mg/dL.    This patient is accompanied by their self who contributes to the history of their care.    The following portions of the patient's history were reviewed and updated as appropriate: allergies, current medications, past family history, past medical history, past social history, past surgical history and problem list.    Subjective      Review of Systems:   Review of Systems - See HPI    Past Medical History:   Past Medical History:   Diagnosis Date   • Anxiety    • Bowel incontinence     w/ history of Stimulator Device    • CAD (coronary artery disease)    • Chest pain    • CHF (congestive heart failure) (HCC)    • Depression    • Diabetes mellitus, type 2 (HCC)    • Diabetic peripheral neuropathy (HCC)    • Dyslipidemia    • Fatigue    • GERD (gastroesophageal reflux disease)    • Hypertension    • Mental status change    • Migraine     Complex    • Migraine headache    • SOB (shortness of breath)        Past Surgical History:   Past Surgical History:   Procedure Laterality Date   • CARDIAC CATHETERIZATION     • CARDIAC CATHETERIZATION N/A 10/14/2016    Procedure: Left Heart Cath;  Surgeon: Santiago Garner MD;  Location: [facility] CATH INVASIVE LOCATION;  Service:    • COLONOSCOPY  02/25/2022    Dr. Graves-recommended 3 year repeat   •  ENDOSCOPY  02/03/2022    Dr. Graves   • GASTRIC STIMULATOR IMPLANT SURGERY     • SMALL INTESTINE SURGERY      unspecified, x3 as an infant    • TESTICLE SURGERY         Family History:   Family History   Problem Relation Age of Onset   • Heart failure Mother    • Diabetes Mother    • Alzheimer's disease Mother    • Heart attack Father    • Kidney disease Father    • Cancer Father    • Prostate cancer Father    • Cancer Sister    • Breast cancer Sister    • Diabetes Sister    • Heart disease Sister    • Arrhythmia Sister    • Diabetes Brother    • Heart disease Brother    • Cancer Brother    • Liver disease Brother    • Alzheimer's disease Maternal Grandmother    • Liver cancer Maternal Grandfather    • Other Paternal Grandmother         [place] flu   • Other Paternal Grandfather         [place] flu   • Colon cancer Neg Hx        Social History:   Social History     Socioeconomic History   • Marital status:    Tobacco Use   • Smoking status: Never   • Smokeless tobacco: Never   Vaping Use   • Vaping Use: Never used   Substance and Sexual Activity   • Alcohol use: Never   • Drug use: Never   • Sexual activity: Defer       Tobacco History:   Social History     Tobacco Use   Smoking Status Never   Smokeless Tobacco Never       Medications:     Current Outpatient Medications:   •  amLODIPine (NORVASC) 10 MG tablet, TAKE 1 TABLET DAILY, Disp: 90 tablet, Rfl: 3  •  butalbital-acetaminophen-caffeine (FIORICET, ESGIC) -40 MG per tablet, TAKE 1 TABLET BY MOUTH ONCE DAILY AS NEEDED FOR HEADACHE, Disp: 30 tablet, Rfl: 0  •  divalproex (DEPAKOTE) 500 MG DR tablet, Take 1 tablet by mouth Daily for 30 days., Disp: 30 tablet, Rfl: 3  •  gabapentin (NEURONTIN) 300 MG capsule, Take 1 capsule by mouth 3 (Three) Times a Day., Disp: 270 capsule, Rfl: 1  •  glipizide (GLUCOTROL) 10 MG tablet, Take 1 tablet by mouth 2 (Two) Times a Day Before Meals., Disp: 180 tablet, Rfl: 0  •  glucose blood test strip, 1 each by Other  "route Daily. Use daily as directed, Disp: 100 each, Rfl: 12  •  glucose monitor monitoring kit, 1 each Daily., Disp: 1 each, Rfl: 0  •  Incontinence Supply Disposable (NU-FIT ADULT UNDERWEAR MEDIUM) misc, , Disp: , Rfl:   •  Insulin Syringe 31G X 5/16\" 0.3 ML misc, 1 each Every Night., Disp: 90 each, Rfl: 3  •  Lancets Thin misc, 1 each Daily. Use daily as directed, Disp: 100 each, Rfl: 11  •  lisinopril (PRINIVIL,ZESTRIL) 20 MG tablet, TAKE 1 TABLET DAILY, Disp: 90 tablet, Rfl: 0  •  Lutein 5 % beads, Take 5 mg by mouth Daily., Disp: , Rfl:   •  metFORMIN (GLUCOPHAGE) 1000 MG tablet, TAKE 1 TABLET TWICE DAILY  WITH MEALS, Disp: 180 tablet, Rfl: 0  •  Multiple Vitamins-Minerals (PRESERVISION AREDS 2 PO), Take  by mouth Daily., Disp: , Rfl:   •  NON FORMULARY, Shot in eye every 3 months for macular degeneration, Disp: , Rfl:   •  ondansetron (ZOFRAN) 8 MG tablet, TAKE 1 TABLET BY MOUTH EVERY 8 HOURS AS NEEDED FOR NAUSEA FOR VOMITING, Disp: 30 tablet, Rfl: 0  •  pantoprazole (PROTONIX) 40 MG EC tablet, Take 1 tablet by mouth 2 (Two) Times a Day. 30 minutes prior to first meal of the day and 30 minutes prior to last meal of the day, Disp: 180 tablet, Rfl: 3  •  sildenafil (VIAGRA) 100 MG tablet, Take 1 tablet by mouth Daily As Needed for Erectile Dysfunction., Disp: 15 tablet, Rfl: 11  •  simvastatin (ZOCOR) 20 MG tablet, Take 0.5 tablets by mouth Every Night., Disp: 45 tablet, Rfl: 1  •  Specialty Vitamins Products (HEALTHY HEART COMPLEX PO), Take  by mouth Daily., Disp: , Rfl:   •  tamsulosin (FLOMAX) 0.4 MG capsule 24 hr capsule, Take 1 capsule by mouth Daily., Disp: 90 capsule, Rfl: 0  •  Trulicity 1.5 MG/0.5ML solution pen-injector, Inject 1.5 mg under the skin into the appropriate area as directed 1 (One) Time Per Week., Disp: 12 pen, Rfl: 3  •  vitamin B-12 (CYANOCOBALAMIN) 100 MCG tablet, Take 50 mcg by mouth 2 (two) times a day., Disp: , Rfl:   •  vitamin C (ASCORBIC ACID) 500 MG tablet, Take 500 mg by mouth 2 " "(two) times a day., Disp: , Rfl:   •  vitamin E 400 UNIT capsule, Take 400 Units by mouth Daily., Disp: , Rfl:   •  Zeaxanthin powder, Take 1 mg by mouth Daily., Disp: , Rfl:   •  Zinc 40 MG tablet, Take 1 tablet by mouth Daily., Disp: , Rfl:     Allergies:   Allergies   Allergen Reactions   • Actos [Pioglitazone] Anaphylaxis     congestive heart failure   • Avandia [Rosiglitazone] Anaphylaxis     congestive heart failure.   • Darvon [Propoxyphene] Itching     Angioedema    itching   • Talwin [Pentazocine] Rash     Angioedema    itching       Objective   Objective     Physical Exam:  Vital Signs:   Vitals:    11/04/22 1256   BP: 134/60   Pulse: 76   Weight: 75.3 kg (166 lb)   Height: 170.2 cm (67.01\")   PainSc:   7     Body mass index is 25.99 kg/m².     Physical Exam  Nursing note reviewed  Const: NAD, A&Ox4, Pleasant, Cooperative  Eyes: EOMI, no conjunctivitis  ENT: No nasal discharge present, neck supple  Cardiac: Regular rate and rhythm, no cyanosis  Resp: Respiratory rate within normal limits, no increased work of breathing, no audible wheezing or retractions noted  GI: No distention or ascites  MSK: Motor and sensation grossly intact in bilateral upper extremities  Neurologic: CN II-XII grossly intact  Psych: Appropriate mood and behavior.  Skin: Warm, dry  Procedures/Radiology     Procedures  No radiology results for the last 7 days     Assessment & Plan   Assessment / Plan      Assessment/Plan:   Problems Addressed This Visit  Diagnoses and all orders for this visit:    1. Nausea (Primary)  -     POCT urinalysis dipstick, automated    2. Intractable migraine with aura without status migrainosus    3. Nephrolithiasis  -     Ambulatory Referral to Urology    4. Urinary retention with incomplete bladder emptying  -     Ambulatory Referral to Urology    5. Left flank pain    6. Proteinuria, unspecified type  -     Microalbumin / Creatinine Urine Ratio - Urine, Clean Catch; Future  -     Comprehensive Metabolic " Panel; Future  -     C3+C4+MT+RA; Future  -     Protein Elec + Interp, Serum; Future  -     Creatinine, Total Protein , Electrophoresis & Immunofixation 24 Hr Urine - Urine, Clean Catch; Future    7. Type 2 diabetes mellitus without complication, without long-term current use of insulin (HCC)  -     Hemoglobin A1c; Future  -     Comprehensive Metabolic Panel; Future      Problem List Items Addressed This Visit        Endocrine and Metabolic    Diabetes mellitus, type 2 (HCC)       Gastrointestinal Abdominal     Nausea - Primary       Neuro    Intractable migraine with aura without status migrainosus   Other Visit Diagnoses     Nephrolithiasis        Relevant Orders    Ambulatory Referral to Urology    Urinary retention with incomplete bladder emptying        Relevant Orders    Ambulatory Referral to Urology    Left flank pain        Proteinuria, unspecified type                Patient Instructions   1. Urine testing for proteins      Follow Up:   Return in about 2 weeks (around 11/18/2022).      DO BAILEY Jones RD  [facility]  [address] [address]  Fax [phone]  Phone [phone]      Transcribed from ambient dictation for Elio Moore DO by Minda Fernandez.  11/04/22   14:47 EDT    Patient or patient representative verbalized consent to the visit recording.  I have personally performed the services described in this document as transcribed by the above individual, and it is both accurate and complete.

## 2022-11-08 ENCOUNTER — OFFICE VISIT (OUTPATIENT)
Dept: NEUROLOGY | Facility: CLINIC | Age: 67
End: 2022-11-08

## 2022-11-08 ENCOUNTER — LAB (OUTPATIENT)
Dept: LAB | Facility: HOSPITAL | Age: 67
End: 2022-11-08

## 2022-11-08 VITALS
OXYGEN SATURATION: 97 % | HEART RATE: 67 BPM | SYSTOLIC BLOOD PRESSURE: 130 MMHG | BODY MASS INDEX: 26.06 KG/M2 | HEIGHT: 67 IN | WEIGHT: 166.01 LBS | DIASTOLIC BLOOD PRESSURE: 62 MMHG

## 2022-11-08 DIAGNOSIS — E11.9 TYPE 2 DIABETES MELLITUS WITHOUT COMPLICATION, WITHOUT LONG-TERM CURRENT USE OF INSULIN: ICD-10-CM

## 2022-11-08 DIAGNOSIS — G43.119 INTRACTABLE MIGRAINE WITH AURA WITHOUT STATUS MIGRAINOSUS: Primary | ICD-10-CM

## 2022-11-08 DIAGNOSIS — R80.9 PROTEINURIA, UNSPECIFIED TYPE: ICD-10-CM

## 2022-11-08 LAB
ALBUMIN SERPL-MCNC: 4.2 G/DL (ref 3.5–5.2)
ALBUMIN UR-MCNC: 3.3 MG/DL
ALBUMIN/GLOB SERPL: 1.4 G/DL
ALP SERPL-CCNC: 69 U/L (ref 39–117)
ALT SERPL W P-5'-P-CCNC: 15 U/L (ref 1–41)
ANION GAP SERPL CALCULATED.3IONS-SCNC: 10 MMOL/L (ref 5–15)
AST SERPL-CCNC: 16 U/L (ref 1–40)
BILIRUB SERPL-MCNC: 0.3 MG/DL (ref 0–1.2)
BUN SERPL-MCNC: 15 MG/DL (ref 8–23)
BUN/CREAT SERPL: 14.7 (ref 7–25)
CALCIUM SPEC-SCNC: 9.7 MG/DL (ref 8.6–10.5)
CHLORIDE SERPL-SCNC: 103 MMOL/L (ref 98–107)
CO2 SERPL-SCNC: 28 MMOL/L (ref 22–29)
CREAT SERPL-MCNC: 1.02 MG/DL (ref 0.76–1.27)
CREAT UR-MCNC: 70.4 MG/DL
EGFRCR SERPLBLD CKD-EPI 2021: 81.1 ML/MIN/1.73
GLOBULIN UR ELPH-MCNC: 2.9 GM/DL
GLUCOSE SERPL-MCNC: 127 MG/DL (ref 65–99)
HBA1C MFR BLD: 7.4 % (ref 4.8–5.6)
MICROALBUMIN/CREAT UR: 46.9 MG/G
POTASSIUM SERPL-SCNC: 4.8 MMOL/L (ref 3.5–5.2)
PROT SERPL-MCNC: 7.1 G/DL (ref 6–8.5)
SODIUM SERPL-SCNC: 141 MMOL/L (ref 136–145)

## 2022-11-08 PROCEDURE — 84166 PROTEIN E-PHORESIS/URINE/CSF: CPT

## 2022-11-08 PROCEDURE — 82043 UR ALBUMIN QUANTITATIVE: CPT

## 2022-11-08 PROCEDURE — 86335 IMMUNFIX E-PHORSIS/URINE/CSF: CPT

## 2022-11-08 PROCEDURE — 84165 PROTEIN E-PHORESIS SERUM: CPT

## 2022-11-08 PROCEDURE — 82570 ASSAY OF URINE CREATININE: CPT

## 2022-11-08 PROCEDURE — 84156 ASSAY OF PROTEIN URINE: CPT

## 2022-11-08 PROCEDURE — 86160 COMPLEMENT ANTIGEN: CPT

## 2022-11-08 PROCEDURE — 99214 OFFICE O/P EST MOD 30 MIN: CPT | Performed by: PSYCHIATRY & NEUROLOGY

## 2022-11-08 PROCEDURE — 86038 ANTINUCLEAR ANTIBODIES: CPT

## 2022-11-08 PROCEDURE — 83036 HEMOGLOBIN GLYCOSYLATED A1C: CPT

## 2022-11-08 PROCEDURE — 86431 RHEUMATOID FACTOR QUANT: CPT

## 2022-11-08 PROCEDURE — 80053 COMPREHEN METABOLIC PANEL: CPT

## 2022-11-08 RX ORDER — DIVALPROEX SODIUM 500 MG/1
500 TABLET, DELAYED RELEASE ORAL DAILY
Qty: 30 TABLET | Refills: 3 | Status: SHIPPED | OUTPATIENT
Start: 2022-11-08 | End: 2023-02-22 | Stop reason: SDUPTHER

## 2022-11-08 NOTE — PROGRESS NOTES
"Subjective:    CC: Israel Reyna is in clinic today for follow up for history of migraines.    HPI:  Problem history:    This is a 67 yo male who presents for re-evaluation of worsening migraines. Intractable migraines since 2013 followed by Dr. Chaidez normally.   He went to have COVID vaccine March 2021 Pfizer. He had immediate worsening headaches, chills, feeling terrible, weak and in bed x 4 days. He had his second injection April 2021 Pfizer with the same exact symptoms. He feels that his symptoms have progressively worsened since the COVID vaccines. Has noticed every time he eats anything at all he notices he cannot walk, he cannot speak, his left face will \"draw up\", twitch and be painful with episodes lasting 15 minutes to 2 hours and have been present for 2-3 months and seems to be worsening since COVID vaccine. He was last seen in our clinic 3/2020. He tells me the pain in his head, holocranial, can be anywhere throughout the head, \"feels like someone is repeatedly hitting me with a ball bat or stick and at times feels like someone is driving a nail in the back of my head. The ones that scare me the most are the headaches in the temples that feel like sharp, someone shooting a nail gun in my temples.\" Also has associated dizziness. Pain can radiate across his head and within 10 minutes and he loses control of his bowels. This is not a change and has been present since 9223-9101. No imaging of head since 2017. He cannot have MRI due to anal sphincter stimulator. Has chronic daily headaches. \"Drawing of face\" is daily. CTA head/neck 2019 normal. He feels when he has his pain there is \"moisture running down his head\". Takes topamax 25mg BID and gabapentin 300mg TID. Experiences severe pain with episodes. Cannot afford Botox. He is agreeable to CT head today. He is requesting migraine cocktail as well. He uses cane at times. Has been using fioricet PRN. He is scheduled for OP migraine cocktail today " at 3pm at [facility]. Last migraine cocktail was [id number]. Tells me one med gives him hiccups for 2-3 days after but unsure which.  His headaches do also have severe pain, throbbing, photophobia, phonophobia, nausea and occasional vomiting and do her daily with some episodes being worse than others.    Prior notes and history: He has had multiple episodes of facial drooping, confusion, tingling & weakness all associated with headaches and has been diagnosed with intractable migraine with aura with complex migraines.  He has undergone continuous EEG monitoring as well as routine EEG monitoring and these test have been unremarkable.  He has been treated with multiple medications for his migraines including doxepin, Topamax, Elavil, Depakote, verapamil. He has previously had CT of the head in 2017 which appeared within normal limits with no changes from prior imaging.  CT of the cervical spine did show some degenerative changes in 2017.  He also had a carotid ultrasound in August 2019 which was concerning for high-grade stenosis.  He underwent CTA of the head and neck on 9/9/2019 ordered by Dr. Gavin neurosurgery and this showed minimal atherosclerosis with no stenosis, aneurysm or occlusions noted.  He does continue to take aspirin as well as simvastatin.  He has had a history of stroke.  EEG in 2017 was normal with minimal intermittent generalized slowing.  There has been discussion of Botox injections but this is too costly with his insurance and he does not want to try this.  He tells me he has had no changes in his health over the past 6 months. Has anal sphincter stimulator and cannot undergo MRI. Triptans contraindicated.    9/7/2021: I am seeing him in clinic today for the first time.  I have reviewed his problem history in detail.  Based on the history, he likely has chronic migraines with prominent sensory auras/complex migraines.  Since receiving vaccine back in March/April 2021, he is experiencing  8-10 episodes of left facial tingling, numbness followed by a left face drawing and generalized weakness lasting for 1 to 2 hours.  Some of these episodes are associated with headaches and some of these episodes are not.  He has been on Topamax 25 mg twice daily for almost 5 years.  He is also on gabapentin 300 mg 3 times daily for neck pain.  He uses Fioricet as needed for severe migraines which seems to be helping.  On his last visit with Dr. Aguero in May 2021, he was prescribed tapering course of steroids after which, he did not have any relief in intensity and frequency of these episodes.  He has had detailed neurological work-up in the past which was essentially normal including routine EEG, continuous EEG as well as CT angiogram of brain and neck.    11/8/2022: He is in clinic for follow-up after 13 months.  He reports that since his initial visit with me, he has had several breakthrough migraines associated with left facial tingling, numbness followed by face drawing, generalized weakness lasting for 1 to 2 hours.  With some of the episodes, he feels as if fluid is moving from the right side of the head to the left.  On the last visit, Topamax dose was increased but he reports it did not really help so he stopped taking the medication.  He has been also given some samples of Nurtec to be taken at the onset of migraine which she has tried couple of times and seems to have helped somewhat.    The following portions of the patient's history were reviewed and updated as of 11/08/2022: allergies, social history and problem list.       Current Outpatient Medications:   •  amLODIPine (NORVASC) 10 MG tablet, TAKE 1 TABLET DAILY, Disp: 90 tablet, Rfl: 3  •  butalbital-acetaminophen-caffeine (FIORICET, ESGIC) -40 MG per tablet, TAKE 1 TABLET BY MOUTH ONCE DAILY AS NEEDED FOR HEADACHE, Disp: 30 tablet, Rfl: 0  •  gabapentin (NEURONTIN) 300 MG capsule, Take 1 capsule by mouth 3 (Three) Times a Day., Disp: 270  "capsule, Rfl: 1  •  glipizide (GLUCOTROL) 10 MG tablet, Take 1 tablet by mouth 2 (Two) Times a Day Before Meals., Disp: 180 tablet, Rfl: 0  •  glucose blood test strip, 1 each by Other route Daily. Use daily as directed, Disp: 100 each, Rfl: 12  •  glucose monitor monitoring kit, 1 each Daily., Disp: 1 each, Rfl: 0  •  Incontinence Supply Disposable (NU-FIT ADULT UNDERWEAR MEDIUM) misc, , Disp: , Rfl:   •  Insulin Syringe 31G X 5/16\" 0.3 ML misc, 1 each Every Night., Disp: 90 each, Rfl: 3  •  Lancets Thin misc, 1 each Daily. Use daily as directed, Disp: 100 each, Rfl: 11  •  lisinopril (PRINIVIL,ZESTRIL) 20 MG tablet, TAKE 1 TABLET DAILY, Disp: 90 tablet, Rfl: 0  •  Lutein 5 % beads, Take 5 mg by mouth Daily., Disp: , Rfl:   •  metFORMIN (GLUCOPHAGE) 1000 MG tablet, TAKE 1 TABLET TWICE DAILY  WITH MEALS, Disp: 180 tablet, Rfl: 0  •  Multiple Vitamins-Minerals (PRESERVISION AREDS 2 PO), Take  by mouth Daily., Disp: , Rfl:   •  NON FORMULARY, Shot in eye every 3 months for macular degeneration, Disp: , Rfl:   •  ondansetron (ZOFRAN) 8 MG tablet, TAKE 1 TABLET BY MOUTH EVERY 8 HOURS AS NEEDED FOR NAUSEA FOR VOMITING, Disp: 30 tablet, Rfl: 0  •  pantoprazole (PROTONIX) 40 MG EC tablet, Take 1 tablet by mouth 2 (Two) Times a Day. 30 minutes prior to first meal of the day and 30 minutes prior to last meal of the day, Disp: 180 tablet, Rfl: 3  •  sildenafil (VIAGRA) 100 MG tablet, Take 1 tablet by mouth Daily As Needed for Erectile Dysfunction., Disp: 15 tablet, Rfl: 11  •  simvastatin (ZOCOR) 20 MG tablet, Take 0.5 tablets by mouth Every Night., Disp: 45 tablet, Rfl: 1  •  Specialty Vitamins Products (HEALTHY HEART COMPLEX PO), Take  by mouth Daily., Disp: , Rfl:   •  tamsulosin (FLOMAX) 0.4 MG capsule 24 hr capsule, Take 1 capsule by mouth Daily., Disp: 90 capsule, Rfl: 0  •  Trulicity 1.5 MG/0.5ML solution pen-injector, Inject 1.5 mg under the skin into the appropriate area as directed 1 (One) Time Per Week., Disp: 12 " pen, Rfl: 3  •  vitamin B-12 (CYANOCOBALAMIN) 100 MCG tablet, Take 50 mcg by mouth 2 (two) times a day., Disp: , Rfl:   •  vitamin C (ASCORBIC ACID) 500 MG tablet, Take 500 mg by mouth 2 (two) times a day., Disp: , Rfl:   •  vitamin E 400 UNIT capsule, Take 400 Units by mouth Daily., Disp: , Rfl:   •  Zeaxanthin powder, Take 1 mg by mouth Daily., Disp: , Rfl:   •  Zinc 40 MG tablet, Take 1 tablet by mouth Daily., Disp: , Rfl:   •  divalproex (DEPAKOTE) 500 MG DR tablet, Take 1 tablet by mouth Daily for 30 days., Disp: 30 tablet, Rfl: 3   Past Medical History:   Diagnosis Date   • Anxiety    • Bowel incontinence     w/ history of Stimulator Device    • CAD (coronary artery disease)    • Chest pain    • CHF (congestive heart failure) (HCC)    • Depression    • Diabetes mellitus, type 2 (HCC)    • Diabetic peripheral neuropathy (HCC)    • Dyslipidemia    • Fatigue    • GERD (gastroesophageal reflux disease)    • Hypertension    • Mental status change    • Migraine     Complex    • Migraine headache    • SOB (shortness of breath)       Past Surgical History:   Procedure Laterality Date   • CARDIAC CATHETERIZATION     • CARDIAC CATHETERIZATION N/A 10/14/2016    Procedure: Left Heart Cath;  Surgeon: Santiago Garner MD;  Location: [facility] CATH INVASIVE LOCATION;  Service:    • COLONOSCOPY  02/25/2022    Dr. Graves-recommended 3 year repeat   • ENDOSCOPY  02/03/2022    Dr. Graves   • GASTRIC STIMULATOR IMPLANT SURGERY     • SMALL INTESTINE SURGERY      unspecified, x3 as an infant    • TESTICLE SURGERY        Family History   Problem Relation Age of Onset   • Heart failure Mother    • Diabetes Mother    • Alzheimer's disease Mother    • Heart attack Father    • Kidney disease Father    • Cancer Father    • Prostate cancer Father    • Cancer Sister    • Breast cancer Sister    • Diabetes Sister    • Heart disease Sister    • Arrhythmia Sister    • Diabetes Brother    • Heart disease Brother    • Cancer Brother    •  "Liver disease Brother    • Alzheimer's disease Maternal Grandmother    • Liver cancer Maternal Grandfather    • Other Paternal Grandmother         [place] flu   • Other Paternal Grandfather         [place] flu   • Colon cancer Neg Hx         Review of Systems   Gastrointestinal: Positive for nausea.     Objective:    /62   Pulse 67   Ht 170.2 cm (67.01\")   Wt 75.3 kg (166 lb 0.1 oz)   SpO2 97%   BMI 25.99 kg/m²     Neurology Exam:  General apperance: NAD.     Mental status: Alert, awake and oriented to time place and person.    Recent and Remote memory: Can recall 3/3 objects at 5 minutes. Can recall historical events.     Attention span and Concentration: Serial 7s: Normal.     Fund of knowledge:  Normal.     Language and Speech: No aphasia or dysarthria.    Naming , Repitition and Comprehension:  Can name objects, repeat a sentence and follow commands. Speech is clear and fluent with good repetition, comprehension, and naming.    CN II to XII: Intact.    Opthalmoscopic Exam: No papilledema.    Motor:  Right UE muscle strength 5/5. Normal tone.     Left UE muscle strength 5/5. Normal tone.      Right LE muscle strength5/5. Normal tone.     Left LE muscle strength 5/5. Normal tone.      Sensory: Normal light touch, vibration and pinprick sensation bilaterally.    DTRs: 2+ bilaterally.    Babinski: Negative bilaterally.    Co-ordination: Normal finger-to-nose, heel to shin B/L.    Rhomberg: Negative.    Gait: Normal.    Cardiovascular: Regular rate and rhythm without murmur, gallop or rub.    Assessment and Plan:  1. Intractable migraine with aura without status migrainosus  -He is reporting worsening in migraine frequency and intensity.  Topamax 100 mg twice daily did not help so he stopped taking.  I am going to start him on Depakote 500 mg daily and see how he does.  Have given him some more samples of Nurtec to try.  If it works then I have advised him to call office so prescription can be sent to his " pharmacy.  Otherwise, I will see him back in clinic in 3 months for follow-up.       I spent 30 minutes in patient care: Reviewing records prior to the visit, entering orders and documentation and spent more than hurtado 50% of this time face-to-face in management, instructions and education regarding above mentioned diagnosis and also on counseling and discussing about taking medication regularly, possible side effects with medication use, importance of good sleep hygiene, good hydration and regular exercise.    Return in about 3 months (around 2/8/2023).

## 2022-11-09 LAB
ALBUMIN SERPL ELPH-MCNC: 3.7 G/DL (ref 2.9–4.4)
ALBUMIN/GLOB SERPL: 1.2 {RATIO} (ref 0.7–1.7)
ALPHA1 GLOB SERPL ELPH-MCNC: 0.3 G/DL (ref 0–0.4)
ALPHA2 GLOB SERPL ELPH-MCNC: 1 G/DL (ref 0.4–1)
ANA SER QL: NEGATIVE
B-GLOBULIN SERPL ELPH-MCNC: 1 G/DL (ref 0.7–1.3)
C3 SERPL-MCNC: 147 MG/DL (ref 82–167)
C4 SERPL-MCNC: 30 MG/DL (ref 12–38)
GAMMA GLOB SERPL ELPH-MCNC: 0.7 G/DL (ref 0.4–1.8)
GLOBULIN SER CALC-MCNC: 3 G/DL (ref 2.2–3.9)
LABORATORY COMMENT REPORT: NORMAL
M PROTEIN SERPL ELPH-MCNC: NORMAL G/DL
PROT PATTERN SERPL ELPH-IMP: NORMAL
PROT SERPL-MCNC: 6.7 G/DL (ref 6–8.5)
RHEUMATOID FACT SERPL-ACNC: <10 IU/ML

## 2022-11-10 ENCOUNTER — TELEPHONE (OUTPATIENT)
Dept: UROLOGY | Facility: CLINIC | Age: 67
End: 2022-11-10

## 2022-11-10 LAB
ALBUMIN 24H MFR UR ELPH: 43.9 %
ALPHA1 GLOB 24H MFR UR ELPH: 4.9 %
ALPHA2 GLOB 24H MFR UR ELPH: 11.9 %
B-GLOBULIN MFR UR ELPH: 22 %
CREAT 24H UR-MRATE: 1062 MG/24 HR (ref 1000–2000)
CREAT UR-MCNC: 88.5 MG/DL
GAMMA GLOB 24H MFR UR ELPH: 17.3 %
HIV 1 & 2 AB SER-IMP: NORMAL
INTERPRETATION UR IFE-IMP: NORMAL
M PROTEIN 24H MFR UR ELPH: NORMAL %
PROT UR-MCNC: 13.7 MG/DL

## 2022-11-10 NOTE — TELEPHONE ENCOUNTER
Received a referral for PT, and attempted to schedule within the next weeks. Left VM to return call to get scheduled.

## 2022-11-10 NOTE — TELEPHONE ENCOUNTER
Caller: Israel Reyna    Relationship to patient: Self    Best call back number: [phone]    Chief complaint: IS IN SEVERE PAIN AND HAS 2 KIDNEY STONES IN BOTH KIDNEYS. WOULD LIKE SOONER APPT THEN WHAT'S AVAILABLE.    Type of visit: NEW PATIENT    Requested date: ASAP    If rescheduling, when is the original appointment: 12/12/22 3:30P    Additional notes: WOULD LIKE CALL BACK

## 2022-11-15 ENCOUNTER — TELEPHONE (OUTPATIENT)
Dept: UROLOGY | Facility: CLINIC | Age: 67
End: 2022-11-15

## 2022-11-16 ENCOUNTER — TELEPHONE (OUTPATIENT)
Dept: FAMILY MEDICINE CLINIC | Facility: CLINIC | Age: 67
End: 2022-11-16

## 2022-11-16 NOTE — TELEPHONE ENCOUNTER
Caller: Israel Reyna    Relationship: Self    Best call back number: [phone]    What orders are you requesting (i.e. lab or imaging): CT SCAN FOR STONE PROTOCOL    In what timeframe would the patient need to come in: ASAP    Additional notes: PATIENT ADVISES THAT UROLOGY HAS RECOMMENDED THAT HIS PRIMARY CARE PROVIDER ORDER A CT SCAN FOR STONE PROTOCOL BEFORE HE IS SEEN BY DR. JOSHUA. PLEASE ADVISE.

## 2022-11-18 ENCOUNTER — TELEPHONE (OUTPATIENT)
Dept: FAMILY MEDICINE CLINIC | Facility: CLINIC | Age: 67
End: 2022-11-18

## 2022-11-18 ENCOUNTER — OFFICE VISIT (OUTPATIENT)
Dept: FAMILY MEDICINE CLINIC | Facility: CLINIC | Age: 67
End: 2022-11-18

## 2022-11-18 VITALS
WEIGHT: 165.2 LBS | TEMPERATURE: 98.2 F | HEART RATE: 91 BPM | DIASTOLIC BLOOD PRESSURE: 82 MMHG | BODY MASS INDEX: 25.87 KG/M2 | OXYGEN SATURATION: 97 % | SYSTOLIC BLOOD PRESSURE: 110 MMHG

## 2022-11-18 DIAGNOSIS — E11.65 TYPE 2 DIABETES MELLITUS WITH HYPERGLYCEMIA, WITHOUT LONG-TERM CURRENT USE OF INSULIN: ICD-10-CM

## 2022-11-18 DIAGNOSIS — R11.0 NAUSEA: Primary | ICD-10-CM

## 2022-11-18 DIAGNOSIS — E11.9 TYPE 2 DIABETES MELLITUS WITHOUT COMPLICATION, WITHOUT LONG-TERM CURRENT USE OF INSULIN: ICD-10-CM

## 2022-11-18 DIAGNOSIS — N20.0 NEPHROLITHIASIS: ICD-10-CM

## 2022-11-18 PROCEDURE — 99214 OFFICE O/P EST MOD 30 MIN: CPT | Performed by: FAMILY MEDICINE

## 2022-11-18 RX ORDER — BLOOD-GLUCOSE SENSOR
1 EACH MISCELLANEOUS
Qty: 6 EACH | Refills: 3 | Status: SHIPPED | OUTPATIENT
Start: 2022-11-18 | End: 2023-01-16

## 2022-11-18 RX ORDER — DULAGLUTIDE 1.5 MG/.5ML
0.5 INJECTION, SOLUTION SUBCUTANEOUS WEEKLY
Qty: 6 ML | Refills: 3 | Status: SHIPPED | OUTPATIENT
Start: 2022-11-18 | End: 2023-02-02

## 2022-11-18 NOTE — TELEPHONE ENCOUNTER
Contacted pt & informed him that I did reach out to [facility] but the representative stated that the application would be mailed to the patient for 2023. Pt verbalized understanding and states he will look out for it in the mail. I advised the pt to reach out to the office once the paper has been received if he needs help or additional information filled out by the office. Pt states he has enough Trulicity at this time to get him through the end of a year.

## 2022-11-18 NOTE — TELEPHONE ENCOUNTER
Caller: Israel Reyna    Relationship: Self    Best call back number: [phone]    What form or medical record are you requesting: PATIENT ASSISTANCE PROGRAM FOR TRULICITY     Who is requesting this form or medical record from you: PATIENT     How would you like to receive the form or medical records (pick-up, mail, fax): FAX TO [employer] [phone]    Timeframe paperwork needed: BEFORE NEXT YEAR     Additional notes: THE PATIENT WAS SEEN TODAY HE WANTS TO MAKE SURE THAT THE DOCTOR WILL SEND IN THE PAPERWORK TO THE [employer] FOR HIS TRULICITY THE PATIENT IS CONCERNED BECAUSE A PRESCRIPTION FOR TRULICITY WAS SENT TO HIS LOCAL PHARMACY HE STATES THAT THE PRESCRIPTION WAS SUPPOSED TO GO  TO THE [employer] PLEASE CALL PATIENT TO DISCUSS

## 2022-11-19 ENCOUNTER — TELEPHONE (OUTPATIENT)
Dept: FAMILY MEDICINE CLINIC | Facility: CLINIC | Age: 67
End: 2022-11-19

## 2022-11-19 NOTE — TELEPHONE ENCOUNTER
----- Message from Elio Moore, DO sent at 11/18/2022  1:56 PM EST -----  Is there anything we need to do to re-enroll him in the patient assistance program for his Trulicity?  It looks like it was last done in December of last year.  I remember that the [employer] was not successful but the [facility] one went through.

## 2022-11-19 NOTE — TELEPHONE ENCOUNTER
No answer, left voicemail  to notify him that patient assistance will need to be initiated on his end. He can visit [email or web address] to begin his patient assistance application.     Hub can relay and document.

## 2022-11-22 ENCOUNTER — TELEPHONE (OUTPATIENT)
Dept: FAMILY MEDICINE CLINIC | Facility: CLINIC | Age: 67
End: 2022-11-22

## 2022-11-22 NOTE — TELEPHONE ENCOUNTER
Pt stated that  switched two of his medications from twice daily to once daily and since then his blood sugar is continuing to rise. It blood sugar is currently over 100 and he states that he feels really bad and is wanting to know if he should switch back to taking the medication twice daily. Please advise

## 2022-11-22 NOTE — TELEPHONE ENCOUNTER
Patient reports that after holding evening dose of metformin and glipizide his glucose has been running higher in the 250's    He checked this morning and reports glucose as 176. He is concerned and states that when his glucose is in this range he feels very sick. I advised him that 176 is a normal average and he states his glucoses typically stays from 90 -120

## 2022-11-29 ENCOUNTER — APPOINTMENT (OUTPATIENT)
Dept: CT IMAGING | Facility: HOSPITAL | Age: 67
End: 2022-11-29

## 2022-12-02 ENCOUNTER — TELEPHONE (OUTPATIENT)
Dept: NEUROLOGY | Facility: CLINIC | Age: 67
End: 2022-12-02

## 2022-12-02 ENCOUNTER — TELEMEDICINE (OUTPATIENT)
Dept: FAMILY MEDICINE CLINIC | Facility: CLINIC | Age: 67
End: 2022-12-02

## 2022-12-02 DIAGNOSIS — U07.1 UPPER RESPIRATORY TRACT INFECTION DUE TO COVID-19 VIRUS: ICD-10-CM

## 2022-12-02 DIAGNOSIS — J06.9 UPPER RESPIRATORY TRACT INFECTION DUE TO COVID-19 VIRUS: ICD-10-CM

## 2022-12-02 DIAGNOSIS — R11.0 NAUSEA: Primary | ICD-10-CM

## 2022-12-02 PROCEDURE — 99212 OFFICE O/P EST SF 10 MIN: CPT | Performed by: FAMILY MEDICINE

## 2022-12-02 NOTE — TELEPHONE ENCOUNTER
Provider: EFRAÍN ARAIZA MD    Caller: ROHITH    Relationship to Patient: SELF    Phone Number: [phone]    Reason for Call: PT HAS COVID AS OF 11-29-22.   STATED HE IS ON PAXLOVID AND WAS TOLD BY URGENT CARE THAT SOME MEDS HE CAN'T TAKE WITH IT.   PT STATES HE IS HAVING SEVERE PAIN HA I AND NOT LIKE HIS MIGRAINE.  PT WOULD LIKE TO KNOW WHAT TO TAKE.  STATES HE HAS NOT TAKEN HIS NORMAL MEDICATION D/T THE PAXLOVID.    CAN PT TAKE HIS NORMAL MIGRAINE MEDICATION?    PLEASE CALL & ADVISE

## 2022-12-02 NOTE — PROGRESS NOTES
Subjective   Israel Reyna is a 67 y.o. male.     Chief Complaint   Patient presents with   • Follow-up     nausea       History of Present Illness     Israel Reyna presents today for   Chief Complaint   Patient presents with   • Follow-up     nausea     Contracted COVID 11/29/22, had to reschedule his CT to 12/6/22. On Paxlovid and Z-umang plus bromfed DM. Reviewed notes from Dr. Guzman [facility] on 11/29/22.    Feels bad right now, has trouble really remembering how he was feeling before-hand in regards to his sugar and nausea. Has CT scheduled for 12/6.    You have chosen to receive care through a telehealth visit.  Do you consent to use a video/audio connection for your medical care today? Yes    Patient location: [address] [place] [address] [address] [place] [address]   Provider Location: [facility] [place] [address]    The following portions of the patient's history were reviewed and updated as appropriate: allergies, current medications, past family history, past medical history, past social history, past surgical history and problem list.    Active Ambulatory Problems     Diagnosis Date Noted   • Bowel incontinence 09/26/2016   • Classic migraine with aura 09/26/2016   • Depression    • Anxiety    • Mental status change 09/26/2016   • Disorientation 09/26/2016   • Facial droop 09/26/2016   • Difficulty walking 09/26/2016   • Intractable migraine with aura without status migrainosus 10/06/2016   • Chest pain 10/06/2016   • CAD (coronary artery disease)    • Hypertension    • Dyslipidemia    • Diabetes mellitus, type 2 ([facility])    • GERD (gastroesophageal reflux disease)    • CHF (congestive heart failure) ([facility])    • Chest pain    • SOB (shortness of breath)    • Fatigue    • Chronic migraine 04/11/2017   • Other cerebral infarction (HCC) 07/18/2017   • Transient cerebral ischemia 07/18/2017   • Neck pain 10/17/2017   • Spondylosis of cervical region without myelopathy or radiculopathy 10/17/2017   •  Facial pain 03/22/2018   • Nausea 06/01/2022   • Erectile disorder due to medical condition in male 06/01/2022     Resolved Ambulatory Problems     Diagnosis Date Noted   • Seizure disorder (HCC) 09/26/2016   • Migraine      Past Medical History:   Diagnosis Date   • Diabetic peripheral neuropathy (HCC)    • Mental status change    • Migraine headache      Past Surgical History:   Procedure Laterality Date   • CARDIAC CATHETERIZATION     • CARDIAC CATHETERIZATION N/A 10/14/2016    Procedure: Left Heart Cath;  Surgeon: Santiago Garner MD;  Location: [facility] CATH INVASIVE LOCATION;  Service:    • COLONOSCOPY  02/25/2022    Dr. Graves-recommended 3 year repeat   • ENDOSCOPY  02/03/2022    Dr. Graves   • GASTRIC STIMULATOR IMPLANT SURGERY     • SMALL INTESTINE SURGERY      unspecified, x3 as an infant    • TESTICLE SURGERY       Family History   Problem Relation Age of Onset   • Heart failure Mother    • Diabetes Mother    • Alzheimer's disease Mother    • Heart attack Father    • Kidney disease Father    • Cancer Father    • Prostate cancer Father    • Cancer Sister    • Breast cancer Sister    • Diabetes Sister    • Heart disease Sister    • Arrhythmia Sister    • Diabetes Brother    • Heart disease Brother    • Cancer Brother    • Liver disease Brother    • Alzheimer's disease Maternal Grandmother    • Liver cancer Maternal Grandfather    • Other Paternal Grandmother         [place] flu   • Other Paternal Grandfather         [place] flu   • Colon cancer Neg Hx      Social History     Socioeconomic History   • Marital status:    Tobacco Use   • Smoking status: Never   • Smokeless tobacco: Never   Vaping Use   • Vaping Use: Never used   Substance and Sexual Activity   • Alcohol use: Never   • Drug use: Never   • Sexual activity: Defer       Review of Systems    Objective   There were no vitals taken for this visit.  Vitals obtained from patient if available  Physical Exam  Const: Ill-appearing, NAD,  Pleasant, Cooperative  Eyes: EOMI, no conjunctivitis  ENT: No copious nasal drainage noted  Cardiac: Regular rate by pulse  Resp: Respiratory rate observed to be within normal limits, no increased work of breathing observed, no audible wheezing or cough noted  Psych: Appropriate mood and behavior.  Procedures  Assessment & Plan   Problem List Items Addressed This Visit        Gastrointestinal Abdominal     Nausea - Primary   Other Visit Diagnoses     Upper respiratory tract infection due to COVID-19 virus            Will follow up with him after CT.    See patient diagnoses and orders along with patient instructions for assessment, plan, and changes to care for patient.    This visit was conducted via telemedicine with live video and audio provided through Video Options: MyChart/Zoom at the point of care.    There are no Patient Instructions on file for this visit.    No follow-ups on file.    Ambulatory progress note signed and attested to by Elio Moore D.O.

## 2022-12-06 ENCOUNTER — HOSPITAL ENCOUNTER (OUTPATIENT)
Dept: CT IMAGING | Facility: HOSPITAL | Age: 67
Discharge: HOME | End: 2022-12-06
Admitting: FAMILY MEDICINE

## 2022-12-06 DIAGNOSIS — N20.0 NEPHROLITHIASIS: ICD-10-CM

## 2022-12-06 PROCEDURE — 74176 CT ABD & PELVIS W/O CONTRAST: CPT

## 2022-12-06 RX ORDER — METHYLPREDNISOLONE 4 MG/1
TABLET ORAL
Qty: 1 EACH | Refills: 0 | Status: SHIPPED | OUTPATIENT
Start: 2022-12-06 | End: 2023-01-16

## 2022-12-06 NOTE — TELEPHONE ENCOUNTER
Called pt and left detailed vm stating it is okay to resume normal migraine medications as there will be no interactions with Paxlovid. Also informed pt that Dr. Crain sent in a Medrol script that will help with his ongoing head pain. Informed pt to call back with any additional questions.

## 2022-12-08 ENCOUNTER — TELEPHONE (OUTPATIENT)
Dept: NEUROLOGY | Facility: CLINIC | Age: 67
End: 2022-12-08

## 2022-12-08 NOTE — TELEPHONE ENCOUNTER
Provider: DR ARAIZA  Caller: PATIENT  Relationship to Patient: SELF  Phone Number:   Telephone Information:   Mobile [phone]     Reason for Call: PATIENT CALLED WITH MEDICATION CONCERNS. PATIENT IS NOT SURE IF NEW MEDICATION MEDROL IS FOR HIS HEADACHES AND WOULD LIKE FURTHER ADVISEMENT ON WHAT HE SHOULD BE TAKING.    PLEASE REVIEW AND ADVISE, THANK YOU.

## 2022-12-09 ENCOUNTER — TELEPHONE (OUTPATIENT)
Dept: UROLOGY | Facility: CLINIC | Age: 67
End: 2022-12-09

## 2022-12-09 NOTE — TELEPHONE ENCOUNTER
Caller: Israel Reyna    Relationship to patient: SELF    Best call back number: [phone]    Patient is needing: PT HAD A MESSAGE TO CALL OFFICE TO RESCHEDULE APPT. PLEASE GIVE PT A CALL BACK.
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
## 2022-12-19 ENCOUNTER — OFFICE VISIT (OUTPATIENT)
Dept: UROLOGY | Facility: CLINIC | Age: 67
End: 2022-12-19

## 2022-12-19 VITALS — WEIGHT: 160 LBS | HEIGHT: 67 IN | BODY MASS INDEX: 25.11 KG/M2

## 2022-12-19 DIAGNOSIS — N20.0 NEPHROLITHIASIS: Primary | ICD-10-CM

## 2022-12-19 PROCEDURE — 99204 OFFICE O/P NEW MOD 45 MIN: CPT | Performed by: UROLOGY

## 2022-12-19 NOTE — PROGRESS NOTES
Office Note Kidney Stone      Patient Name: Israel Reyna  : 1955   MRN: [id number]     Chief Complaint: History of Nephrolithiasis/Ureterolithiasis     Referring Provider: Elio Moore DO    History of Present Illness: Israel Reyna is a 67 y.o. male who presents today for evaluation of nephrolithiasis.  The patient has history of intermittent flank pain, recent CT imaging in 2022 demonstrating small nonobstructing nephrolithiasis.  He denies nausea, emesis, fever, chills.  He denies significant lower urinary tract symptoms at this time.  Is dysuria or hematuria.    Patient has previously tried stone prevention medications: none    Stone related history  Family history of stones:   no  Renal disease or anatomic abnormality: no  Malabsorptive disease or gastric bypass: no  Frequent UTI's    no  Parathyroid disease    no        Subjective      Review of System: Review of Systems   Genitourinary: Negative for decreased urine volume, difficulty urinating, dysuria, enuresis, flank pain, frequency, hematuria and urgency.        I have reviewed the ROS documented by my clinical staff, updated as appropriate and I agree. Morgan Cavazos MD    Past Medical History:   Past Medical History:   Diagnosis Date   • Anxiety    • Bowel incontinence     w/ history of Stimulator Device    • CAD (coronary artery disease)    • Chest pain    • CHF (congestive heart failure) ([facility])    • Depression    • Diabetes mellitus, type 2 ([facility])    • Diabetic peripheral neuropathy ([facility])    • Dyslipidemia    • Fatigue    • GERD (gastroesophageal reflux disease)    • Hypertension    • Mental status change    • Migraine     Complex    • Migraine headache    • SOB (shortness of breath)        Past Surgical History:   Past Surgical History:   Procedure Laterality Date   • CARDIAC CATHETERIZATION     • CARDIAC CATHETERIZATION N/A 10/14/2016    Procedure: Left Heart Cath;  Surgeon: Santiago Garner MD;  Location:  [facility] CATH INVASIVE LOCATION;  Service:    • COLONOSCOPY  02/25/2022    Dr. Graves-recommended 3 year repeat   • ENDOSCOPY  02/03/2022    Dr. Graves   • GASTRIC STIMULATOR IMPLANT SURGERY     • SMALL INTESTINE SURGERY      unspecified, x3 as an infant    • TESTICLE SURGERY         Family History:   Family History   Problem Relation Age of Onset   • Heart failure Mother    • Diabetes Mother    • Alzheimer's disease Mother    • Heart attack Father    • Kidney disease Father    • Cancer Father    • Prostate cancer Father    • Cancer Sister    • Breast cancer Sister    • Diabetes Sister    • Heart disease Sister    • Arrhythmia Sister    • Diabetes Brother    • Heart disease Brother    • Cancer Brother    • Liver disease Brother    • Alzheimer's disease Maternal Grandmother    • Liver cancer Maternal Grandfather    • Other Paternal Grandmother         [place] flu   • Other Paternal Grandfather         [place] flu   • Colon cancer Neg Hx        Social History:   Social History     Socioeconomic History   • Marital status:    Tobacco Use   • Smoking status: Never   • Smokeless tobacco: Never   Vaping Use   • Vaping Use: Never used   Substance and Sexual Activity   • Alcohol use: Never   • Drug use: Never   • Sexual activity: Defer       Medications:     Current Outpatient Medications:   •  amLODIPine (NORVASC) 10 MG tablet, TAKE 1 TABLET DAILY, Disp: 90 tablet, Rfl: 3  •  azithromycin (ZITHROMAX) 250 MG tablet, Take 2 tablets the first day, then 1 tablet daily for 4 days., Disp: 6 tablet, Rfl: 0  •  brompheniramine-pseudoephedrine-DM 30-2-10 MG/5ML syrup, Take 5 mL by mouth 4 (Four) Times a Day As Needed for Allergies., Disp: 118 mL, Rfl: 0  •  butalbital-acetaminophen-caffeine (FIORICET, ESGIC) -40 MG per tablet, TAKE 1 TABLET BY MOUTH ONCE DAILY AS NEEDED FOR HEADACHE, Disp: 30 tablet, Rfl: 0  •  gabapentin (NEURONTIN) 300 MG capsule, Take 1 capsule by mouth 3 (Three) Times a Day., Disp: 270  "capsule, Rfl: 1  •  glipizide (GLUCOTROL) 10 MG tablet, Take 1 tablet by mouth 2 (Two) Times a Day Before Meals., Disp: 180 tablet, Rfl: 0  •  glucose blood test strip, 1 each by Other route Daily. Use daily as directed, Disp: 100 each, Rfl: 12  •  glucose monitor monitoring kit, 1 each Daily., Disp: 1 each, Rfl: 0  •  Incontinence Supply Disposable (NU-FIT ADULT UNDERWEAR MEDIUM) misc, , Disp: , Rfl:   •  Insulin Syringe 31G X 5/16\" 0.3 ML misc, 1 each Every Night., Disp: 90 each, Rfl: 3  •  Lancets Thin misc, 1 each Daily. Use daily as directed, Disp: 100 each, Rfl: 11  •  lisinopril (PRINIVIL,ZESTRIL) 20 MG tablet, TAKE 1 TABLET DAILY, Disp: 90 tablet, Rfl: 0  •  Lutein 5 % beads, Take 5 mg by mouth Daily., Disp: , Rfl:   •  metFORMIN (GLUCOPHAGE) 1000 MG tablet, TAKE 1 TABLET TWICE DAILY  WITH MEALS, Disp: 180 tablet, Rfl: 0  •  methylPREDNISolone (MEDROL) 4 MG dose pack, Take as directed on package instructions., Disp: 1 each, Rfl: 0  •  Multiple Vitamins-Minerals (PRESERVISION AREDS 2 PO), Take  by mouth Daily., Disp: , Rfl:   •  NON FORMULARY, Shot in eye every 3 months for macular degeneration, Disp: , Rfl:   •  ondansetron (ZOFRAN) 8 MG tablet, TAKE 1 TABLET BY MOUTH EVERY 8 HOURS AS NEEDED FOR NAUSEA FOR VOMITING, Disp: 30 tablet, Rfl: 0  •  pantoprazole (PROTONIX) 40 MG EC tablet, Take 1 tablet by mouth 2 (Two) Times a Day. 30 minutes prior to first meal of the day and 30 minutes prior to last meal of the day, Disp: 180 tablet, Rfl: 3  •  sildenafil (VIAGRA) 100 MG tablet, Take 1 tablet by mouth Daily As Needed for Erectile Dysfunction., Disp: 15 tablet, Rfl: 11  •  simvastatin (ZOCOR) 20 MG tablet, Take 0.5 tablets by mouth Every Night., Disp: 45 tablet, Rfl: 1  •  Specialty Vitamins Products (HEALTHY HEART COMPLEX PO), Take  by mouth Daily., Disp: , Rfl:   •  tamsulosin (FLOMAX) 0.4 MG capsule 24 hr capsule, Take 1 capsule by mouth Daily., Disp: 90 capsule, Rfl: 0  •  Trulicity 1.5 MG/0.5ML solution " "pen-injector, Inject 1.5 mg under the skin into the appropriate area as directed 1 (One) Time Per Week., Disp: 6 mL, Rfl: 3  •  vitamin B-12 (CYANOCOBALAMIN) 100 MCG tablet, Take 50 mcg by mouth 2 (two) times a day., Disp: , Rfl:   •  vitamin C (ASCORBIC ACID) 500 MG tablet, Take 500 mg by mouth 2 (two) times a day., Disp: , Rfl:   •  vitamin E 400 UNIT capsule, Take 400 Units by mouth Daily., Disp: , Rfl:   •  Zeaxanthin powder, Take 1 mg by mouth Daily., Disp: , Rfl:   •  Zinc 40 MG tablet, Take 1 tablet by mouth Daily., Disp: , Rfl:   •  Continuous Blood Gluc Sensor (CyrbaStyle Mary 3 Sensor) misc, 1 each Every 14 (Fourteen) Days., Disp: 6 each, Rfl: 3  •  divalproex (DEPAKOTE) 500 MG DR tablet, Take 1 tablet by mouth Daily for 30 days., Disp: 30 tablet, Rfl: 3    Allergies:   Allergies   Allergen Reactions   • Actos [Pioglitazone] Anaphylaxis     congestive heart failure   • Avandia [Rosiglitazone] Anaphylaxis     congestive heart failure.   • Darvon [Propoxyphene] Itching     Angioedema    itching   • Talwin [Pentazocine] Rash     Angioedema    itching       Objective     Physical Exam:   Vital Signs:   Vitals:    12/19/22 1542   Weight: 72.6 kg (160 lb)   Height: 170.2 cm (67.01\")   PainSc:   2     Body mass index is 25.05 kg/m².     Physical Exam  Vitals and nursing note reviewed.   Constitutional:       Appearance: Normal appearance.   HENT:      Head: Normocephalic and atraumatic.   Cardiovascular:      Comments: Well perfused  Pulmonary:      Effort: Pulmonary effort is normal.   Abdominal:      General: Abdomen is flat.      Palpations: Abdomen is soft.   Musculoskeletal:         General: Normal range of motion.   Skin:     General: Skin is warm and dry.   Neurological:      General: No focal deficit present.      Mental Status: He is alert and oriented to person, place, and time. Mental status is at baseline.   Psychiatric:         Mood and Affect: Mood normal.         Behavior: Behavior normal.         " Thought Content: Thought content normal.         Judgment: Judgment normal.         Labs:   Brief Urine Lab Results  (Last result in the past 365 days)      Color   Clarity   Blood   Leuk Est   Nitrite   Protein   CREAT   Urine HCG        11/08/22 1105             88.5         11/08/22 1105             70.4               Urine Culture    Urine Culture 1/28/22   Urine Culture No growth              Lab Results   Component Value Date    GLUCOSE 127 (H) 11/08/2022    CALCIUM 9.7 11/08/2022     11/08/2022    K 4.8 11/08/2022    CO2 28.0 11/08/2022     11/08/2022    BUN 15 11/08/2022    CREATININE 1.02 11/08/2022    EGFRIFNONA 66 01/28/2022    BCR 14.7 11/08/2022    ANIONGAP 10.0 11/08/2022       Lab Results   Component Value Date    WBC 7.82 06/01/2022    HGB 12.2 (L) 06/01/2022    HCT 37.8 06/01/2022    MCV 89.8 06/01/2022     06/01/2022         Images:   CT Abdomen Pelvis Stone Protocol    Result Date: 12/6/2022   1. Small nonobstructing intrarenal calculi. No obstructive uropathy. 2. Small pericardial effusion. 3. Additional findings as given above.    This report was finalized on 12/6/2022 12:34 PM by Dong Rose MD.      XR Abdomen KUB    Result Date: 9/14/2022  Suggested constipation without evidence of obstruction.  This report was finalized on 9/14/2022 4:38 PM by Saúl Damon MD.        Measures:   Tobacco:   Israel Reyna  reports that he has never smoked. He has never used smokeless tobacco.. I have educated him on the risk of diseases from using tobacco products    Assessment / Plan      Assessment/Plan:   Israel Reyna is a 67 y.o. male who presented today with nephrolithiasis/ureterolithiasis, history of stone disease.  Recent CT imaging in 12/2022 demonstrating small nonobstructing renal calculi.  We have discussed that at this time we would not recommend surgical intervention, do not feel that symptoms are related to small nonobstructing stone.  He has no  evidence of obstruction on CT imaging.  He has no significant lower urinary tract symptom complaints at this time.  Based upon his current imaging would recommend follow-up on an as-needed basis, if he has recurrence or worsening of symptoms.    Diagnoses and all orders for this visit:    1. Nephrolithiasis (Primary)           Follow Up:   Return if symptoms worsen or fail to improve.    I spent approximately 45 minutes providing clinical care for this patient; including review of patient's chart and provider documentation, face to face time spent with patient in examination room (obtaining history, performing physical exam, discussing diagnosis and management options), placing orders, and completing patient documentation.     Morgan Cavazos MD  [facility] [place]

## 2022-12-26 DIAGNOSIS — G43.119 INTRACTABLE MIGRAINE WITH AURA WITHOUT STATUS MIGRAINOSUS: ICD-10-CM

## 2022-12-26 PROBLEM — N20.0 NEPHROLITHIASIS: Status: ACTIVE | Noted: 2022-12-26

## 2022-12-27 RX ORDER — BUTALBITAL, ACETAMINOPHEN AND CAFFEINE 50; 325; 40 MG/1; MG/1; MG/1
TABLET ORAL
Qty: 30 TABLET | Refills: 0 | Status: SHIPPED | OUTPATIENT
Start: 2022-12-27 | End: 2023-02-22 | Stop reason: SDUPTHER

## 2022-12-27 NOTE — TELEPHONE ENCOUNTER
Rx Refill Note  Requested Prescriptions     Pending Prescriptions Disp Refills   • butalbital-acetaminophen-caffeine (FIORICET, ESGIC) -40 MG per tablet [Pharmacy Med Name: Butalbital-APAP-Caffeine -40 MG Oral Tablet] 30 tablet 0     Sig: TAKE 1 TABLET BY MOUTH ONCE DAILY AS NEEDED FOR HEADACHE      Last filled:10/24/2022 with 0 refills. Pending to provider.  Last office visit with prescribing clinician: 11/8/2022      Next office visit with prescribing clinician: 2/22/2023     Prem Bernard MA  12/27/22, 09:22 EST

## 2022-12-28 ENCOUNTER — TELEPHONE (OUTPATIENT)
Dept: FAMILY MEDICINE CLINIC | Facility: CLINIC | Age: 67
End: 2022-12-28

## 2022-12-28 NOTE — TELEPHONE ENCOUNTER
"    Caller: Israel Reyna    Relationship: Self    Best call back number: [phone]    What medications are you currently taking:   Current Outpatient Medications on File Prior to Visit   Medication Sig Dispense Refill   • butalbital-acetaminophen-caffeine (FIORICET, ESGIC) -40 MG per tablet TAKE 1 TABLET BY MOUTH ONCE DAILY AS NEEDED FOR HEADACHE 30 tablet 0   • amLODIPine (NORVASC) 10 MG tablet TAKE 1 TABLET DAILY 90 tablet 3   • azithromycin (ZITHROMAX) 250 MG tablet Take 2 tablets the first day, then 1 tablet daily for 4 days. 6 tablet 0   • brompheniramine-pseudoephedrine-DM 30-2-10 MG/5ML syrup Take 5 mL by mouth 4 (Four) Times a Day As Needed for Allergies. 118 mL 0   • Continuous Blood Gluc Sensor (FreeStyle Mary 3 Sensor) misc 1 each Every 14 (Fourteen) Days. 6 each 3   • divalproex (DEPAKOTE) 500 MG DR tablet Take 1 tablet by mouth Daily for 30 days. 30 tablet 3   • gabapentin (NEURONTIN) 300 MG capsule Take 1 capsule by mouth 3 (Three) Times a Day. 270 capsule 1   • glipizide (GLUCOTROL) 10 MG tablet Take 1 tablet by mouth 2 (Two) Times a Day Before Meals. 180 tablet 0   • glucose blood test strip 1 each by Other route Daily. Use daily as directed 100 each 12   • glucose monitor monitoring kit 1 each Daily. 1 each 0   • Incontinence Supply Disposable (NU-FIT ADULT UNDERWEAR MEDIUM) misc      • Insulin Syringe 31G X 5/16\" 0.3 ML misc 1 each Every Night. 90 each 3   • Lancets Thin misc 1 each Daily. Use daily as directed 100 each 11   • lisinopril (PRINIVIL,ZESTRIL) 20 MG tablet TAKE 1 TABLET DAILY 90 tablet 0   • Lutein 5 % beads Take 5 mg by mouth Daily.     • metFORMIN (GLUCOPHAGE) 1000 MG tablet TAKE 1 TABLET TWICE DAILY  WITH MEALS 180 tablet 0   • methylPREDNISolone (MEDROL) 4 MG dose pack Take as directed on package instructions. 1 each 0   • Multiple Vitamins-Minerals (PRESERVISION AREDS 2 PO) Take  by mouth Daily.     • NON FORMULARY Shot in eye every 3 months for macular " degeneration     • ondansetron (ZOFRAN) 8 MG tablet TAKE 1 TABLET BY MOUTH EVERY 8 HOURS AS NEEDED FOR NAUSEA FOR VOMITING 30 tablet 0   • pantoprazole (PROTONIX) 40 MG EC tablet Take 1 tablet by mouth 2 (Two) Times a Day. 30 minutes prior to first meal of the day and 30 minutes prior to last meal of the day 180 tablet 3   • sildenafil (VIAGRA) 100 MG tablet Take 1 tablet by mouth Daily As Needed for Erectile Dysfunction. 15 tablet 11   • simvastatin (ZOCOR) 20 MG tablet Take 0.5 tablets by mouth Every Night. 45 tablet 1   • Specialty Vitamins Products (HEALTHY HEART COMPLEX PO) Take  by mouth Daily.     • tamsulosin (FLOMAX) 0.4 MG capsule 24 hr capsule Take 1 capsule by mouth Daily. 90 capsule 0   • Trulicity 1.5 MG/0.5ML solution pen-injector Inject 1.5 mg under the skin into the appropriate area as directed 1 (One) Time Per Week. 6 mL 3   • vitamin B-12 (CYANOCOBALAMIN) 100 MCG tablet Take 50 mcg by mouth 2 (two) times a day.     • vitamin C (ASCORBIC ACID) 500 MG tablet Take 500 mg by mouth 2 (two) times a day.     • vitamin E 400 UNIT capsule Take 400 Units by mouth Daily.     • Zeaxanthin powder Take 1 mg by mouth Daily.     • Zinc 40 MG tablet Take 1 tablet by mouth Daily.       No current facility-administered medications on file prior to visit.          What are your concerns:PT STATED THAT IN ORDER FOR HIM TO GET RX TRULICITY PCP WILL NEED TO PRINT OUT APPLICATION FROM [facility] ONLINE FOR THE YEAR 2023

## 2022-12-28 NOTE — TELEPHONE ENCOUNTER
Contacted patient to discuss further, he has been in contact with [facility] who advised him to print the application and submit his information seperately      He has asked our office to print an application to add the provider and prescription info.   Patient notified that PCP is not in office this week and this can be signed and completed once he returns.

## 2023-01-05 ENCOUNTER — TELEPHONE (OUTPATIENT)
Dept: FAMILY MEDICINE CLINIC | Facility: CLINIC | Age: 68
End: 2023-01-05
Payer: MEDICARE

## 2023-01-05 NOTE — TELEPHONE ENCOUNTER
Patient reports ongoing abdominal pain for the past couple of days. He reports chronic nausea and reflux symptoms    He has been using Alkaseltzer and it has not been effective. I advised him to follow up with GI doctor. Patient refused and scheduled appt with PCP on 1/16

## 2023-01-05 NOTE — TELEPHONE ENCOUNTER
Caller: Israel Reyna    Relationship: Self    Best call back number:[phone]    What is the best time to reach you: ANYTIME    Who are you requesting to speak with (clinical staff, provider,  specific staff member): CLINICAL STAFF    Do you know the name of the person who called: PATIENT    What was the call regarding: STOMACH PAINS/ NO APPOINTMENTS AVAILABLE WITH PCP    Do you require a callback: YES

## 2023-01-16 ENCOUNTER — OFFICE VISIT (OUTPATIENT)
Dept: FAMILY MEDICINE CLINIC | Facility: CLINIC | Age: 68
End: 2023-01-16
Payer: MEDICARE

## 2023-01-16 VITALS
DIASTOLIC BLOOD PRESSURE: 68 MMHG | WEIGHT: 168.2 LBS | OXYGEN SATURATION: 98 % | HEART RATE: 76 BPM | SYSTOLIC BLOOD PRESSURE: 124 MMHG | BODY MASS INDEX: 26.4 KG/M2 | HEIGHT: 67 IN

## 2023-01-16 DIAGNOSIS — R33.9 URINARY RETENTION WITH INCOMPLETE BLADDER EMPTYING: ICD-10-CM

## 2023-01-16 DIAGNOSIS — E78.5 DYSLIPIDEMIA: ICD-10-CM

## 2023-01-16 DIAGNOSIS — E11.65 TYPE 2 DIABETES MELLITUS WITH HYPERGLYCEMIA, WITHOUT LONG-TERM CURRENT USE OF INSULIN: Primary | ICD-10-CM

## 2023-01-16 PROCEDURE — 99214 OFFICE O/P EST MOD 30 MIN: CPT | Performed by: FAMILY MEDICINE

## 2023-01-16 RX ORDER — ATORVASTATIN CALCIUM 20 MG/1
20 TABLET, FILM COATED ORAL DAILY
Qty: 90 TABLET | Refills: 3 | Status: SHIPPED | OUTPATIENT
Start: 2023-01-16

## 2023-01-16 RX ORDER — TAMSULOSIN HYDROCHLORIDE 0.4 MG/1
1 CAPSULE ORAL DAILY
Qty: 90 CAPSULE | Refills: 3 | Status: SHIPPED | OUTPATIENT
Start: 2023-01-16

## 2023-01-16 RX ORDER — GLIPIZIDE 10 MG/1
10 TABLET ORAL
Qty: 180 TABLET | Refills: 3 | Status: SHIPPED | OUTPATIENT
Start: 2023-01-16

## 2023-01-16 RX ORDER — LISINOPRIL 20 MG/1
20 TABLET ORAL DAILY
Qty: 90 TABLET | Refills: 3 | Status: SHIPPED | OUTPATIENT
Start: 2023-01-16

## 2023-01-16 NOTE — PROGRESS NOTES
Established Patient Office Visit      Patient Name: Israel Reyna  : 1955   MRN: [id number]   Care Team: Patient Care Team:  Elio Moore DO as PCP - General (Family Medicine)    Chief Complaint:    Chief Complaint   Patient presents with   • Diabetes     Need to discuss forms for trulicity medication        History of Present Illness: Israel Reyna is a 67 y.o. male who is here today for follow-up and medication review.    HPI    The patient states that he is here today mostly for a follow-up on his general health and also to go over all of his medications with me to see if anything has changed from his home list. He adds that he feels that he needs to speak to me about his Tulicity. He notes that he faxed his portion in and then was told that they never received his information or anything from this office concerning his Trulicity prescription. He reports that trying to get his Trulicity prescription filled is extremely difficult.    The patient states that he has not been feeling well lately. He notes that his urologist told him that he has a small calculus in his kidney that is embedded and they plan on watching it to see if it moves or not. He adds that he is unsure if something is just being missed because he feels a constant pain in his kidneys. He reports that his most recent appointment wit his urologist was in 2022. He goes on to explain that he is mostly concerned about this due to losing his father to kidney disease and his brother being on dialysis 3 times weekly. He goes on to explain that when he first wakes up in the morning he has to urinate approximately 5 times before he is able to fully empty his bladder.     The patient reports that he has not gotten his Flomax refilled and he would like to know if he should do that. He states that the medication that is sent to the [facility] separately is one that he would like be kept confidential. He adds that he does  not feel that the medication has the effect that he had hoped it would however, it is better than not taking it at all. He states that he has never been on anything other than simvastatin for his cholesterol that he can remember.     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 He adds that he is having them often at this point. The patient adds that he is unsure about when his next appointment is with Dr. Crain.     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 He denies taking a fiber supplement at this time. He goes on to explain that he mostly has issues with this issue in the middle of the night. He adds that he is not happy that he has gained some of his weight back recently. He was down to a reported 150 pounds and today he weighed 169 pounds. He explains that this issue is severely impacting his quality of life because he feels like he cannot go anywhere without fear of having an accident however, he is not open to the possibility of an ostomy at this time.     This patient is accompanied by their self who contributes to the history of his care.      The following portions of the patient's history were reviewed and updated as appropriate: allergies, current medications, past family history, past medical history, past social history, past surgical history and problem list.    Subjective      Review of Systems:   Review of Systems - See HPI    Past Medical History:   Past Medical History:   Diagnosis Date   • Allergic    • Anxiety    • Bowel incontinence     w/ history of Stimulator Device    • CAD (coronary artery disease)    • Cataract    • Chest pain    • CHF (congestive heart failure) (HCC)    • Colon polyp    • Depression    • Diabetes mellitus, type  2 (HCC)    • Diabetic peripheral neuropathy (HCC)    • Dyslipidemia    • Fatigue    • GERD (gastroesophageal reflux disease)    • Hypertension    • Kidney stone    • Mental status change    • Migraine     Complex    • Migraine headache    • Renal insufficiency    • SOB (shortness of breath)        Past Surgical History:   Past Surgical History:   Procedure Laterality Date   • CARDIAC CATHETERIZATION     • CARDIAC CATHETERIZATION N/A 10/14/2016    Procedure: Left Heart Cath;  Surgeon: Santiago Garner MD;  Location: [facility] CATH INVASIVE LOC[facility];  Service:    • COLONOSCOPY  02/25/2022    Dr. Graves-recommended 3 year repeat   • ENDOSCOPY  02/03/2022    Dr. Graves   • GASTRIC STIMULATOR IMPLANT SURGERY     • SMALL INTESTINE SURGERY      unspecified, x3 as an infant    • TESTICLE SURGERY         Family History:   Family History   Problem Relation Age of Onset   • Heart failure Mother    • Diabetes Mother    • Alzheimer's disease Mother    • Arthritis Mother    • Heart disease Mother    • Heart attack Father    • Kidney disease Father    • Cancer Father    • Prostate cancer Father    • Heart disease Father    • Hypertension Father    • Cancer Sister    • Breast cancer Sister    • Diabetes Sister    • Heart disease Sister    • Arrhythmia Sister    • Diabetes Brother    • Heart disease Brother    • Cancer Brother    • Liver disease Brother    • Kidney disease Brother    • Alzheimer's disease Maternal Grandmother    • Liver cancer Maternal Grandfather    • Liver disease Maternal Grandfather    • Other Paternal Grandmother         Icelandic flu   • Other Paternal Grandfather         I[place]ic flu   • Colon cancer Neg Hx        Social History:   Social History     Socioeconomic History   • Marital status:    Tobacco Use   • Smoking status: Never   • Smokeless tobacco: Never   Vaping Use   • Vaping Use: Never used   Substance and Sexual Activity   • Alcohol use: Never   • Drug use: Never   • Sexual activity: Yes  "      Tobacco History:   Social History     Tobacco Use   Smoking Status Never   Smokeless Tobacco Never       Medications:     Current Outpatient Medications:   •  amLODIPine (NORVASC) 10 MG tablet, TAKE 1 TABLET DAILY, Disp: 90 tablet, Rfl: 3  •  butalbital-acetaminophen-caffeine (FIORICET, ESGIC) -40 MG per tablet, TAKE 1 TABLET BY MOUTH ONCE DAILY AS NEEDED FOR HEADACHE, Disp: 30 tablet, Rfl: 0  •  gabapentin (NEURONTIN) 300 MG capsule, Take 1 capsule by mouth 3 (Three) Times a Day., Disp: 270 capsule, Rfl: 1  •  glipizide (GLUCOTROL) 10 MG tablet, Take 1 tablet by mouth 2 (Two) Times a Day Before Meals., Disp: 180 tablet, Rfl: 3  •  glucose blood test strip, 1 each by Other route Daily. Use daily as directed, Disp: 100 each, Rfl: 12  •  glucose monitor monitoring kit, 1 each Daily., Disp: 1 each, Rfl: 0  •  Incontinence Supply Disposable (NU-FIT ADULT UNDERWEAR MEDIUM) misc, , Disp: , Rfl:   •  Insulin Syringe 31G X 5/16\" 0.3 ML misc, 1 each Every Night., Disp: 90 each, Rfl: 3  •  Lancets Thin misc, 1 each Daily. Use daily as directed, Disp: 100 each, Rfl: 11  •  lisinopril (PRINIVIL,ZESTRIL) 20 MG tablet, Take 1 tablet by mouth Daily., Disp: 90 tablet, Rfl: 3  •  Lutein 5 % beads, Take 5 mg by mouth Daily., Disp: , Rfl:   •  metFORMIN (GLUCOPHAGE) 1000 MG tablet, Take 1 tablet by mouth 2 (Two) Times a Day With Meals., Disp: 180 tablet, Rfl: 3  •  Multiple Vitamins-Minerals (PRESERVISION AREDS 2 PO), Take  by mouth Daily., Disp: , Rfl:   •  NON FORMULARY, Shot in eye every 3 months for macular degeneration, Disp: , Rfl:   •  ondansetron (ZOFRAN) 8 MG tablet, TAKE 1 TABLET BY MOUTH EVERY 8 HOURS AS NEEDED FOR NAUSEA FOR VOMITING, Disp: 30 tablet, Rfl: 0  •  pantoprazole (PROTONIX) 40 MG EC tablet, Take 1 tablet by mouth 2 (Two) Times a Day. 30 minutes prior to first meal of the day and 30 minutes prior to last meal of the day, Disp: 180 tablet, Rfl: 3  •  sildenafil (VIAGRA) 100 MG tablet, Take 1 tablet by " "mouth Daily As Needed for Erectile Dysfunction., Disp: 15 tablet, Rfl: 11  •  Specialty Vitamins Products (HEALTHY HEART COMPLEX PO), Take  by mouth Daily., Disp: , Rfl:   •  tamsulosin (FLOMAX) 0.4 MG capsule 24 hr capsule, Take 1 capsule by mouth Daily., Disp: 90 capsule, Rfl: 3  •  Trulicity 1.5 MG/0.5ML solution pen-injector, Inject 1.5 mg under the skin into the appropriate area as directed 1 (One) Time Per Week., Disp: 6 mL, Rfl: 3  •  vitamin B-12 (CYANOCOBALAMIN) 100 MCG tablet, Take 50 mcg by mouth 2 (two) times a day., Disp: , Rfl:   •  vitamin C (ASCORBIC ACID) 500 MG tablet, Take 500 mg by mouth 2 (two) times a day., Disp: , Rfl:   •  vitamin E 400 UNIT capsule, Take 400 Units by mouth Daily., Disp: , Rfl:   •  Zeaxanthin powder, Take 1 mg by mouth Daily., Disp: , Rfl:   •  Zinc 40 MG tablet, Take 1 tablet by mouth Daily., Disp: , Rfl:   •  atorvastatin (LIPITOR) 20 MG tablet, Take 1 tablet by mouth Daily., Disp: 90 tablet, Rfl: 3  •  divalproex (DEPAKOTE) 500 MG DR tablet, Take 1 tablet by mouth Daily for 30 days., Disp: 30 tablet, Rfl: 3    Allergies:   Allergies   Allergen Reactions   • Actos [Pioglitazone] Anaphylaxis     congestive heart failure   • Avandia [Rosiglitazone] Anaphylaxis     congestive heart failure.   • Darvon [Propoxyphene] Itching     Angioedema    itching   • Talwin [Pentazocine] Rash     Angioedema    itching       Objective   Objective     Physical Exam:  Vital Signs:   Vitals:    01/16/23 1418   BP: 124/68   Pulse: 76   SpO2: 98%   Weight: 76.3 kg (168 lb 3.2 oz)   Height: 170.2 cm (67.01\")     Body mass index is 26.34 kg/m².     Physical Exam    Nursing note reviewed  Const: NAD, A & Ox4, Pleasant, Cooperative  Eyes: EOMI, no conjunctivitis  ENT: No nasal discharge present, neck supple  Cardiac: Regular rate and rhythm, no cyanosis  Resp: Respiratory rate within normal limits, no increased work of breathing, no audible wheezing or retractions noted  GI: No distention or " ascites  MSK: Motor and sensation grossly intact in bilateral upper extremities  Neurologic: CN II-XII grossly intact  Psych: Appropriate mood and behavior.  Skin: Warm, dry    Procedures/Radiology     Procedures  No radiology results for the last 7 days     Assessment & Plan   Assessment / Plan      Assessment/Plan:   Problems Addressed This Visit  Diagnoses and all orders for this visit:    1. Type 2 diabetes mellitus with hyperglycemia, without long-term current use of insulin (HCC) (Primary)  -     metFORMIN (GLUCOPHAGE) 1000 MG tablet; Take 1 tablet by mouth 2 (Two) Times a Day With Meals.  Dispense: 180 tablet; Refill: 3  -     lisinopril (PRINIVIL,ZESTRIL) 20 MG tablet; Take 1 tablet by mouth Daily.  Dispense: 90 tablet; Refill: 3  -     glipizide (GLUCOTROL) 10 MG tablet; Take 1 tablet by mouth 2 (Two) Times a Day Before Meals.  Dispense: 180 tablet; Refill: 3    2. Urinary retention with incomplete bladder emptying  -     tamsulosin (FLOMAX) 0.4 MG capsule 24 hr capsule; Take 1 capsule by mouth Daily.  Dispense: 90 capsule; Refill: 3    3. Dyslipidemia  -     atorvastatin (LIPITOR) 20 MG tablet; Take 1 tablet by mouth Daily.  Dispense: 90 tablet; Refill: 3      Problem List Items Addressed This Visit        Cardiac and Vasculature    Dyslipidemia    Relevant Medications    atorvastatin (LIPITOR) 20 MG tablet       Endocrine and Metabolic    Diabetes mellitus, type 2 (HCC) - Primary    Overview     On CGM he is having significant hyperglycemia in the middle of the night with precipitous drops (320 --> 60-70 within about 30 minutes). These correlate with times when he is getting sick at night.         Relevant Medications    metFORMIN (GLUCOPHAGE) 1000 MG tablet    lisinopril (PRINIVIL,ZESTRIL) 20 MG tablet    glipizide (GLUCOTROL) 10 MG tablet   Other Visit Diagnoses     Urinary retention with incomplete bladder emptying        Relevant Medications    tamsulosin (FLOMAX) 0.4 MG capsule 24 hr capsule           1. Bowel incontinence due to incompetent anal sphincter.   - He has tried stool bulking agents, Imodium, and dietary adjustments, although certainly a radical option, we may actually want to discuss a colostomy.  - I recommended the patient add a fiber supplement to his diet daily to try to bulk up his stool.  - Patient will follow up with gastroenterology for assistance with this issue and discuss any other possibilities of treatment.    2. Dyslipidemia  - The patient will discontinue to use of simvastatin due to possible interaction with Depakote.  - The patient will begin taking atorvastatin daily.    3. Type 2 diabetes mellitus.  - This office will be re-faxing all documentation needed in order for patient to receive his Trulicity as prescribed.   - Continue glipizide, metformin, and Trulicity.      There are no Patient Instructions on file for this visit.    Follow Up:   No follow-ups on file.    I spent 33 minutes caring for Israel on this date of service. This time includes time spent by me in the following activities:reviewing tests, counseling and educating the patient/family/caregiver, ordering medications, tests, or procedures and documenting information in the medical record    DO BAILEY Jones RD  [facility]  [address] [facility] [address] [facility] [place] [address]  Fax [phone]  Phone [phone]       Transcribed from ambient dictation for Elio Moore DO by Rupali Vázquez.  01/16/23   15:07 EST    Patient or patient representative verbalized consent to the visit recording.  I have personally performed the services described in this document as transcribed by the above individual, and it is both accurate and complete.

## 2023-01-23 DIAGNOSIS — R11.0 NAUSEA: ICD-10-CM

## 2023-01-23 RX ORDER — ONDANSETRON HYDROCHLORIDE 8 MG/1
TABLET, FILM COATED ORAL
Qty: 30 TABLET | Refills: 0 | Status: SHIPPED | OUTPATIENT
Start: 2023-01-23

## 2023-01-23 NOTE — TELEPHONE ENCOUNTER
Rx Refill Note  Requested Prescriptions     Pending Prescriptions Disp Refills   • ondansetron (ZOFRAN) 8 MG tablet [Pharmacy Med Name: Ondansetron HCl 8 MG Oral Tablet] 30 tablet 0     Sig: TAKE 1 TABLET BY MOUTH EVERY 8 HOURS AS NEEDED FOR NAUSEA FOR VOMITING      Last office visit with prescribing clinician: 1/16/2023   Last telemedicine visit with prescribing clinician: Visit date not found   Next office visit with prescribing clinician: Visit date not found                         Would you like a call back once the refill request has been completed: [] Yes [] No    If the office needs to give you a call back, can they leave a voicemail: [] Yes [] No    Reyna Charles MA  01/23/23, 10:16 EST

## 2023-01-30 ENCOUNTER — TELEPHONE (OUTPATIENT)
Dept: FAMILY MEDICINE CLINIC | Facility: CLINIC | Age: 68
End: 2023-01-30
Payer: MEDICARE

## 2023-01-30 NOTE — TELEPHONE ENCOUNTER
Caller: Israel Reyna    Relationship: Self    Best call back number: [phone]    What form or medical record are you requesting: TRULICITY -PATIENT ASSISTANCE WITH [facility]    Who is requesting this form or medical record from you: PATIENT/[facility]    How would you like to receive the form or medical records (pick-up, mail, fax): FAX-LISTED ON PAPERWORK        Timeframe paperwork needed: AS SOON AS POSSIBLE    Additional notes: PATIENT STATED HE HAS CALLED THE [facility] AND THEY STATED ON THE PAPERWORK ON PAGE 5 NEEDS TO BE DR SIGNATURE AND ON PAGE 6 NEEDS TO HAVE THE PATIENTS NAME AND DATE OF BIRTH PATIENT NEEDS THE PAPERWORK TO BE FAXED AGAIN WITH THOSE CORRECTIONS PLEASE    ON A SIDE NOTE PATIENT IS HAVING RETINA SURGERY ON 2/7/23 WITH DR ORTEGA PATIENT JUST WANTED TO ADVISE

## 2023-02-01 RX ORDER — OMEPRAZOLE 40 MG/1
CAPSULE, DELAYED RELEASE ORAL
Qty: 90 CAPSULE | Refills: 0 | Status: SHIPPED | OUTPATIENT
Start: 2023-02-01

## 2023-02-02 DIAGNOSIS — E11.9 TYPE 2 DIABETES MELLITUS WITHOUT COMPLICATION, WITHOUT LONG-TERM CURRENT USE OF INSULIN: ICD-10-CM

## 2023-02-03 RX ORDER — DULAGLUTIDE 1.5 MG/.5ML
0.5 INJECTION, SOLUTION SUBCUTANEOUS WEEKLY
Qty: 6 ML | Refills: 3 | Status: SHIPPED | OUTPATIENT
Start: 2023-02-03

## 2023-02-09 ENCOUNTER — TELEPHONE (OUTPATIENT)
Dept: FAMILY MEDICINE CLINIC | Facility: CLINIC | Age: 68
End: 2023-02-09
Payer: MEDICARE

## 2023-02-09 NOTE — TELEPHONE ENCOUNTER
Contacted pt to discuss further. Pt states some information is missing from pg 5. After reviewing paperwork in epic the sig, license and state  are missing. I informed pt that PCP is out of the office until 2/14 but I will send to him to review upon return.

## 2023-02-09 NOTE — TELEPHONE ENCOUNTER
Caller: Israel Reyna    Relationship: Self    Best call back number: [phone]    What form or medical record are you requesting: PATIENT ASSISTANCE FOR TRULICITY    Who is requesting this form or medical record from you: [facility]    How would you like to receive the form or medical records (pick-up, mail, fax): FAX  If fax, what is the fax number: [phone]      Timeframe paperwork needed: AS SOON AS POSSIBLE    Additional notes: PATIENT STATES THAT THE [facility] CALLED AND INFORMED HIM THAT THE PAPERWORK FROM THE DOCTOR WAS MISSING SOME THINGS ON THE PAPERWORK. ON PAGE 5 IT NEEDS TO HAVE THE DOCTOR'S SIGNATURE, STATE LICENSE NUMBER AND THE STATE THAT ISSUES THE DOCTOR'S LICENSE.

## 2023-02-21 ENCOUNTER — TELEPHONE (OUTPATIENT)
Dept: FAMILY MEDICINE CLINIC | Facility: CLINIC | Age: 68
End: 2023-02-21
Payer: MEDICARE

## 2023-02-21 NOTE — TELEPHONE ENCOUNTER
Caller: Israel Reyna    Relationship: Self    Best call back number: [phone]    What is the best time to reach you: ANYTIME     Who are you requesting to speak with (clinical staff, provider,  specific staff member): CLINICAL STAFF     What was the call regarding: PATIENT STATES THAT HE NEEDS TO HAVE PAGES 5 AND 6 TO BE REREFAXED FOR HIS TRULICTY. HE SAYS THAT IT NEEDS TO INCLUDE HIS NAME, DATE OF BIRTH,AND THE ACCOUNT NUMBER WITH THE [employer] ([id number]), THE DOCTOR'S SIGNATURE, LICENSE NUMBER AND STATE IT IS ISSUED FROM.     Do you require a callback: YES

## 2023-02-22 ENCOUNTER — OFFICE VISIT (OUTPATIENT)
Dept: NEUROLOGY | Facility: CLINIC | Age: 68
End: 2023-02-22
Payer: MEDICARE

## 2023-02-22 VITALS
SYSTOLIC BLOOD PRESSURE: 118 MMHG | DIASTOLIC BLOOD PRESSURE: 62 MMHG | HEART RATE: 100 BPM | OXYGEN SATURATION: 97 % | BODY MASS INDEX: 25.11 KG/M2 | WEIGHT: 160 LBS | HEIGHT: 67 IN

## 2023-02-22 DIAGNOSIS — G43.119 INTRACTABLE MIGRAINE WITH AURA WITHOUT STATUS MIGRAINOSUS: ICD-10-CM

## 2023-02-22 PROCEDURE — 99214 OFFICE O/P EST MOD 30 MIN: CPT | Performed by: PSYCHIATRY & NEUROLOGY

## 2023-02-22 RX ORDER — BUTALBITAL, ACETAMINOPHEN AND CAFFEINE 50; 325; 40 MG/1; MG/1; MG/1
1 TABLET ORAL ONCE AS NEEDED
Qty: 30 TABLET | Refills: 0 | Status: SHIPPED | OUTPATIENT
Start: 2023-02-22

## 2023-02-22 RX ORDER — DIVALPROEX SODIUM 500 MG/1
500 TABLET, DELAYED RELEASE ORAL 2 TIMES DAILY
Qty: 60 TABLET | Refills: 3 | Status: SHIPPED | OUTPATIENT
Start: 2023-02-22 | End: 2023-03-24

## 2023-02-22 NOTE — PROGRESS NOTES
"Subjective:    CC: Israel Reyna is in clinic today for follow up for      HPI:  Problem history:    This is a 67 yo male who presents for re-evaluation of worsening migraines. Intractable migraines since 2013 followed by Dr. Chaidez normally.   He went to have COVID vaccine March 2021 Pfizer. He had immediate worsening headaches, chills, feeling terrible, weak and in bed x 4 days. He had his second injection April 2021 Pfizer with the same exact symptoms. He feels that his symptoms have progressively worsened since the COVID vaccines. Has noticed every time he eats anything at all he notices he cannot walk, he cannot speak, his left face will \"draw up\", twitch and be painful with episodes lasting 15 minutes to 2 hours and have been present for 2-3 months and seems to be worsening since COVID vaccine. He was last seen in our clinic 3/2020. He tells me the pain in his head, holocranial, can be anywhere throughout the head, \"feels like someone is repeatedly hitting me with a ball bat or stick and at times feels like someone is driving a nail in the back of my head. The ones that scare me the most are the headaches in the temples that feel like sharp, someone shooting a nail gun in my temples.\" Also has associated dizziness. Pain can radiate across his head and within 10 minutes and he loses control of his bowels. This is not a change and has been present since [id number]. No imaging of head since 2017. He cannot have MRI due to anal sphincter stimulator. Has chronic daily headaches. \"Drawing of face\" is daily. CTA head/neck 2019 normal. He feels when he has his pain there is \"moisture running down his head\". Takes topamax 25mg BID and gabapentin 300mg TID. Experiences severe pain with episodes. Cannot afford Botox. He is agreeable to CT head today. He is requesting migraine cocktail as well. He uses cane at times. Has been using fioricet PRN. He is scheduled for OP migraine cocktail today at 3pm at [facility] [facility]. Last migraine cocktail was [id number]. Tells me one med gives him hiccups for 2-3 days after but unsure which.  His headaches do also have severe pain, throbbing, photophobia, phonophobia, nausea and occasional vomiting and do her daily with some episodes being worse than others.    Prior notes and history: He has had multiple episodes of facial drooping, confusion, tingling & weakness all associated with headaches and has been diagnosed with intractable migraine with aura with complex migraines.  He has undergone continuous EEG monitoring as well as routine EEG monitoring and these test have been unremarkable.  He has been treated with multiple medications for his migraines including doxepin, Topamax, Elavil, Depakote, verapamil. He has previously had CT of the head in 2017 which appeared within normal limits with no changes from prior imaging.  CT of the cervical spine did show some degenerative changes in 2017.  He also had a carotid ultrasound in August 2019 which was concerning for high-grade stenosis.  He underwent CTA of the head and neck on 9/9/2019 ordered by Dr. Gavin neurosurgery and this showed minimal atherosclerosis with no stenosis, aneurysm or occlusions noted.  He does continue to take aspirin as well as simvastatin.  He has had a history of stroke.  EEG in 2017 was normal with minimal intermittent generalized slowing.  There has been discussion of Botox injections but this is too costly with his insurance and he does not want to try this.  He tells me he has had no changes in his health over the past 6 months. Has anal sphincter stimulator and cannot undergo MRI. Triptans contraindicated.    9/7/2021: I am seeing him in clinic today for the first time.  I have reviewed his problem history in detail.  Based on the history, he likely has chronic migraines with prominent sensory auras/complex migraines.  Since receiving vaccine back in March/April 2021, he is experiencing 8-10 episodes of  left facial tingling, numbness followed by a left face drawing and generalized weakness lasting for 1 to 2 hours.  Some of these episodes are associated with headaches and some of these episodes are not.  He has been on Topamax 25 mg twice daily for almost 5 years.  He is also on gabapentin 300 mg 3 times daily for neck pain.  He uses Fioricet as needed for severe migraines which seems to be helping.  On his last visit with Dr. Aguero in May 2021, he was prescribed tapering course of steroids after which, he did not have any relief in intensity and frequency of these episodes.  He has had detailed neurological work-up in the past which was essentially normal including routine EEG, continuous EEG as well as CT angiogram of brain and neck.    11/8/2022: He is in clinic for follow-up after 13 months.  He reports that since his initial visit with me, he has had several breakthrough migraines associated with left facial tingling, numbness followed by face drawing, generalized weakness lasting for 1 to 2 hours.  With some of the episodes, he feels as if fluid is moving from the right side of the head to the left.  On the last visit, Topamax dose was increased but he reports it did not really help so he stopped taking the medication.  He has been also given some samples of Nurtec to be taken at the onset of migraine which she has tried couple of times and seems to have helped somewhat.    Follow-up: 2/22/2023: He is in clinic for regular follow-up.  Since his last visit on November 8, 2022, he was diagnosed to have COVID towards the end of November which increased migraine frequency and intensity some.  He was prescribed a Medrol Dosepak which helped reduce some of this however he continues to have frequent episodes of right facial twitching, drying followed by left sided weakness and numbness lasting for 1 to 2 hours.  Some of this episodes are associated with headache and some of them are not.  He was instructed to  "start Depakote after last visit but he has never done so.  He did try Topamax 100 mg twice daily but it was not helpful so he stopped taking it.  So far he has tried only Topamax and gabapentin for migraine prevention    The following portions of the patient's history were reviewed and updated as of 02/22/2023: allergies, social history and problem list.       Current Outpatient Medications:   •  amLODIPine (NORVASC) 10 MG tablet, TAKE 1 TABLET DAILY, Disp: 90 tablet, Rfl: 3  •  butalbital-acetaminophen-caffeine (FIORICET, ESGIC) -40 MG per tablet, Take 1 tablet by mouth 1 (One) Time As Needed for Headache for up to 30 doses., Disp: 30 tablet, Rfl: 0  •  divalproex (DEPAKOTE) 500 MG DR tablet, Take 1 tablet by mouth 2 (Two) Times a Day for 30 days., Disp: 60 tablet, Rfl: 3  •  gabapentin (NEURONTIN) 300 MG capsule, Take 1 capsule by mouth 3 (Three) Times a Day., Disp: 270 capsule, Rfl: 1  •  glipizide (GLUCOTROL) 10 MG tablet, Take 1 tablet by mouth 2 (Two) Times a Day Before Meals., Disp: 180 tablet, Rfl: 3  •  glucose blood test strip, 1 each by Other route Daily. Use daily as directed, Disp: 100 each, Rfl: 12  •  glucose monitor monitoring kit, 1 each Daily., Disp: 1 each, Rfl: 0  •  Incontinence Supply Disposable (NU-FIT ADULT UNDERWEAR MEDIUM) misc, , Disp: , Rfl:   •  Insulin Syringe 31G X 5/16\" 0.3 ML misc, 1 each Every Night., Disp: 90 each, Rfl: 3  •  Lancets Thin misc, 1 each Daily. Use daily as directed, Disp: 100 each, Rfl: 11  •  lisinopril (PRINIVIL,ZESTRIL) 20 MG tablet, Take 1 tablet by mouth Daily., Disp: 90 tablet, Rfl: 3  •  Lutein 5 % beads, Take 5 mg by mouth Daily., Disp: , Rfl:   •  metFORMIN (GLUCOPHAGE) 1000 MG tablet, Take 1 tablet by mouth 2 (Two) Times a Day With Meals., Disp: 180 tablet, Rfl: 3  •  Multiple Vitamins-Minerals (PRESERVISION AREDS 2 PO), Take  by mouth Daily., Disp: , Rfl:   •  omeprazole (priLOSEC) 40 MG capsule, TAKE 1 CAPSULE DAILY, Disp: 90 capsule, Rfl: 0  •  " ondansetron (ZOFRAN) 8 MG tablet, TAKE 1 TABLET BY MOUTH EVERY 8 HOURS AS NEEDED FOR NAUSEA FOR VOMITING, Disp: 30 tablet, Rfl: 0  •  pantoprazole (PROTONIX) 40 MG EC tablet, Take 1 tablet by mouth 2 (Two) Times a Day. 30 minutes prior to first meal of the day and 30 minutes prior to last meal of the day, Disp: 180 tablet, Rfl: 3  •  sildenafil (VIAGRA) 100 MG tablet, Take 1 tablet by mouth Daily As Needed for Erectile Dysfunction., Disp: 15 tablet, Rfl: 11  •  Trulicity 1.5 MG/0.5ML solution pen-injector, Inject 1.5 mg under the skin into the appropriate area as directed 1 (One) Time Per Week., Disp: 6 mL, Rfl: 3  •  vitamin B-12 (CYANOCOBALAMIN) 100 MCG tablet, Take 50 mcg by mouth 2 (two) times a day., Disp: , Rfl:   •  vitamin C (ASCORBIC ACID) 500 MG tablet, Take 500 mg by mouth 2 (two) times a day., Disp: , Rfl:   •  Zinc 40 MG tablet, Take 1 tablet by mouth Daily., Disp: , Rfl:   •  atorvastatin (LIPITOR) 20 MG tablet, Take 1 tablet by mouth Daily., Disp: 90 tablet, Rfl: 3  •  NON FORMULARY, Shot in eye every 3 months for macular degeneration, Disp: , Rfl:   •  Specialty Vitamins Products (HEALTHY HEART COMPLEX PO), Take  by mouth Daily., Disp: , Rfl:   •  tamsulosin (FLOMAX) 0.4 MG capsule 24 hr capsule, Take 1 capsule by mouth Daily., Disp: 90 capsule, Rfl: 3  •  vitamin E 400 UNIT capsule, Take 400 Units by mouth Daily., Disp: , Rfl:   •  Zeaxanthin powder, Take 1 mg by mouth Daily., Disp: , Rfl:    Past Medical History:   Diagnosis Date   • Allergic    • Anxiety    • Bowel incontinence     w/ history of Stimulator Device    • CAD (coronary artery disease)    • Cataract    • Chest pain    • CHF (congestive heart failure) (HCC)    • Colon polyp    • Depression    • Diabetes mellitus, type 2 (HCC)    • Diabetic peripheral neuropathy (HCC)    • Dyslipidemia    • Fatigue    • GERD (gastroesophageal reflux disease)    • Hypertension    • Kidney stone    • Mental status change    • Migraine     Complex    •  "Migraine headache    • Renal insufficiency    • SOB (shortness of breath)       Past Surgical History:   Procedure Laterality Date   • CARDIAC CATHETERIZATION     • CARDIAC CATHETERIZATION N/A 10/14/2016    Procedure: Left Heart Cath;  Surgeon: Santiago Garner MD;  Location:  [facility];  Service:    • COLONOSCOPY  02/25/2022    Dr. Graves-recommended 3 year repeat   • ENDOSCOPY  02/03/2022    Dr. Graves   • GASTRIC STIMULATOR IMPLANT SURGERY     • SMALL INTESTINE SURGERY      unspecified, x3 as an infant    • TESTICLE SURGERY        Family History   Problem Relation Age of Onset   • Heart failure Mother    • Diabetes Mother    • Alzheimer's disease Mother    • Arthritis Mother    • Heart disease Mother    • Heart attack Father    • Kidney disease Father    • Cancer Father    • Prostate cancer Father    • Heart disease Father    • Hypertension Father    • Cancer Sister    • Breast cancer Sister    • Diabetes Sister    • Heart disease Sister    • Arrhythmia Sister    • Diabetes Brother    • Heart disease Brother    • Cancer Brother    • Liver disease Brother    • Kidney disease Brother    • Alzheimer's disease Maternal Grandmother    • Liver cancer Maternal Grandfather    • Liver disease Maternal Grandfather    • Other Paternal Grandmother         Portuguese flu   • Other Paternal Grandfather         Portuguese flu   • Colon cancer Neg Hx         Review of Systems  Objective:    /62   Pulse 100   Ht 170.2 cm (67\")   Wt 72.6 kg (160 lb)   SpO2 97%   BMI 25.06 kg/m²     Neurology Exam:  General apperance: NAD.     Mental status: Alert, awake and oriented to time place and person.    Recent and Remote memory: Can recall 3/3 objects at 5 minutes. Can recall historical events.     Attention span and Concentration: Serial 7s: Normal.     Fund of knowledge:  Normal.     Language and Speech: No aphasia or dysarthria.    Naming , Repitition and Comprehension:  Can name objects, repeat a " sentence and follow commands. Speech is clear and fluent with good repetition, comprehension, and naming.    CN II to XII: Intact.    Opthalmoscopic Exam: No papilledema.    Motor:  Right UE muscle strength 5/5. Normal tone.     Left UE muscle strength 5/5. Normal tone.      Right LE muscle strength5/5. Normal tone.     Left LE muscle strength 5/5. Normal tone.      Sensory: Normal light touch, vibration and pinprick sensation bilaterally.    DTRs: 2+ bilaterally.    Babinski: Negative bilaterally.    Co-ordination: Normal finger-to-nose, heel to shin B/L.    Rhomberg: Negative.    Gait: Normal.    Cardiovascular: Regular rate and rhythm without murmur, gallop or rub.    Assessment and Plan:  1. Intractable migraine with aura without status migrainosus  He continues to have frequent episodes of left-sided paresthesias, left facial twitching/drying followed by weakness lasting for 1 to 2 hours.  Some of these are associated with headaches and some of these are not.  He has had these for many years now he has completed detailed neurological work-up in the past including continuous EEG monitoring which was negative.  Since he has not tried Depakote, I have told him to start Depakote 500 mg once a day for 7 days and 500 mg twice daily after that.  We will plan to see him back in clinic in 6 weeks for follow-up and at that time based on the response to Depakote, further treatment options will be discussed with him.  If no response to Depakote then once a month monoclonal antibody can be considered as he has not tried it in the past.  I will also prescribe him Ubrelvy 100 mg to be taken as needed at the onset of migraine.    - butalbital-acetaminophen-caffeine (FIORICET, ESGIC) -40 MG per tablet; Take 1 tablet by mouth 1 (One) Time As Needed for Headache for up to 30 doses.  Dispense: 30 tablet; Refill: 0       I spent 30 minutes in patient care: Reviewing records prior to the visit, entering orders and  documentation and spent more than hurtado 50% of this time face-to-face in management, instructions and education regarding above mentioned diagnosis and also on counseling and discussing about taking medication regularly, possible side effects with medication use, importance of good sleep hygiene, good hydration and regular exercise.    Return in about 6 weeks (around 4/5/2023).       Note to patient: The 21st Century Cures Act makes medical notes like these available to patients in the interest of transparency. However, be advised this is a medical document. It is intended as peer to peer communication. It is written in medical language and may contain abbreviations or verbiage that are unfamiliar. It may appear blunt or direct. Medical documents are intended to carry relevant information, facts as evident, and the clinical opinion of the physician.

## 2023-02-23 ENCOUNTER — TELEPHONE (OUTPATIENT)
Dept: FAMILY MEDICINE CLINIC | Facility: CLINIC | Age: 68
End: 2023-02-23

## 2023-02-23 ENCOUNTER — TELEMEDICINE (OUTPATIENT)
Dept: FAMILY MEDICINE CLINIC | Facility: CLINIC | Age: 68
End: 2023-02-23
Payer: MEDICARE

## 2023-02-23 DIAGNOSIS — J00 ACUTE NASOPHARYNGITIS: Primary | ICD-10-CM

## 2023-02-23 PROCEDURE — 99213 OFFICE O/P EST LOW 20 MIN: CPT | Performed by: FAMILY MEDICINE

## 2023-02-23 RX ORDER — AMOXICILLIN AND CLAVULANATE POTASSIUM 875; 125 MG/1; MG/1
1 TABLET, FILM COATED ORAL 2 TIMES DAILY
Qty: 14 TABLET | Refills: 0 | Status: SHIPPED | OUTPATIENT
Start: 2023-02-23 | End: 2023-03-02

## 2023-02-23 RX ORDER — BROMPHENIRAMINE MALEATE, PSEUDOEPHEDRINE HYDROCHLORIDE, AND DEXTROMETHORPHAN HYDROBROMIDE 2; 30; 10 MG/5ML; MG/5ML; MG/5ML
5 SYRUP ORAL 4 TIMES DAILY PRN
Qty: 118 ML | Refills: 1 | Status: SHIPPED | OUTPATIENT
Start: 2023-02-23 | End: 2023-03-02

## 2023-02-23 NOTE — PROGRESS NOTES
Subjective   Israel Reyna is a 68 y.o. male.     Chief Complaint   Patient presents with   • URI       History of Present Illness     Israel Reyna presents today for   Chief Complaint   Patient presents with   • URI     Started Sunday. Aching head to toe, sore throat, cannot breathe through nose. He has been using a nasal sniffer and fluticasone nasal spray. Negative COVID test this morning. No exposure to flu as far as he knows. He had surgery 2 weeks ago on left retina and had f/u yesterday.    You have chosen to receive care through a telehealth visit.  Do you consent to use a video/audio connection for your medical care today? Yes    Patient location: [address] [place] [address] [address] [place] [address]   Provider Location: [facility] [place] [id number]    The following portions of the patient's history were reviewed and updated as appropriate: allergies, current medications, past family history, past medical history, past social history, past surgical history and problem list.    Active Ambulatory Problems     Diagnosis Date Noted   • Bowel incontinence 09/26/2016   • Classic migraine with aura 09/26/2016   • Depression    • Anxiety    • Mental status change 09/26/2016   • Disorientation 09/26/2016   • Facial droop 09/26/2016   • Difficulty walking 09/26/2016   • Intractable migraine with aura without status migrainosus 10/06/2016   • Chest pain 10/06/2016   • CAD (coronary artery disease)    • Hypertension    • Dyslipidemia    • Diabetes mellitus, type 2 ([facility])    • GERD (gastroesophageal reflux disease)    • CHF (congestive heart failure) ([facility])    • Chest pain    • SOB (shortness of breath)    • Fatigue    • Chronic migraine 04/11/2017   • Other cerebral infarction ([facility]) 07/18/2017   • Transient cerebral ischemia 07/18/2017   • Neck pain 10/17/2017   • Spondylosis of cervical region without myelopathy or radiculopathy 10/17/2017   • Facial pain 03/22/2018   • Nausea 06/01/2022   • Erectile  disorder due to medical condition in male 06/01/2022   • Nephrolithiasis 12/26/2022     Resolved Ambulatory Problems     Diagnosis Date Noted   • Seizure disorder (HCC) 09/26/2016   • Migraine      Past Medical History:   Diagnosis Date   • Allergic    • Cataract    • Colon polyp    • Diabetic peripheral neuropathy (HCC)    • Kidney stone    • Mental status change    • Migraine headache    • Renal insufficiency      Past Surgical History:   Procedure Laterality Date   • CARDIAC CATHETERIZATION     • CARDIAC CATHETERIZATION N/A 10/14/2016    Procedure: Left Heart Cath;  Surgeon: Santiago Garner MD;  Location: [facility] CATH INVASIVE LOC[facility];  Service:    • COLONOSCOPY  02/25/2022    Dr. Graves-recommended 3 year repeat   • ENDOSCOPY  02/03/2022    Dr. Graves   • GASTRIC STIMULATOR IMPLANT SURGERY     • SMALL INTESTINE SURGERY      unspecified, x3 as an infant    • TESTICLE SURGERY       Family History   Problem Relation Age of Onset   • Heart failure Mother    • Diabetes Mother    • Alzheimer's disease Mother    • Arthritis Mother    • Heart disease Mother    • Heart attack Father    • Kidney disease Father    • Cancer Father    • Prostate cancer Father    • Heart disease Father    • Hypertension Father    • Cancer Sister    • Breast cancer Sister    • Diabetes Sister    • Heart disease Sister    • Arrhythmia Sister    • Diabetes Brother    • Heart disease Brother    • Cancer Brother    • Liver disease Brother    • Kidney disease Brother    • Alzheimer's disease Maternal Grandmother    • Liver cancer Maternal Grandfather    • Liver disease Maternal Grandfather    • Other Paternal Grandmother         Luxembourgish flu   • Other Paternal Grandfather         Lux[place]bourgish flu   • Colon cancer Neg Hx      Social History     Socioeconomic History   • Marital status:    Tobacco Use   • Smoking status: Never   • Smokeless tobacco: Never   Vaping Use   • Vaping Use: Never used   Substance and Sexual Activity   • Alcohol  "use: Never   • Drug use: Never   • Sexual activity: Yes       Review of Systems  Review of Systems -  General ROS: negative for - chills, fever or night sweats  Cardiovascular ROS: no chest pain or dyspnea on exertion    Objective   There were no vitals taken for this visit.  Vitals obtained from patient if available  Physical Exam  Const: NAD, A&Ox4, Pleasant  ENT: Moderate nasal discharge present, nasal congestion noted in voice  Cardiac: Regular rate and rhythm, no cyanosis  Resp: Respiratory rate within normal limits, no increased work of breathing, no audible wheezing or retractions noted  Psych: Appropriate mood and behavior.  Procedures  Assessment & Plan   Problem List Items Addressed This Visit    None  Visit Diagnoses     Acute nasopharyngitis    -  Primary    Relevant Medications    brompheniramine-pseudoephedrine-DM 30-2-10 MG/5ML syrup    amoxicillin-clavulanate (Augmentin) 875-125 MG per tablet        Would be out of treatment period for flu anyways. Given PM recommend abx if persisting or worsening at 10 days, since we're going into the weekend will send to pharmacy for patient to have if needed. Otherwise symptomatic treatment, f/u PRN    See patient diagnoses and orders along with patient instructions for assessment, plan, and changes to care for patient.    This visit was conducted via telemedicine with live video and audio provided through Video Options: MyChart/Zoom at the point of care.    Patient Instructions   · Attain adequate rest and increase clear fluid intake. If you are not eating, add Pedialyte to ensure that the water has enough sugar and salt to be absorbed into your body.  · Use warm salt water gargles, Cepacol lozenges, and chloraseptic spray for sore throat.   · Use Mucinex 600 mg twice daily and nasal saline irrigation with \"ocean\" or \"simply saline\" brand sterile nasal spray twice daily if needed for sinuses.   · Use warm compresses over sinuses for comfort if needed.   · Zinc " lozenges may prevent the adhesion of viruses in the respiratory tract to reduce your risk of getting sick.  · Alternate use of Tylenol 500 mg and Ibuprofen 400 mg every 4 hours as needed for headache, body aches, and/or fever reduction  · Use Loratadine (Claritin), Cetrizine (Zyrtec), or Fexofenadine (Allegra) once daily over the counter, Flonase 1 spray each nostril daily, and auto-inflate ears using modified valsalva maneuver approximately 20 times daily for ear congestion.     Call or Return to office if symptoms worsen or persist.        Return if symptoms worsen or fail to improve.    Ambulatory progress note signed and attested to by Elio Moore D.O.

## 2023-02-23 NOTE — TELEPHONE ENCOUNTER
"Caller: [facility] [address] [place] [address] [facility] - [phone] [facility] [phone]     Relationship: Pharmacy    Best call back number:   NELIDA  [phone]    What medications are you currently taking:   Current Outpatient Medications on File Prior to Visit   Medication Sig Dispense Refill   • amLODIPine (NORVASC) 10 MG tablet TAKE 1 TABLET DAILY 90 tablet 3   • amoxicillin-clavulanate (Augmentin) 875-125 MG per tablet Take 1 tablet by mouth 2 (Two) Times a Day for 7 days. 14 tablet 0   • atorvastatin (LIPITOR) 20 MG tablet Take 1 tablet by mouth Daily. 90 tablet 3   • brompheniramine-pseudoephedrine-DM 30-2-10 MG/5ML syrup Take 5 mL by mouth 4 (Four) Times a Day As Needed for Allergies (mucous) for up to 7 days. 118 mL 1   • butalbital-acetaminophen-caffeine (FIORICET, ESGIC) -40 MG per tablet Take 1 tablet by mouth 1 (One) Time As Needed for Headache for up to 30 doses. 30 tablet 0   • divalproex (DEPAKOTE) 500 MG DR tablet Take 1 tablet by mouth 2 (Two) Times a Day for 30 days. 60 tablet 3   • gabapentin (NEURONTIN) 300 MG capsule Take 1 capsule by mouth 3 (Three) Times a Day. 270 capsule 1   • glipizide (GLUCOTROL) 10 MG tablet Take 1 tablet by mouth 2 (Two) Times a Day Before Meals. 180 tablet 3   • glucose blood test strip 1 each by Other route Daily. Use daily as directed 100 each 12   • glucose monitor monitoring kit 1 each Daily. 1 each 0   • Incontinence Supply Disposable (NU-FIT ADULT UNDERWEAR MEDIUM) misc      • Insulin Syringe 31G X 5/16\" 0.3 ML misc 1 each Every Night. 90 each 3   • Lancets Thin misc 1 each Daily. Use daily as directed 100 each 11   • lisinopril (PRINIVIL,ZESTRIL) 20 MG tablet Take 1 tablet by mouth Daily. 90 tablet 3   • Lutein 5 % beads Take 5 mg by mouth Daily.     • metFORMIN (GLUCOPHAGE) 1000 MG tablet Take 1 tablet by mouth 2 (Two) Times a Day With Meals. 180 tablet 3   • Multiple Vitamins-Minerals (PRESERVISION AREDS 2 PO) Take  by mouth Daily.   "   • NON FORMULARY Shot in eye every 3 months for macular degeneration     • omeprazole (priLOSEC) 40 MG capsule TAKE 1 CAPSULE DAILY 90 capsule 0   • ondansetron (ZOFRAN) 8 MG tablet TAKE 1 TABLET BY MOUTH EVERY 8 HOURS AS NEEDED FOR NAUSEA FOR VOMITING 30 tablet 0   • pantoprazole (PROTONIX) 40 MG EC tablet Take 1 tablet by mouth 2 (Two) Times a Day. 30 minutes prior to first meal of the day and 30 minutes prior to last meal of the day 180 tablet 3   • sildenafil (VIAGRA) 100 MG tablet Take 1 tablet by mouth Daily As Needed for Erectile Dysfunction. 15 tablet 11   • Specialty Vitamins Products (HEALTHY HEART COMPLEX PO) Take  by mouth Daily.     • tamsulosin (FLOMAX) 0.4 MG capsule 24 hr capsule Take 1 capsule by mouth Daily. 90 capsule 3   • Trulicity 1.5 MG/0.5ML solution pen-injector Inject 1.5 mg under the skin into the appropriate area as directed 1 (One) Time Per Week. 6 mL 3   • ubrogepant 100 MG tablet Take 1 tablet by mouth As Needed (Migraine) for up to 30 days. 16 tablet 3   • vitamin B-12 (CYANOCOBALAMIN) 100 MCG tablet Take 50 mcg by mouth 2 (two) times a day.     • vitamin C (ASCORBIC ACID) 500 MG tablet Take 500 mg by mouth 2 (two) times a day.     • vitamin E 400 UNIT capsule Take 400 Units by mouth Daily.     • Zeaxanthin powder Take 1 mg by mouth Daily.     • Zinc 40 MG tablet Take 1 tablet by mouth Daily.       No current facility-administered medications on file prior to visit.      Which medication are you concerned about: ALL APPLICABLE/CURRENT MEDICATIONS    Who prescribed you this medication: DR DANIELLE, AND OTHERS POSSIBLY    What are your concerns: PHARMACY STATES THAT THE PATIENT'S WIFE HAS MAJOR CONCERNS ABOUT HIS MEDICATIONS. SHE SAYS THAT THE WIFE IS NOT SURE WHICH MEDICATIONS HE SHOULD BE TAKING/NOT TAKING, DOSAGES, ETC.     PHARMACY STATES THAT SOME OF THE MEDICATION CONCERNS IN QUESTION COULD BE FROM OTHER PRESCRIBERS BUT IS NOT POSITIVE.     PLEASE ADVISE PHARMACY OR THE PATIENT'S  WIFE     How long have you had these concerns: 2/23/23

## 2023-02-23 NOTE — TELEPHONE ENCOUNTER
Contacted Mary Carmen to discuss further. I reviewed active meds. She will print a copy and provided to patient's wife    Bromphed cough syrup not covered under insurance for $80 pharmacist advised patient to try Robitussin or Sudafed OTC

## 2023-02-27 ENCOUNTER — PRIOR AUTHORIZATION (OUTPATIENT)
Dept: FAMILY MEDICINE CLINIC | Facility: CLINIC | Age: 68
End: 2023-02-27
Payer: MEDICARE

## 2023-02-27 NOTE — TELEPHONE ENCOUNTER
Submitted to [facility]  Med:BROM/PSE/DM SYP  Status:sent to plan, awaiting determination    Created:02/27/2023

## 2023-03-02 ENCOUNTER — OFFICE VISIT (OUTPATIENT)
Dept: FAMILY MEDICINE CLINIC | Facility: CLINIC | Age: 68
End: 2023-03-02
Payer: MEDICARE

## 2023-03-02 VITALS
OXYGEN SATURATION: 98 % | HEART RATE: 78 BPM | DIASTOLIC BLOOD PRESSURE: 80 MMHG | WEIGHT: 172 LBS | TEMPERATURE: 96.9 F | SYSTOLIC BLOOD PRESSURE: 134 MMHG | HEIGHT: 67 IN | BODY MASS INDEX: 27 KG/M2

## 2023-03-02 DIAGNOSIS — R52 BODY ACHES: ICD-10-CM

## 2023-03-02 DIAGNOSIS — J06.9 UPPER RESPIRATORY TRACT INFECTION, UNSPECIFIED TYPE: Primary | ICD-10-CM

## 2023-03-02 LAB
EXPIRATION DATE: NORMAL
FLUAV AG UPPER RESP QL IA.RAPID: NOT DETECTED
FLUBV AG UPPER RESP QL IA.RAPID: NOT DETECTED
INTERNAL CONTROL: NORMAL
Lab: NORMAL
SARS-COV-2 AG UPPER RESP QL IA.RAPID: NOT DETECTED

## 2023-03-02 PROCEDURE — 99213 OFFICE O/P EST LOW 20 MIN: CPT | Performed by: PHYSICIAN ASSISTANT

## 2023-03-02 PROCEDURE — 87428 SARSCOV & INF VIR A&B AG IA: CPT | Performed by: PHYSICIAN ASSISTANT

## 2023-03-02 RX ORDER — GUAIFENESIN 600 MG/1
600 TABLET, EXTENDED RELEASE ORAL 2 TIMES DAILY
Qty: 20 TABLET | Refills: 0 | Status: SHIPPED | OUTPATIENT
Start: 2023-03-02 | End: 2023-03-12

## 2023-03-02 RX ORDER — AZITHROMYCIN 250 MG/1
TABLET, FILM COATED ORAL
Qty: 6 TABLET | Refills: 0 | Status: SHIPPED | OUTPATIENT
Start: 2023-03-02 | End: 2023-03-29

## 2023-03-02 NOTE — PROGRESS NOTES
"    Chief Complaint   Patient presents with   • Nasal Congestion   • Generalized Body Aches     X 1 week    • Respiratory Distress       HPI     Israel Reyna is a pleasant 68 y.o. male with a PMH of CAD, CHF, type 2 diabetes, and migraines who presents for evaluation of \"chief complaint.\"     Patient c/o 1-week history of nasal congestion, malaise, eye watering, shortness of breath, throat clearing, chills, and night sweats. He seemed to be improving on Augmentin from his PCP but it was causing diarrhea. He stopped it after about 4 days. He did have pneumonia several years ago. A home COVID test last week was negative.      Past Medical History:   Diagnosis Date   • Allergic    • Anxiety    • Bowel incontinence     w/ history of Stimulator Device    • CAD (coronary artery disease)    • Cataract    • Chest pain    • CHF (congestive heart failure) ([facility])    • Colon polyp    • Depression    • Diabetes mellitus, type 2 ([facility])    • Diabetic peripheral neuropathy ([facility])    • Dyslipidemia    • Fatigue    • GERD (gastroesophageal reflux disease)    • Hypertension    • Kidney stone    • Mental status change    • Migraine     Complex    • Migraine headache    • Renal insufficiency    • SOB (shortness of breath)        Past Surgical History:   Procedure Laterality Date   • CARDIAC CATHETERIZATION     • CARDIAC CATHETERIZATION N/A 10/14/2016    Procedure: Left Heart Cath;  Surgeon: Santiago Garner MD;  Location: [facility] CATH INVASIVE LOCATION;  Service:    • COLONOSCOPY  02/25/2022    Dr. Graves-recommended 3 year repeat   • ENDOSCOPY  02/03/2022    Dr. Graves   • GASTRIC STIMULATOR IMPLANT SURGERY     • SMALL INTESTINE SURGERY      unspecified, x3 as an infant    • TESTICLE SURGERY         Family History   Problem Relation Age of Onset   • Heart failure Mother    • Diabetes Mother    • Alzheimer's disease Mother    • Arthritis Mother    • Heart disease Mother    • Heart attack Father    • Kidney disease Father "    • Cancer Father    • Prostate cancer Father    • Heart disease Father    • Hypertension Father    • Cancer Sister    • Breast cancer Sister    • Diabetes Sister    • Heart disease Sister    • Arrhythmia Sister    • Diabetes Brother    • Heart disease Brother    • Cancer Brother    • Liver disease Brother    • Kidney disease Brother    • Alzheimer's disease Maternal Grandmother    • Liver cancer Maternal Grandfather    • Liver disease Maternal Grandfather    • Other Paternal Grandmother         Serbian flu   • Other Paternal Grandfather         Serbian flu   • Colon cancer Neg Hx        Social History     Socioeconomic History   • Marital status:    Tobacco Use   • Smoking status: Never   • Smokeless tobacco: Never   Vaping Use   • Vaping Use: Never used   Substance and Sexual Activity   • Alcohol use: Never   • Drug use: Never   • Sexual activity: Yes       Allergies   Allergen Reactions   • Actos [Pioglitazone] Anaphylaxis     congestive heart failure   • Avandia [Rosiglitazone] Anaphylaxis     congestive heart failure.   • Darvon [Propoxyphene] Itching     Angioedema    itching   • Talwin [Pentazocine] Rash     Angioedema    itching       ROS    Review of Systems   Constitutional: Positive for chills and diaphoresis. Negative for fever.   HENT: Positive for congestion, postnasal drip, rhinorrhea and sore throat.    Respiratory: Positive for shortness of breath. Negative for cough and wheezing.    Cardiovascular: Negative for chest pain.   Musculoskeletal: Positive for myalgias.   Neurological: Positive for headache.       Vitals:    03/02/23 0935   BP: 134/80   Pulse: 78   Temp: 96.9 °F (36.1 °C)   SpO2: 98%     Body mass index is 26.94 kg/m².      Current Outpatient Medications:   •  amLODIPine (NORVASC) 10 MG tablet, TAKE 1 TABLET DAILY, Disp: 90 tablet, Rfl: 3  •  atorvastatin (LIPITOR) 20 MG tablet, Take 1 tablet by mouth Daily., Disp: 90 tablet, Rfl: 3  •  butalbital-acetaminophen-caffeine  "(FIORICET, ESGIC) -40 MG per tablet, Take 1 tablet by mouth 1 (One) Time As Needed for Headache for up to 30 doses., Disp: 30 tablet, Rfl: 0  •  divalproex (DEPAKOTE) 500 MG DR tablet, Take 1 tablet by mouth 2 (Two) Times a Day for 30 days., Disp: 60 tablet, Rfl: 3  •  gabapentin (NEURONTIN) 300 MG capsule, Take 1 capsule by mouth 3 (Three) Times a Day., Disp: 270 capsule, Rfl: 1  •  glipizide (GLUCOTROL) 10 MG tablet, Take 1 tablet by mouth 2 (Two) Times a Day Before Meals., Disp: 180 tablet, Rfl: 3  •  glucose blood test strip, 1 each by Other route Daily. Use daily as directed, Disp: 100 each, Rfl: 12  •  glucose monitor monitoring kit, 1 each Daily., Disp: 1 each, Rfl: 0  •  Incontinence Supply Disposable (NU-FIT ADULT UNDERWEAR MEDIUM) misc, , Disp: , Rfl:   •  Insulin Syringe 31G X 5/16\" 0.3 ML misc, 1 each Every Night., Disp: 90 each, Rfl: 3  •  Lancets Thin misc, 1 each Daily. Use daily as directed, Disp: 100 each, Rfl: 11  •  lisinopril (PRINIVIL,ZESTRIL) 20 MG tablet, Take 1 tablet by mouth Daily., Disp: 90 tablet, Rfl: 3  •  Lutein 5 % beads, Take 5 mg by mouth Daily., Disp: , Rfl:   •  metFORMIN (GLUCOPHAGE) 1000 MG tablet, Take 1 tablet by mouth 2 (Two) Times a Day With Meals., Disp: 180 tablet, Rfl: 3  •  Multiple Vitamins-Minerals (PRESERVISION AREDS 2 PO), Take  by mouth Daily., Disp: , Rfl:   •  NON FORMULARY, Shot in eye every 3 months for macular degeneration, Disp: , Rfl:   •  omeprazole (priLOSEC) 40 MG capsule, TAKE 1 CAPSULE DAILY, Disp: 90 capsule, Rfl: 0  •  ondansetron (ZOFRAN) 8 MG tablet, TAKE 1 TABLET BY MOUTH EVERY 8 HOURS AS NEEDED FOR NAUSEA FOR VOMITING, Disp: 30 tablet, Rfl: 0  •  pantoprazole (PROTONIX) 40 MG EC tablet, Take 1 tablet by mouth 2 (Two) Times a Day. 30 minutes prior to first meal of the day and 30 minutes prior to last meal of the day, Disp: 180 tablet, Rfl: 3  •  sildenafil (VIAGRA) 100 MG tablet, Take 1 tablet by mouth Daily As Needed for Erectile " Dysfunction., Disp: 15 tablet, Rfl: 11  •  Specialty Vitamins Products (HEALTHY HEART COMPLEX PO), Take  by mouth Daily., Disp: , Rfl:   •  tamsulosin (FLOMAX) 0.4 MG capsule 24 hr capsule, Take 1 capsule by mouth Daily., Disp: 90 capsule, Rfl: 3  •  Trulicity 1.5 MG/0.5ML solution pen-injector, Inject 1.5 mg under the skin into the appropriate area as directed 1 (One) Time Per Week., Disp: 6 mL, Rfl: 3  •  ubrogepant 100 MG tablet, Take 1 tablet by mouth As Needed (Migraine) for up to 30 days., Disp: 16 tablet, Rfl: 3  •  vitamin B-12 (CYANOCOBALAMIN) 100 MCG tablet, Take 50 mcg by mouth 2 (two) times a day., Disp: , Rfl:   •  vitamin C (ASCORBIC ACID) 500 MG tablet, Take 1 tablet by mouth 2 (two) times a day., Disp: , Rfl:   •  vitamin E 400 UNIT capsule, Take 1 capsule by mouth Daily., Disp: , Rfl:   •  Zeaxanthin powder, Take 1 mg by mouth Daily., Disp: , Rfl:   •  Zinc 40 MG tablet, Take 1 tablet by mouth Daily., Disp: , Rfl:   •  azithromycin (Zithromax Z-Laron) 250 MG tablet, Take 2 tablets by mouth on day 1, then 1 tablet daily on days 2-5, Disp: 6 tablet, Rfl: 0  •  guaiFENesin (Mucinex) 600 MG 12 hr tablet, Take 1 tablet by mouth 2 (Two) Times a Day for 10 days., Disp: 20 tablet, Rfl: 0    PE    Physical Exam  Vitals reviewed.   Constitutional:       General: He is not in acute distress.     Appearance: He is well-developed.   HENT:      Head: Normocephalic.      Right Ear: Tympanic membrane and ear canal normal. Tympanic membrane is not erythematous.      Left Ear: Tympanic membrane and ear canal normal. Tympanic membrane is not erythematous.      Nose:      Right Turbinates: Swollen.      Left Turbinates: Swollen.      Right Sinus: Maxillary sinus tenderness present. No frontal sinus tenderness.      Left Sinus: Maxillary sinus tenderness present. No frontal sinus tenderness.      Mouth/Throat:      Pharynx: Uvula midline. Posterior oropharyngeal erythema present.      Tonsils: No tonsillar exudate.    Eyes:      General:         Right eye: No discharge.         Left eye: No discharge.      Conjunctiva/sclera: Conjunctivae normal.   Cardiovascular:      Rate and Rhythm: Normal rate and regular rhythm.      Heart sounds: Normal heart sounds.   Pulmonary:      Effort: Pulmonary effort is normal. No respiratory distress.      Breath sounds: Normal breath sounds. No wheezing, rhonchi or rales.   Musculoskeletal:      Cervical back: Normal range of motion and neck supple.      Right lower leg: No edema.      Left lower leg: No edema.   Lymphadenopathy:      Cervical: No cervical adenopathy.      Upper Body:      Right upper body: No supraclavicular adenopathy.      Left upper body: No supraclavicular adenopathy.   Skin:     General: Skin is warm and dry.   Psychiatric:         Behavior: Behavior normal.          A/P    Problem List Items Addressed This Visit    None  Visit Diagnoses     Upper respiratory tract infection, unspecified type    -  Primary    Start azithromycin to cover for secondary bacterial etiology.  Discussed Mucinex and Flonase for congestion.  Negative tests for COVID, strep, and influenza.    Relevant Medications    azithromycin (Zithromax Z-Laron) 250 MG tablet    Body aches        Relevant Orders    POCT SARS-CoV-2 Antigen ASHOK (Completed)          Plan of care was reviewed with patient at the conclusion of today's visit. Education was provided regarding diagnoses, management, prescribed or recommended OTC products, and the importance of compliance with follow-up appointments. The patient was counseled regarding the risks, benefits, and possible side-effects of treatment. I advised the patient to keep me informed of any acute changes in their status including new, worsening, or persistent symptoms. Patient expresses understanding and agreement with the management plan.        BRITTA San

## 2023-03-07 ENCOUNTER — PRIOR AUTHORIZATION (OUTPATIENT)
Dept: NEUROLOGY | Facility: CLINIC | Age: 68
End: 2023-03-07
Payer: MEDICARE

## 2023-03-16 ENCOUNTER — TELEPHONE (OUTPATIENT)
Dept: FAMILY MEDICINE CLINIC | Facility: CLINIC | Age: 68
End: 2023-03-16
Payer: MEDICARE

## 2023-03-16 NOTE — TELEPHONE ENCOUNTER
Caller: Israel Reyna    Relationship: Self    Best call back number: [phone]    What is the best time to reach you: ANY TIME    Who are you requesting to speak with (clinical staff, provider,  specific staff member): DR DANIELLE    Do you know the name of the person who called: SELF    What was the call regarding: [facility]. PATIENT STATES OFFICE NEEDS TO CALL AND FIND OUT IF EVERYTHING IS GOOD BECAUSE HE WILL NEED MEDICATION SOON. PLEASE ADVISE    Do you require a callback: YES

## 2023-03-17 NOTE — TELEPHONE ENCOUNTER
Contacted [facility] [employer] to get more information. Pt's application was approved for enrollment and the first shipment will come today by noon for a 4 month supply. Enrolled 03/08/2022-12/21/2023.    Pt notified & had no further questions at this time.

## 2023-03-29 ENCOUNTER — OFFICE VISIT (OUTPATIENT)
Dept: FAMILY MEDICINE CLINIC | Facility: CLINIC | Age: 68
End: 2023-03-29
Payer: MEDICARE

## 2023-03-29 ENCOUNTER — LAB (OUTPATIENT)
Dept: LAB | Facility: HOSPITAL | Age: 68
End: 2023-03-29
Payer: MEDICARE

## 2023-03-29 ENCOUNTER — HOSPITAL ENCOUNTER (OUTPATIENT)
Dept: GENERAL RADIOLOGY | Facility: HOSPITAL | Age: 68
Discharge: HOME | End: 2023-03-29
Payer: MEDICARE

## 2023-03-29 VITALS
SYSTOLIC BLOOD PRESSURE: 122 MMHG | TEMPERATURE: 97 F | DIASTOLIC BLOOD PRESSURE: 78 MMHG | BODY MASS INDEX: 26.87 KG/M2 | WEIGHT: 171.2 LBS | HEART RATE: 72 BPM | HEIGHT: 67 IN

## 2023-03-29 DIAGNOSIS — N52.1 ERECTILE DISORDER DUE TO MEDICAL CONDITION IN MALE: ICD-10-CM

## 2023-03-29 DIAGNOSIS — R10.9 FLANK PAIN: ICD-10-CM

## 2023-03-29 DIAGNOSIS — R52 BODY ACHES: ICD-10-CM

## 2023-03-29 DIAGNOSIS — E11.9 TYPE 2 DIABETES MELLITUS WITHOUT COMPLICATION, WITHOUT LONG-TERM CURRENT USE OF INSULIN: ICD-10-CM

## 2023-03-29 DIAGNOSIS — R50.9 FEVER, UNSPECIFIED FEVER CAUSE: ICD-10-CM

## 2023-03-29 DIAGNOSIS — R52 BODY ACHES: Primary | ICD-10-CM

## 2023-03-29 DIAGNOSIS — R11.0 NAUSEA: ICD-10-CM

## 2023-03-29 LAB
ALBUMIN SERPL-MCNC: 4.4 G/DL (ref 3.5–5.2)
ALBUMIN/GLOB SERPL: 1.6 G/DL
ALP SERPL-CCNC: 71 U/L (ref 39–117)
ALT SERPL W P-5'-P-CCNC: 13 U/L (ref 1–41)
ANION GAP SERPL CALCULATED.3IONS-SCNC: 9.9 MMOL/L (ref 5–15)
AST SERPL-CCNC: 14 U/L (ref 1–40)
BACTERIA UR QL AUTO: ABNORMAL /HPF
BASOPHILS # BLD AUTO: 0.11 10*3/MM3 (ref 0–0.2)
BASOPHILS NFR BLD AUTO: 1.3 % (ref 0–1.5)
BILIRUB SERPL-MCNC: <0.2 MG/DL (ref 0–1.2)
BILIRUB UR QL STRIP: NEGATIVE
BUN SERPL-MCNC: 19 MG/DL (ref 8–23)
BUN/CREAT SERPL: 17.6 (ref 7–25)
CALCIUM SPEC-SCNC: 9.5 MG/DL (ref 8.6–10.5)
CHLORIDE SERPL-SCNC: 103 MMOL/L (ref 98–107)
CHROMATIN AB SERPL-ACNC: <10 IU/ML (ref 0–14)
CK SERPL-CCNC: 42 U/L (ref 20–200)
CLARITY UR: CLEAR
CO2 SERPL-SCNC: 26.1 MMOL/L (ref 22–29)
COLOR UR: YELLOW
CREAT SERPL-MCNC: 1.08 MG/DL (ref 0.76–1.27)
CRP SERPL-MCNC: <0.3 MG/DL (ref 0–0.5)
DEPRECATED RDW RBC AUTO: 40.8 FL (ref 37–54)
EGFRCR SERPLBLD CKD-EPI 2021: 75.2 ML/MIN/1.73
EOSINOPHIL # BLD AUTO: 0.28 10*3/MM3 (ref 0–0.4)
EOSINOPHIL NFR BLD AUTO: 3.3 % (ref 0.3–6.2)
ERYTHROCYTE [DISTWIDTH] IN BLOOD BY AUTOMATED COUNT: 13 % (ref 12.3–15.4)
ERYTHROCYTE [SEDIMENTATION RATE] IN BLOOD: 38 MM/HR (ref 0–20)
GLOBULIN UR ELPH-MCNC: 2.8 GM/DL
GLUCOSE SERPL-MCNC: 215 MG/DL (ref 65–99)
GLUCOSE UR STRIP-MCNC: ABNORMAL MG/DL
HBA1C MFR BLD: 8.6 % (ref 4.8–5.6)
HCT VFR BLD AUTO: 35.2 % (ref 37.5–51)
HGB BLD-MCNC: 11.4 G/DL (ref 13–17.7)
HGB UR QL STRIP.AUTO: NEGATIVE
HIV1+2 AB SER QL: NORMAL
HYALINE CASTS UR QL AUTO: ABNORMAL /LPF
IMM GRANULOCYTES # BLD AUTO: 0.05 10*3/MM3 (ref 0–0.05)
IMM GRANULOCYTES NFR BLD AUTO: 0.6 % (ref 0–0.5)
KETONES UR QL STRIP: NEGATIVE
LDH SERPL-CCNC: 163 U/L (ref 135–225)
LEUKOCYTE ESTERASE UR QL STRIP.AUTO: NEGATIVE
LYMPHOCYTES # BLD AUTO: 2.13 10*3/MM3 (ref 0.7–3.1)
LYMPHOCYTES NFR BLD AUTO: 25.2 % (ref 19.6–45.3)
MCH RBC QN AUTO: 28.4 PG (ref 26.6–33)
MCHC RBC AUTO-ENTMCNC: 32.4 G/DL (ref 31.5–35.7)
MCV RBC AUTO: 87.6 FL (ref 79–97)
MONOCYTES # BLD AUTO: 0.62 10*3/MM3 (ref 0.1–0.9)
MONOCYTES NFR BLD AUTO: 7.3 % (ref 5–12)
NEUTROPHILS NFR BLD AUTO: 5.26 10*3/MM3 (ref 1.7–7)
NEUTROPHILS NFR BLD AUTO: 62.3 % (ref 42.7–76)
NITRITE UR QL STRIP: NEGATIVE
NRBC BLD AUTO-RTO: 0 /100 WBC (ref 0–0.2)
PH UR STRIP.AUTO: 6 [PH] (ref 5–8)
PLATELET # BLD AUTO: 327 10*3/MM3 (ref 140–450)
PMV BLD AUTO: 10.1 FL (ref 6–12)
POTASSIUM SERPL-SCNC: 4.8 MMOL/L (ref 3.5–5.2)
PROT SERPL-MCNC: 7.2 G/DL (ref 6–8.5)
PROT UR QL STRIP: ABNORMAL
RBC # BLD AUTO: 4.02 10*6/MM3 (ref 4.14–5.8)
RBC # UR STRIP: ABNORMAL /HPF
REF LAB TEST METHOD: ABNORMAL
SODIUM SERPL-SCNC: 139 MMOL/L (ref 136–145)
SP GR UR STRIP: 1.02 (ref 1–1.03)
SQUAMOUS #/AREA URNS HPF: ABNORMAL /HPF
TSH SERPL DL<=0.05 MIU/L-ACNC: 2.44 UIU/ML (ref 0.27–4.2)
UROBILINOGEN UR QL STRIP: ABNORMAL
WBC # UR STRIP: ABNORMAL /HPF
WBC NRBC COR # BLD: 8.45 10*3/MM3 (ref 3.4–10.8)

## 2023-03-29 PROCEDURE — 81001 URINALYSIS AUTO W/SCOPE: CPT

## 2023-03-29 PROCEDURE — 80053 COMPREHEN METABOLIC PANEL: CPT

## 2023-03-29 PROCEDURE — 85652 RBC SED RATE AUTOMATED: CPT

## 2023-03-29 PROCEDURE — 71046 X-RAY EXAM CHEST 2 VIEWS: CPT

## 2023-03-29 PROCEDURE — 82550 ASSAY OF CK (CPK): CPT

## 2023-03-29 PROCEDURE — 87086 URINE CULTURE/COLONY COUNT: CPT

## 2023-03-29 PROCEDURE — 87040 BLOOD CULTURE FOR BACTERIA: CPT

## 2023-03-29 PROCEDURE — G0432 EIA HIV-1/HIV-2 SCREEN: HCPCS

## 2023-03-29 PROCEDURE — 83036 HEMOGLOBIN GLYCOSYLATED A1C: CPT

## 2023-03-29 PROCEDURE — 85025 COMPLETE CBC W/AUTO DIFF WBC: CPT

## 2023-03-29 PROCEDURE — 83615 LACTATE (LD) (LDH) ENZYME: CPT

## 2023-03-29 PROCEDURE — 84443 ASSAY THYROID STIM HORMONE: CPT

## 2023-03-29 PROCEDURE — 86140 C-REACTIVE PROTEIN: CPT

## 2023-03-29 PROCEDURE — 36415 COLL VENOUS BLD VENIPUNCTURE: CPT

## 2023-03-29 PROCEDURE — 86431 RHEUMATOID FACTOR QUANT: CPT

## 2023-03-29 RX ORDER — SILDENAFIL 100 MG/1
100 TABLET, FILM COATED ORAL DAILY PRN
Qty: 15 TABLET | Refills: 11 | Status: SHIPPED | OUTPATIENT
Start: 2023-03-29

## 2023-03-29 RX ORDER — SILDENAFIL 100 MG/1
100 TABLET, FILM COATED ORAL DAILY PRN
Qty: 15 TABLET | Refills: 11 | Status: SHIPPED | OUTPATIENT
Start: 2023-03-29 | End: 2023-03-29 | Stop reason: SDUPTHER

## 2023-03-29 NOTE — PROGRESS NOTES
Established Patient Office Visit      Patient Name: Israel Reyna  : 1955   MRN: [id number]   Care Team: Patient Care Team:  Elio Moore DO as PCP - General (Family Medicine)    Chief Complaint:    Chief Complaint   Patient presents with   • Nasal Congestion     Pt co continued congestion, nightly fevers, fatigue, with aches for approximately 1 month        History of Present Illness: Israel Reyna is a 68 y.o. male who is here today for chief complaint.    HPI    Had explosive diarrhea with medication for upper respiratory infection a couple weeks ago.  He has baseline anal sphincter incompetence.    Running slight fever every night 100-101 ever since his virtual visit around 9-9:30. Takes Tylenol or ibuprofen which will break it in the middle of the night and he will wake in a cold sweat. During the day he feels ok.    Feels achy and chills which is what prompts him to take his temperature.    This patient is accompanied by their self who contributes to the history of their care.    The following portions of the patient's history were reviewed and updated as appropriate: allergies, current medications, past family history, past medical history, past social history, past surgical history and problem list.    Subjective      Review of Systems:   Review of Systems - See HPI    Past Medical History:   Past Medical History:   Diagnosis Date   • Allergic    • Anxiety    • Bowel incontinence     w/ history of Stimulator Device    • CAD (coronary artery disease)    • Cataract    • Chest pain    • CHF (congestive heart failure) ([facility])    • Colon polyp    • Depression    • Diabetes mellitus, type 2 ([facility])    • Diabetic peripheral neuropathy ([facility])    • Dyslipidemia    • Fatigue    • GERD (gastroesophageal reflux disease)    • Hypertension    • Kidney stone    • Mental status change    • Migraine     Complex    • Migraine headache    • Renal insufficiency    • SOB (shortness of breath)         Past Surgical History:   Past Surgical History:   Procedure Laterality Date   • CARDIAC CATHETERIZATION     • CARDIAC CATHETERIZATION N/A 10/14/2016    Procedure: Left Heart Cath;  Surgeon: Santiago Garner MD;  Location: [facility] INVASIVE LOCATION;  Service:    • COLONOSCOPY  02/25/2022    Dr. Graves-recommended 3 year repeat   • ENDOSCOPY  02/03/2022    Dr. Graves   • GASTRIC STIMULATOR IMPLANT SURGERY     • SMALL INTESTINE SURGERY      unspecified, x3 as an infant    • TESTICLE SURGERY         Family History:   Family History   Problem Relation Age of Onset   • Heart failure Mother    • Diabetes Mother    • Alzheimer's disease Mother    • Arthritis Mother    • Heart disease Mother    • Heart attack Father    • Kidney disease Father    • Cancer Father    • Prostate cancer Father    • Heart disease Father    • Hypertension Father    • Cancer Sister    • Breast cancer Sister    • Diabetes Sister    • Heart disease Sister    • Arrhythmia Sister    • Diabetes Brother    • Heart disease Brother    • Cancer Brother    • Liver disease Brother    • Kidney disease Brother    • Alzheimer's disease Maternal Grandmother    • Liver cancer Maternal Grandfather    • Liver disease Maternal Grandfather    • Other Paternal Grandmother         Mosotho flu   • Other Paternal Grandfather         Mosotho flu   • Colon cancer Neg Hx        Social History:   Social History     Socioeconomic History   • Marital status:    Tobacco Use   • Smoking status: Never   • Smokeless tobacco: Never   Vaping Use   • Vaping Use: Never used   Substance and Sexual Activity   • Alcohol use: Never   • Drug use: Never   • Sexual activity: Yes       Tobacco History:   Social History     Tobacco Use   Smoking Status Never   Smokeless Tobacco Never       Medications:     Current Outpatient Medications:   •  amLODIPine (NORVASC) 10 MG tablet, TAKE 1 TABLET DAILY, Disp: 90 tablet, Rfl: 3  •  atorvastatin (LIPITOR) 20 MG tablet, Take 1  "tablet by mouth Daily., Disp: 90 tablet, Rfl: 3  •  butalbital-acetaminophen-caffeine (FIORICET, ESGIC) -40 MG per tablet, Take 1 tablet by mouth 1 (One) Time As Needed for Headache for up to 30 doses., Disp: 30 tablet, Rfl: 0  •  gabapentin (NEURONTIN) 300 MG capsule, Take 1 capsule by mouth 3 (Three) Times a Day., Disp: 270 capsule, Rfl: 1  •  glipizide (GLUCOTROL) 10 MG tablet, Take 1 tablet by mouth 2 (Two) Times a Day Before Meals., Disp: 180 tablet, Rfl: 3  •  glucose blood test strip, 1 each by Other route Daily. Use daily as directed, Disp: 100 each, Rfl: 12  •  glucose monitor monitoring kit, 1 each Daily., Disp: 1 each, Rfl: 0  •  Incontinence Supply Disposable (NU-FIT ADULT UNDERWEAR MEDIUM) misc, , Disp: , Rfl:   •  Insulin Syringe 31G X 5/16\" 0.3 ML misc, 1 each Every Night., Disp: 90 each, Rfl: 3  •  Lancets Thin misc, 1 each Daily. Use daily as directed, Disp: 100 each, Rfl: 11  •  lisinopril (PRINIVIL,ZESTRIL) 20 MG tablet, Take 1 tablet by mouth Daily., Disp: 90 tablet, Rfl: 3  •  Lutein 5 % beads, Take 5 mg by mouth Daily., Disp: , Rfl:   •  metFORMIN (GLUCOPHAGE) 1000 MG tablet, Take 1 tablet by mouth 2 (Two) Times a Day With Meals., Disp: 180 tablet, Rfl: 3  •  Multiple Vitamins-Minerals (PRESERVISION AREDS 2 PO), Take  by mouth Daily., Disp: , Rfl:   •  NON FORMULARY, Shot in eye every 3 months for macular degeneration, Disp: , Rfl:   •  omeprazole (priLOSEC) 40 MG capsule, TAKE 1 CAPSULE DAILY, Disp: 90 capsule, Rfl: 0  •  ondansetron (ZOFRAN) 8 MG tablet, TAKE 1 TABLET BY MOUTH EVERY 8 HOURS AS NEEDED FOR NAUSEA FOR VOMITING, Disp: 30 tablet, Rfl: 0  •  pantoprazole (PROTONIX) 40 MG EC tablet, Take 1 tablet by mouth 2 (Two) Times a Day. 30 minutes prior to first meal of the day and 30 minutes prior to last meal of the day, Disp: 180 tablet, Rfl: 3  •  sildenafil (VIAGRA) 100 MG tablet, Take 1 tablet by mouth Daily As Needed for Erectile Dysfunction., Disp: 15 tablet, Rfl: 11  •  " "Specialty Vitamins Products (HEALTHY HEART COMPLEX PO), Take  by mouth Daily., Disp: , Rfl:   •  tamsulosin (FLOMAX) 0.4 MG capsule 24 hr capsule, Take 1 capsule by mouth Daily., Disp: 90 capsule, Rfl: 3  •  Trulicity 1.5 MG/0.5ML solution pen-injector, Inject 1.5 mg under the skin into the appropriate area as directed 1 (One) Time Per Week., Disp: 6 mL, Rfl: 3  •  vitamin B-12 (CYANOCOBALAMIN) 100 MCG tablet, Take 50 mcg by mouth 2 (two) times a day., Disp: , Rfl:   •  vitamin C (ASCORBIC ACID) 500 MG tablet, Take 1 tablet by mouth 2 (two) times a day., Disp: , Rfl:   •  vitamin E 400 UNIT capsule, Take 1 capsule by mouth Daily., Disp: , Rfl:   •  Zeaxanthin powder, Take 1 mg by mouth Daily., Disp: , Rfl:   •  Zinc 40 MG tablet, Take 1 tablet by mouth Daily., Disp: , Rfl:   •  divalproex (DEPAKOTE) 500 MG DR tablet, Take 1 tablet by mouth 2 (Two) Times a Day for 30 days., Disp: 60 tablet, Rfl: 3    Allergies:   Allergies   Allergen Reactions   • Actos [Pioglitazone] Anaphylaxis     congestive heart failure   • Avandia [Rosiglitazone] Anaphylaxis     congestive heart failure.   • Darvon [Propoxyphene] Itching     Angioedema    itching   • Talwin [Pentazocine] Rash     Angioedema    itching       Objective   Objective     Physical Exam:  Vital Signs:   Vitals:    03/29/23 1001   BP: 122/78   BP Location: Left arm   Patient Position: Sitting   Cuff Size: Adult   Pulse: 72   Temp: 97 °F (36.1 °C)   TempSrc: Infrared   Weight: 77.7 kg (171 lb 3.2 oz)   Height: 170.2 cm (67\")     Body mass index is 26.81 kg/m².     Physical Exam  Nursing note reviewed  Const: NAD, A&Ox4, Pleasant, Cooperative.  Nontoxic-appearing  Eyes: EOMI, no conjunctivitis  ENT: No nasal discharge present, neck supple  Cardiac: Regular rate and rhythm, no cyanosis  Resp: Respiratory rate within normal limits, no increased work of breathing, no audible wheezing or retractions noted  Procedures/Radiology     Procedures  No radiology results for the " last 7 days     Assessment & Plan   Assessment / Plan      Assessment/Plan:   Problems Addressed This Visit  Diagnoses and all orders for this visit:    1. Body aches (Primary)  -     Sedimentation Rate; Future  -     CBC & Differential; Future  -     Comprehensive Metabolic Panel; Future  -     Urinalysis With Microscopic If Indicated (No Culture) - Urine, Clean Catch; Future  -     Urine Culture - Urine, Urine, Clean Catch; Future  -     C-reactive Protein; Future  -     CK; Future  -     Lactate Dehydrogenase; Future  -     TSH Rfx On Abnormal To Free T4; Future  -     Rheumatoid Factor; Future  -     HIV-1 / O / 2 Ag / Antibody 4th Generation; Future  -     XR Chest PA & Lateral; Future  -     Blood Culture - Blood,; Future    2. Fever, unspecified fever cause  -     Sedimentation Rate; Future  -     CBC & Differential; Future  -     Comprehensive Metabolic Panel; Future  -     Urinalysis With Microscopic If Indicated (No Culture) - Urine, Clean Catch; Future  -     Urine Culture - Urine, Urine, Clean Catch; Future  -     C-reactive Protein; Future  -     CK; Future  -     Lactate Dehydrogenase; Future  -     TSH Rfx On Abnormal To Free T4; Future  -     Rheumatoid Factor; Future  -     HIV-1 / O / 2 Ag / Antibody 4th Generation; Future  -     XR Chest PA & Lateral; Future  -     Blood Culture - Blood,; Future    3. Type 2 diabetes mellitus without complication, without long-term current use of insulin ([facility])  -     Hemoglobin A1c; Future    4. Erectile disorder due to medical condition in male  -     Discontinue: sildenafil (VIAGRA) 100 MG tablet; Take 1 tablet by mouth Daily As Needed for Erectile Dysfunction.  Dispense: 15 tablet; Refill: 11  -     sildenafil (VIAGRA) 100 MG tablet; Take 1 tablet by mouth Daily As Needed for Erectile Dysfunction.  Dispense: 15 tablet; Refill: 11      Problem List Items Addressed This Visit        Endocrine and Metabolic    Diabetes mellitus, type 2 (HCC)    Overview     On CGM  he is having significant hyperglycemia in the middle of the night with precipitous drops (320 --> 60-70 within about 30 minutes). These correlate with times when he is getting sick at night.         Relevant Orders    Hemoglobin A1c       Genitourinary and Reproductive     Erectile disorder due to medical condition in male    Relevant Medications    sildenafil (VIAGRA) 100 MG tablet   Other Visit Diagnoses     Body aches    -  Primary    Relevant Orders    Sedimentation Rate    CBC & Differential    Comprehensive Metabolic Panel    Urinalysis With Microscopic If Indicated (No Culture) - Urine, Clean Catch    Urine Culture - Urine, Urine, Clean Catch    C-reactive Protein    CK    Lactate Dehydrogenase    TSH Rfx On Abnormal To Free T4    Rheumatoid Factor    HIV-1 / O / 2 Ag / Antibody 4th Generation    XR Chest PA & Lateral    Blood Culture - Blood,    Fever, unspecified fever cause        Relevant Orders    Sedimentation Rate    CBC & Differential    Comprehensive Metabolic Panel    Urinalysis With Microscopic If Indicated (No Culture) - Urine, Clean Catch    Urine Culture - Urine, Urine, Clean Catch    C-reactive Protein    CK    Lactate Dehydrogenase    TSH Rfx On Abnormal To Free T4    Rheumatoid Factor    HIV-1 / O / 2 Ag / Antibody 4th Generation    XR Chest PA & Lateral    Blood Culture - Blood,        Recurrent fever of unknown origin.  Work-up as above.  He has chronic dizziness as well that he reports has been more prominent over the last few months, so if labs are not illustrative would recommend an MRI.    There are no Patient Instructions on file for this visit.    Follow Up:   No follow-ups on file.        DO BAILEY Jones RD  [facility]  [address]  [facility] [address]  Fax [phone]  Phone [phone]

## 2023-03-30 LAB — BACTERIA SPEC AEROBE CULT: NO GROWTH

## 2023-03-31 ENCOUNTER — TELEPHONE (OUTPATIENT)
Dept: FAMILY MEDICINE CLINIC | Facility: CLINIC | Age: 68
End: 2023-03-31
Payer: MEDICARE

## 2023-03-31 NOTE — TELEPHONE ENCOUNTER
Caller: Israel Reyna    Relationship: Self    Best call back number: [phone]      What was the call regarding: PATIENT HAS BEEN SUFFERING FROM NIGHT FEVERS THAT ARE MAKING HIM FEEL TERRIBLE. THE FEVER SOMETIMES COME BACK DURING THE DAY, PATIENT HAS BEEN TAKING TYLENOL TO CONTROL THIS. PATIENT IS CONCERNED ABOUT TAKING SO MUCH MEDICATION, ASKING FOR ADVICE FROM DR DANIELLE AND WHAT PATIENT CAN TAKE, WHAT TO DO.     PATIENT WOULD LIKE A CALL BACK TO DISCUSS MOST RECENT LABS AND XRAY.     Do you require a callback: YES      [facility] [address] [place] [address] - [phone]  - [phone]   [phone]

## 2023-04-03 LAB — BACTERIA SPEC AEROBE CULT: NORMAL

## 2023-04-03 NOTE — TELEPHONE ENCOUNTER
Contacted patient to discuss further, relayed lab results. He states that he does no check glucose regularly      I advised him to monitor and keep record and if symptoms worsen repot to ER. He verbalized understanding and agreed

## 2023-04-11 NOTE — PROGRESS NOTES
"   Neuro Office Visit      Encounter Date: 2023   Patient Name: Israel Reyna  : 1955   MRN: [id number]   PCP:  Elio Moore DO     Chief Complaint:    Chief Complaint   Patient presents with   • Migraine   • Dizziness       History of Present Illness: Israel eRyna is a 68 y.o. male who is here today in Neurology for follow up for migraines and dizziness. Patient was previously seen by Dr. Chaidez and more recently Dr. Crain on 2023.     Prior neurologic workup per Dr. Crain:  This is a 67 yo male who presents for re-evaluation of worsening migraines. Intractable migraines since  followed by Dr. Chaidez normally.   He went to have COVID vaccine 2021 Pfizer. He had immediate worsening headaches, chills, feeling terrible, weak and in bed x 4 days. He had his second injection 2021 Pfizer with the same exact symptoms. He feels that his symptoms have progressively worsened since the COVID vaccines. Has noticed every time he eats anything at all he notices he cannot walk, he cannot speak, his left face will \"draw up\", twitch and be painful with episodes lasting 15 minutes to 2 hours and have been present for 2-3 months and seems to be worsening since COVID vaccine. He was last seen in our clinic 3/2020. He tells me the pain in his head, holocranial, can be anywhere throughout the head, \"feels like someone is repeatedly hitting me with a ball bat or stick and at times feels like someone is driving a nail in the back of my head. The ones that scare me the most are the headaches in the temples that feel like sharp, someone shooting a nail gun in my temples.\" Also has associated dizziness. Pain can radiate across his head and within 10 minutes and he loses control of his bowels. This is not a change and has been present since 7352-1319. No imaging of head since 2017. He cannot have MRI due to anal sphincter stimulator. Has chronic daily headaches. \"Drawing of " "face\" is daily. CTA head/neck 2019 normal. He feels when he has his pain there is \"moisture running down his head\". Takes topamax 25mg BID and gabapentin 300mg TID. Experiences severe pain with episodes. Cannot afford Botox. He is agreeable to CT head today. He is requesting migraine cocktail as well. He uses cane at times. Has been using fioricet PRN. He is scheduled for OP migraine cocktail today at 3pm at [facility]. Last migraine cocktail was [id number]. Tells me one med gives him hiccups for 2-3 days after but unsure which.  His headaches do also have severe pain, throbbing, photophobia, phonophobia, nausea and occasional vomiting and do her daily with some episodes being worse than others.    Current visit 4/12/2023:  Patient in clinic for regular follow up. At his last visit he was prescribed Depakote 500 mg BID for migraine prevention and Ubrelvy 100 mg for abortive treatment for migraines. He is unsure if he has started taking Depakote since his last visit. He does not believe that he had started Ubrelvy d/t cost but also was unsure. He stated that his spouse helps him to manage his medications and that he would need to check with her regarding what he is currently taking. He states that he continues to have both headaches and the previously described episodes including left sided paresthesias, left facial twitching/drawing up. His headaches vary in location and are located at the top of his head, occiput, bilateral temples, and at times will start near right temple and radiate towards left. Patient has had these episodes for many years and has previously completed detailed neurologic workup including cEEG which was negative. He reports that some headaches are associated with light and sound sensitivity along with nausea. He has near daily headaches of some kind everyday varying in presentation and severity. He has also appreciated dizziness over approximately the last month, he reports that it is " worse while walking and that he has been using his cane more recently for stabilization, no recent falls. He recently saw his PCP d/t concern for fever and body aches who performed lab work including Hemoglobin A1c was 8.60, blood culture did not show any growth at 5 days, HIV was nonreactive, rheumatoid factor was less than 10, TSH 2.440, , CK 42, CRP <0.30, CMP showed elevated glucose, sedimentation rate 38. It appears that at his last visit a CT head was discussed but had not yet been ordered.     He also vocalized that he would like to have his sphincter stimulator removed as he does not feel that it is beneficial.     Subjective      Review of Systems   Constitutional: Positive for fatigue. Negative for fever.   HENT: Negative for trouble swallowing and voice change.    Eyes: Negative for photophobia and visual disturbance.   Respiratory: Negative for cough and shortness of breath.    Cardiovascular: Negative for chest pain and palpitations.   Gastrointestinal: Positive for diarrhea and nausea. Negative for vomiting.   Genitourinary: Negative for dysuria and frequency.   Musculoskeletal: Positive for gait problem.   Neurological: Positive for dizziness, facial asymmetry, weakness, numbness and headaches.          Past Medical History:   Past Medical History:   Diagnosis Date   • Allergic    • Anxiety    • Bowel incontinence     w/ history of Stimulator Device    • CAD (coronary artery disease)    • Cataract    • Chest pain    • CHF (congestive heart failure)    • Colon polyp    • Depression    • Diabetes mellitus, type 2    • Diabetic peripheral neuropathy    • Dyslipidemia    • Fatigue    • GERD (gastroesophageal reflux disease)    • Hypertension    • Kidney stone    • Mental status change    • Migraine     Complex    • Migraine headache    • Renal insufficiency    • SOB (shortness of breath)        Past Surgical History:   Past Surgical History:   Procedure Laterality Date   • CARDIAC CATHETERIZATION      • CARDIAC CATHETERIZATION N/A 10/14/2016    Procedure: Left Heart Cath;  Surgeon: Santiago Garner MD;  Location:  [facility];  Service:    • COLONOSCOPY  02/25/2022    Dr. Graves-recommended 3 year repeat   • ENDOSCOPY  02/03/2022    Dr. Graves   • GASTRIC STIMULATOR IMPLANT SURGERY     • SMALL INTESTINE SURGERY      unspecified, x3 as an infant    • TESTICLE SURGERY         Family History:   Family History   Problem Relation Age of Onset   • Heart failure Mother    • Diabetes Mother    • Alzheimer's disease Mother    • Arthritis Mother    • Heart disease Mother    • Heart attack Father    • Kidney disease Father    • Cancer Father    • Prostate cancer Father    • Heart disease Father    • Hypertension Father    • Cancer Sister    • Breast cancer Sister    • Diabetes Sister    • Heart disease Sister    • Arrhythmia Sister    • Diabetes Brother    • Heart disease Brother    • Cancer Brother    • Liver disease Brother    • Kidney disease Brother    • Alzheimer's disease Maternal Grandmother    • Liver cancer Maternal Grandfather    • Liver disease Maternal Grandfather    • Other Paternal Grandmother         [place] flu   • Other Paternal Grandfather         [place] flu   • Colon cancer Neg Hx        Social History:   Social History     Socioeconomic History   • Marital status:    Tobacco Use   • Smoking status: Never   • Smokeless tobacco: Never   Vaping Use   • Vaping Use: Never used   Substance and Sexual Activity   • Alcohol use: Never   • Drug use: Never   • Sexual activity: Yes       Medications:     Current Outpatient Medications:   •  amLODIPine (NORVASC) 10 MG tablet, TAKE 1 TABLET DAILY, Disp: 90 tablet, Rfl: 3  •  atorvastatin (LIPITOR) 20 MG tablet, Take 1 tablet by mouth Daily., Disp: 90 tablet, Rfl: 3  •  divalproex (DEPAKOTE) 500 MG DR tablet, Take 1 tablet by mouth Daily for 7 days. Then stop., Disp: 7 tablet, Rfl: 0  •  gabapentin (NEURONTIN) 300 MG capsule, Take 1  "capsule by mouth 3 (Three) Times a Day., Disp: 270 capsule, Rfl: 1  •  glipizide (GLUCOTROL) 10 MG tablet, Take 1 tablet by mouth 2 (Two) Times a Day Before Meals., Disp: 180 tablet, Rfl: 3  •  glucose blood test strip, 1 each by Other route Daily. Use daily as directed, Disp: 100 each, Rfl: 12  •  glucose monitor monitoring kit, 1 each Daily., Disp: 1 each, Rfl: 0  •  Incontinence Supply Disposable (NU-FIT ADULT UNDERWEAR MEDIUM) misc, , Disp: , Rfl:   •  Insulin Syringe 31G X 5/16\" 0.3 ML misc, 1 each Every Night., Disp: 90 each, Rfl: 3  •  Lancets Thin misc, 1 each Daily. Use daily as directed, Disp: 100 each, Rfl: 11  •  lisinopril (PRINIVIL,ZESTRIL) 20 MG tablet, Take 1 tablet by mouth Daily., Disp: 90 tablet, Rfl: 3  •  Lutein 5 % beads, Take 5 mg by mouth Daily., Disp: , Rfl:   •  metFORMIN (GLUCOPHAGE) 1000 MG tablet, Take 1 tablet by mouth 2 (Two) Times a Day With Meals., Disp: 180 tablet, Rfl: 3  •  Multiple Vitamins-Minerals (PRESERVISION AREDS 2 PO), Take  by mouth Daily., Disp: , Rfl:   •  NON FORMULARY, Shot in eye every 3 months for macular degeneration, Disp: , Rfl:   •  omeprazole (priLOSEC) 40 MG capsule, TAKE 1 CAPSULE DAILY, Disp: 90 capsule, Rfl: 0  •  ondansetron (ZOFRAN) 8 MG tablet, TAKE 1 TABLET BY MOUTH EVERY 8 HOURS AS NEEDED FOR NAUSEA FOR VOMITING, Disp: 30 tablet, Rfl: 0  •  pantoprazole (PROTONIX) 40 MG EC tablet, Take 1 tablet by mouth 2 (Two) Times a Day. 30 minutes prior to first meal of the day and 30 minutes prior to last meal of the day, Disp: 180 tablet, Rfl: 3  •  sildenafil (VIAGRA) 100 MG tablet, Take 1 tablet by mouth Daily As Needed for Erectile Dysfunction., Disp: 15 tablet, Rfl: 11  •  Specialty Vitamins Products (HEALTHY HEART COMPLEX PO), Take  by mouth Daily., Disp: , Rfl:   •  tamsulosin (FLOMAX) 0.4 MG capsule 24 hr capsule, Take 1 capsule by mouth Daily., Disp: 90 capsule, Rfl: 3  •  Trulicity 1.5 MG/0.5ML solution pen-injector, Inject 1.5 mg under the skin into the " "appropriate area as directed 1 (One) Time Per Week., Disp: 6 mL, Rfl: 3  •  vitamin B-12 (CYANOCOBALAMIN) 100 MCG tablet, Take 50 mcg by mouth 2 (two) times a day., Disp: , Rfl:   •  vitamin C (ASCORBIC ACID) 500 MG tablet, Take 1 tablet by mouth 2 (two) times a day., Disp: , Rfl:   •  vitamin E 400 UNIT capsule, Take 1 capsule by mouth Daily., Disp: , Rfl:   •  Zeaxanthin powder, Take 1 mg by mouth Daily., Disp: , Rfl:   •  Zinc 40 MG tablet, Take 1 tablet by mouth Daily., Disp: , Rfl:   •  butalbital-acetaminophen-caffeine (FIORICET, ESGIC) -40 MG per tablet, Take 1 tablet by mouth 1 (One) Time As Needed for Headache for up to 30 doses., Disp: 30 tablet, Rfl: 0  •  Erenumab-aooe (AIMOVIG) 140 MG/ML auto-injector, Inject 1 mL under the skin into the appropriate area as directed Every 30 (Thirty) Days., Disp: 1 mL, Rfl: 11  •  ubrogepant (Ubrelvy) 100 MG tablet, Take 1 tablet by mouth As Needed (Migraine) for up to 30 days., Disp: 16 tablet, Rfl: 3    Allergies:   Allergies   Allergen Reactions   • Actos [Pioglitazone] Anaphylaxis     congestive heart failure   • Avandia [Rosiglitazone] Anaphylaxis     congestive heart failure.   • Darvon [Propoxyphene] Itching     Angioedema    itching   • Talwin [Pentazocine] Rash     Angioedema    itching         [facility] Fall Risk Assessment was completed, and patient is at LOW risk for falls.Assessment completed on:4/12/2023    Objective     Objective:    /66   Pulse 80   Ht 170.2 cm (67\")   Wt 76.2 kg (168 lb)   SpO2 97%   BMI 26.31 kg/m²   Body mass index is 26.31 kg/m².    Physical Exam  Vitals reviewed.   Constitutional:       Appearance: Normal appearance.   HENT:      Head: Normocephalic and atraumatic.      Mouth/Throat:      Mouth: Mucous membranes are moist.      Pharynx: Oropharynx is clear.   Pulmonary:      Effort: Pulmonary effort is normal. No respiratory distress.   Musculoskeletal:      Right lower leg: No edema.      Left lower leg: No edema. "   Skin:     General: Skin is warm and dry.   Neurological:      Mental Status: He is alert.          Neurology Exam:    General apperance: NAD.     Mental status: Alert, awake and oriented to time place and person.    Fund of knowledge:  Normal.     Language and Speech: No aphasia or dysarthria.    Naming , Repitition and Comprehension:  Can name objects, repeat a sentence and follow commands. Speech is clear and fluent with good repetition, comprehension, and naming.    Cranial Nerves:   CN II: Visual fields are full. Intact. Pupils - PERRLA  CN III, IV and VI: Extraocular movements are intact. Normal saccades.   CN V: Facial sensation is intact.   CN VII: Muscles of facial expression reveal no asymmetry. Intact.   CN VIII: Hearing is intact. Whispered voice intact.   CN IX and X: Palate elevates symmetrically. Intact  CN XI: Shoulder shrug is intact.   CN XII: Tongue is midline without evidence of atrophy or fasciculation.     Motor:  Right UE muscle strength 5/5. Normal tone.     Left UE muscle strength 5/5. Normal tone.      Right LE muscle strength 5/5. Normal tone.     Left LE muscle strength 5/5. Normal tone.      Sensory: Normal light touch sensation bilaterally.    DTRs: 2+ bilaterally in upper and lower extremities.    Babinski: Negative bilaterally.    Coordination: Normal finger-to-nose, heel to shin B/L.    Rhomberg: Positive.    Gait: Noted slight imbalance when walking - patient was able to ambulate without assistive device independently but I did not that he stumbled twice while walking        Labs:   WBC   Date Value Ref Range Status   03/29/2023 8.45 3.40 - 10.80 10*3/mm3 Final     RBC   Date Value Ref Range Status   03/29/2023 4.02 (L) 4.14 - 5.80 10*6/mm3 Final     Hemoglobin   Date Value Ref Range Status   03/29/2023 11.4 (L) 13.0 - 17.7 g/dL Final     Hematocrit   Date Value Ref Range Status   03/29/2023 35.2 (L) 37.5 - 51.0 % Final     MCV   Date Value Ref Range Status   03/29/2023 87.6 79.0 -  97.0 fL Final     MCH   Date Value Ref Range Status   03/29/2023 28.4 26.6 - 33.0 pg Final     MCHC   Date Value Ref Range Status   03/29/2023 32.4 31.5 - 35.7 g/dL Final     RDW   Date Value Ref Range Status   03/29/2023 13.0 12.3 - 15.4 % Final     RDW-SD   Date Value Ref Range Status   03/29/2023 40.8 37.0 - 54.0 fl Final     MPV   Date Value Ref Range Status   03/29/2023 10.1 6.0 - 12.0 fL Final     Platelets   Date Value Ref Range Status   03/29/2023 327 140 - 450 10*3/mm3 Final     Neutrophil %   Date Value Ref Range Status   03/29/2023 62.3 42.7 - 76.0 % Final     Lymphocyte %   Date Value Ref Range Status   03/29/2023 25.2 19.6 - 45.3 % Final     Monocyte %   Date Value Ref Range Status   03/29/2023 7.3 5.0 - 12.0 % Final     Eosinophil %   Date Value Ref Range Status   03/29/2023 3.3 0.3 - 6.2 % Final     Basophil %   Date Value Ref Range Status   03/29/2023 1.3 0.0 - 1.5 % Final     Immature Grans %   Date Value Ref Range Status   03/29/2023 0.6 (H) 0.0 - 0.5 % Final     Neutrophils, Absolute   Date Value Ref Range Status   03/29/2023 5.26 1.70 - 7.00 10*3/mm3 Final     Lymphocytes, Absolute   Date Value Ref Range Status   03/29/2023 2.13 0.70 - 3.10 10*3/mm3 Final     Monocytes, Absolute   Date Value Ref Range Status   03/29/2023 0.62 0.10 - 0.90 10*3/mm3 Final     Eosinophils, Absolute   Date Value Ref Range Status   03/29/2023 0.28 0.00 - 0.40 10*3/mm3 Final     Basophils, Absolute   Date Value Ref Range Status   03/29/2023 0.11 0.00 - 0.20 10*3/mm3 Final     Immature Grans, Absolute   Date Value Ref Range Status   03/29/2023 0.05 0.00 - 0.05 10*3/mm3 Final     nRBC   Date Value Ref Range Status   03/29/2023 0.0 0.0 - 0.2 /100 WBC Final     Lab Results   Component Value Date    GLUCOSE 215 (H) 03/29/2023    BUN 19 03/29/2023    CREATININE 1.08 03/29/2023    EGFR 75.2 03/29/2023    BCR 17.6 03/29/2023    K 4.8 03/29/2023    CO2 26.1 03/29/2023    CALCIUM 9.5 03/29/2023    PROTENTOTREF 6.7 11/08/2022     ALBUMIN 4.4 03/29/2023    BILITOT <0.2 03/29/2023    AST 14 03/29/2023    ALT 13 03/29/2023         Assessment / Plan      Assessment/Plan:   Diagnoses and all orders for this visit:    1. Intractable chronic migraine without aura and without status migrainosus (Primary)  -     CT Head Without Contrast; Future  -     Erenumab-aooe (AIMOVIG) 140 MG/ML auto-injector; Inject 1 mL under the skin into the appropriate area as directed Every 30 (Thirty) Days.  Dispense: 1 mL; Refill: 11  -     divalproex (DEPAKOTE) 500 MG DR tablet; Take 1 tablet by mouth Daily for 7 days. Then stop.  Dispense: 7 tablet; Refill: 0  -     ubrogepant (Ubrelvy) 100 MG tablet; Take 1 tablet by mouth As Needed (Migraine) for up to 30 days.  Dispense: 16 tablet; Refill: 3    Israel Reyna is in clinic today for follow up of chronic migraines. He reports that since last visit he has not had noticeable improvement in either his migraines or episodes. He is unsure if he has started taking Depakote 500 mg BID or Ubrelvy. He is going to check when he gets home to see if he has started taking Depakote - if patient has been taking Depakote without improvement and recent onset of dizziness my plan is to slowly stop Depakote and consider starting CGRP injectable for migraine prevention as he has now tried Topamax and Depakote. Patient is also going to check to see if Ubrelvy is available at his pharmacy as it appears that it has been approved. I have also ordered a CT head to further investigate the dizziness. Also advised patient to follow up with GI regarding wanting to get the anal sphincter stimulator removed.       *Addendum 4/13/2023: Patient called clinic stating that he has been taking Depakote 500 mg BID, since he has not noticed any benefit in headaches and has noted SE of dizziness and tremoring I am going to discontinue Depakote, I will send in a 1 week prescription to slowly taper off medication then plan to stop. Will also order Aimovig  140 mg monthly injectable for migraine prevention. I am also going to contact our drug rep for Ubrelvy to see if any patient assistance can be offered since patient is reporting that with insurance coverage his out of pocket cost would be >$800/month    Patient Education:       Reviewed medications, potential side effects and signs and symptoms to report. Discussed risk versus benefits of treatment plan with patient and/or family-including medications, labs and radiology that may be ordered. Addressed questions and concerns during visit. Patient and/or family verbalized understanding and agree with plan. Instructed to call the office with any questions and report to ER with any life-threatening symptoms.     Follow Up:   Return in about 8 weeks (around 6/7/2023).    I spent 45 minutes face to face with the patient. I personally spent 50 percent of this time counseling and discussing diagnosis, diagnostic testing, evaluation, treatment options and management .       During this visit the following were done:  Labs Reviewed [x]    Labs Ordered []    Radiology Reports Reviewed []    Radiology Ordered [x]    PCP Records Reviewed [x]    Referring Provider Records Reviewed []    ER Records Reviewed []    Hospital Records Reviewed []    History Obtained From Family []    Radiology Images Reviewed []    Other Reviewed [x]   Reviewed prior neurology records per Dr. Crain  Records Requested []      LILIANE Dial  [facility]  [address] [address] [phone]

## 2023-04-12 ENCOUNTER — OFFICE VISIT (OUTPATIENT)
Dept: NEUROLOGY | Facility: CLINIC | Age: 68
End: 2023-04-12
Payer: MEDICARE

## 2023-04-12 VITALS
WEIGHT: 168 LBS | HEART RATE: 80 BPM | SYSTOLIC BLOOD PRESSURE: 134 MMHG | DIASTOLIC BLOOD PRESSURE: 66 MMHG | BODY MASS INDEX: 26.37 KG/M2 | OXYGEN SATURATION: 97 % | HEIGHT: 67 IN

## 2023-04-12 DIAGNOSIS — G43.119 INTRACTABLE MIGRAINE WITH AURA WITHOUT STATUS MIGRAINOSUS: ICD-10-CM

## 2023-04-12 DIAGNOSIS — G43.719 INTRACTABLE CHRONIC MIGRAINE WITHOUT AURA AND WITHOUT STATUS MIGRAINOSUS: Primary | ICD-10-CM

## 2023-04-12 PROCEDURE — 1160F RVW MEDS BY RX/DR IN RCRD: CPT

## 2023-04-12 PROCEDURE — 99214 OFFICE O/P EST MOD 30 MIN: CPT

## 2023-04-12 PROCEDURE — 1159F MED LIST DOCD IN RCRD: CPT

## 2023-04-12 PROCEDURE — 3078F DIAST BP <80 MM HG: CPT

## 2023-04-12 PROCEDURE — 3075F SYST BP GE 130 - 139MM HG: CPT

## 2023-04-12 RX ORDER — BUTALBITAL, ACETAMINOPHEN AND CAFFEINE 50; 325; 40 MG/1; MG/1; MG/1
1 TABLET ORAL ONCE AS NEEDED
Qty: 30 TABLET | Refills: 0 | Status: SHIPPED | OUTPATIENT
Start: 2023-04-12

## 2023-04-13 RX ORDER — DIVALPROEX SODIUM 500 MG/1
500 TABLET, DELAYED RELEASE ORAL DAILY
Qty: 7 TABLET | Refills: 0 | Status: SHIPPED | OUTPATIENT
Start: 2023-04-13 | End: 2023-04-20

## 2023-04-14 ENCOUNTER — PRIOR AUTHORIZATION (OUTPATIENT)
Dept: NEUROLOGY | Facility: CLINIC | Age: 68
End: 2023-04-14
Payer: MEDICARE

## 2023-04-17 ENCOUNTER — TELEPHONE (OUTPATIENT)
Dept: NEUROLOGY | Facility: CLINIC | Age: 68
End: 2023-04-17
Payer: MEDICARE

## 2023-04-17 DIAGNOSIS — G43.719 INTRACTABLE CHRONIC MIGRAINE WITHOUT AURA AND WITHOUT STATUS MIGRAINOSUS: Primary | ICD-10-CM

## 2023-04-17 RX ORDER — METHYLPREDNISOLONE 4 MG/1
TABLET ORAL
Qty: 21 TABLET | Refills: 0 | Status: SHIPPED | OUTPATIENT
Start: 2023-04-17

## 2023-04-17 NOTE — TELEPHONE ENCOUNTER
I called patient to check in regarding dizziness, headache, and right facial pain that is sore to touch. He reports that he has stopped Depakote but that he is still having dizziness and headache. I had called in Aimovi but this has not been picked up yet, patient's spouse to go to pharmacy to check on cost. I have requested that one of our MA's call tomorrow to check on patient regarding if he has been able to  medication. I have also shared patient case details with Dr. Crain as he also provides care for this patient.

## 2023-04-17 NOTE — TELEPHONE ENCOUNTER
Discussed case with Dr. Crain who additionally recommend Medrol DosePack. Dr. Crain also talked with the patient regarding his right temple/right eye pain.    This has been called into patient's pharmacy.

## 2023-04-17 NOTE — TELEPHONE ENCOUNTER
Spoke with patient.  He states that he has not picked up his Aimovig yet.  Notified patient that [facility] but he does not feel good and does not feel like calling to see what the copay is going to be.  Stated that he could not afford the other medication and could probably not afford this one either.  States the side of his face hurts to touch it and that the other meds help him sleep through the pain.  He said to just forget about it and hung up.

## 2023-04-17 NOTE — TELEPHONE ENCOUNTER
Provider: FLORENCIA MORENO   Caller: ROHITH GODINEZ   Relationship to Patient: PATIENT   Phone Number: [phone]  Reason for Call: PATIENT CALLED STATES THAT ON HIS LAST OV WITH FLORENCIA THEY DISCUSSED HIS DIZZINESS. NOW PATIENT STATES THAT HIS DIZZINESS IS GETTING WORSE. HE STATES THAT HE IS DEPENDING MORE ON HIS CANE AS WELL HIS HEAD AND HIS FACE ARE HURTING AND HES IN A LOT OF PAIN. HE STATES THAT HE HAS NOT FALLEN OR HIT HIS HEAD IN ANY WAY. PATIENT ALSO STATES THAT HE HAS NOT RECEIVED A PHONE CALL ABOUT SCHEDULING HIS BRAIN SCAN. PATIENT STATES THAT NONE OF THE MEDICATION IS HELPING. AT THIS TIME IT JUST HELPS HIM SLEEP THROUGH THE PAIN. PATIENT IS REQUESTING A CALL BACK     PLEASE CALL PATIENT AND ADVISE

## 2023-04-18 NOTE — TELEPHONE ENCOUNTER
Spoke to patient to inform him that I have applied for a Aimovig copay card.  He should receive it in the mail within 7-14 days.  He stated appreciation.Patient says that he actually feels well enough to go to [facility] study tonight.

## 2023-04-18 NOTE — TELEPHONE ENCOUNTER
Patient states that he picked up the med pack that you sent in for him yesterday and started taking it this morning.  He states that he is not having any side effects as yet but does still feel a little dizzy.  His does seem to be getting some relief from the pain.  The Aimovig is still a little pricey and he would be interested in any FC that is available.  I informed him that I would check on this for him.

## 2023-04-28 ENCOUNTER — OFFICE VISIT (OUTPATIENT)
Dept: FAMILY MEDICINE CLINIC | Facility: CLINIC | Age: 68
End: 2023-04-28
Payer: MEDICARE

## 2023-04-28 ENCOUNTER — LAB (OUTPATIENT)
Dept: LAB | Facility: HOSPITAL | Age: 68
End: 2023-04-28
Payer: MEDICARE

## 2023-04-28 VITALS
WEIGHT: 172.4 LBS | SYSTOLIC BLOOD PRESSURE: 150 MMHG | DIASTOLIC BLOOD PRESSURE: 72 MMHG | BODY MASS INDEX: 27 KG/M2 | HEART RATE: 86 BPM | TEMPERATURE: 97.1 F | OXYGEN SATURATION: 98 %

## 2023-04-28 DIAGNOSIS — R42 DIZZINESS: ICD-10-CM

## 2023-04-28 DIAGNOSIS — G90.1 DYSAUTONOMIA: ICD-10-CM

## 2023-04-28 DIAGNOSIS — E11.65 TYPE 2 DIABETES MELLITUS WITH HYPERGLYCEMIA, WITHOUT LONG-TERM CURRENT USE OF INSULIN: ICD-10-CM

## 2023-04-28 DIAGNOSIS — R42 DIZZINESS: Primary | ICD-10-CM

## 2023-04-28 LAB
ANION GAP SERPL CALCULATED.3IONS-SCNC: 14 MMOL/L (ref 5–15)
B-OH-BUTYR SERPL-SCNC: 0.26 MMOL/L (ref 0.02–0.27)
BASOPHILS # BLD AUTO: 0.07 10*3/MM3 (ref 0–0.2)
BASOPHILS NFR BLD AUTO: 0.9 % (ref 0–1.5)
BILIRUB UR QL STRIP: NEGATIVE
BUN SERPL-MCNC: 30 MG/DL (ref 8–23)
BUN/CREAT SERPL: 23.3 (ref 7–25)
CALCIUM SPEC-SCNC: 9.1 MG/DL (ref 8.6–10.5)
CHLORIDE SERPL-SCNC: 103 MMOL/L (ref 98–107)
CLARITY UR: CLEAR
CO2 SERPL-SCNC: 21 MMOL/L (ref 22–29)
COLOR UR: YELLOW
CREAT SERPL-MCNC: 1.29 MG/DL (ref 0.76–1.27)
DEPRECATED RDW RBC AUTO: 40.3 FL (ref 37–54)
EGFRCR SERPLBLD CKD-EPI 2021: 60.8 ML/MIN/1.73
EOSINOPHIL # BLD AUTO: 0.18 10*3/MM3 (ref 0–0.4)
EOSINOPHIL NFR BLD AUTO: 2.4 % (ref 0.3–6.2)
ERYTHROCYTE [DISTWIDTH] IN BLOOD BY AUTOMATED COUNT: 12.8 % (ref 12.3–15.4)
GLUCOSE SERPL-MCNC: 225 MG/DL (ref 65–99)
GLUCOSE UR STRIP-MCNC: NEGATIVE MG/DL
HCT VFR BLD AUTO: 36.2 % (ref 37.5–51)
HGB BLD-MCNC: 12 G/DL (ref 13–17.7)
HGB UR QL STRIP.AUTO: NEGATIVE
IMM GRANULOCYTES # BLD AUTO: 0.03 10*3/MM3 (ref 0–0.05)
IMM GRANULOCYTES NFR BLD AUTO: 0.4 % (ref 0–0.5)
KETONES UR QL STRIP: ABNORMAL
LEUKOCYTE ESTERASE UR QL STRIP.AUTO: NEGATIVE
LYMPHOCYTES # BLD AUTO: 2.02 10*3/MM3 (ref 0.7–3.1)
LYMPHOCYTES NFR BLD AUTO: 27.3 % (ref 19.6–45.3)
MCH RBC QN AUTO: 28.4 PG (ref 26.6–33)
MCHC RBC AUTO-ENTMCNC: 33.1 G/DL (ref 31.5–35.7)
MCV RBC AUTO: 85.6 FL (ref 79–97)
MONOCYTES # BLD AUTO: 0.64 10*3/MM3 (ref 0.1–0.9)
MONOCYTES NFR BLD AUTO: 8.6 % (ref 5–12)
NEUTROPHILS NFR BLD AUTO: 4.46 10*3/MM3 (ref 1.7–7)
NEUTROPHILS NFR BLD AUTO: 60.4 % (ref 42.7–76)
NITRITE UR QL STRIP: NEGATIVE
NRBC BLD AUTO-RTO: 0 /100 WBC (ref 0–0.2)
PH UR STRIP.AUTO: 5.5 [PH] (ref 5–8)
PHOSPHATE SERPL-MCNC: 3.7 MG/DL (ref 2.5–4.5)
PLATELET # BLD AUTO: 295 10*3/MM3 (ref 140–450)
PMV BLD AUTO: 10.5 FL (ref 6–12)
POTASSIUM SERPL-SCNC: 4.7 MMOL/L (ref 3.5–5.2)
PROT UR QL STRIP: ABNORMAL
RBC # BLD AUTO: 4.23 10*6/MM3 (ref 4.14–5.8)
SODIUM SERPL-SCNC: 138 MMOL/L (ref 136–145)
SP GR UR STRIP: 1.03 (ref 1–1.03)
UROBILINOGEN UR QL STRIP: ABNORMAL
WBC NRBC COR # BLD: 7.4 10*3/MM3 (ref 3.4–10.8)

## 2023-04-28 PROCEDURE — 85025 COMPLETE CBC W/AUTO DIFF WBC: CPT

## 2023-04-28 PROCEDURE — 80048 BASIC METABOLIC PNL TOTAL CA: CPT

## 2023-04-28 PROCEDURE — 81003 URINALYSIS AUTO W/O SCOPE: CPT

## 2023-04-28 PROCEDURE — 3078F DIAST BP <80 MM HG: CPT | Performed by: FAMILY MEDICINE

## 2023-04-28 PROCEDURE — 84100 ASSAY OF PHOSPHORUS: CPT

## 2023-04-28 PROCEDURE — 3077F SYST BP >= 140 MM HG: CPT | Performed by: FAMILY MEDICINE

## 2023-04-28 PROCEDURE — 82010 KETONE BODYS QUAN: CPT

## 2023-04-28 PROCEDURE — 36415 COLL VENOUS BLD VENIPUNCTURE: CPT

## 2023-04-28 PROCEDURE — 99213 OFFICE O/P EST LOW 20 MIN: CPT | Performed by: FAMILY MEDICINE

## 2023-04-28 PROCEDURE — 3052F HG A1C>EQUAL 8.0%<EQUAL 9.0%: CPT | Performed by: FAMILY MEDICINE

## 2023-04-28 NOTE — PROGRESS NOTES
"Established Patient Office Visit      Patient Name: Israel Reyna  : 1955   MRN: [id number]   Care Team: Patient Care Team:  Elio Moore DO as PCP - General (Family Medicine)    Chief Complaint:    Chief Complaint   Patient presents with   • Dizziness     Pt co vertigo and headaches       History of Present Illness: Israel Reyna is a 68 y.o. male who is here today for chief complaint.    HPI    Has been more dizzy than usual the last week or so.    Walked out of bank this morning and couldn't walk out without holding onto something. Felt like \"everything inside was jumpy\"    Has poor vision in left eye regardless (~20/40) right eye usually 20/20. Everything feels blurry.    This patient is accompanied by their self who contributes to the history of their care.    The following portions of the patient's history were reviewed and updated as appropriate: allergies, current medications, past family history, past medical history, past social history, past surgical history and problem list.    Subjective      Review of Systems:   Review of Systems - See HPI    Past Medical History:   Past Medical History:   Diagnosis Date   • Allergic    • Anxiety    • Bowel incontinence     w/ history of Stimulator Device    • CAD (coronary artery disease)    • Cataract    • Chest pain    • CHF (congestive heart failure)    • Colon polyp    • Depression    • Diabetes mellitus, type 2    • Diabetic peripheral neuropathy    • Dyslipidemia    • Fatigue    • GERD (gastroesophageal reflux disease)    • Hypertension    • Kidney stone    • Mental status change    • Migraine     Complex    • Migraine headache    • Renal insufficiency    • SOB (shortness of breath)        Past Surgical History:   Past Surgical History:   Procedure Laterality Date   • CARDIAC CATHETERIZATION     • CARDIAC CATHETERIZATION N/A 10/14/2016    Procedure: Left Heart Cath;  Surgeon: Santiago Garner MD;  Location: [facility] CATH INVASIVE " LOCATION;  Service:    • COLONOSCOPY  02/25/2022    Dr. Graves-recommended 3 year repeat   • ENDOSCOPY  02/03/2022    Dr. Graves   • GASTRIC STIMULATOR IMPLANT SURGERY     • SMALL INTESTINE SURGERY      unspecified, x3 as an infant    • TESTICLE SURGERY         Family History:   Family History   Problem Relation Age of Onset   • Heart failure Mother    • Diabetes Mother    • Alzheimer's disease Mother    • Arthritis Mother    • Heart disease Mother    • Heart attack Father    • Kidney disease Father    • Cancer Father    • Prostate cancer Father    • Heart disease Father    • Hypertension Father    • Cancer Sister    • Breast cancer Sister    • Diabetes Sister    • Heart disease Sister    • Arrhythmia Sister    • Diabetes Brother    • Heart disease Brother    • Cancer Brother    • Liver disease Brother    • Kidney disease Brother    • Alzheimer's disease Maternal Grandmother    • Liver cancer Maternal Grandfather    • Liver disease Maternal Grandfather    • Other Paternal Grandmother         Ch[place] flu   • Other Paternal Grandfather         [place] flu   • Colon cancer Neg Hx        Social History:   Social History     Socioeconomic History   • Marital status:    Tobacco Use   • Smoking status: Never   • Smokeless tobacco: Never   Vaping Use   • Vaping Use: Never used   Substance and Sexual Activity   • Alcohol use: Never   • Drug use: Never   • Sexual activity: Yes       Tobacco History:   Social History     Tobacco Use   Smoking Status Never   Smokeless Tobacco Never       Medications:     Current Outpatient Medications:   •  amLODIPine (NORVASC) 10 MG tablet, TAKE 1 TABLET DAILY, Disp: 90 tablet, Rfl: 3  •  atorvastatin (LIPITOR) 20 MG tablet, Take 1 tablet by mouth Daily., Disp: 90 tablet, Rfl: 3  •  butalbital-acetaminophen-caffeine (FIORICET, ESGIC) -40 MG per tablet, Take 1 tablet by mouth 1 (One) Time As Needed for Headache for up to 30 doses., Disp: 30 tablet, Rfl: 0  •   "Erenumab-aooe (AIMOVIG) 140 MG/ML auto-injector, Inject 1 mL under the skin into the appropriate area as directed Every 30 (Thirty) Days., Disp: 1 mL, Rfl: 11  •  gabapentin (NEURONTIN) 300 MG capsule, Take 1 capsule by mouth 3 (Three) Times a Day., Disp: 270 capsule, Rfl: 1  •  glipizide (GLUCOTROL) 10 MG tablet, Take 1 tablet by mouth 2 (Two) Times a Day Before Meals., Disp: 180 tablet, Rfl: 3  •  glucose blood test strip, 1 each by Other route Daily. Use daily as directed, Disp: 100 each, Rfl: 12  •  glucose monitor monitoring kit, 1 each Daily., Disp: 1 each, Rfl: 0  •  Incontinence Supply Disposable (NU-FIT ADULT UNDERWEAR MEDIUM) misc, , Disp: , Rfl:   •  Insulin Syringe 31G X 5/16\" 0.3 ML misc, 1 each Every Night., Disp: 90 each, Rfl: 3  •  Lancets Thin misc, 1 each Daily. Use daily as directed, Disp: 100 each, Rfl: 11  •  lisinopril (PRINIVIL,ZESTRIL) 20 MG tablet, Take 1 tablet by mouth Daily., Disp: 90 tablet, Rfl: 3  •  Lutein 5 % beads, Take 5 mg by mouth Daily., Disp: , Rfl:   •  metFORMIN (GLUCOPHAGE) 1000 MG tablet, Take 1 tablet by mouth 2 (Two) Times a Day With Meals., Disp: 180 tablet, Rfl: 3  •  Multiple Vitamins-Minerals (PRESERVISION AREDS 2 PO), Take  by mouth Daily., Disp: , Rfl:   •  NON FORMULARY, Shot in eye every 3 months for macular degeneration, Disp: , Rfl:   •  omeprazole (priLOSEC) 40 MG capsule, TAKE 1 CAPSULE DAILY, Disp: 90 capsule, Rfl: 0  •  ondansetron (ZOFRAN) 8 MG tablet, TAKE 1 TABLET BY MOUTH EVERY 8 HOURS AS NEEDED FOR NAUSEA FOR VOMITING, Disp: 30 tablet, Rfl: 0  •  pantoprazole (PROTONIX) 40 MG EC tablet, Take 1 tablet by mouth 2 (Two) Times a Day. 30 minutes prior to first meal of the day and 30 minutes prior to last meal of the day, Disp: 180 tablet, Rfl: 3  •  sildenafil (VIAGRA) 100 MG tablet, Take 1 tablet by mouth Daily As Needed for Erectile Dysfunction., Disp: 15 tablet, Rfl: 11  •  Specialty Vitamins Products (HEALTHY HEART COMPLEX PO), Take  by mouth Daily., " Disp: , Rfl:   •  tamsulosin (FLOMAX) 0.4 MG capsule 24 hr capsule, Take 1 capsule by mouth Daily., Disp: 90 capsule, Rfl: 3  •  Trulicity 1.5 MG/0.5ML solution pen-injector, Inject 1.5 mg under the skin into the appropriate area as directed 1 (One) Time Per Week., Disp: 6 mL, Rfl: 3  •  ubrogepant (Ubrelvy) 100 MG tablet, Take 1 tablet by mouth As Needed (Migraine) for up to 30 days., Disp: 16 tablet, Rfl: 3  •  vitamin B-12 (CYANOCOBALAMIN) 100 MCG tablet, Take 50 mcg by mouth 2 (two) times a day., Disp: , Rfl:   •  vitamin C (ASCORBIC ACID) 500 MG tablet, Take 1 tablet by mouth 2 (two) times a day., Disp: , Rfl:   •  vitamin E 400 UNIT capsule, Take 1 capsule by mouth Daily., Disp: , Rfl:   •  Zeaxanthin powder, Take 1 mg by mouth Daily., Disp: , Rfl:   •  Zinc 40 MG tablet, Take 1 tablet by mouth Daily., Disp: , Rfl:   •  divalproex (DEPAKOTE) 500 MG DR tablet, Take 1 tablet by mouth Daily for 7 days. Then stop., Disp: 7 tablet, Rfl: 0  •  verapamil (CALAN) 40 MG tablet, Take 1 tablet by mouth Daily for 30 days., Disp: 30 tablet, Rfl: 0    Allergies:   Allergies   Allergen Reactions   • Actos [Pioglitazone] Anaphylaxis     congestive heart failure   • Avandia [Rosiglitazone] Anaphylaxis     congestive heart failure.   • Darvon [Propoxyphene] Itching     Angioedema    itching   • Talwin [Pentazocine] Rash     Angioedema    itching       Objective   Objective     Physical Exam:  Vital Signs:   Vitals:    04/28/23 1339   BP: 150/72   BP Location: Left arm   Patient Position: Sitting   Cuff Size: Adult   Pulse: 86   Temp: 97.1 °F (36.2 °C)   TempSrc: Infrared   SpO2: 98%   Weight: 78.2 kg (172 lb 6.4 oz)     Body mass index is 27 kg/m².     Physical Exam  Nursing note reviewed  Const: NAD, A&Ox4, Pleasant, Cooperative  Eyes: EOMI, no conjunctivitis  ENT: No nasal discharge present, neck supple  Cardiac: Regular rate and rhythm, no cyanosis  Resp: Respiratory rate within normal limits, no increased work of breathing,  no audible wheezing or retractions noted  GI: No distention or ascites  MSK: Motor and sensation grossly intact in bilateral upper extremities  Neurologic: CN II-XII grossly intact  Psych: Appropriate mood and behavior.  Skin: Warm, dry  Procedures/Radiology     Procedures  CT Head Without Contrast    Result Date: 5/2/2023  Impression: No acute intracranial findings. Electronically Signed: Yang Tea  5/2/2023 1:00 PM EDT  Workstation ID: [id number]       Assessment & Plan   Assessment / Plan      Assessment/Plan:   Problems Addressed This Visit  Diagnoses and all orders for this visit:    1. Dizziness (Primary)  -     CBC & Differential; Future  -     Beta-Hydroxybutyrate; Future  -     Phosphorus; Future  -     Basic Metabolic Panel; Future  -     Urinalysis With Culture If Indicated -; Future    2. Type 2 diabetes mellitus with hyperglycemia, without long-term current use of insulin  -     CBC & Differential; Future  -     Beta-Hydroxybutyrate; Future  -     Phosphorus; Future  -     Basic Metabolic Panel; Future  -     Urinalysis With Culture If Indicated -; Future    3. Dysautonomia  -     CBC & Differential; Future  -     Beta-Hydroxybutyrate; Future  -     Phosphorus; Future  -     Basic Metabolic Panel; Future  -     Urinalysis With Culture If Indicated -; Future      Problem List Items Addressed This Visit        Endocrine and Metabolic    Diabetes mellitus, type 2    Overview     On CGM he is having significant hyperglycemia in the middle of the night with precipitous drops (320 --> 60-70 within about 30 minutes). These correlate with times when he is getting sick at night.         Relevant Orders    CBC & Differential (Completed)    Beta-Hydroxybutyrate    Phosphorus (Completed)    Basic Metabolic Panel (Completed)    Urinalysis With Culture If Indicated - (Completed)   Other Visit Diagnoses     Dizziness    -  Primary    Relevant Orders    CBC & Differential (Completed)    Beta-Hydroxybutyrate     Phosphorus (Completed)    Basic Metabolic Panel (Completed)    Urinalysis With Culture If Indicated - (Completed)    Dysautonomia        Relevant Orders    CBC & Differential (Completed)    Beta-Hydroxybutyrate    Phosphorus (Completed)    Basic Metabolic Panel (Completed)    Urinalysis With Culture If Indicated - (Completed)            Patient Instructions   1. If your symptoms worsen or change, go to ER      Follow Up:   Return in about 1 week (around 5/5/2023).      DO BAILEY Jones RD  [facility]  [address]  [facility] [phone]  Fax [phone]  Phone [phone]

## 2023-05-02 ENCOUNTER — OFFICE VISIT (OUTPATIENT)
Dept: NEUROLOGY | Facility: CLINIC | Age: 68
End: 2023-05-02
Payer: MEDICARE

## 2023-05-02 ENCOUNTER — HOSPITAL ENCOUNTER (OUTPATIENT)
Dept: CT IMAGING | Facility: HOSPITAL | Age: 68
Discharge: HOME | End: 2023-05-02
Payer: MEDICARE

## 2023-05-02 VITALS
OXYGEN SATURATION: 97 % | BODY MASS INDEX: 27 KG/M2 | DIASTOLIC BLOOD PRESSURE: 72 MMHG | HEART RATE: 88 BPM | WEIGHT: 172 LBS | SYSTOLIC BLOOD PRESSURE: 134 MMHG | HEIGHT: 67 IN

## 2023-05-02 DIAGNOSIS — G47.33 OSA (OBSTRUCTIVE SLEEP APNEA): ICD-10-CM

## 2023-05-02 DIAGNOSIS — G43.119 INTRACTABLE MIGRAINE WITH AURA WITHOUT STATUS MIGRAINOSUS: Primary | ICD-10-CM

## 2023-05-02 DIAGNOSIS — G43.719 INTRACTABLE CHRONIC MIGRAINE WITHOUT AURA AND WITHOUT STATUS MIGRAINOSUS: ICD-10-CM

## 2023-05-02 PROCEDURE — 3075F SYST BP GE 130 - 139MM HG: CPT | Performed by: PSYCHIATRY & NEUROLOGY

## 2023-05-02 PROCEDURE — 99214 OFFICE O/P EST MOD 30 MIN: CPT | Performed by: PSYCHIATRY & NEUROLOGY

## 2023-05-02 PROCEDURE — 3078F DIAST BP <80 MM HG: CPT | Performed by: PSYCHIATRY & NEUROLOGY

## 2023-05-02 PROCEDURE — 70450 CT HEAD/BRAIN W/O DYE: CPT

## 2023-05-02 PROCEDURE — 1160F RVW MEDS BY RX/DR IN RCRD: CPT | Performed by: PSYCHIATRY & NEUROLOGY

## 2023-05-02 PROCEDURE — 1159F MED LIST DOCD IN RCRD: CPT | Performed by: PSYCHIATRY & NEUROLOGY

## 2023-05-02 RX ORDER — VERAPAMIL HYDROCHLORIDE 40 MG/1
40 TABLET ORAL DAILY
Qty: 30 TABLET | Refills: 0 | Status: SHIPPED | OUTPATIENT
Start: 2023-05-02 | End: 2023-06-01

## 2023-05-02 NOTE — PROGRESS NOTES
"Subjective:    CC: Israel Reyna is seen today for follow-up for headaches/migraines.     HPI:   Initial visit/ Prior w/u:  This is a 67 yo male who presents for re-evaluation of worsening migraines. Intractable migraines since 2013 followed by Dr. Chaidez normally.   He went to have COVID vaccine March 2021 Pfizer. He had immediate worsening headaches, chills, feeling terrible, weak and in bed x 4 days. He had his second injection April 2021 Pfizer with the same exact symptoms. He feels that his symptoms have progressively worsened since the COVID vaccines. Has noticed every time he eats anything at all he notices he cannot walk, he cannot speak, his left face will \"draw up\", twitch and be painful with episodes lasting 15 minutes to 2 hours and have been present for 2-3 months and seems to be worsening since COVID vaccine. He was last seen in our clinic 3/2020. He tells me the pain in his head, holocranial, can be anywhere throughout the head, \"feels like someone is repeatedly hitting me with a ball bat or stick and at times feels like someone is driving a nail in the back of my head. The ones that scare me the most are the headaches in the temples that feel like sharp, someone shooting a nail gun in my temples.\" Also has associated dizziness. Pain can radiate across his head and within 10 minutes and he loses control of his bowels. This is not a change and has been present since 7026-6690. No imaging of head since 2017. He cannot have MRI due to anal sphincter stimulator. Has chronic daily headaches. \"Drawing of face\" is daily. CTA head/neck 2019 normal. He feels when he has his pain there is \"moisture running down his head\". Takes topamax 25mg BID and gabapentin 300mg TID. Experiences severe pain with episodes. Cannot afford Botox. He is agreeable to CT head today. He is requesting migraine cocktail as well. He uses cane at times. Has been using fioricet PRN. He is scheduled for OP migraine cocktail " today at 3pm at [facility]. Last migraine cocktail was [id number]. Tells me one med gives him hiccups for 2-3 days after but unsure which.  His headaches do also have severe pain, throbbing, photophobia, phonophobia, nausea and occasional vomiting and do her daily with some episodes being worse than others.     Follow up with Sabiha Hanna, APRN: 4/12/2023:  Patient in clinic for regular follow up. At his last visit he was prescribed Depakote 500 mg BID for migraine prevention and Ubrelvy 100 mg for abortive treatment for migraines. He is unsure if he has started taking Depakote since his last visit. He does not believe that he had started Ubrelvy d/t cost but also was unsure. He stated that his spouse helps him to manage his medications and that he would need to check with her regarding what he is currently taking. He states that he continues to have both headaches and the previously described episodes including left sided paresthesias, left facial twitching/drawing up. His headaches vary in location and are located at the top of his head, occiput, bilateral temples, and at times will start near right temple and radiate towards left. Patient has had these episodes for many years and has previously completed detailed neurologic workup including cEEG which was negative. He reports that some headaches are associated with light and sound sensitivity along with nausea. He has near daily headaches of some kind everyday varying in presentation and severity. He has also appreciated dizziness over approximately the last month, he reports that it is worse while walking and that he has been using his cane more recently for stabilization, no recent falls. He recently saw his PCP d/t concern for fever and body aches who performed lab work including Hemoglobin A1c was 8.60, blood culture did not show any growth at 5 days, HIV was nonreactive, rheumatoid factor was less than 10, TSH 2.440, , CK 42, CRP <0.30, CMP  showed elevated glucose, sedimentation rate 38. It appears that at his last visit a CT head was discussed but had not yet been ordered.       Follow-up with Dr. Crain: 05/02/2023: Patient is in clinic for regular follow-up. Since the last visit, he has had a CT scan on 05/02/2023 to assess dizzy spells which did not reveal any acute intracranial abnormalities.  I reviewed the CTH personally.  He could not afford to have Ubrelvy filled. He has been unable to fill his Aimovig due to cost as well. He is still experiencing dizzy spells and fatigue. He also notes intermittent shooting pain in different parts of the head he has completed his Depakote and he has not refilled this medication. He is not certain if it provided any relief. When he wakes up in the morning, he has difficulty with ambulation and speech, facial weakness, and headache. He feels worse if he sits down to relax. His pain occasionally wakes him in the middle of the night. He has been prescribed oxygen 15 L for his head pain in the past, which helped if he used the oxygen when he felt the pain coming on.  He is not sleeping well and he is constantly dreaming when he is asleep. He is not certain if he is snoring. He does not feel refreshed when he wakes up. He has been tested for sleep apnea and provided with a CPAP. He could not tolerate the mask and he was only using the machine for 2 to 3 hours.    He is taking Norvasc for blood pressure control. He had an episode of dizziness and headache last week, for which he saw Dr. Moore. He had a systolic blood pressure reading of 150 mmHg. He is taking amlodipine and lisinopril. He will see Dr. Moore on 05/09/2023.    He is feeling discouraged. He denies depression or the desire to harm himself. He is tired of seeing doctors, having testing performed, and feeling like he is not seeing any improvement. His short term memory is poor. He has a strong family history of Alzheimer disease in his mother, his maternal  "grandmother, his maternal great-grandfather, and 2 of his maternal uncles.    He is having issues with his bowels. His sphincter never closes so he cannot hold a bowel movement when he needs to go. He has a stimulator, which he would like to have removed. He cannot have an MRI with this device in situ. He has been advised he may need to consider a colostomy bag. He has not followed up with gastroenterology. He has had an upper endoscopy.    The following portions of the patient's history were reviewed today and updated as of 05/02/2023  : allergies, social history and problem list.  This document will be scanned to patient's chart.      Current Outpatient Medications:   •  amLODIPine (NORVASC) 10 MG tablet, TAKE 1 TABLET DAILY, Disp: 90 tablet, Rfl: 3  •  atorvastatin (LIPITOR) 20 MG tablet, Take 1 tablet by mouth Daily., Disp: 90 tablet, Rfl: 3  •  butalbital-acetaminophen-caffeine (FIORICET, ESGIC) -40 MG per tablet, Take 1 tablet by mouth 1 (One) Time As Needed for Headache for up to 30 doses., Disp: 30 tablet, Rfl: 0  •  Erenumab-aooe (AIMOVIG) 140 MG/ML auto-injector, Inject 1 mL under the skin into the appropriate area as directed Every 30 (Thirty) Days., Disp: 1 mL, Rfl: 11  •  gabapentin (NEURONTIN) 300 MG capsule, Take 1 capsule by mouth 3 (Three) Times a Day., Disp: 270 capsule, Rfl: 1  •  glipizide (GLUCOTROL) 10 MG tablet, Take 1 tablet by mouth 2 (Two) Times a Day Before Meals., Disp: 180 tablet, Rfl: 3  •  glucose blood test strip, 1 each by Other route Daily. Use daily as directed, Disp: 100 each, Rfl: 12  •  glucose monitor monitoring kit, 1 each Daily., Disp: 1 each, Rfl: 0  •  Incontinence Supply Disposable (NU-FIT ADULT UNDERWEAR MEDIUM) misc, , Disp: , Rfl:   •  Insulin Syringe 31G X 5/16\" 0.3 ML misc, 1 each Every Night., Disp: 90 each, Rfl: 3  •  Lancets Thin misc, 1 each Daily. Use daily as directed, Disp: 100 each, Rfl: 11  •  lisinopril (PRINIVIL,ZESTRIL) 20 MG tablet, Take 1 tablet by " mouth Daily., Disp: 90 tablet, Rfl: 3  •  Lutein 5 % beads, Take 5 mg by mouth Daily., Disp: , Rfl:   •  metFORMIN (GLUCOPHAGE) 1000 MG tablet, Take 1 tablet by mouth 2 (Two) Times a Day With Meals., Disp: 180 tablet, Rfl: 3  •  Multiple Vitamins-Minerals (PRESERVISION AREDS 2 PO), Take  by mouth Daily., Disp: , Rfl:   •  NON FORMULARY, Shot in eye every 3 months for macular degeneration, Disp: , Rfl:   •  omeprazole (priLOSEC) 40 MG capsule, TAKE 1 CAPSULE DAILY, Disp: 90 capsule, Rfl: 0  •  ondansetron (ZOFRAN) 8 MG tablet, TAKE 1 TABLET BY MOUTH EVERY 8 HOURS AS NEEDED FOR NAUSEA FOR VOMITING, Disp: 30 tablet, Rfl: 0  •  pantoprazole (PROTONIX) 40 MG EC tablet, Take 1 tablet by mouth 2 (Two) Times a Day. 30 minutes prior to first meal of the day and 30 minutes prior to last meal of the day, Disp: 180 tablet, Rfl: 3  •  sildenafil (VIAGRA) 100 MG tablet, Take 1 tablet by mouth Daily As Needed for Erectile Dysfunction., Disp: 15 tablet, Rfl: 11  •  Specialty Vitamins Products (HEALTHY HEART COMPLEX PO), Take  by mouth Daily., Disp: , Rfl:   •  tamsulosin (FLOMAX) 0.4 MG capsule 24 hr capsule, Take 1 capsule by mouth Daily., Disp: 90 capsule, Rfl: 3  •  Trulicity 1.5 MG/0.5ML solution pen-injector, Inject 1.5 mg under the skin into the appropriate area as directed 1 (One) Time Per Week., Disp: 6 mL, Rfl: 3  •  ubrogepant (Ubrelvy) 100 MG tablet, Take 1 tablet by mouth As Needed (Migraine) for up to 30 days., Disp: 16 tablet, Rfl: 3  •  vitamin B-12 (CYANOCOBALAMIN) 100 MCG tablet, Take 50 mcg by mouth 2 (two) times a day., Disp: , Rfl:   •  vitamin C (ASCORBIC ACID) 500 MG tablet, Take 1 tablet by mouth 2 (two) times a day., Disp: , Rfl:   •  vitamin E 400 UNIT capsule, Take 1 capsule by mouth Daily., Disp: , Rfl:   •  Zeaxanthin powder, Take 1 mg by mouth Daily., Disp: , Rfl:   •  Zinc 40 MG tablet, Take 1 tablet by mouth Daily., Disp: , Rfl:   •  divalproex (DEPAKOTE) 500 MG DR tablet, Take 1 tablet by mouth Daily  for 7 days. Then stop., Disp: 7 tablet, Rfl: 0  •  verapamil (CALAN) 40 MG tablet, Take 1 tablet by mouth Daily for 30 days., Disp: 30 tablet, Rfl: 0   Past Medical History:   Diagnosis Date   • Allergic    • Anxiety    • Bowel incontinence     w/ history of Stimulator Device    • CAD (coronary artery disease)    • Cataract    • Chest pain    • CHF (congestive heart failure)    • Colon polyp    • Depression    • Diabetes mellitus, type 2    • Diabetic peripheral neuropathy    • Dyslipidemia    • Fatigue    • GERD (gastroesophageal reflux disease)    • Hypertension    • Kidney stone    • Mental status change    • Migraine     Complex    • Migraine headache    • Renal insufficiency    • SOB (shortness of breath)       Past Surgical History:   Procedure Laterality Date   • CARDIAC CATHETERIZATION     • CARDIAC CATHETERIZATION N/A 10/14/2016    Procedure: Left Heart Cath;  Surgeon: Santiago Garner MD;  Location: [facility] CATH INVASIVE LOCATION;  Service:    • COLONOSCOPY  02/25/2022    Dr. Graves-recommended 3 year repeat   • ENDOSCOPY  02/03/2022    Dr. Graves   • GASTRIC STIMULATOR IMPLANT SURGERY     • SMALL INTESTINE SURGERY      unspecified, x3 as an infant    • TESTICLE SURGERY        Family History   Problem Relation Age of Onset   • Heart failure Mother    • Diabetes Mother    • Alzheimer's disease Mother    • Arthritis Mother    • Heart disease Mother    • Heart attack Father    • Kidney disease Father    • Cancer Father    • Prostate cancer Father    • Heart disease Father    • Hypertension Father    • Cancer Sister    • Breast cancer Sister    • Diabetes Sister    • Heart disease Sister    • Arrhythmia Sister    • Diabetes Brother    • Heart disease Brother    • Cancer Brother    • Liver disease Brother    • Kidney disease Brother    • Alzheimer's disease Maternal Grandmother    • Liver cancer Maternal Grandfather    • Liver disease Maternal Grandfather    • Other Paternal Grandmother         [place]  "flu   • Other Paternal Grandfather         [place] flu   • Colon cancer Neg Hx       Review of Systems    All other systems reviewed and are negative     Objective:    /72   Pulse 88   Ht 170.2 cm (67\")   Wt 78 kg (172 lb)   SpO2 97%   BMI 26.94 kg/m²     Neurology Exam:    General appearance: NAD.     Mental status: Alert, awake and oriented to time place and person.    Fund of knowledge:  Normal.     Language and Speech: No aphasia or dysarthria.    Naming , Repetition and Comprehension:  Can name objects, repeat a sentence and follow commands. Speech is clear and fluent with good repetition, comprehension, and naming.    Cranial Nerves:   CN II: Visual fields are full. Intact. Fundi - Normal, No papillederma, Pupils - SANDRA  CN III, IV and VI: Extraocular movements are intact. Normal saccades.   CN V: Facial sensation is intact.   CN VII: Muscles of facial expression reveal no asymmetry. Intact.   CN VIII: Hearing is intact. Whispered voice intact.   CN IX and X: Palate elevates symmetrically. Intact  CN XI: Shoulder shrug is intact.   CN XII: Tongue is midline without evidence of atrophy or fasciculation.     Motor:  Right UE muscle strength 5/5. Normal tone.     Left UE muscle strength 5/5. Normal tone.      Right LE muscle strength5/5. Normal tone.     Left LE muscle strength 5/5. Normal tone.      Sensory: Normal light touch, vibration and pinprick sensation bilaterally.    DTRs: 2+ bilaterally in upper and lower extremities.    Babinski: Negative bilaterally.    Co-ordination: Normal finger-to-nose, heel to shin B/L.    Rhomberg: Negative.    Gait: Normal.    Cardiovascular: Regular rate and rhythm without murmur, gallop or rub.    Ophthalmoscopic exam: Normal fundi, no papilledema.    Assessment and Plan:  1. Intractable migraine with aura without status migrainosus  2. NAINA (obstructive sleep apnea)  - Patient is a 68-year-old male who is seen in clinic for regular follow-up.  He continues to " have intermittent sharp shooting pain involving different parts of the head, frequent migraines, dizzy spells.  He reports that generally symptoms are worse when he wakes up first thing in the morning. he has been previously diagnosed with sleep apnea but he did not adhere to his CPAP machine as prescribed.  I am concerned that untreated sleep apnea is causing majority of symptoms.  We will refer him to sleep medicine for further evaluation and treatment and to reconsider CPAP treatment or to consider a different treatment option to treat his sleep apnea.  Insurance did not approve Ubrelvy and Aimovig was very expensive so he is currently not on Aimovig.  I am going to stop his amlodipine and instead start him on verapamil 40 mg daily and see if it can improve head pains and migraines that he is experiencing.. He will then follow-up with us in 6 weeks to evaluate how he tolerates this change in his medication.    - Ambulatory Referral to Sleep Medicine      Return in about 6 weeks (around 6/13/2023).     Syed Crain MD       Note to patient: The 21st Century Cures Act makes medical notes like these available to patients in the interest of transparency. However, be advised this is a medical document. It is intended as peer to peer communication. It is written in medical language and may contain abbreviations or verbiage that are unfamiliar. It may appear blunt or direct. Medical documents are intended to carry relevant information, facts as evident, and the clinical opinion of the physician.     Transcribed from ambient dictation for Syed Crain MD by Oh Hook, Quality .    Patient verbalized consent to the visit recording.  I have personally performed the services described in this document as transcribed by the above individual, and it is both accurate and complete.  Syed Crain MD  5/2/2023

## 2023-05-04 ENCOUNTER — TELEPHONE (OUTPATIENT)
Dept: NEUROLOGY | Facility: CLINIC | Age: 68
End: 2023-05-04

## 2023-05-04 NOTE — TELEPHONE ENCOUNTER
Caller: Israel Reyna    Relationship: Self    Best call back number: [phone]    Who are you requesting to speak with (clinical staff, provider,  specific staff member): LIZBETH    What was the call regarding:   RX COST  PT WAS TOLD BY LIZBETH THAT HE WOULD HELP THE PT TO GET ENROLLED INTO THE PATIENT ASSISTANCE PROGRAM FOR THE RX. THE PT DIDN'T REMEMBER THE NAME OF THE RX BUT SAID LIZBETH WOULD KNOW WHAT IT WAS.    Do you require a callback:   YES, PLEASE.

## 2023-05-09 ENCOUNTER — OFFICE VISIT (OUTPATIENT)
Dept: FAMILY MEDICINE CLINIC | Facility: CLINIC | Age: 68
End: 2023-05-09
Payer: MEDICARE

## 2023-05-09 VITALS
BODY MASS INDEX: 26.59 KG/M2 | DIASTOLIC BLOOD PRESSURE: 72 MMHG | WEIGHT: 169.4 LBS | SYSTOLIC BLOOD PRESSURE: 122 MMHG | TEMPERATURE: 98.3 F | HEIGHT: 67 IN

## 2023-05-09 DIAGNOSIS — E11.65 TYPE 2 DIABETES MELLITUS WITH HYPERGLYCEMIA, WITHOUT LONG-TERM CURRENT USE OF INSULIN: ICD-10-CM

## 2023-05-09 DIAGNOSIS — R42 DIZZINESS: Primary | ICD-10-CM

## 2023-05-09 DIAGNOSIS — G90.1 DYSAUTONOMIA: ICD-10-CM

## 2023-05-09 PROCEDURE — 3052F HG A1C>EQUAL 8.0%<EQUAL 9.0%: CPT | Performed by: FAMILY MEDICINE

## 2023-05-09 PROCEDURE — 3074F SYST BP LT 130 MM HG: CPT | Performed by: FAMILY MEDICINE

## 2023-05-09 PROCEDURE — 3078F DIAST BP <80 MM HG: CPT | Performed by: FAMILY MEDICINE

## 2023-05-09 PROCEDURE — 99214 OFFICE O/P EST MOD 30 MIN: CPT | Performed by: FAMILY MEDICINE

## 2023-05-09 RX ORDER — INSULIN GLARGINE AND LIXISENATIDE 100; 33 U/ML; UG/ML
15 INJECTION, SOLUTION SUBCUTANEOUS
Qty: 9 ML | Refills: 0 | Status: SHIPPED | OUTPATIENT
Start: 2023-05-09 | End: 2023-05-23

## 2023-05-09 NOTE — PATIENT INSTRUCTIONS
Hold off on Trulicity  Start Soliqua 15U every morning before breakfast  Monitor your blood sugar as usual

## 2023-05-09 NOTE — PROGRESS NOTES
Established Patient Office Visit      Patient Name: Israel Reyna  : 1955   MRN: [id number]   Care Team: Patient Care Team:  Elio Moore DO as PCP - General (Family Medicine)    Chief Complaint:    Chief Complaint   Patient presents with   • Dizziness     1 week follow-up for dizziness       History of Present Illness: Israel Reyna is a 68 y.o. male who is here today for chief complaint.    HPI    He has a lot of concerns for dementia due to FH (mom, maternal uncle). His CT showed chronic small vessel ischemic changes.    History of sleep apnea, insurance stopped covering because they deemed him noncompliant (would have to take off frequently due to chronic bowel incontinence).    This patient is accompanied by their self who contributes to the history of their care.    The following portions of the patient's history were reviewed and updated as appropriate: allergies, current medications, past family history, past medical history, past social history, past surgical history and problem list.    Subjective      Review of Systems:   Review of Systems - See HPI    Past Medical History:   Past Medical History:   Diagnosis Date   • Allergic    • Anxiety    • Bowel incontinence     w/ history of Stimulator Device    • CAD (coronary artery disease)    • Cataract    • Chest pain    • CHF (congestive heart failure)    • Colon polyp    • Depression    • Diabetes mellitus, type 2    • Diabetic peripheral neuropathy    • Dyslipidemia    • Fatigue    • GERD (gastroesophageal reflux disease)    • Hypertension    • Kidney stone    • Mental status change    • Migraine     Complex    • Migraine headache    • Renal insufficiency    • SOB (shortness of breath)        Past Surgical History:   Past Surgical History:   Procedure Laterality Date   • CARDIAC CATHETERIZATION     • CARDIAC CATHETERIZATION N/A 10/14/2016    Procedure: Left Heart Cath;  Surgeon: Santiago Garner MD;  Location: [facility]  INVASIVE LOCATION;  Service:    • COLONOSCOPY  02/25/2022    Dr. Graves-recommended 3 year repeat   • ENDOSCOPY  02/03/2022    Dr. Graves   • GASTRIC STIMULATOR IMPLANT SURGERY     • SMALL INTESTINE SURGERY      unspecified, x3 as an infant    • TESTICLE SURGERY         Family History:   Family History   Problem Relation Age of Onset   • Heart failure Mother    • Diabetes Mother    • Alzheimer's disease Mother    • Arthritis Mother    • Heart disease Mother    • Heart attack Father    • Kidney disease Father    • Cancer Father    • Prostate cancer Father    • Heart disease Father    • Hypertension Father    • Cancer Sister    • Breast cancer Sister    • Diabetes Sister    • Heart disease Sister    • Arrhythmia Sister    • Diabetes Brother    • Heart disease Brother    • Cancer Brother    • Liver disease Brother    • Kidney disease Brother    • Alzheimer's disease Maternal Grandmother    • Liver cancer Maternal Grandfather    • Liver disease Maternal Grandfather    • Other Paternal Grandmother         Irish flu   • Other Paternal Grandfather         Irish flu   • Colon cancer Neg Hx        Social History:   Social History     Socioeconomic History   • Marital status:    Tobacco Use   • Smoking status: Never   • Smokeless tobacco: Never   Vaping Use   • Vaping Use: Never used   Substance and Sexual Activity   • Alcohol use: Never   • Drug use: Never   • Sexual activity: Yes       Tobacco History:   Social History     Tobacco Use   Smoking Status Never   Smokeless Tobacco Never       Medications:     Current Outpatient Medications:   •  amLODIPine (NORVASC) 10 MG tablet, TAKE 1 TABLET DAILY, Disp: 90 tablet, Rfl: 3  •  atorvastatin (LIPITOR) 20 MG tablet, Take 1 tablet by mouth Daily., Disp: 90 tablet, Rfl: 3  •  butalbital-acetaminophen-caffeine (FIORICET, ESGIC) -40 MG per tablet, Take 1 tablet by mouth 1 (One) Time As Needed for Headache for up to 30 doses., Disp: 30 tablet, Rfl: 0  •   "Erenumab-aooe (AIMOVIG) 140 MG/ML auto-injector, Inject 1 mL under the skin into the appropriate area as directed Every 30 (Thirty) Days., Disp: 1 mL, Rfl: 11  •  gabapentin (NEURONTIN) 300 MG capsule, Take 1 capsule by mouth 3 (Three) Times a Day., Disp: 270 capsule, Rfl: 1  •  glipizide (GLUCOTROL) 10 MG tablet, Take 1 tablet by mouth 2 (Two) Times a Day Before Meals., Disp: 180 tablet, Rfl: 3  •  glucose blood test strip, 1 each by Other route Daily. Use daily as directed, Disp: 100 each, Rfl: 12  •  glucose monitor monitoring kit, 1 each Daily., Disp: 1 each, Rfl: 0  •  Incontinence Supply Disposable (NU-FIT ADULT UNDERWEAR MEDIUM) misc, , Disp: , Rfl:   •  Insulin Syringe 31G X 5/16\" 0.3 ML misc, 1 each Every Night., Disp: 90 each, Rfl: 3  •  Lancets Thin misc, 1 each Daily. Use daily as directed, Disp: 100 each, Rfl: 11  •  lisinopril (PRINIVIL,ZESTRIL) 20 MG tablet, Take 1 tablet by mouth Daily., Disp: 90 tablet, Rfl: 3  •  Lutein 5 % beads, Take 5 mg by mouth Daily., Disp: , Rfl:   •  metFORMIN (GLUCOPHAGE) 1000 MG tablet, Take 1 tablet by mouth 2 (Two) Times a Day With Meals., Disp: 180 tablet, Rfl: 3  •  Multiple Vitamins-Minerals (PRESERVISION AREDS 2 PO), Take  by mouth Daily., Disp: , Rfl:   •  NON FORMULARY, Shot in eye every 3 months for macular degeneration, Disp: , Rfl:   •  omeprazole (priLOSEC) 40 MG capsule, TAKE 1 CAPSULE DAILY, Disp: 90 capsule, Rfl: 0  •  ondansetron (ZOFRAN) 8 MG tablet, TAKE 1 TABLET BY MOUTH EVERY 8 HOURS AS NEEDED FOR NAUSEA FOR VOMITING, Disp: 30 tablet, Rfl: 0  •  pantoprazole (PROTONIX) 40 MG EC tablet, Take 1 tablet by mouth 2 (Two) Times a Day. 30 minutes prior to first meal of the day and 30 minutes prior to last meal of the day, Disp: 180 tablet, Rfl: 3  •  sildenafil (VIAGRA) 100 MG tablet, Take 1 tablet by mouth Daily As Needed for Erectile Dysfunction., Disp: 15 tablet, Rfl: 11  •  Specialty Vitamins Products (HEALTHY HEART COMPLEX PO), Take  by mouth Daily., " "Disp: , Rfl:   •  tamsulosin (FLOMAX) 0.4 MG capsule 24 hr capsule, Take 1 capsule by mouth Daily., Disp: 90 capsule, Rfl: 3  •  verapamil (CALAN) 40 MG tablet, Take 1 tablet by mouth Daily for 30 days., Disp: 30 tablet, Rfl: 0  •  vitamin B-12 (CYANOCOBALAMIN) 100 MCG tablet, Take 50 mcg by mouth 2 (two) times a day., Disp: , Rfl:   •  vitamin C (ASCORBIC ACID) 500 MG tablet, Take 1 tablet by mouth 2 (two) times a day., Disp: , Rfl:   •  vitamin E 400 UNIT capsule, Take 1 capsule by mouth Daily., Disp: , Rfl:   •  Zeaxanthin powder, Take 1 mg by mouth Daily., Disp: , Rfl:   •  Zinc 40 MG tablet, Take 1 tablet by mouth Daily., Disp: , Rfl:   •  divalproex (DEPAKOTE) 500 MG DR tablet, Take 1 tablet by mouth Daily for 7 days. Then stop., Disp: 7 tablet, Rfl: 0  •  Insulin Pen Needle 31G X 6 MM misc, 1 each Daily., Disp: 100 each, Rfl: 3  •  Soliqua 100-33 UNT-MCG/ML solution pen-injector injection, Inject 15 Units under the skin into the appropriate area as directed Every Morning Before Breakfast. Should be taken within the hour prior to first meal of day, Disp: 9 mL, Rfl: 0    Allergies:   Allergies   Allergen Reactions   • Actos [Pioglitazone] Anaphylaxis     congestive heart failure   • Avandia [Rosiglitazone] Anaphylaxis     congestive heart failure.   • Darvon [Propoxyphene] Itching     Angioedema    itching   • Talwin [Pentazocine] Rash     Angioedema    itching       Objective   Objective     Physical Exam:  Vital Signs:   Vitals:    05/09/23 1256   BP: 122/72   BP Location: Left arm   Patient Position: Sitting   Cuff Size: Adult   Temp: 98.3 °F (36.8 °C)   TempSrc: Infrared   Weight: 76.8 kg (169 lb 6.4 oz)   Height: 170.2 cm (67\")     Body mass index is 26.53 kg/m².     Physical Exam  Nursing note reviewed  Const: NAD, A&Ox4, Pleasant, Cooperative  Eyes: EOMI, no conjunctivitis  ENT: No nasal discharge present, neck supple  Cardiac: Regular rate and rhythm, no cyanosis  Resp: Respiratory rate within normal " limits, no increased work of breathing, no audible wheezing or retractions noted  GI: No distention or ascites  MSK: Motor and sensation grossly intact in bilateral upper extremities  Neurologic: CN II-XII grossly intact  Psych: Appropriate mood and behavior.  Skin: Warm, dry  Procedures/Radiology     Procedures  No radiology results for the last 7 days     Assessment & Plan   Assessment / Plan      Assessment/Plan:   Problems Addressed This Visit  Diagnoses and all orders for this visit:    1. Dizziness (Primary)    2. Dysautonomia    3. Type 2 diabetes mellitus with hyperglycemia, without long-term current use of insulin  -     Soliqua 100-33 UNT-MCG/ML solution pen-injector injection; Inject 15 Units under the skin into the appropriate area as directed Every Morning Before Breakfast. Should be taken within the hour prior to first meal of day  Dispense: 9 mL; Refill: 0  -     Insulin Pen Needle 31G X 6 MM misc; 1 each Daily.  Dispense: 100 each; Refill: 3      Problem List Items Addressed This Visit        Endocrine and Metabolic    Diabetes mellitus, type 2    Overview     On CGM he is having significant hyperglycemia in the middle of the night with precipitous drops (320 --> 60-70 within about 30 minutes). These correlate with times when he is getting sick at night.         Relevant Medications    Soliqua 100-33 UNT-MCG/ML solution pen-injector injection    Insulin Pen Needle 31G X 6 MM misc   Other Visit Diagnoses     Dizziness    -  Primary    Dysautonomia                Patient Instructions   1. Hold off on Trulicity  2. Start Soliqua 15U every morning before breakfast  3. Monitor your blood sugar as usual      Follow Up:   Return in about 2 weeks (around 5/23/2023) for video visit.      DO BAILEY Jones RD  [facility]  [address]  [facility] [address]  Fax [phone]  Phone [phone]

## 2023-05-09 NOTE — TELEPHONE ENCOUNTER
PT CALLING THOUGHT THOM MAY HAVE RETURNED HIS CALL ABOUT ASSISTANCE? IF SO PLEASE CALL BACK HE IS NOT GETTING VM?     ROHITH [phone]

## 2023-05-11 NOTE — TELEPHONE ENCOUNTER
Provider: EFRAÍN ARAIZA MD    Caller: ROHITH    Relationship to Patient: SELF    Phone Number: [phone]    Reason for Call: PT CALLING REGARDING MESSAGE.  STATED HE HAS NOT HEARD BACK FROM [facility] REGARDING THE ASSISTANCE PROGRAM FOR RX.  CALLED S/W LUZ AT CLINIC, ADVISED HE IS IN A ROOM WITH A PT AND TO SEND THE MESSAGE AND MAKE IT A HIGH PRIORITY.  ADVISED PT THAT I AM ADDING TO THE MESSAGE AND CHANGING TO A HIGH PRIORITY.      PLEASE CALL AND ADVISE

## 2023-05-23 ENCOUNTER — TELEMEDICINE (OUTPATIENT)
Dept: FAMILY MEDICINE CLINIC | Facility: CLINIC | Age: 68
End: 2023-05-23

## 2023-05-23 DIAGNOSIS — K52.9 CHRONIC DIARRHEA: ICD-10-CM

## 2023-05-23 DIAGNOSIS — R41.3 MEMORY IMPAIRMENT: ICD-10-CM

## 2023-05-23 DIAGNOSIS — R15.9 INCONTINENCE OF FECES, UNSPECIFIED FECAL INCONTINENCE TYPE: ICD-10-CM

## 2023-05-23 DIAGNOSIS — E78.5 DYSLIPIDEMIA: ICD-10-CM

## 2023-05-23 DIAGNOSIS — E11.65 TYPE 2 DIABETES MELLITUS WITH HYPERGLYCEMIA, WITHOUT LONG-TERM CURRENT USE OF INSULIN: Primary | ICD-10-CM

## 2023-05-23 DIAGNOSIS — R11.0 NAUSEA: ICD-10-CM

## 2023-05-23 PROCEDURE — 3052F HG A1C>EQUAL 8.0%<EQUAL 9.0%: CPT | Performed by: FAMILY MEDICINE

## 2023-05-23 PROCEDURE — 99214 OFFICE O/P EST MOD 30 MIN: CPT | Performed by: FAMILY MEDICINE

## 2023-05-23 RX ORDER — DIPHENOXYLATE HYDROCHLORIDE AND ATROPINE SULFATE 2.5; .025 MG/1; MG/1
1 TABLET ORAL 4 TIMES DAILY PRN
Qty: 120 TABLET | Refills: 2 | Status: SHIPPED | OUTPATIENT
Start: 2023-05-23

## 2023-05-23 RX ORDER — ATORVASTATIN CALCIUM 20 MG/1
20 TABLET, FILM COATED ORAL DAILY
Qty: 90 TABLET | Refills: 3 | Status: SHIPPED | OUTPATIENT
Start: 2023-05-23

## 2023-05-23 RX ORDER — PANTOPRAZOLE SODIUM 40 MG/1
40 TABLET, DELAYED RELEASE ORAL 2 TIMES DAILY
Qty: 180 TABLET | Refills: 3 | Status: SHIPPED | OUTPATIENT
Start: 2023-05-23

## 2023-05-23 RX ORDER — INSULIN GLARGINE AND LIXISENATIDE 100; 33 U/ML; UG/ML
20 INJECTION, SOLUTION SUBCUTANEOUS
Qty: 9 ML | Refills: 0 | Status: SHIPPED | OUTPATIENT
Start: 2023-05-23

## 2023-05-23 NOTE — PROGRESS NOTES
Established Patient Office Visit      Patient Name: Israel Reyna  : 1955   MRN: [id number]   Care Team: Patient Care Team:  Elio Moore DO as PCP - General (Family Medicine)    Chief Complaint:  No chief complaint on file.      History of Present Illness: Israel Reyna is a 68 y.o. male who is here today for chief complaint.    HPI    Started Soliqua in place of Trulicity. Sugars have been running 180s at 15U. Will titrate up. Having headache, swelling in feet. Thinks the head pain may be from his usual migraines. Having pain on left side and then will lose control of bowels.  /96 up to 192.  Having hearing aids made, will get them in July  Had COVID in November    Still having a lot of memory impairment    This patient is accompanied by their self who contributes to the history of their care.    The following portions of the patient's history were reviewed and updated as appropriate: allergies, current medications, past family history, past medical history, past social history, past surgical history and problem list.    Subjective      Review of Systems:   Review of Systems - See HPI    Past Medical History:   Past Medical History:   Diagnosis Date   • Allergic    • Anxiety    • Bowel incontinence     w/ history of Stimulator Device    • CAD (coronary artery disease)    • Cataract    • Chest pain    • CHF (congestive heart failure)    • Colon polyp    • Depression    • Diabetes mellitus, type 2    • Diabetic peripheral neuropathy    • Dyslipidemia    • Fatigue    • GERD (gastroesophageal reflux disease)    • Hypertension    • Kidney stone    • Mental status change    • Migraine     Complex    • Migraine headache    • Renal insufficiency    • SOB (shortness of breath)        Past Surgical History:   Past Surgical History:   Procedure Laterality Date   • CARDIAC CATHETERIZATION     • CARDIAC CATHETERIZATION N/A 10/14/2016    Procedure: Left Heart Cath;  Surgeon: Santiago  MD Pascual;  Location: [facility] INVASIVE LOCATION;  Service:    • COLONOSCOPY  02/25/2022    Dr. Graves-recommended 3 year repeat   • ENDOSCOPY  02/03/2022    Dr. Graves   • GASTRIC STIMULATOR IMPLANT SURGERY     • SMALL INTESTINE SURGERY      unspecified, x3 as an infant    • TESTICLE SURGERY         Family History:   Family History   Problem Relation Age of Onset   • Heart failure Mother    • Diabetes Mother    • Alzheimer's disease Mother    • Arthritis Mother    • Heart disease Mother    • Heart attack Father    • Kidney disease Father    • Cancer Father    • Prostate cancer Father    • Heart disease Father    • Hypertension Father    • Cancer Sister    • Breast cancer Sister    • Diabetes Sister    • Heart disease Sister    • Arrhythmia Sister    • Diabetes Brother    • Heart disease Brother    • Cancer Brother    • Liver disease Brother    • Kidney disease Brother    • Alzheimer's disease Maternal Grandmother    • Liver cancer Maternal Grandfather    • Liver disease Maternal Grandfather    • Other Paternal Grandmother         Malay flu   • Other Paternal Grandfather         Malay flu   • Colon cancer Neg Hx        Social History:   Social History     Socioeconomic History   • Marital status:    Tobacco Use   • Smoking status: Never     Passive exposure: Never   • Smokeless tobacco: Never   Vaping Use   • Vaping Use: Never used   Substance and Sexual Activity   • Alcohol use: Never   • Drug use: Never   • Sexual activity: Yes       Tobacco History:   Social History     Tobacco Use   Smoking Status Never   • Passive exposure: Never   Smokeless Tobacco Never       Medications:     Current Outpatient Medications:   •  atorvastatin (LIPITOR) 20 MG tablet, Take 1 tablet by mouth Daily., Disp: 90 tablet, Rfl: 3  •  pantoprazole (PROTONIX) 40 MG EC tablet, Take 1 tablet by mouth 2 (Two) Times a Day. 30 minutes prior to first meal of the day and 30 minutes prior to last meal of the day, Disp:  "180 tablet, Rfl: 3  •  Soliqua 100-33 UNT-MCG/ML solution pen-injector injection, Inject 20 Units under the skin into the appropriate area as directed Every Morning Before Breakfast. Should be taken within the hour prior to first meal of day, Disp: 9 mL, Rfl: 0  •  amLODIPine (NORVASC) 10 MG tablet, TAKE 1 TABLET DAILY, Disp: 90 tablet, Rfl: 3  •  butalbital-acetaminophen-caffeine (FIORICET, ESGIC) -40 MG per tablet, Take 1 tablet by mouth 1 (One) Time As Needed for Headache for up to 30 doses., Disp: 30 tablet, Rfl: 0  •  diphenoxylate-atropine (Lomotil) 2.5-0.025 MG per tablet, Take 1 tablet by mouth 4 (Four) Times a Day As Needed for Diarrhea., Disp: 120 tablet, Rfl: 2  •  divalproex (DEPAKOTE) 500 MG DR tablet, Take 1 tablet by mouth Daily for 7 days. Then stop., Disp: 7 tablet, Rfl: 0  •  Erenumab-aooe (AIMOVIG) 140 MG/ML auto-injector, Inject 1 mL under the skin into the appropriate area as directed Every 30 (Thirty) Days., Disp: 1 mL, Rfl: 11  •  gabapentin (NEURONTIN) 300 MG capsule, Take 1 capsule by mouth 3 (Three) Times a Day., Disp: 270 capsule, Rfl: 1  •  glipizide (GLUCOTROL) 10 MG tablet, Take 1 tablet by mouth 2 (Two) Times a Day Before Meals., Disp: 180 tablet, Rfl: 3  •  glucose blood test strip, 1 each by Other route Daily. Use daily as directed, Disp: 100 each, Rfl: 12  •  glucose monitor monitoring kit, 1 each Daily., Disp: 1 each, Rfl: 0  •  Incontinence Supply Disposable (NU-FIT ADULT UNDERWEAR MEDIUM) misc, , Disp: , Rfl:   •  Insulin Pen Needle 31G X 6 MM misc, 1 each Daily., Disp: 100 each, Rfl: 3  •  Insulin Syringe 31G X 5/16\" 0.3 ML misc, 1 each Every Night., Disp: 90 each, Rfl: 3  •  Lancets Thin misc, 1 each Daily. Use daily as directed, Disp: 100 each, Rfl: 11  •  lisinopril (PRINIVIL,ZESTRIL) 20 MG tablet, Take 1 tablet by mouth Daily., Disp: 90 tablet, Rfl: 3  •  Lutein 5 % beads, Take 5 mg by mouth Daily., Disp: , Rfl:   •  metFORMIN (GLUCOPHAGE) 1000 MG tablet, Take 1 tablet " by mouth 2 (Two) Times a Day With Meals., Disp: 180 tablet, Rfl: 3  •  Multiple Vitamins-Minerals (PRESERVISION AREDS 2 PO), Take  by mouth Daily., Disp: , Rfl:   •  NON FORMULARY, Shot in eye every 3 months for macular degeneration, Disp: , Rfl:   •  ondansetron (ZOFRAN) 8 MG tablet, TAKE 1 TABLET BY MOUTH EVERY 8 HOURS AS NEEDED FOR NAUSEA FOR VOMITING, Disp: 30 tablet, Rfl: 0  •  sildenafil (VIAGRA) 100 MG tablet, Take 1 tablet by mouth Daily As Needed for Erectile Dysfunction., Disp: 15 tablet, Rfl: 11  •  Specialty Vitamins Products (HEALTHY HEART COMPLEX PO), Take  by mouth Daily., Disp: , Rfl:   •  tamsulosin (FLOMAX) 0.4 MG capsule 24 hr capsule, Take 1 capsule by mouth Daily., Disp: 90 capsule, Rfl: 3  •  verapamil (CALAN) 40 MG tablet, Take 1 tablet by mouth Daily for 30 days., Disp: 30 tablet, Rfl: 0  •  vitamin B-12 (CYANOCOBALAMIN) 100 MCG tablet, Take 50 mcg by mouth 2 (two) times a day., Disp: , Rfl:   •  vitamin C (ASCORBIC ACID) 500 MG tablet, Take 1 tablet by mouth 2 (two) times a day., Disp: , Rfl:   •  vitamin E 400 UNIT capsule, Take 1 capsule by mouth Daily., Disp: , Rfl:   •  Zeaxanthin powder, Take 1 mg by mouth Daily., Disp: , Rfl:   •  Zinc 40 MG tablet, Take 1 tablet by mouth Daily., Disp: , Rfl:     Allergies:   Allergies   Allergen Reactions   • Actos [Pioglitazone] Anaphylaxis     congestive heart failure   • Avandia [Rosiglitazone] Anaphylaxis     congestive heart failure.   • Darvon [Propoxyphene] Itching     Angioedema    itching   • Talwin [Pentazocine] Rash     Angioedema    itching       Objective   Objective     Physical Exam:  Vital Signs: There were no vitals filed for this visit.  There is no height or weight on file to calculate BMI.     Physical Exam  Nursing note reviewed  Const: NAD, A&Ox4, Pleasant, Cooperative  Eyes: EOMI, no conjunctivitis  ENT: No nasal discharge present, neck supple  Cardiac: Regular rate and rhythm, no cyanosis  Resp: Respiratory rate within normal  limits, no increased work of breathing, no audible wheezing or retractions noted  GI: No distention or ascites  MSK: Motor and sensation grossly intact in bilateral upper extremities  Neurologic: CN II-XII grossly intact  Psych: Appropriate mood and behavior.  Skin: Warm, dry  Procedures/Radiology     Procedures  No radiology results for the last 7 days     Assessment & Plan   Assessment / Plan      Assessment/Plan:   Problems Addressed This Visit  Diagnoses and all orders for this visit:    1. Type 2 diabetes mellitus with hyperglycemia, without long-term current use of insulin (Primary)  Assessment & Plan:  Increase Soliqua to 20U, titrate based on AM FSBS    Orders:  -     Soliqua 100-33 UNT-MCG/ML solution pen-injector injection; Inject 20 Units under the skin into the appropriate area as directed Every Morning Before Breakfast. Should be taken within the hour prior to first meal of day  Dispense: 9 mL; Refill: 0    2. Dyslipidemia  -     atorvastatin (LIPITOR) 20 MG tablet; Take 1 tablet by mouth Daily.  Dispense: 90 tablet; Refill: 3    3. Nausea  -     pantoprazole (PROTONIX) 40 MG EC tablet; Take 1 tablet by mouth 2 (Two) Times a Day. 30 minutes prior to first meal of the day and 30 minutes prior to last meal of the day  Dispense: 180 tablet; Refill: 3    4. Incontinence of feces, unspecified fecal incontinence type  -     diphenoxylate-atropine (Lomotil) 2.5-0.025 MG per tablet; Take 1 tablet by mouth 4 (Four) Times a Day As Needed for Diarrhea.  Dispense: 120 tablet; Refill: 2    5. Memory impairment    6. Chronic diarrhea  -     diphenoxylate-atropine (Lomotil) 2.5-0.025 MG per tablet; Take 1 tablet by mouth 4 (Four) Times a Day As Needed for Diarrhea.  Dispense: 120 tablet; Refill: 2    Problem List Items Addressed This Visit        Cardiac and Vasculature    Dyslipidemia    Relevant Medications    atorvastatin (LIPITOR) 20 MG tablet       Endocrine and Metabolic    Diabetes mellitus, type 2 - Primary     Overview     On CGM he is having significant hyperglycemia in the middle of the night with precipitous drops (320 --> 60-70 within about 30 minutes). These correlate with times when he is getting sick at night.         Current Assessment & Plan     Increase Soliqua to 20U, titrate based on AM FSBS         Relevant Medications    Soliqua 100-33 UNT-MCG/ML solution pen-injector injection       Gastrointestinal Abdominal     Bowel incontinence    Relevant Medications    diphenoxylate-atropine (Lomotil) 2.5-0.025 MG per tablet    Nausea    Relevant Medications    pantoprazole (PROTONIX) 40 MG EC tablet   Other Visit Diagnoses     Memory impairment        Chronic diarrhea        Relevant Medications    diphenoxylate-atropine (Lomotil) 2.5-0.025 MG per tablet            Patient Instructions   Change insulin doses every 3 days based on the following:     If before breakfast blood sugar readings are consistently higher than 150 for 3-4 days, raise insulin dose by 2 units.    Consider guanfacine 1mg for the brain fog/memory impairment after COVID.       Follow Up:   Return in about 3 months (around 8/23/2023) for with A1c;.    DO BAILEY Jones RD  [facility]  [address]  [facility] [address]  Fax [phone]  Phone [phone]

## 2023-05-23 NOTE — PATIENT INSTRUCTIONS
Change insulin doses every 3 days based on the following:     If before breakfast blood sugar readings are consistently higher than 150 for 3-4 days, raise insulin dose by 2 units.    Consider guanfacine 1mg for the brain fog/memory impairment after COVID.

## 2023-05-30 RX ORDER — VERAPAMIL HYDROCHLORIDE 40 MG/1
40 TABLET ORAL DAILY
Qty: 60 TABLET | Refills: 1 | Status: SHIPPED | OUTPATIENT
Start: 2023-05-30 | End: 2023-06-05 | Stop reason: SDUPTHER

## 2023-05-30 RX ORDER — VERAPAMIL HYDROCHLORIDE 40 MG/1
40 TABLET ORAL DAILY
Qty: 30 TABLET | Refills: 0 | Status: SHIPPED | OUTPATIENT
Start: 2023-05-30 | End: 2023-05-30

## 2023-05-30 NOTE — TELEPHONE ENCOUNTER
Provider: DR ARAIZA  Caller: ROHITH   Relationship to Patient: PATIENT   Phone Number: [phone]  Reason for Call: PATIENT CALLED STATES THAT HIS BLOOD PRESSURE IS RUNNING EXTREMELY HIGH AND HE WENT TO THE URGENT CARE AND THEY RECOMMENDED PATIENT GO TO THE ED. PATIENT REFUSED AND STATED THAT HE WANTED TO SPEAK WITH HIS NEUROLOGIST FIRST. PLEASE CALL PATIENT AND ADVISE.     THANK YOU

## 2023-05-30 NOTE — TELEPHONE ENCOUNTER
Caller: Israel Reyna    Relationship: Self  Best call back number: [phone]  Requested Prescriptions:   Requested Prescriptions     Pending Prescriptions Disp Refills   • verapamil (CALAN) 40 MG tablet 30 tablet 0     Sig: Take 1 tablet by mouth Daily for 30 days.        Pharmacy where request should be sent: [facility] [address] [place] [address] [phone] [facility] [phone] FX     Last office visit with prescribing clinician: 5/2/2023   Last telemedicine visit with prescribing clinician: Visit date not found   Next office visit with prescribing clinician: 7/7/2023     Additional details provided by patient: PATIENT STATES THAT HE DOESN'T FEEL THIS MEDICATION IS CONTROLLING HIS B/P AS WELL AS HIS PREVIOUS MEDICATION BEFORE BEGINNING THIS ONE     Does the patient have less than a 3 day supply:  [x] Yes  [] No    Would you like a call back once the refill request has been completed: [] Yes [x] No    If the office needs to give you a call back, can they leave a voicemail: [] Yes [x] No    Vicente Echols Rep   05/30/23 10:19 EDT

## 2023-05-31 DIAGNOSIS — G43.119 INTRACTABLE MIGRAINE WITH AURA WITHOUT STATUS MIGRAINOSUS: ICD-10-CM

## 2023-05-31 RX ORDER — BUTALBITAL, ACETAMINOPHEN AND CAFFEINE 50; 325; 40 MG/1; MG/1; MG/1
TABLET ORAL
Qty: 30 TABLET | Refills: 0 | Status: SHIPPED | OUTPATIENT
Start: 2023-05-31

## 2023-05-31 NOTE — TELEPHONE ENCOUNTER
Rx Refill Note  Requested Prescriptions     Pending Prescriptions Disp Refills   • butalbital-acetaminophen-caffeine (FIORICET, ESGIC) -40 MG per tablet [Pharmacy Med Name: Butalbital-APAP-Caffeine -40 MG Oral Tablet] 30 tablet 0     Sig: TAKE ONE TABLET BY MOUTH ONE TIME AS NEEDED FOR HEADACHE FOR UP TO 30 DOSES      Last filled:4/12/23. Pending to provider  Last office visit with prescribing clinician: 5/2/2023      Next office visit with prescribing clinician: 7/7/2023     Prem Bernard MA  05/31/23, 14:11 EDT

## 2023-06-05 ENCOUNTER — PATIENT MESSAGE (OUTPATIENT)
Dept: FAMILY MEDICINE CLINIC | Facility: CLINIC | Age: 68
End: 2023-06-05

## 2023-06-05 ENCOUNTER — OFFICE VISIT (OUTPATIENT)
Dept: FAMILY MEDICINE CLINIC | Facility: CLINIC | Age: 68
End: 2023-06-05
Payer: MEDICARE

## 2023-06-05 ENCOUNTER — DOCUMENTATION (OUTPATIENT)
Dept: NEUROLOGY | Facility: CLINIC | Age: 68
End: 2023-06-05
Payer: MEDICARE

## 2023-06-05 VITALS
BODY MASS INDEX: 27.47 KG/M2 | SYSTOLIC BLOOD PRESSURE: 122 MMHG | HEIGHT: 67 IN | DIASTOLIC BLOOD PRESSURE: 70 MMHG | HEART RATE: 60 BPM | TEMPERATURE: 96.9 F | WEIGHT: 175 LBS

## 2023-06-05 DIAGNOSIS — I65.23 BILATERAL CAROTID ARTERY STENOSIS: ICD-10-CM

## 2023-06-05 DIAGNOSIS — R51.9 ACUTE INTRACTABLE HEADACHE, UNSPECIFIED HEADACHE TYPE: ICD-10-CM

## 2023-06-05 DIAGNOSIS — R41.3 MEMORY IMPAIRMENT: ICD-10-CM

## 2023-06-05 DIAGNOSIS — R41.3 MEMORY IMPAIRMENT: Primary | ICD-10-CM

## 2023-06-05 DIAGNOSIS — R51.9 ACUTE INTRACTABLE HEADACHE, UNSPECIFIED HEADACHE TYPE: Primary | ICD-10-CM

## 2023-06-05 DIAGNOSIS — E11.65 TYPE 2 DIABETES MELLITUS WITH HYPERGLYCEMIA, WITHOUT LONG-TERM CURRENT USE OF INSULIN: ICD-10-CM

## 2023-06-05 PROCEDURE — 99214 OFFICE O/P EST MOD 30 MIN: CPT | Performed by: FAMILY MEDICINE

## 2023-06-05 PROCEDURE — 3052F HG A1C>EQUAL 8.0%<EQUAL 9.0%: CPT | Performed by: FAMILY MEDICINE

## 2023-06-05 PROCEDURE — 3078F DIAST BP <80 MM HG: CPT | Performed by: FAMILY MEDICINE

## 2023-06-05 PROCEDURE — 3074F SYST BP LT 130 MM HG: CPT | Performed by: FAMILY MEDICINE

## 2023-06-05 RX ORDER — VERAPAMIL HYDROCHLORIDE 40 MG/1
40 TABLET ORAL 2 TIMES DAILY
Qty: 60 TABLET | Refills: 1 | Status: SHIPPED | OUTPATIENT
Start: 2023-06-05 | End: 2023-07-05

## 2023-06-05 RX ORDER — LISINOPRIL 40 MG/1
40 TABLET ORAL DAILY
Qty: 90 TABLET | Refills: 0 | Status: SHIPPED | OUTPATIENT
Start: 2023-06-05

## 2023-06-05 NOTE — PATIENT INSTRUCTIONS
Change insulin doses every 7 days based on the following:     If before breakfast blood sugar readings are consistently higher than 150 for 3-4 days, raise Soliqua dose by 2 units.     2. Increase lisinopril from 20mg to 40mg (still once per day), continue verapamil 40mg twice daily    3. I will order CT scan of your head with contrast, ok to follow up with Dr. Crain on results

## 2023-06-05 NOTE — PROGRESS NOTES
Established Patient Office Visit      Patient Name: Israel Reyna  : 1955   MRN: [id number]   Care Team: Patient Care Team:  Elio Moore DO as PCP - General (Family Medicine)    Chief Complaint:    Chief Complaint   Patient presents with    Shoulder Pain    Neck Pain    Headache     Pt co neck, shoulder, and L arm pain       History of Present Illness: Israel Reyna is a 68 y.o. male who is here today for chief complaint.    HPI    Israel presents today for urgent care follow-up after being seen last week for elevated BP, headache neck shoulder and left arm pain.  He does suffer from chronic migraines and follows with Dr. Crain in neurology.  His next appointment there is not until .    He reports that at home his BP was getting up to 217/121 and staying high, so when his headache got some worse he went to [facility] for evaluation. 150/90 on his home monitor shortly before going in, 138/80. Of note he was changed from amlodipine to verapamil, and then increased to 40mg BID last week.    Sugars have been in the high 180s for the most part despite starting on Soliqua.  He has been counseled previously on intermittent titration based on fasting blood sugars, but has not adjusted his home insulin regimen at home.  He is at 20 units currently.  His blood sugar this morning was 109, advised him that if he stays higher than 150 for 3 consecutive days he should increase by 2 units.  This was placed on his after visit summary as well.    This patient is accompanied by their self who contributes to the history of their care.    The following portions of the patient's history were reviewed and updated as appropriate: allergies, current medications, past family history, past medical history, past social history, past surgical history and problem list.    Subjective      Review of Systems:   Review of Systems - See HPI    Past Medical History:   Past Medical History:   Diagnosis Date     Allergic     Anxiety     Bowel incontinence     w/ history of Stimulator Device     CAD (coronary artery disease)     Cataract     Chest pain     CHF (congestive heart failure)     Colon polyp     Depression     Diabetes mellitus, type 2     Diabetic peripheral neuropathy     Dyslipidemia     Fatigue     GERD (gastroesophageal reflux disease)     Hypertension     Kidney stone     Mental status change     Migraine     Complex     Migraine headache     Renal insufficiency     SOB (shortness of breath)        Past Surgical History:   Past Surgical History:   Procedure Laterality Date    CARDIAC CATHETERIZATION      CARDIAC CATHETERIZATION N/A 10/14/2016    Procedure: Left Heart Cath;  Surgeon: Santiago Garner MD;  Location:  [facility];  Service:     COLONOSCOPY  02/25/2022    Dr. Graves-recommended 3 year repeat    ENDOSCOPY  02/03/2022    Dr. Graves    GASTRIC STIMULATOR IMPLANT SURGERY      SMALL INTESTINE SURGERY      unspecified, x3 as an infant     TESTICLE SURGERY         Family History:   Family History   Problem Relation Age of Onset    Heart failure Mother     Diabetes Mother     Alzheimer's disease Mother     Arthritis Mother     Heart disease Mother     Heart attack Father     Kidney disease Father     Cancer Father     Prostate cancer Father     Heart disease Father     Hypertension Father     Cancer Sister     Breast cancer Sister     Diabetes Sister     Heart disease Sister     Arrhythmia Sister     Diabetes Brother     Heart disease Brother     Cancer Brother     Liver disease Brother     Kidney disease Brother     Alzheimer's disease Maternal Grandmother     Liver cancer Maternal Grandfather     Liver disease Maternal Grandfather     Other Paternal Grandmother         Cub[place] flu    Other Paternal Grandfather         [place]ub[place] flu    Colon cancer Neg Hx        Social History:   Social History     Socioeconomic History    Marital status:    Tobacco Use    Smoking status:  "Never     Passive exposure: Never    Smokeless tobacco: Never   Vaping Use    Vaping Use: Never used   Substance and Sexual Activity    Alcohol use: Never    Drug use: Never    Sexual activity: Yes       Tobacco History:   Social History     Tobacco Use   Smoking Status Never    Passive exposure: Never   Smokeless Tobacco Never       Medications:     Current Outpatient Medications:     atorvastatin (LIPITOR) 20 MG tablet, Take 1 tablet by mouth Daily., Disp: 90 tablet, Rfl: 3    butalbital-acetaminophen-caffeine (FIORICET, ESGIC) -40 MG per tablet, TAKE ONE TABLET BY MOUTH ONE TIME AS NEEDED FOR HEADACHE FOR UP TO 30 DOSES, Disp: 30 tablet, Rfl: 0    diphenoxylate-atropine (Lomotil) 2.5-0.025 MG per tablet, Take 1 tablet by mouth 4 (Four) Times a Day As Needed for Diarrhea., Disp: 120 tablet, Rfl: 2    Erenumab-aooe (AIMOVIG) 140 MG/ML auto-injector, Inject 1 mL under the skin into the appropriate area as directed Every 30 (Thirty) Days., Disp: 1 mL, Rfl: 11    gabapentin (NEURONTIN) 300 MG capsule, Take 1 capsule by mouth 3 (Three) Times a Day., Disp: 270 capsule, Rfl: 1    glipizide (GLUCOTROL) 10 MG tablet, Take 1 tablet by mouth 2 (Two) Times a Day Before Meals., Disp: 180 tablet, Rfl: 3    glucose blood test strip, 1 each by Other route Daily. Use daily as directed, Disp: 100 each, Rfl: 12    glucose monitor monitoring kit, 1 each Daily., Disp: 1 each, Rfl: 0    Incontinence Supply Disposable (NU-FIT ADULT UNDERWEAR MEDIUM) misc, , Disp: , Rfl:     Insulin Pen Needle 31G X 6 MM misc, 1 each Daily., Disp: 100 each, Rfl: 3    Insulin Syringe 31G X 5/16\" 0.3 ML misc, 1 each Every Night., Disp: 90 each, Rfl: 3    Lancets Thin misc, 1 each Daily. Use daily as directed, Disp: 100 each, Rfl: 11    lisinopril (PRINIVIL,ZESTRIL) 40 MG tablet, Take 1 tablet by mouth Daily., Disp: 90 tablet, Rfl: 0    Lutein 5 % beads, Take 5 mg by mouth Daily., Disp: , Rfl:     metFORMIN (GLUCOPHAGE) 1000 MG tablet, Take 1 tablet " by mouth 2 (Two) Times a Day With Meals., Disp: 180 tablet, Rfl: 3    Multiple Vitamins-Minerals (PRESERVISION AREDS 2 PO), Take  by mouth Daily., Disp: , Rfl:     NON FORMULARY, Shot in eye every 3 months for macular degeneration, Disp: , Rfl:     ondansetron (ZOFRAN) 8 MG tablet, TAKE 1 TABLET BY MOUTH EVERY 8 HOURS AS NEEDED FOR NAUSEA FOR VOMITING, Disp: 30 tablet, Rfl: 0    pantoprazole (PROTONIX) 40 MG EC tablet, Take 1 tablet by mouth 2 (Two) Times a Day. 30 minutes prior to first meal of the day and 30 minutes prior to last meal of the day, Disp: 180 tablet, Rfl: 3    sildenafil (VIAGRA) 100 MG tablet, Take 1 tablet by mouth Daily As Needed for Erectile Dysfunction., Disp: 15 tablet, Rfl: 11    Soliqua 100-33 UNT-MCG/ML solution pen-injector injection, Inject 20 Units under the skin into the appropriate area as directed Every Morning Before Breakfast. Should be taken within the hour prior to first meal of day, Disp: 9 mL, Rfl: 0    Specialty Vitamins Products (HEALTHY HEART COMPLEX PO), Take  by mouth Daily., Disp: , Rfl:     tamsulosin (FLOMAX) 0.4 MG capsule 24 hr capsule, Take 1 capsule by mouth Daily., Disp: 90 capsule, Rfl: 3    verapamil (CALAN) 40 MG tablet, Take 1 tablet by mouth Daily for 30 days., Disp: 60 tablet, Rfl: 1    vitamin B-12 (CYANOCOBALAMIN) 100 MCG tablet, Take 50 mcg by mouth 2 (two) times a day., Disp: , Rfl:     vitamin C (ASCORBIC ACID) 500 MG tablet, Take 1 tablet by mouth 2 (two) times a day., Disp: , Rfl:     vitamin E 400 UNIT capsule, Take 1 capsule by mouth Daily., Disp: , Rfl:     Zeaxanthin powder, Take 1 mg by mouth Daily., Disp: , Rfl:     Zinc 40 MG tablet, Take 1 tablet by mouth Daily., Disp: , Rfl:     Allergies:   Allergies   Allergen Reactions    Actos [Pioglitazone] Anaphylaxis     congestive heart failure    Avandia [Rosiglitazone] Anaphylaxis     congestive heart failure.    Darvon [Propoxyphene] Itching     Angioedema    itching    Talwin [Pentazocine] Rash      "Angioedema    itching       Objective   Objective     Physical Exam:  Vital Signs:   Vitals:    06/05/23 1011   BP: 122/70   BP Location: Left arm   Patient Position: Sitting   Cuff Size: Adult   Pulse: 60   Temp: 96.9 °F (36.1 °C)   TempSrc: Infrared   Weight: 79.4 kg (175 lb)   Height: 170.2 cm (67\")     Body mass index is 27.41 kg/m².     Physical Exam  Nursing note reviewed  Const: NAD, A&Ox4, Pleasant, Cooperative  Eyes: EOMI, no conjunctivitis  ENT: No nasal discharge present, neck supple  Cardiac: Regular rate and rhythm, no cyanosis  Resp: Respiratory rate within normal limits, no increased work of breathing, no audible wheezing or retractions noted  GI: No distention or ascites  MSK: Motor and sensation grossly intact in bilateral upper extremities  Neurologic: CN II-XII grossly intact  Psych: Appropriate mood and behavior.  Skin: Warm, dry  Procedures/Radiology     Procedures  No radiology results for the last 7 days     Assessment & Plan   Assessment / Plan      Assessment/Plan:   Problems Addressed This Visit  Diagnoses and all orders for this visit:    1. Acute intractable headache, unspecified headache type (Primary)  -     Cancel: CT Head With Contrast; Future  -     CT Head With Contrast; Future    2. Type 2 diabetes mellitus with hyperglycemia, without long-term current use of insulin  -     lisinopril (PRINIVIL,ZESTRIL) 40 MG tablet; Take 1 tablet by mouth Daily.  Dispense: 90 tablet; Refill: 0    3. Memory impairment  -     Cancel: CT Head With Contrast; Future  -     CT Head With Contrast; Future      Problem List Items Addressed This Visit          Endocrine and Metabolic    Diabetes mellitus, type 2    Overview     On CGM he is having significant hyperglycemia in the middle of the night with precipitous drops (320 --> 60-70 within about 30 minutes). These correlate with times when he is getting sick at night.         Relevant Medications    lisinopril (PRINIVIL,ZESTRIL) 40 MG tablet     Other " Visit Diagnoses       Acute intractable headache, unspecified headache type    -  Primary    Relevant Orders    CT Head With Contrast    Memory impairment        Relevant Orders    CT Head With Contrast            Patient Instructions   Change insulin doses every 7 days based on the following:     If before breakfast blood sugar readings are consistently higher than 150 for 3-4 days, raise Soliqua dose by 2 units.     2. Increase lisinopril from 20mg to 40mg (still once per day), continue verapamil 40mg twice daily    3. I will order CT scan of your head with contrast, ok to follow up with Dr. Crain on results    Follow Up:   Return in about 5 weeks (around 7/10/2023).        DO BAILEY Jones RD  [facility]  [address]  [facility] [address]  Fax [phone]  Phone [phone]

## 2023-06-06 ENCOUNTER — HOSPITAL ENCOUNTER (OUTPATIENT)
Dept: CT IMAGING | Facility: HOSPITAL | Age: 68
Discharge: HOME | End: 2023-06-06
Admitting: FAMILY MEDICINE
Payer: MEDICARE

## 2023-06-06 DIAGNOSIS — I65.23 BILATERAL CAROTID ARTERY STENOSIS: ICD-10-CM

## 2023-06-06 DIAGNOSIS — R51.9 ACUTE INTRACTABLE HEADACHE, UNSPECIFIED HEADACHE TYPE: ICD-10-CM

## 2023-06-06 DIAGNOSIS — R41.3 MEMORY IMPAIRMENT: ICD-10-CM

## 2023-06-06 LAB — CREAT BLDA-MCNC: 1.2 MG/DL (ref 0.6–1.3)

## 2023-06-06 PROCEDURE — 70496 CT ANGIOGRAPHY HEAD: CPT

## 2023-06-06 PROCEDURE — 25510000001 IOPAMIDOL 61 % SOLUTION: Performed by: FAMILY MEDICINE

## 2023-06-06 PROCEDURE — 70498 CT ANGIOGRAPHY NECK: CPT

## 2023-06-06 PROCEDURE — 82565 ASSAY OF CREATININE: CPT

## 2023-06-06 RX ADMIN — IOPAMIDOL 75 ML: 612 INJECTION, SOLUTION INTRAVENOUS at 14:59

## 2023-06-07 ENCOUNTER — TELEPHONE (OUTPATIENT)
Dept: NEUROLOGY | Facility: CLINIC | Age: 68
End: 2023-06-07
Payer: MEDICARE

## 2023-06-07 DIAGNOSIS — M47.812 SPONDYLOSIS OF CERVICAL REGION WITHOUT MYELOPATHY OR RADICULOPATHY: ICD-10-CM

## 2023-06-07 DIAGNOSIS — R51.9 FACIAL PAIN: ICD-10-CM

## 2023-06-07 DIAGNOSIS — M54.2 NECK PAIN: ICD-10-CM

## 2023-06-07 DIAGNOSIS — E11.42 DIABETIC POLYNEUROPATHY ASSOCIATED WITH TYPE 2 DIABETES MELLITUS: ICD-10-CM

## 2023-06-07 DIAGNOSIS — G43.119 INTRACTABLE MIGRAINE WITH AURA WITHOUT STATUS MIGRAINOSUS: ICD-10-CM

## 2023-06-07 NOTE — TELEPHONE ENCOUNTER
Caller: Israel Reyna    Relationship: Self    Best call back number: [phone]    Who are you requesting to speak with (clinical staff, provider,  specific staff member):   LIZBETH    What was the call regarding: PLEASE CALL PT BACK TO DISCUSS PT ASSISTANCE PROGRAMS.

## 2023-06-07 NOTE — TELEPHONE ENCOUNTER
Rx Refill Note  Requested Prescriptions     Pending Prescriptions Disp Refills    gabapentin (NEURONTIN) 300 MG capsule 270 capsule 1     Sig: Take 1 capsule by mouth 3 (Three) Times a Day.      Last filled:08/26/22 for 90 day supply. With 1 refill  Last office visit with prescribing clinician: 5/2/2023      Next office visit with prescribing clinician: 6/7/2023     Prem Bernard MA  06/07/23, 16:08 EDT

## 2023-06-07 NOTE — TELEPHONE ENCOUNTER
Caller: Israel Reyna    Relationship: Self    Best call back number: [phone]    Requested Prescriptions:   Requested Prescriptions     Pending Prescriptions Disp Refills    gabapentin (NEURONTIN) 300 MG capsule 270 capsule 1     Sig: Take 1 capsule by mouth 3 (Three) Times a Day.        Pharmacy where request should be sent: [facility] [place] [facility] - [address] [facility] AT [address] TO [facility] - [phone]  - [phone] FX     Last office visit with prescribing clinician: 5/2/2023   Last telemedicine visit with prescribing clinician: Visit date not found   Next office visit with prescribing clinician: 7/7/2023     Additional details provided by patient:     Does the patient have less than a 3 day supply:  [] Yes  [x] No    Would you like a call back once the refill request has been completed: [] Yes [] No    If the office needs to give you a call back, can they leave a voicemail: [] Yes [] No    Vicente Rangel   06/07/23 12:23 EDT

## 2023-06-08 RX ORDER — GABAPENTIN 300 MG/1
300 CAPSULE ORAL 3 TIMES DAILY
Qty: 270 CAPSULE | Refills: 1 | Status: SHIPPED | OUTPATIENT
Start: 2023-06-08

## 2023-06-08 NOTE — TELEPHONE ENCOUNTER
Spoke to Israel and offered assistance regarding patient assistance forms. Patient believes he has this filled out correctly and will get these sent off soon.

## 2023-07-12 ENCOUNTER — OFFICE VISIT (OUTPATIENT)
Dept: FAMILY MEDICINE CLINIC | Facility: CLINIC | Age: 68
End: 2023-07-12
Payer: MEDICARE

## 2023-07-12 VITALS
WEIGHT: 175.8 LBS | SYSTOLIC BLOOD PRESSURE: 118 MMHG | HEIGHT: 67 IN | BODY MASS INDEX: 27.59 KG/M2 | TEMPERATURE: 97.5 F | DIASTOLIC BLOOD PRESSURE: 78 MMHG

## 2023-07-12 DIAGNOSIS — H00.025 HORDEOLUM INTERNUM OF LEFT LOWER EYELID: ICD-10-CM

## 2023-07-12 DIAGNOSIS — E11.65 TYPE 2 DIABETES MELLITUS WITH HYPERGLYCEMIA, WITHOUT LONG-TERM CURRENT USE OF INSULIN: Primary | ICD-10-CM

## 2023-07-12 LAB
EXPIRATION DATE: NORMAL
GLUCOSE BLDC GLUCOMTR-MCNC: 198 MG/DL (ref 70–130)
HBA1C MFR BLD: 8.4 %
Lab: NORMAL

## 2023-07-12 RX ORDER — LEVOFLOXACIN 5 MG/ML
1 SOLUTION/ DROPS TOPICAL EVERY 6 HOURS
Qty: 5 ML | Refills: 0 | Status: SHIPPED | OUTPATIENT
Start: 2023-07-12

## 2023-07-12 RX ORDER — DULAGLUTIDE 1.5 MG/.5ML
1.5 INJECTION, SOLUTION SUBCUTANEOUS WEEKLY
Qty: 2 ML | Refills: 2 | Status: SHIPPED | OUTPATIENT
Start: 2023-07-12

## 2023-07-12 NOTE — PROGRESS NOTES
Established Patient Office Visit      Patient Name: Israel Reyna  : 1955   MRN: [id number]   Care Team: Patient Care Team:  Elio Moore DO as PCP - General (Family Medicine)    Chief Complaint:    Chief Complaint   Patient presents with    Headache    Dizziness     5 week follow-up re dizziness, headache       History of Present Illness: Israel Reyna is a 68 y.o. male who is here today for chief complaint.    HPI    At his last visit we discussed titration of his Soliqua, counseled to raise it by 2 units every 3 days if a.m. blood sugars remain higher than 150.  He also increased his lisinopril from 20 to 40 mg/day, kept his verapamil at 40 mg twice per day.    Soliqua has been more cost prohibitive. Stopped at 22U every morning, did not continue titrating up. AM FSBS still well over 150. 198 this AM.    Gabapentin at 6 total per day    This patient is accompanied by their wife who contributes to the history of their care.    The following portions of the patient's history were reviewed and updated as appropriate: allergies, current medications, past family history, past medical history, past social history, past surgical history and problem list.    Subjective      Review of Systems:   Review of Systems - See HPI    Past Medical History:   Past Medical History:   Diagnosis Date    Allergic     Anxiety     Bowel incontinence     w/ history of Stimulator Device     CAD (coronary artery disease)     Cataract     Chest pain     CHF (congestive heart failure)     Colon polyp     Depression     Diabetes mellitus, type 2     Diabetic peripheral neuropathy     Dyslipidemia     Fatigue     GERD (gastroesophageal reflux disease)     Hypertension     Kidney stone     Mental status change     Migraine     Complex     Migraine headache     Renal insufficiency     SOB (shortness of breath)        Past Surgical History:   Past Surgical History:   Procedure Laterality Date    CARDIAC  CATHETERIZATION      CARDIAC CATHETERIZATION N/A 10/14/2016    Procedure: Left Heart Cath;  Surgeon: Santiago Garner MD;  Location:  [facility];  Service:     COLONOSCOPY  02/25/2022    Dr. Graves-recommended 3 year repeat    ENDOSCOPY  02/03/2022    Dr. Graves    GASTRIC STIMULATOR IMPLANT SURGERY      SMALL INTESTINE SURGERY      unspecified, x3 as an infant     TESTICLE SURGERY         Family History:   Family History   Problem Relation Age of Onset    Heart failure Mother     Diabetes Mother     Alzheimer's disease Mother     Arthritis Mother     Heart disease Mother     Heart attack Father     Kidney disease Father     Cancer Father     Prostate cancer Father     Heart disease Father     Hypertension Father     Cancer Sister     Breast cancer Sister     Diabetes Sister     Heart disease Sister     Arrhythmia Sister     Diabetes Brother     Heart disease Brother     Cancer Brother     Liver disease Brother     Kidney disease Brother     Alzheimer's disease Maternal Grandmother     Liver cancer Maternal Grandfather     Liver disease Maternal Grandfather     Other Paternal Grandmother         Mongolian flu    Other Paternal Grandfather         Mongolian flu    Colon cancer Neg Hx        Social History:   Social History     Socioeconomic History    Marital status:    Tobacco Use    Smoking status: Never     Passive exposure: Never    Smokeless tobacco: Never   Vaping Use    Vaping Use: Never used   Substance and Sexual Activity    Alcohol use: Never    Drug use: Never    Sexual activity: Yes       Tobacco History:   Social History     Tobacco Use   Smoking Status Never    Passive exposure: Never   Smokeless Tobacco Never       Medications:     Current Outpatient Medications:     atorvastatin (LIPITOR) 20 MG tablet, Take 1 tablet by mouth Daily., Disp: 90 tablet, Rfl: 3    diphenoxylate-atropine (Lomotil) 2.5-0.025 MG per tablet, Take 1 tablet by mouth 4 (Four) Times a Day As Needed  "for Diarrhea., Disp: 120 tablet, Rfl: 2    Erenumab-aooe (AIMOVIG) 140 MG/ML auto-injector, Inject 1 mL under the skin into the appropriate area as directed Every 30 (Thirty) Days., Disp: 1 mL, Rfl: 11    gabapentin (NEURONTIN) 300 MG capsule, Take 2 capsules by mouth 3 (Three) Times a Day for 30 days., Disp: 180 capsule, Rfl: 3    glipizide (GLUCOTROL) 10 MG tablet, Take 1 tablet by mouth 2 (Two) Times a Day Before Meals., Disp: 180 tablet, Rfl: 3    glucose blood test strip, 1 each by Other route Daily. Use daily as directed, Disp: 100 each, Rfl: 12    glucose monitor monitoring kit, 1 each Daily., Disp: 1 each, Rfl: 0    Incontinence Supply Disposable (NU-FIT ADULT UNDERWEAR MEDIUM) misc, , Disp: , Rfl:     Insulin Pen Needle 31G X 6 MM misc, 1 each Daily., Disp: 100 each, Rfl: 3    Insulin Syringe 31G X 5/16\" 0.3 ML misc, 1 each Every Night., Disp: 90 each, Rfl: 3    Lancets Thin misc, 1 each Daily. Use daily as directed, Disp: 100 each, Rfl: 11    Lutein 5 % beads, Take 5 mg by mouth Daily., Disp: , Rfl:     metFORMIN (GLUCOPHAGE) 1000 MG tablet, Take 1 tablet by mouth 2 (Two) Times a Day With Meals., Disp: 180 tablet, Rfl: 3    Multiple Vitamins-Minerals (PRESERVISION AREDS 2 PO), Take  by mouth Daily., Disp: , Rfl:     NON FORMULARY, Shot in eye every 3 months for macular degeneration, Disp: , Rfl:     ondansetron (ZOFRAN) 8 MG tablet, TAKE 1 TABLET BY MOUTH EVERY 8 HOURS AS NEEDED FOR NAUSEA FOR VOMITING, Disp: 30 tablet, Rfl: 0    pantoprazole (PROTONIX) 40 MG EC tablet, Take 1 tablet by mouth 2 (Two) Times a Day. 30 minutes prior to first meal of the day and 30 minutes prior to last meal of the day, Disp: 180 tablet, Rfl: 3    sildenafil (VIAGRA) 100 MG tablet, Take 1 tablet by mouth Daily As Needed for Erectile Dysfunction., Disp: 15 tablet, Rfl: 11    Specialty Vitamins Products (HEALTHY HEART COMPLEX PO), Take  by mouth Daily., Disp: , Rfl:     tamsulosin (FLOMAX) 0.4 MG capsule 24 hr capsule, Take 1 " "capsule by mouth Daily., Disp: 90 capsule, Rfl: 3    vitamin B-12 (CYANOCOBALAMIN) 100 MCG tablet, Take 50 mcg by mouth 2 (two) times a day., Disp: , Rfl:     vitamin C (ASCORBIC ACID) 500 MG tablet, Take 1 tablet by mouth 2 (two) times a day., Disp: , Rfl:     vitamin E 400 UNIT capsule, Take 1 capsule by mouth Daily., Disp: , Rfl:     Zeaxanthin powder, Take 1 mg by mouth Daily., Disp: , Rfl:     Zinc 40 MG tablet, Take 1 tablet by mouth Daily., Disp: , Rfl:     butalbital-acetaminophen-caffeine (FIORICET, ESGIC) -40 MG per tablet, TAKE ONE TABLET BY MOUTH ONE TIME AS NEEDED FOR HEADACHE FOR UP TO 30 DOSES, Disp: 30 tablet, Rfl: 0    cephalexin (Keflex) 500 MG capsule, Take 1 capsule by mouth 2 (Two) Times a Day for 7 days., Disp: 14 capsule, Rfl: 0    Dulaglutide (Trulicity) 1.5 MG/0.5ML solution pen-injector, Inject 1.5 mg under the skin into the appropriate area as directed 1 (One) Time Per Week., Disp: 2 mL, Rfl: 2    levoFLOXacin (QUIXIN) 0.5 % ophthalmic solution, Administer 1 drop into the left eye Every 6 (Six) Hours., Disp: 5 mL, Rfl: 0    lisinopril (PRINIVIL,ZESTRIL) 40 MG tablet, Take 1 tablet by mouth 2 (Two) Times a Day., Disp: 180 tablet, Rfl: 0    verapamil (CALAN) 40 MG tablet, Take 1 tablet by mouth 2 (Two) Times a Day for 30 days., Disp: 60 tablet, Rfl: 1    Allergies:   Allergies   Allergen Reactions    Actos [Pioglitazone] Anaphylaxis     congestive heart failure    Avandia [Rosiglitazone] Anaphylaxis     congestive heart failure.    Darvon [Propoxyphene] Itching     Angioedema    itching    Talwin [Pentazocine] Rash     Angioedema    itching       Objective   Objective     Physical Exam:  Vital Signs:   Vitals:    07/12/23 1013   BP: 118/78   BP Location: Left arm   Patient Position: Sitting   Cuff Size: Adult   Temp: 97.5 °F (36.4 °C)   TempSrc: Infrared   Weight: 79.7 kg (175 lb 12.8 oz)   Height: 170.2 cm (67\")     Body mass index is 27.53 kg/m².     Physical Exam  Nursing note " reviewed  Const: NAD, A&Ox4, Pleasant, Cooperative  Eyes: EOMI, no conjunctivitis  ENT: No nasal discharge present, neck supple  Cardiac: Regular rate and rhythm, no cyanosis  Resp: Respiratory rate within normal limits, no increased work of breathing, no audible wheezing or retractions noted  GI: No distention or ascites  MSK: Motor and sensation grossly intact in bilateral upper extremities  Neurologic: CN II-XII grossly intact  Psych: Appropriate mood and behavior.  Skin: Warm, dry  Procedures/Radiology     Procedures  No radiology results for the last 7 days     Assessment & Plan   Assessment / Plan      Assessment/Plan:   Problems Addressed This Visit  Diagnoses and all orders for this visit:    1. Type 2 diabetes mellitus with hyperglycemia, without long-term current use of insulin (Primary)  -     POC Glycosylated Hemoglobin (Hb A1C)  -     POCT Glucose  -     Dulaglutide (Trulicity) 1.5 MG/0.5ML solution pen-injector; Inject 1.5 mg under the skin into the appropriate area as directed 1 (One) Time Per Week.  Dispense: 2 mL; Refill: 2    2. Hordeolum internum of left lower eyelid  -     levoFLOXacin (QUIXIN) 0.5 % ophthalmic solution; Administer 1 drop into the left eye Every 6 (Six) Hours.  Dispense: 5 mL; Refill: 0      Problem List Items Addressed This Visit          Endocrine and Metabolic    Diabetes mellitus, type 2 - Primary    Overview     On CGM he is having significant hyperglycemia in the middle of the night with precipitous drops (320 --> 60-70 within about 30 minutes). These correlate with times when he is getting sick at night.         Relevant Medications    Dulaglutide (Trulicity) 1.5 MG/0.5ML solution pen-injector    Other Relevant Orders    POC Glycosylated Hemoglobin (Hb A1C) (Completed)    POCT Glucose (Completed)     Other Visit Diagnoses       Hordeolum internum of left lower eyelid        Relevant Medications    levoFLOXacin (QUIXIN) 0.5 % ophthalmic solution                Patient  Instructions   Return to Trulicity 1.5mg weekly    Follow Up:   Return in about 3 months (around 10/12/2023) for with A1c;.      DO BAILEY Jones RD  [facility]  [address]  [facility] [address]  Fax [phone]  Phone [phone]

## 2023-08-08 ENCOUNTER — TELEMEDICINE (OUTPATIENT)
Dept: FAMILY MEDICINE CLINIC | Facility: CLINIC | Age: 68
End: 2023-08-08
Payer: MEDICARE

## 2023-08-08 DIAGNOSIS — J06.9 ACUTE URI: Primary | ICD-10-CM

## 2023-08-08 DIAGNOSIS — K52.9 CHRONIC DIARRHEA: ICD-10-CM

## 2023-08-08 PROCEDURE — 99214 OFFICE O/P EST MOD 30 MIN: CPT | Performed by: FAMILY MEDICINE

## 2023-08-08 PROCEDURE — 3052F HG A1C>EQUAL 8.0%<EQUAL 9.0%: CPT | Performed by: FAMILY MEDICINE

## 2023-08-08 RX ORDER — BENZOCAINE AND MENTHOL, UNSPECIFIED FORM 15; 2.3 MG/1; MG/1
1 LOZENGE ORAL
Qty: 32 LOZENGE | Refills: 0 | Status: SHIPPED | OUTPATIENT
Start: 2023-08-08

## 2023-08-08 RX ORDER — PANCRELIPASE 36000; 180000; 114000 [USP'U]/1; [USP'U]/1; [USP'U]/1
36000 CAPSULE, DELAYED RELEASE PELLETS ORAL
Qty: 90 CAPSULE | Refills: 0 | COMMUNITY
Start: 2023-08-08

## 2023-08-08 RX ORDER — OLOPATADINE HYDROCHLORIDE 2 MG/ML
1 SOLUTION/ DROPS OPHTHALMIC DAILY
Qty: 2.5 ML | Refills: 5 | Status: SHIPPED | OUTPATIENT
Start: 2023-08-08

## 2023-08-08 NOTE — PATIENT INSTRUCTIONS
Concern for exocrine pancreatic insufficiency (in addition to gastroparesis and sphincter incontinence)

## 2023-08-08 NOTE — PROGRESS NOTES
Subjective   Israel Reyna is a 68 y.o. male.     Chief Complaint   Patient presents with    Sore Throat       History of Present Illness     Israel Reyna presents today for   Chief Complaint   Patient presents with    Sore Throat     Lost voice last week, eyes running, fever 99, sore throat.    You have chosen to receive care through a telehealth visit.  Do you consent to use a video/audio connection for your medical care today? Yes    [facility] [address] [facility] [address]   Provider Location: [facility] [place] [id number]    The following portions of the patient's history were reviewed and updated as appropriate: allergies, current medications, past family history, past medical history, past social history, past surgical history and problem list.    Active Ambulatory Problems     Diagnosis Date Noted    Bowel incontinence 09/26/2016    Classic migraine with aura 09/26/2016    Depression     Anxiety     Mental status change 09/26/2016    Disorientation 09/26/2016    Facial droop 09/26/2016    Difficulty walking 09/26/2016    Intractable migraine with aura without status migrainosus 10/06/2016    Chest pain 10/06/2016    CAD (coronary artery disease)     Hypertension     Dyslipidemia     Diabetes mellitus, type 2     GERD (gastroesophageal reflux disease)     CHF (congestive heart failure)     Chest pain     SOB (shortness of breath)     Fatigue     Chronic migraine 04/11/2017    Other cerebral infarction 07/18/2017    Transient cerebral ischemia 07/18/2017    Neck pain 10/17/2017    Spondylosis of cervical region without myelopathy or radiculopathy 10/17/2017    Facial pain 03/22/2018    Nausea 06/01/2022    Erectile disorder due to medical condition in male 06/01/2022    Nephrolithiasis 12/26/2022     Resolved Ambulatory Problems     Diagnosis Date Noted    Seizure disorder 09/26/2016    Migraine      Past Medical History:   Diagnosis Date    Allergic      Cataract     Colon polyp     Diabetic peripheral neuropathy     Kidney stone     Mental status change     Migraine headache     Renal insufficiency      Past Surgical History:   Procedure Laterality Date    CARDIAC CATHETERIZATION      CARDIAC CATHETERIZATION N/A 10/14/2016    Procedure: Left Heart Cath;  Surgeon: Santiago Garner MD;  Location: [facility] CATH INVASIVE LOCATION;  Service:     COLONOSCOPY  02/25/2022    Dr. Graves-recommended 3 year repeat    ENDOSCOPY  02/03/2022    Dr. Graves    GASTRIC STIMULATOR IMPLANT SURGERY      SMALL INTESTINE SURGERY      unspecified, x3 as an infant     TESTICLE SURGERY       Family History   Problem Relation Age of Onset    Heart failure Mother     Diabetes Mother     Alzheimer's disease Mother     Arthritis Mother     Heart disease Mother     Heart attack Father     Kidney disease Father     Cancer Father     Prostate cancer Father     Heart disease Father     Hypertension Father     Cancer Sister     Breast cancer Sister     Diabetes Sister     Heart disease Sister     Arrhythmia Sister     Diabetes Brother     Heart disease Brother     Cancer Brother     Liver disease Brother     Kidney disease Brother     Alzheimer's disease Maternal Grandmother     Liver cancer Maternal Grandfather     Liver disease Maternal Grandfather     Other Paternal Grandmother         French flu    Other Paternal Grandfather         French flu    Colon cancer Neg Hx      Social History     Socioeconomic History    Marital status:    Tobacco Use    Smoking status: Never     Passive exposure: Never    Smokeless tobacco: Never   Vaping Use    Vaping Use: Never used   Substance and Sexual Activity    Alcohol use: Never    Drug use: Never    Sexual activity: Yes       Review of Systems  Review of Systems -  General ROS: negative for - chills, fever or night sweats  Cardiovascular ROS: no chest pain or dyspnea on exertion  Gastrointestinal ROS: no abdominal pain, change in bowel  habits, or black or bloody stools  Genito-Urinary ROS: no dysuria, trouble voiding, or hematuria    Objective   There were no vitals taken for this visit.  Vitals obtained from patient if available  Physical Exam  Const: Non-toxic appearing, NAD, A&Ox4, Pleasant, Cooperative  Eyes: EOMI, no conjunctivitis  ENT: No copious nasal drainage noted  Cardiac: Regular rate by pulse  Resp: Respiratory rate observed to be within normal limits, no increased work of breathing observed, no audible wheezing or cough noted  Psych: Appropriate mood and behavior.  Procedures  Assessment & Plan   Problem List Items Addressed This Visit    None  Visit Diagnoses       Acute URI    -  Primary    Relevant Medications    Benzocaine-Menthol (Cepacol) 15-2.3 MG lozenge    olopatadine (PATADAY) 0.2 % solution ophthalmic solution    Chronic diarrhea        Relevant Medications    Creon [id number] units capsule delayed-release particles capsule            See patient diagnoses and orders along with patient instructions for assessment, plan, and changes to care for patient.    This visit was conducted via telemedicine with live video and audio provided through Video Options: MyChart/Zoom at the point of care.    Patient Instructions   Concern for exocrine pancreatic insufficiency (in addition to gastroparesis and sphincter incontinence)    No follow-ups on file.    Ambulatory progress note signed and attested to by Elio Moore D.O.

## 2023-08-10 RX ORDER — VERAPAMIL HYDROCHLORIDE 40 MG/1
TABLET ORAL
Qty: 60 TABLET | Refills: 0 | Status: SHIPPED | OUTPATIENT
Start: 2023-08-10

## 2023-08-10 NOTE — TELEPHONE ENCOUNTER
Rx Refill Note  Requested Prescriptions     Pending Prescriptions Disp Refills    verapamil (CALAN) 40 MG tablet [Pharmacy Med Name: Verapamil HCl 40 MG Oral Tablet] 60 tablet 0     Sig: Take 1 tablet by mouth twice daily      Last filled: 6/5/23 #60 w/ 1 RF  Last office visit with prescribing clinician: 7/7/2023      Next office visit with prescribing clinician: 11/7/2023     Hermilo Rod MA  08/10/23, 10:24 EDT

## 2023-08-16 ENCOUNTER — TELEPHONE (OUTPATIENT)
Dept: FAMILY MEDICINE CLINIC | Facility: CLINIC | Age: 68
End: 2023-08-16
Payer: MEDICARE

## 2023-08-16 NOTE — TELEPHONE ENCOUNTER
Caller: Israel Reyna    Relationship: Self    Best call back number: [phone]  Requested Prescriptions:   Requested Prescriptions      No prescriptions requested or ordered in this encounter        Pharmacy where request should be sent:      Last office visit with prescribing clinician: 7/12/2023   Last telemedicine visit with prescribing clinician: 8/8/2023   Next office visit with prescribing clinician: 9/22/2023     Additional details provided by patient: NEED THE SAMPLES THAT WERE GIVEN TO HIM FOR HIS STOMACH AT LAST TELEHEALTH VISIT. MEDS SEEM TO WORKING AND BEFORE DOING A RX HE WOULD LIKE TO DO MORE SAMPLES.    HE HAS NONE LEFT    Does the patient have less than a 3 day supply:  [x] Yes  [] No    Would you like a call back once the refill request has been completed: [x] Yes [] No    If the office needs to give you a call back, can they leave a voicemail: [] Yes [] No    Vicente Galindo Rep   08/16/23 15:09 EDT

## 2023-08-25 NOTE — TELEPHONE ENCOUNTER
PATIENT IS CALLING TO FOLLOW UP ON THIS REQUEST. HE'S NOT HEARD ANYTHING BACK.    HIS STOMACH HAS GONE BACK TO THE ISSUES HE'S HAD PREVIOUSLY SINCE HE HASN'T HAD ANY OF THIS MEDICATION. PLEASE FOLLOW UP ASAP.

## 2023-08-28 NOTE — TELEPHONE ENCOUNTER
Patient informed samples would be left at the front behind the . He will be by to  samples tomorrow afternoon.   I did advise patient he will need to be by before 4pm.

## 2023-09-07 RX ORDER — VERAPAMIL HYDROCHLORIDE 40 MG/1
TABLET ORAL
Qty: 60 TABLET | Refills: 1 | Status: SHIPPED | OUTPATIENT
Start: 2023-09-07

## 2023-09-07 NOTE — TELEPHONE ENCOUNTER
Rx Refill Note  Requested Prescriptions     Pending Prescriptions Disp Refills    verapamil (CALAN) 40 MG tablet [Pharmacy Med Name: Verapamil HCl 40 MG Oral Tablet] 60 tablet 0     Sig: Take 1 tablet by mouth twice daily      Last filled: 8/10/23 30 days with 0 RF --> adding 1 RF to get pt to next appt   Last office visit with prescribing clinician: 7/7/2023      Next office visit with prescribing clinician: 11/7/2023     Hermilo Rod MA  09/07/23, 08:55 EDT

## 2023-09-14 ENCOUNTER — TELEMEDICINE (OUTPATIENT)
Dept: FAMILY MEDICINE CLINIC | Facility: CLINIC | Age: 68
End: 2023-09-14
Payer: MEDICARE

## 2023-09-14 DIAGNOSIS — R41.3 MEMORY IMPAIRMENT: ICD-10-CM

## 2023-09-14 DIAGNOSIS — K52.9 CHRONIC DIARRHEA: ICD-10-CM

## 2023-09-14 DIAGNOSIS — E11.65 TYPE 2 DIABETES MELLITUS WITH HYPERGLYCEMIA, WITHOUT LONG-TERM CURRENT USE OF INSULIN: ICD-10-CM

## 2023-09-14 DIAGNOSIS — R51.9 ACUTE INTRACTABLE HEADACHE, UNSPECIFIED HEADACHE TYPE: ICD-10-CM

## 2023-09-14 DIAGNOSIS — K86.81 EXOCRINE PANCREATIC INSUFFICIENCY: Primary | ICD-10-CM

## 2023-09-14 DIAGNOSIS — K31.84 GASTROPARESIS: ICD-10-CM

## 2023-09-14 PROCEDURE — 3052F HG A1C>EQUAL 8.0%<EQUAL 9.0%: CPT | Performed by: FAMILY MEDICINE

## 2023-09-14 PROCEDURE — 99214 OFFICE O/P EST MOD 30 MIN: CPT | Performed by: FAMILY MEDICINE

## 2023-09-14 RX ORDER — PANCRELIPASE 36000; 180000; 114000 [USP'U]/1; [USP'U]/1; [USP'U]/1
36000 CAPSULE, DELAYED RELEASE PELLETS ORAL
Qty: 90 CAPSULE | Refills: 0 | Status: SHIPPED | OUTPATIENT
Start: 2023-09-14

## 2023-09-14 NOTE — PROGRESS NOTES
Subjective   Israel Reyna is a 68 y.o. male.     Chief Complaint   Patient presents with    Follow-up     Diarrhea and chronic fecal incontinence       History of Present Illness     Israel Reyna presents today for   Chief Complaint   Patient presents with    Follow-up     Diarrhea and chronic fecal incontinence     Creon is effective for his bowel, helping with bowel incontinence (multifactorial--rectal incompetence plus pancreatic insufficiency secondary to chronic diabetes)    Aspirating a lot when he eats or drinks. Has noted increased belching. Taking pantoprazole BID (Billings's esophagus per EGD 2022). Recommend checking B12, folate, magnesium. He does take B12 supplement daily.    You have chosen to receive care through a telehealth visit.  Do you consent to use a video/audio connection for your medical care today? Yes    [facility] [address] [facility] [address]   Provider Location: [facility] [place] [address]    The following portions of the patient's history were reviewed and updated as appropriate: allergies, current medications, past family history, past medical history, past social history, past surgical history and problem list.    Active Ambulatory Problems     Diagnosis Date Noted    Bowel incontinence 09/26/2016    Classic migraine with aura 09/26/2016    Depression     Anxiety     Mental status change 09/26/2016    Disorientation 09/26/2016    Facial droop 09/26/2016    Difficulty walking 09/26/2016    Intractable migraine with aura without status migrainosus 10/06/2016    Chest pain 10/06/2016    CAD (coronary artery disease)     Hypertension     Dyslipidemia     Diabetes mellitus, type 2     GERD (gastroesophageal reflux disease)     CHF (congestive heart failure)     Chest pain     SOB (shortness of breath)     Fatigue     Chronic migraine 04/11/2017    Other cerebral infarction 07/18/2017    Transient cerebral ischemia 07/18/2017    Neck pain  10/17/2017    Spondylosis of cervical region without myelopathy or radiculopathy 10/17/2017    Facial pain 03/22/2018    Nausea 06/01/2022    Erectile disorder due to medical condition in male 06/01/2022    Nephrolithiasis 12/26/2022     Resolved Ambulatory Problems     Diagnosis Date Noted    Seizure disorder 09/26/2016    Migraine      Past Medical History:   Diagnosis Date    Allergic     Cataract     Colon polyp     Diabetic peripheral neuropathy     Kidney stone     Mental status change     Migraine headache     Renal insufficiency      Past Surgical History:   Procedure Laterality Date    CARDIAC CATHETERIZATION      CARDIAC CATHETERIZATION N/A 10/14/2016    Procedure: Left Heart Cath;  Surgeon: Santiago Garner MD;  Location:  [facility] CATH INVASIVE LOCATION;  Service:     COLONOSCOPY  02/25/2022    Dr. Graves-recommended 3 year repeat    ENDOSCOPY  02/03/2022    Dr. Graves    GASTRIC STIMULATOR IMPLANT SURGERY      SMALL INTESTINE SURGERY      unspecified, x3 as an infant     TESTICLE SURGERY       Family History   Problem Relation Age of Onset    Heart failure Mother     Diabetes Mother     Alzheimer's disease Mother     Arthritis Mother     Heart disease Mother     Heart attack Father     Kidney disease Father     Cancer Father     Prostate cancer Father     Heart disease Father     Hypertension Father     Cancer Sister     Breast cancer Sister     Diabetes Sister     Heart disease Sister     Arrhythmia Sister     Diabetes Brother     Heart disease Brother     Cancer Brother     Liver disease Brother     Kidney disease Brother     Alzheimer's disease Maternal Grandmother     Liver cancer Maternal Grandfather     Liver disease Maternal Grandfather     Other Paternal Grandmother         Vietnamese flu    Other Paternal Grandfather         Vietnamese flu    Colon cancer Neg Hx      Social History     Socioeconomic History    Marital status:    Tobacco Use    Smoking status: Never     Passive exposure:  Never    Smokeless tobacco: Never   Vaping Use    Vaping Use: Never used   Substance and Sexual Activity    Alcohol use: Never    Drug use: Never    Sexual activity: Yes       Review of Systems  Review of Systems -  General ROS: negative for - chills, fever or night sweats  Cardiovascular ROS: no chest pain or dyspnea on exertion  Gastrointestinal ROS: no abdominal pain, change in bowel habits, or black or bloody stools  Genito-Urinary ROS: no dysuria, trouble voiding, or hematuria    Objective   There were no vitals taken for this visit.  Vitals obtained from patient if available  Physical Exam  Const: Non-toxic appearing, NAD, A&Ox4, Pleasant, Cooperative  Eyes: EOMI, no conjunctivitis  ENT: No copious nasal drainage noted  Cardiac: Regular rate by pulse  Resp: Respiratory rate observed to be within normal limits, no increased work of breathing observed, no audible wheezing or cough noted  Psych: Appropriate mood and behavior.  Procedures  Assessment & Plan   Problem List Items Addressed This Visit          Endocrine and Metabolic    Diabetes mellitus, type 2    Overview     On CGM he is having significant hyperglycemia in the middle of the night with precipitous drops (320 --> 60-70 within about 30 minutes). These correlate with times when he is getting sick at night.         Relevant Orders    Ambulatory Referral to Endocrinology (Completed)     Other Visit Diagnoses       Exocrine pancreatic insufficiency    -  Primary    Relevant Medications    Creon [id number] units capsule delayed-release particles capsule    Chronic diarrhea        Relevant Medications    Creon [id number] units capsule delayed-release particles capsule    Other Relevant Orders    Ambulatory Referral to Gastroenterology (Completed)    Memory impairment        Relevant Orders    Magnesium    Vitamin B12    Folate    Gastroparesis        Relevant Orders    Ambulatory Referral to Gastroenterology (Completed)    Acute intractable headache,  unspecified headache type        Relevant Orders    C-reactive Protein            See patient diagnoses and orders along with patient instructions for assessment, plan, and changes to care for patient.    This visit was conducted via telemedicine with live video and audio provided through Video Options: MyChart/Zoom at the point of care.    There are no Patient Instructions on file for this visit.    No follow-ups on file.    Ambulatory progress note signed and attested to by Elio Moore D.O.

## 2023-09-15 ENCOUNTER — LAB (OUTPATIENT)
Dept: LAB | Facility: HOSPITAL | Age: 68
End: 2023-09-15
Payer: MEDICARE

## 2023-09-15 ENCOUNTER — TELEPHONE (OUTPATIENT)
Dept: FAMILY MEDICINE CLINIC | Facility: CLINIC | Age: 68
End: 2023-09-15
Payer: MEDICARE

## 2023-09-15 DIAGNOSIS — R41.3 MEMORY IMPAIRMENT: ICD-10-CM

## 2023-09-15 DIAGNOSIS — R51.9 ACUTE INTRACTABLE HEADACHE, UNSPECIFIED HEADACHE TYPE: ICD-10-CM

## 2023-09-15 LAB
CRP SERPL-MCNC: <0.3 MG/DL (ref 0–0.5)
VIT B12 BLD-MCNC: 1078 PG/ML (ref 211–946)

## 2023-09-15 PROCEDURE — 83735 ASSAY OF MAGNESIUM: CPT

## 2023-09-15 PROCEDURE — 86140 C-REACTIVE PROTEIN: CPT

## 2023-09-15 PROCEDURE — 82607 VITAMIN B-12: CPT

## 2023-09-15 PROCEDURE — 82746 ASSAY OF FOLIC ACID SERUM: CPT

## 2023-09-16 LAB
FOLATE SERPL-MCNC: 10.2 NG/ML (ref 4.78–24.2)
MAGNESIUM SERPL-MCNC: 1.7 MG/DL (ref 1.6–2.4)

## 2023-09-17 DIAGNOSIS — G43.119 INTRACTABLE MIGRAINE WITH AURA WITHOUT STATUS MIGRAINOSUS: ICD-10-CM

## 2023-09-18 RX ORDER — BUTALBITAL, ACETAMINOPHEN AND CAFFEINE 50; 325; 40 MG/1; MG/1; MG/1
TABLET ORAL
Qty: 30 TABLET | Refills: 0 | Status: SHIPPED | OUTPATIENT
Start: 2023-09-18

## 2023-09-18 NOTE — TELEPHONE ENCOUNTER
Rx Refill Note  Requested Prescriptions     Pending Prescriptions Disp Refills    butalbital-acetaminophen-caffeine (FIORICET, ESGIC) -40 MG per tablet [Pharmacy Med Name: Butalbital-APAP-Caffeine -40 MG Oral Tablet] 30 tablet 0     Sig: TAKE ONE TABLET BY MOUTH ONE TIME AS NEEDED FOR HEADACHE FOR UP TO 30 DOSES      Last filled: 7/12/23 #30 with 0 RF   Last office visit with prescribing clinician: 7/7/2023      Next office visit with prescribing clinician: 11/7/2023     Hermilo Rod MA  09/18/23, 11:24 EDT

## 2023-09-22 ENCOUNTER — TELEPHONE (OUTPATIENT)
Dept: FAMILY MEDICINE CLINIC | Facility: CLINIC | Age: 68
End: 2023-09-22

## 2023-09-22 ENCOUNTER — OFFICE VISIT (OUTPATIENT)
Dept: FAMILY MEDICINE CLINIC | Facility: CLINIC | Age: 68
End: 2023-09-22
Payer: MEDICARE

## 2023-09-22 VITALS
WEIGHT: 173 LBS | OXYGEN SATURATION: 98 % | HEART RATE: 87 BPM | HEIGHT: 67 IN | DIASTOLIC BLOOD PRESSURE: 80 MMHG | SYSTOLIC BLOOD PRESSURE: 138 MMHG | BODY MASS INDEX: 27.15 KG/M2

## 2023-09-22 DIAGNOSIS — K31.84 GASTROPARESIS: ICD-10-CM

## 2023-09-22 DIAGNOSIS — E11.65 TYPE 2 DIABETES MELLITUS WITH HYPERGLYCEMIA, WITHOUT LONG-TERM CURRENT USE OF INSULIN: Primary | ICD-10-CM

## 2023-09-22 DIAGNOSIS — Z00.00 MEDICARE ANNUAL WELLNESS VISIT, SUBSEQUENT: ICD-10-CM

## 2023-09-22 LAB
EXPIRATION DATE: NORMAL
HBA1C MFR BLD: 10.2 %
Lab: NORMAL

## 2023-09-22 RX ORDER — INSULIN GLARGINE 300 U/ML
20 INJECTION, SOLUTION SUBCUTANEOUS
Qty: 9 ML | Refills: 0 | Status: SHIPPED | OUTPATIENT
Start: 2023-09-22

## 2023-09-22 NOTE — ASSESSMENT & PLAN NOTE
Had been on Soliqua, stopped this over the summer wanting to go back to the Trulicity. A1c today 10.2%, much worse than the summer (8.4%).

## 2023-09-22 NOTE — PATIENT INSTRUCTIONS
Change insulin doses every 7 days based on the following:     If before breakfast blood sugar readings are consistently higher than 150 for 3-4 days, raise insulin dose by 2 units.         Advance Care Planning and Advance Directives     You make decisions on a daily basis - decisions about where you want to live, your career, your home, your life. Perhaps one of the most important decisions you face is your choice for future medical care. Take time to talk with your family and your healthcare team and start planning today.  Advance Care Planning is a process that can help you:  Understand possible future healthcare decisions in light of your own experiences  Reflect on those decision in light of your goals and values  Discuss your decisions with those closest to you and the healthcare professionals that care for you  Make a plan by creating a document that reflects your wishes    Surrogate Decision Maker  In the event of a medical emergency, which has left you unable to communicate or to make your own decisions, you would need someone to make decisions for you.  It is important to discuss your preferences for medical treatment with this person while you are in good health.     Qualities of a surrogate decision maker:  Willing to take on this role and responsibility  Knows what you want for future medical care  Willing to follow your wishes even if they don't agree with them  Able to make difficult medical decisions under stressful circumstances    Advance Directives  These are legal documents you can create that will guide your healthcare team and decision maker(s) when needed. These documents can be stored in the electronic medical record.    Living Will - a legal document to guide your care if you have a terminal condition or a serious illness and are unable to communicate. States vary by statute in document names/types, but most forms may include one or more of the following:        -  Directions regarding  life-prolonging treatments        -  Directions regarding artificially provided nutrition/hydration        -  Choosing a healthcare decision maker        -  Direction regarding organ/tissue donation    Durable Power of  for Healthcare - this document names an -in-fact to make medical decisions for you, but it may also allow this person to make personal and financial decisions for you. Please seek the advice of an  if you need this type of document.    **Advance Directives are not required and no one may discriminate against you if you do not sign one.    Medical Orders  Many states allow specific forms/orders signed by your physician to record your wishes for medical treatment in your current state of health. This form, signed in personal communication with your physician, addresses resuscitation and other medical interventions that you may or may not want.      For more information or to schedule a time with a [facility] Advance Care Planning Facilitator contact: [facility] or call [phone] and someone will contact you directly.  You are due for Shingrix vaccination series ( the newest shingles vaccine).  It is a two shot series spaced 2-6 months apart. Please get this vaccine series started at your earliest convenience at your local pharmacy to help avoid shingles outbreak. It is more effective than the old Zostavax vaccine and is recommended even if you have had the Zostavax vaccine in the past.  Once the Shingrix series is completed, it does not need to be repeated.   For more information, please look at the website below:  [facility] Shingrix Vaccine Information      Medicare Wellness  Personal Prevention Plan of Service     Date of Office Visit:    Encounter Provider:  Elio Moore DO  Place of Service:  [facility] PRIMARY [facility]ARE  Patient Name: Israel Reyna  :  1955    As part of the Medicare Wellness portion of your visit today, we  are providing you with this personalized preventive plan of services (PPPS). This plan is based upon recommendations of the [place] Preventive Services Task Force (USPSTF) and the Advisory Committee on Immunization Practices (ACIP).    This lists the preventive care services that should be considered, and provides dates of when you are due. Items listed as completed are up-to-date and do not require any further intervention.    Health Maintenance   Topic Date Due   • BMI FOLLOWUP  Never done   • ZOSTER VACCINE (1 of 2) Never done   • HEPATITIS C SCREENING  Never done   • DIABETIC FOOT EXAM  Never done   • LIPID PANEL  11/24/2022   • ANNUAL WELLNESS VISIT  02/28/2023   • Pneumococcal Vaccine 65+ (1 - PCV) 03/29/2024 (Originally 1/22/1961)   • COVID-19 Vaccine (3 - Pfizer series) 07/14/2024 (Originally 6/3/2021)   • INFLUENZA VACCINE  10/01/2023   • URINE MICROALBUMIN  11/08/2023   • HEMOGLOBIN A1C  01/12/2024   • DIABETIC EYE EXAM  01/25/2024   • COLORECTAL CANCER SCREENING  02/25/2025   • TDAP/TD VACCINES (2 - Td or Tdap) 07/30/2033       Orders Placed This Encounter   Procedures   • Ambulatory Referral to Gastroenterology     Referral Priority:   Routine     Referral Type:   Consultation     Referral Reason:   Specialty Services Required     Requested Specialty:   Gastroenterology     Number of Visits Requested:   1       Return in about 3 months (around 12/22/2023) for with A1c;.

## 2023-09-22 NOTE — PROGRESS NOTES
The ABCs of the Annual Wellness Visit  Subsequent Medicare Wellness Visit    Subjective      Israel Reyna is a 68 y.o. male who presents for a Subsequent Medicare Wellness Visit.    The following portions of the patient's history were reviewed and   updated as appropriate: allergies, current medications, past family history, past medical history, past social history, past surgical history, and problem list.    Compared to one year ago, the patient feels his physical   health is the same. The Creon has helped with his diarrhea substantially. He still has chronic fecal incontinence (mutifactorial, including rectal incompetence).    Compared to one year ago, the patient feels his mental   health is worse.    Recent Hospitalizations:  He was not admitted to the hospital during the last year.       Current Medical Providers:  Patient Care Team:  Elio Moore DO as PCP - General (Family Medicine)    Outpatient Medications Prior to Visit   Medication Sig Dispense Refill    atorvastatin (LIPITOR) 20 MG tablet Take 1 tablet by mouth Daily. 90 tablet 3    butalbital-acetaminophen-caffeine (FIORICET, ESGIC) -40 MG per tablet TAKE ONE TABLET BY MOUTH ONE TIME AS NEEDED FOR HEADACHE FOR UP TO 30 DOSES 30 tablet 0    Creon 12440-029591 units capsule delayed-release particles capsule Take 1 capsule by mouth 3 (Three) Times a Day With Meals. 90 capsule 0    diphenoxylate-atropine (Lomotil) 2.5-0.025 MG per tablet Take 1 tablet by mouth 4 (Four) Times a Day As Needed for Diarrhea. 120 tablet 2    Dulaglutide (Trulicity) 1.5 MG/0.5ML solution pen-injector Inject 1.5 mg under the skin into the appropriate area as directed 1 (One) Time Per Week. 2 mL 2    Erenumab-aooe (AIMOVIG) 140 MG/ML auto-injector Inject 1 mL under the skin into the appropriate area as directed Every 30 (Thirty) Days. 1 mL 11    gabapentin (NEURONTIN) 300 MG capsule Take 2 capsules by mouth 3 (Three) Times a Day for 30 days. 180 capsule 3  "   glipizide (GLUCOTROL) 10 MG tablet Take 1 tablet by mouth 2 (Two) Times a Day Before Meals. 180 tablet 3    glucose blood test strip 1 each by Other route Daily. Use daily as directed 100 each 12    glucose monitor monitoring kit 1 each Daily. 1 each 0    Incontinence Supply Disposable (NU-FIT ADULT UNDERWEAR MEDIUM) misc       Insulin Pen Needle 31G X 6 MM misc 1 each Daily. 100 each 3    Insulin Syringe 31G X 5/16\" 0.3 ML misc 1 each Every Night. 90 each 3    Lancets Thin misc 1 each Daily. Use daily as directed 100 each 11    lisinopril (PRINIVIL,ZESTRIL) 40 MG tablet Take 1 tablet by mouth 2 (Two) Times a Day. 180 tablet 0    Lutein 5 % beads Take 5 mg by mouth Daily.      metFORMIN (GLUCOPHAGE) 1000 MG tablet Take 1 tablet by mouth 2 (Two) Times a Day With Meals. 180 tablet 3    Multiple Vitamins-Minerals (PRESERVISION AREDS 2 PO) Take  by mouth Daily.      NON FORMULARY Shot in eye every 3 months for macular degeneration      ondansetron (ZOFRAN) 8 MG tablet TAKE 1 TABLET BY MOUTH EVERY 8 HOURS AS NEEDED FOR NAUSEA FOR VOMITING 30 tablet 0    pantoprazole (PROTONIX) 40 MG EC tablet Take 1 tablet by mouth 2 (Two) Times a Day. 30 minutes prior to first meal of the day and 30 minutes prior to last meal of the day 180 tablet 3    sildenafil (VIAGRA) 100 MG tablet Take 1 tablet by mouth Daily As Needed for Erectile Dysfunction. 15 tablet 11    Specialty Vitamins Products (HEALTHY HEART COMPLEX PO) Take  by mouth Daily.      verapamil (CALAN) 40 MG tablet Take 1 tablet by mouth twice daily 60 tablet 1    vitamin B-12 (CYANOCOBALAMIN) 100 MCG tablet Take 0.5 tablets by mouth 2 (two) times a day.      vitamin C (ASCORBIC ACID) 500 MG tablet Take 1 tablet by mouth 2 (two) times a day.      vitamin E 400 UNIT capsule Take 1 capsule by mouth Daily.      Zeaxanthin powder Take 1 mg by mouth Daily.      Zinc 40 MG tablet Take 1 tablet by mouth Daily.      tamsulosin (FLOMAX) 0.4 MG capsule 24 hr capsule Take 1 capsule " by mouth Daily. 90 capsule 3    Benzocaine-Menthol (Cepacol) 15-2.3 MG lozenge Dissolve 1 lozenge in the mouth Every 2 (Two) Hours As Needed (throat pain). 32 lozenge 0    levoFLOXacin (QUIXIN) 0.5 % ophthalmic solution Administer 1 drop into the left eye Every 6 (Six) Hours. 5 mL 0    olopatadine (PATADAY) 0.2 % solution ophthalmic solution Administer 1 drop to both eyes Daily. 2.5 mL 5     No facility-administered medications prior to visit.       No opioid medication identified on active medication list. I have reviewed chart for other potential  high risk medication/s and harmful drug interactions in the elderly.        Aspirin is not on active medication list.  Aspirin use is not indicated based on review of current medical condition/s. Risk of harm outweighs potential benefits.  .    Patient Active Problem List   Diagnosis    Bowel incontinence    Classic migraine with aura    Depression    Anxiety    Mental status change    Disorientation    Facial droop    Difficulty walking    Intractable migraine with aura without status migrainosus    Chest pain    CAD (coronary artery disease)    Hypertension    Dyslipidemia    Diabetes mellitus, type 2    GERD (gastroesophageal reflux disease)    CHF (congestive heart failure)    Chest pain    SOB (shortness of breath)    Fatigue    Chronic migraine    Other cerebral infarction    Transient cerebral ischemia    Neck pain    Spondylosis of cervical region without myelopathy or radiculopathy    Facial pain    Nausea    Erectile disorder due to medical condition in male    Nephrolithiasis     Advance Care Planning   Advance Care Planning     Advance Directive is not on file.  ACP discussion was held with the patient during this visit. Patient has an advance directive (not in EMR), copy requested.     Objective    Vitals:    09/22/23 0847   BP: 138/80   BP Location: Left arm   Patient Position: Sitting   Cuff Size: Adult   Pulse: 87   SpO2: 98%   Weight: 78.5 kg (173 lb)  "  Height: 170.2 cm (67\")   PainSc:   4   PainLoc: Head     Estimated body mass index is 27.1 kg/m² as calculated from the following:    Height as of this encounter: 170.2 cm (67\").    Weight as of this encounter: 78.5 kg (173 lb).    BMI is >= 25 and <30. (Overweight) The following options were offered after discussion;: weight loss educational material (shared in after visit summary), exercise counseling/recommendations, and nutrition counseling/recommendations      Does the patient have evidence of cognitive impairment?   Yes: Labs Ordered.   ATTENTION  What is the year: correct  What is the month of the year: correct  What is the day of the week?: correct  What is the date?: correct  MEMORY  Repeat address three times, only score third attempt: Dixon Parker [address] [place] [address]: 5  HOW MANY ANIMALS DID THE PATIENT NAME  Verbal Fluency -- Animal Names (0-25): 9-10  CLOCK DRAWING  Clock Drawing: All Correct  MEMORY RECALL  Tell me what you remember about that name and address we were repeating at the beginnin  ACE TOTAL SCORE  Total ACE Score - <25/30 strongly suggests cognitive impairment; <21/30 almost certainly shows dementia: 22   Lab Results   Component Value Date    HGBA1C 10.2 2023          HEALTH RISK ASSESSMENT    Smoking Status:  Social History     Tobacco Use   Smoking Status Never    Passive exposure: Never   Smokeless Tobacco Never     Alcohol Consumption:  Social History     Substance and Sexual Activity   Alcohol Use Never     Fall Risk Screen:    BRAULIO Fall Risk Assessment was completed, and patient is at LOW risk for falls.Assessment completed on:2023    Depression Screenin/22/2023     8:57 AM   PHQ-2/PHQ-9 Depression Screening   Little Interest or Pleasure in Doing Things 0-->not at all   Feeling Down, Depressed or Hopeless 0-->not at all   PHQ-9: Brief Depression Severity Measure Score 0       Health Habits and Functional and Cognitive Screening:      " 9/22/2023     8:56 AM   Functional & Cognitive Status   Do you have difficulty preparing food and eating? No   Do you have difficulty bathing yourself, getting dressed or grooming yourself? No   Do you have difficulty using the toilet? No   Do you have difficulty moving around from place to place? No   Do you have trouble with steps or getting out of a bed or a chair? No   Current Diet Well Balanced Diet   Dental Exam Up to date   Eye Exam Up to date   Exercise (times per week) 3 times per week   Current Exercises Include Walking;Light Weights   Do you need help using the phone?  No   Are you deaf or do you have serious difficulty hearing?  Yes   Do you need help to go to places out of walking distance? No   Do you need help shopping? No   Do you need help preparing meals?  No   Do you need help with housework?  No   Do you need help with laundry? No   Do you need help taking your medications? No   Do you need help managing money? No   Do you ever drive or ride in a car without wearing a seat belt? No   Have you felt unusual stress, anger or loneliness in the last month? No   Who do you live with? Spouse   If you need help, do you have trouble finding someone available to you? No   Have you been bothered in the last four weeks by sexual problems? No   Do you have difficulty concentrating, remembering or making decisions? No       Age-appropriate Screening Schedule:  Refer to the list below for future screening recommendations based on patient's age, sex and/or medical conditions. Orders for these recommended tests are listed in the plan section. The patient has been provided with a written plan.    Health Maintenance   Topic Date Due    BMI FOLLOWUP  Never done    ZOSTER VACCINE (1 of 2) Never done    HEPATITIS C SCREENING  Never done    DIABETIC FOOT EXAM  Never done    LIPID PANEL  11/24/2022    ANNUAL WELLNESS VISIT  02/28/2023    INFLUENZA VACCINE  08/01/2023    COVID-19 Vaccine (3 - 2023-24 season) 09/01/2023     Pneumococcal Vaccine 65+ (1 - PCV) 03/29/2024 (Originally 1/22/1961)    URINE MICROALBUMIN  11/08/2023    DIABETIC EYE EXAM  01/25/2024    HEMOGLOBIN A1C  03/22/2024    COLORECTAL CANCER SCREENING  02/25/2025    TDAP/TD VACCINES (2 - Td or Tdap) 07/30/2033                  CMS Preventative Services Quick Reference  Risk Factors Identified During Encounter:    Hearing Problem: Referral to Audiologist ordered    The above risks/problems have been discussed with the patient.  Pertinent information has been shared with the patient in the After Visit Summary.    Diagnoses and all orders for this visit:    1. Type 2 diabetes mellitus with hyperglycemia, without long-term current use of insulin (Primary)  Assessment & Plan:  Had been on Soliqua, stopped this over the summer wanting to go back to the Trulicity. A1c today 10.2%, much worse than the summer (8.4%).    Orders:  -     Toujeo Max SoloStar 300 UNIT/ML solution pen-injector injection; Inject 20 Units under the skin into the appropriate area as directed every night at bedtime.  Dispense: 9 mL; Refill: 0  -     POC Glycosylated Hemoglobin (Hb A1C)    2. Medicare annual wellness visit, subsequent    3. Gastroparesis  -     Ambulatory Referral to Gastroenterology      Problem List Items Addressed This Visit          Endocrine and Metabolic    Diabetes mellitus, type 2 - Primary    Overview     On CGM he is having significant hyperglycemia in the middle of the night with precipitous drops (320 --> 60-70 within about 30 minutes). These correlate with times when he is getting sick at night.         Current Assessment & Plan     Had been on Soliqua, stopped this over the summer wanting to go back to the Trulicity. A1c today 10.2%, much worse than the summer (8.4%).         Relevant Medications    Toujeo Max SoloStar 300 UNIT/ML solution pen-injector injection    Other Relevant Orders    POC Glycosylated Hemoglobin (Hb A1C) (Completed)     Other Visit Diagnoses        Medicare annual wellness visit, subsequent        Gastroparesis        Relevant Orders    Ambulatory Referral to Gastroenterology (Completed)          The wellness exam has been reviewed in detail.  The patient has been fully counseled on preventative guidelines for vaccines, cancer screenings, and other health maintenance needs.  Functional testing has been performed to assess capacity for independent living and need for other medical interventions.   The patient was counseled on maintaining a lifestyle to promote good health and to minimize chronic diseases.  The patient has been assisted with scheduling healthcare procedures for the coming year and given a written document outlining these recommendations.    Follow Up:   Next Medicare Wellness visit to be scheduled in 1 year.      An After Visit Summary and PPPS were made available to the patient.

## 2023-09-22 NOTE — TELEPHONE ENCOUNTER
"Caller: Israel Reyna    Relationship: Self    Best call back number: [phone]    What medications are you currently taking:   Current Outpatient Medications on File Prior to Visit   Medication Sig Dispense Refill    atorvastatin (LIPITOR) 20 MG tablet Take 1 tablet by mouth Daily. 90 tablet 3    butalbital-acetaminophen-caffeine (FIORICET, ESGIC) -40 MG per tablet TAKE ONE TABLET BY MOUTH ONE TIME AS NEEDED FOR HEADACHE FOR UP TO 30 DOSES 30 tablet 0    Creon [id number] units capsule delayed-release particles capsule Take 1 capsule by mouth 3 (Three) Times a Day With Meals. 90 capsule 0    diphenoxylate-atropine (Lomotil) 2.5-0.025 MG per tablet Take 1 tablet by mouth 4 (Four) Times a Day As Needed for Diarrhea. 120 tablet 2    Dulaglutide (Trulicity) 1.5 MG/0.5ML solution pen-injector Inject 1.5 mg under the skin into the appropriate area as directed 1 (One) Time Per Week. 2 mL 2    Erenumab-aooe (AIMOVIG) 140 MG/ML auto-injector Inject 1 mL under the skin into the appropriate area as directed Every 30 (Thirty) Days. 1 mL 11    gabapentin (NEURONTIN) 300 MG capsule Take 2 capsules by mouth 3 (Three) Times a Day for 30 days. 180 capsule 3    glipizide (GLUCOTROL) 10 MG tablet Take 1 tablet by mouth 2 (Two) Times a Day Before Meals. 180 tablet 3    glucose blood test strip 1 each by Other route Daily. Use daily as directed 100 each 12    glucose monitor monitoring kit 1 each Daily. 1 each 0    Incontinence Supply Disposable (NU-FIT ADULT UNDERWEAR MEDIUM) misc       Insulin Pen Needle 31G X 6 MM misc 1 each Daily. 100 each 3    Insulin Syringe 31G X 5/16\" 0.3 ML misc 1 each Every Night. 90 each 3    Lancets Thin misc 1 each Daily. Use daily as directed 100 each 11    lisinopril (PRINIVIL,ZESTRIL) 40 MG tablet Take 1 tablet by mouth 2 (Two) Times a Day. 180 tablet 0    Lutein 5 % beads Take 5 mg by mouth Daily.      metFORMIN (GLUCOPHAGE) 1000 MG tablet Take 1 tablet by mouth 2 (Two) Times a " Day With Meals. 180 tablet 3    Multiple Vitamins-Minerals (PRESERVISION AREDS 2 PO) Take  by mouth Daily.      NON FORMULARY Shot in eye every 3 months for macular degeneration      ondansetron (ZOFRAN) 8 MG tablet TAKE 1 TABLET BY MOUTH EVERY 8 HOURS AS NEEDED FOR NAUSEA FOR VOMITING 30 tablet 0    pantoprazole (PROTONIX) 40 MG EC tablet Take 1 tablet by mouth 2 (Two) Times a Day. 30 minutes prior to first meal of the day and 30 minutes prior to last meal of the day 180 tablet 3    sildenafil (VIAGRA) 100 MG tablet Take 1 tablet by mouth Daily As Needed for Erectile Dysfunction. 15 tablet 11    Specialty Vitamins Products (HEALTHY HEART COMPLEX PO) Take  by mouth Daily.      Storm Eduardo SoloStar 300 UNIT/ML solution pen-injector injection Inject 20 Units under the skin into the appropriate area as directed every night at bedtime. 9 mL 0    verapamil (CALAN) 40 MG tablet Take 1 tablet by mouth twice daily 60 tablet 1    vitamin B-12 (CYANOCOBALAMIN) 100 MCG tablet Take 0.5 tablets by mouth 2 (two) times a day.      vitamin C (ASCORBIC ACID) 500 MG tablet Take 1 tablet by mouth 2 (two) times a day.      vitamin E 400 UNIT capsule Take 1 capsule by mouth Daily.      Zeaxanthin powder Take 1 mg by mouth Daily.      Zinc 40 MG tablet Take 1 tablet by mouth Daily.      [DISCONTINUED] Benzocaine-Menthol (Cepacol) 15-2.3 MG lozenge Dissolve 1 lozenge in the mouth Every 2 (Two) Hours As Needed (throat pain). 32 lozenge 0    [DISCONTINUED] levoFLOXacin (QUIXIN) 0.5 % ophthalmic solution Administer 1 drop into the left eye Every 6 (Six) Hours. 5 mL 0    [DISCONTINUED] olopatadine (PATADAY) 0.2 % solution ophthalmic solution Administer 1 drop to both eyes Daily. 2.5 mL 5    [DISCONTINUED] tamsulosin (FLOMAX) 0.4 MG capsule 24 hr capsule Take 1 capsule by mouth Daily. 90 capsule 3     No current facility-administered medications on file prior to visit.            Which medication are you concerned about: TOUJEO    Who  prescribed you this medication: DR DANIELLE    What are your concerns: PATIENT STATES THIS MEDICATION WOULD BE OVER $100 A MONTH AND CAN NOT AFFORD THAT. IS THERE ANOTHER INSULIN THAT INSURANCE WOULD COVER? PLEASE ADVISE

## 2023-10-03 ENCOUNTER — TELEPHONE (OUTPATIENT)
Dept: FAMILY MEDICINE CLINIC | Facility: CLINIC | Age: 68
End: 2023-10-03

## 2023-10-03 NOTE — TELEPHONE ENCOUNTER
The patient called regarding his referrals.      He was referred to [facility] for Gastroenterology.  He has seen this provider before, and he does not want to go see him. He is requesting a referral to some place different. He said he is probably a good doctor, but he just didn't mesh with him.    He said he was also supposed to have a Cardiology referral to Dr. Santiago Garner, but he has not been contacted about the Cardiology appt. I don't see a referral for Cardiology in his chart.    Please refer to:  Santiago Garner MD  Phone: [phone]

## 2023-10-10 DIAGNOSIS — R11.0 NAUSEA: ICD-10-CM

## 2023-10-10 RX ORDER — ONDANSETRON HYDROCHLORIDE 8 MG/1
TABLET, FILM COATED ORAL
Qty: 30 TABLET | Refills: 0 | Status: SHIPPED | OUTPATIENT
Start: 2023-10-10

## 2023-10-19 ENCOUNTER — OFFICE VISIT (OUTPATIENT)
Dept: FAMILY MEDICINE CLINIC | Facility: CLINIC | Age: 68
End: 2023-10-19
Payer: MEDICARE

## 2023-10-19 VITALS
HEART RATE: 75 BPM | OXYGEN SATURATION: 95 % | WEIGHT: 172.4 LBS | DIASTOLIC BLOOD PRESSURE: 84 MMHG | BODY MASS INDEX: 27.06 KG/M2 | HEIGHT: 67 IN | SYSTOLIC BLOOD PRESSURE: 132 MMHG

## 2023-10-19 DIAGNOSIS — E11.65 TYPE 2 DIABETES MELLITUS WITH HYPERGLYCEMIA, WITHOUT LONG-TERM CURRENT USE OF INSULIN: ICD-10-CM

## 2023-10-19 PROCEDURE — 3075F SYST BP GE 130 - 139MM HG: CPT | Performed by: FAMILY MEDICINE

## 2023-10-19 PROCEDURE — 3046F HEMOGLOBIN A1C LEVEL >9.0%: CPT | Performed by: FAMILY MEDICINE

## 2023-10-19 PROCEDURE — 3079F DIAST BP 80-89 MM HG: CPT | Performed by: FAMILY MEDICINE

## 2023-10-19 PROCEDURE — 99213 OFFICE O/P EST LOW 20 MIN: CPT | Performed by: FAMILY MEDICINE

## 2023-10-19 RX ORDER — DULAGLUTIDE 1.5 MG/.5ML
1.5 INJECTION, SOLUTION SUBCUTANEOUS WEEKLY
Qty: 6 ML | Refills: 2 | Status: SHIPPED | OUTPATIENT
Start: 2023-10-19 | End: 2023-11-06 | Stop reason: SDUPTHER

## 2023-10-19 NOTE — PROGRESS NOTES
Established Patient Office Visit      Patient Name: Israel Reyna  : 1955   MRN: [id number]   Care Team: Patient Care Team:  Elio Moore DO as PCP - General (Family Medicine)    Chief Complaint:    Chief Complaint   Patient presents with    Diabetes       History of Present Illness: Israel Reyna is a 68 y.o. male who is here today for chief complaint.    HPI    Toujeo 20U has helped with his FSBS, now running 115-145 avg in AM rather than previous 180+. Needs new prescription for Trulicity sent to [facility].    Kiet still working well for diarrhea.    This patient is accompanied by their self who contributes to the history of their care.    The following portions of the patient's history were reviewed and updated as appropriate: allergies, current medications, past family history, past medical history, past social history, past surgical history and problem list.    Subjective      Review of Systems:   Review of Systems - See HPI    Past Medical History:   Past Medical History:   Diagnosis Date    Allergic     Anxiety     Bowel incontinence     w/ history of Stimulator Device     CAD (coronary artery disease)     Cataract     Chest pain     CHF (congestive heart failure)     Colon polyp     Depression     Diabetes mellitus, type 2     Diabetic peripheral neuropathy     Dyslipidemia     Fatigue     GERD (gastroesophageal reflux disease)     Hypertension     Kidney stone     Mental status change     Migraine     Complex     Migraine headache     Renal insufficiency     SOB (shortness of breath)        Past Surgical History:   Past Surgical History:   Procedure Laterality Date    CARDIAC CATHETERIZATION      CARDIAC CATHETERIZATION N/A 10/14/2016    Procedure: Left Heart Cath;  Surgeon: Santiago Garner MD;  Location: [facility] CATH INVASIVE LOCATION;  Service:     COLONOSCOPY  2022    Dr. Graves-recommended 3 year repeat    ENDOSCOPY  2022    Dr. Graves    GASTRIC  STIMULATOR IMPLANT SURGERY      SMALL INTESTINE SURGERY      unspecified, x3 as an infant     TESTICLE SURGERY         Family History:   Family History   Problem Relation Age of Onset    Heart failure Mother     Diabetes Mother     Alzheimer's disease Mother     Arthritis Mother     Heart disease Mother     Heart attack Father     Kidney disease Father     Cancer Father     Prostate cancer Father     Heart disease Father     Hypertension Father     Cancer Sister     Breast cancer Sister     Diabetes Sister     Heart disease Sister     Arrhythmia Sister     Diabetes Brother     Heart disease Brother     Cancer Brother     Liver disease Brother     Kidney disease Brother     Dementia Paternal Uncle     Alzheimer's disease Maternal Grandmother     Liver cancer Maternal Grandfather     Liver disease Maternal Grandfather     Other Paternal Grandmother         Citizen of [place] flu    Other Paternal Grandfather         Citizen of [place] flu    Colon cancer Neg Hx        Social History:   Social History     Socioeconomic History    Marital status:    Tobacco Use    Smoking status: Never     Passive exposure: Never    Smokeless tobacco: Never   Vaping Use    Vaping Use: Never used   Substance and Sexual Activity    Alcohol use: Never    Drug use: Never    Sexual activity: Yes       Tobacco History:   Social History     Tobacco Use   Smoking Status Never    Passive exposure: Never   Smokeless Tobacco Never       Medications:     Current Outpatient Medications:     atorvastatin (LIPITOR) 20 MG tablet, Take 1 tablet by mouth Daily., Disp: 90 tablet, Rfl: 3    butalbital-acetaminophen-caffeine (FIORICET, ESGIC) -40 MG per tablet, TAKE ONE TABLET BY MOUTH ONE TIME AS NEEDED FOR HEADACHE FOR UP TO 30 DOSES, Disp: 30 tablet, Rfl: 0    Creon 94293-178782 units capsule delayed-release particles capsule, Take 1 capsule by mouth 3 (Three) Times a Day With Meals., Disp: 90 capsule, Rfl: 0    diphenoxylate-atropine (Lomotil) 2.5-0.025 MG per  "tablet, Take 1 tablet by mouth 4 (Four) Times a Day As Needed for Diarrhea., Disp: 120 tablet, Rfl: 2    Dulaglutide (Trulicity) 1.5 MG/0.5ML solution pen-injector, Inject 1.5 mg under the skin into the appropriate area as directed 1 (One) Time Per Week., Disp: 6 mL, Rfl: 2    glipizide (GLUCOTROL) 10 MG tablet, Take 1 tablet by mouth 2 (Two) Times a Day Before Meals., Disp: 180 tablet, Rfl: 3    glucose blood test strip, 1 each by Other route Daily. Use daily as directed, Disp: 100 each, Rfl: 12    glucose monitor monitoring kit, 1 each Daily., Disp: 1 each, Rfl: 0    Incontinence Supply Disposable (NU-FIT ADULT UNDERWEAR MEDIUM) misc, , Disp: , Rfl:     Insulin Pen Needle 31G X 6 MM misc, 1 each Daily., Disp: 100 each, Rfl: 3    Insulin Syringe 31G X 5/16\" 0.3 ML misc, 1 each Every Night., Disp: 90 each, Rfl: 3    Lancets Thin misc, 1 each Daily. Use daily as directed, Disp: 100 each, Rfl: 11    lisinopril (PRINIVIL,ZESTRIL) 40 MG tablet, Take 1 tablet by mouth 2 (Two) Times a Day., Disp: 180 tablet, Rfl: 0    Lutein 5 % beads, Take 5 mg by mouth Daily., Disp: , Rfl:     metFORMIN (GLUCOPHAGE) 1000 MG tablet, Take 1 tablet by mouth 2 (Two) Times a Day With Meals., Disp: 180 tablet, Rfl: 3    Multiple Vitamins-Minerals (PRESERVISION AREDS 2 PO), Take  by mouth Daily., Disp: , Rfl:     NON FORMULARY, Shot in eye every 3 months for macular degeneration, Disp: , Rfl:     ondansetron (ZOFRAN) 8 MG tablet, TAKE 1 TABLET BY MOUTH EVERY 8 HOURS AS NEEDED FOR NAUSEA FOR VOMITING, Disp: 30 tablet, Rfl: 0    pantoprazole (PROTONIX) 40 MG EC tablet, Take 1 tablet by mouth 2 (Two) Times a Day. 30 minutes prior to first meal of the day and 30 minutes prior to last meal of the day, Disp: 180 tablet, Rfl: 3    sildenafil (VIAGRA) 100 MG tablet, Take 1 tablet by mouth Daily As Needed for Erectile Dysfunction., Disp: 15 tablet, Rfl: 11    Specialty Vitamins Products (HEALTHY HEART COMPLEX PO), Take  by mouth Daily., Disp: , Rfl:    " " Storm Eduardo SoloStar 300 UNIT/ML solution pen-injector injection, Inject 20 Units under the skin into the appropriate area as directed every night at bedtime., Disp: 9 mL, Rfl: 0    vitamin B-12 (CYANOCOBALAMIN) 100 MCG tablet, Take 0.5 tablets by mouth 2 (two) times a day., Disp: , Rfl:     vitamin C (ASCORBIC ACID) 500 MG tablet, Take 1 tablet by mouth 2 (two) times a day., Disp: , Rfl:     vitamin E 400 UNIT capsule, Take 1 capsule by mouth Daily., Disp: , Rfl:     Zeaxanthin powder, Take 1 mg by mouth Daily., Disp: , Rfl:     Zinc 40 MG tablet, Take 1 tablet by mouth Daily., Disp: , Rfl:     gabapentin (NEURONTIN) 300 MG capsule, Take 2 capsules by mouth 3 (Three) Times a Day for 30 days., Disp: 180 capsule, Rfl: 3    propranolol LA (Inderal LA) 60 MG 24 hr capsule, Take 1 capsule by mouth Daily for 30 days., Disp: 30 capsule, Rfl: 3    Allergies:   Allergies   Allergen Reactions    Actos [Pioglitazone] Anaphylaxis     congestive heart failure    Avandia [Rosiglitazone] Anaphylaxis     congestive heart failure.    Darvon [Propoxyphene] Itching     Angioedema    itching    Talwin [Pentazocine] Rash     Angioedema    itching       Objective   Objective     Physical Exam:  Vital Signs:   Vitals:    10/19/23 1544   BP: 132/84   Pulse: 75   SpO2: 95%   Weight: 78.2 kg (172 lb 6.4 oz)   Height: 170.2 cm (67.01\")     Body mass index is 27 kg/m².     Physical Exam  Nursing note reviewed  Const: NAD, A&Ox4, Pleasant, Cooperative  Eyes: EOMI, no conjunctivitis  ENT: No nasal discharge present, neck supple  Cardiac: Regular rate and rhythm, no cyanosis  Resp: Respiratory rate within normal limits, no increased work of breathing, no audible wheezing or retractions noted  GI: No distention or ascites  MSK: Motor and sensation grossly intact in bilateral upper extremities  Neurologic: CN II-XII grossly intact  Psych: Appropriate mood and behavior.  Skin: Warm, dry  Procedures/Radiology     Procedures  No radiology results for " the last 7 days     Assessment & Plan   Assessment / Plan      Assessment/Plan:   Problems Addressed This Visit  Diagnoses and all orders for this visit:    1. Type 2 diabetes mellitus with hyperglycemia, without long-term current use of insulin  -     Dulaglutide (Trulicity) 1.5 MG/0.5ML solution pen-injector; Inject 1.5 mg under the skin into the appropriate area as directed 1 (One) Time Per Week.  Dispense: 6 mL; Refill: 2      Problem List Items Addressed This Visit          Endocrine and Metabolic    Diabetes mellitus, type 2    Overview     On CGM he is having significant hyperglycemia in the middle of the night with precipitous drops (320 --> 60-70 within about 30 minutes). These correlate with times when he is getting sick at night.         Relevant Medications    Dulaglutide (Trulicity) 1.5 MG/0.5ML solution pen-injector       Patient Instructions   Change insulin doses every 7 days based on the following:     If before breakfast blood sugar readings are averaging higher than 150 for 7 days, raise insulin dose by 2 units. Repeat weekly until average is under 140.    Follow Up:   Return in about 3 months (around 1/19/2024) for with A1c;.      DO BAILEY Jones RD  [facility]  [address]  [facility] [address]  Fax [phone]  Phone [phone]

## 2023-10-19 NOTE — PATIENT INSTRUCTIONS
Change insulin doses every 7 days based on the following:     If before breakfast blood sugar readings are averaging higher than 150 for 7 days, raise insulin dose by 2 units. Repeat weekly until average is under 140.

## 2023-10-20 DIAGNOSIS — E11.65 TYPE 2 DIABETES MELLITUS WITH HYPERGLYCEMIA, WITHOUT LONG-TERM CURRENT USE OF INSULIN: ICD-10-CM

## 2023-10-20 RX ORDER — DULAGLUTIDE 1.5 MG/.5ML
1.5 INJECTION, SOLUTION SUBCUTANEOUS WEEKLY
Qty: 6 ML | Refills: 2 | OUTPATIENT
Start: 2023-10-20

## 2023-10-20 NOTE — TELEPHONE ENCOUNTER
Caller: Israel Reyna    Relationship: Self    Best call back number: [phone]     Requested Prescriptions:   Requested Prescriptions     Pending Prescriptions Disp Refills    Dulaglutide (Trulicity) 1.5 MG/0.5ML solution pen-injector 6 mL 2     Sig: Inject 1.5 mg under the skin into the appropriate area as directed 1 (One) Time Per Week.        Pharmacy where request should be sent:    [facility] [phone]  Last office visit with prescribing clinician: 10/19/2023   Last telemedicine visit with prescribing clinician: 9/14/2023   Next office visit with prescribing clinician: 1/8/2024     Additional details provided by patient: THIS NEEDS TO BE CALLED IN    Does the patient have less than a 3 day supply:  [] Yes  [x] No      Vicente Byrnes Rep   10/20/23 13:16 EDT

## 2023-10-25 ENCOUNTER — TELEPHONE (OUTPATIENT)
Dept: FAMILY MEDICINE CLINIC | Facility: CLINIC | Age: 68
End: 2023-10-25
Payer: MEDICARE

## 2023-10-25 NOTE — TELEPHONE ENCOUNTER
Caller: Israel Reyna    Relationship: Self    Best call back number: [phone]     What is the best time to reach you: ANYTIME    Who are you requesting to speak with (clinical staff, provider,  specific staff member): PCP/MA        What was the call regarding: PATIENT STATED HE CALLED LAST WEEK AND STATED THE TRULICITY NEEDED TO BE VERBALLY OR FAXED INTO THE [facility] AS HE GETS IT FROM [facility]. HE HAD CALLED AND GAVE A PHONE# TO CALL AS THAT IS THE FASTEST AND HAS NOT HEARD ANYTHING. IT DOES NOT NEED TO BE SENT TO [facility] . PATIENT IS ALMOST OUT OF MEDICATION AND NEEDS CALLED AND SENT ASAP    Is it okay if the provider responds through MyChart: CALLBACK WITH STATUS         Yes

## 2023-10-30 ENCOUNTER — OFFICE VISIT (OUTPATIENT)
Dept: NEUROLOGY | Facility: CLINIC | Age: 68
End: 2023-10-30
Payer: MEDICARE

## 2023-10-30 ENCOUNTER — TELEPHONE (OUTPATIENT)
Dept: FAMILY MEDICINE CLINIC | Facility: CLINIC | Age: 68
End: 2023-10-30
Payer: MEDICARE

## 2023-10-30 VITALS
OXYGEN SATURATION: 97 % | HEIGHT: 67 IN | DIASTOLIC BLOOD PRESSURE: 78 MMHG | SYSTOLIC BLOOD PRESSURE: 136 MMHG | HEART RATE: 73 BPM | WEIGHT: 172 LBS | BODY MASS INDEX: 27 KG/M2

## 2023-10-30 DIAGNOSIS — G89.29 CHRONIC HEAD PAIN: ICD-10-CM

## 2023-10-30 DIAGNOSIS — M54.2 NECK PAIN: ICD-10-CM

## 2023-10-30 DIAGNOSIS — G31.84 MCI (MILD COGNITIVE IMPAIRMENT): ICD-10-CM

## 2023-10-30 DIAGNOSIS — R51.9 CHRONIC HEAD PAIN: ICD-10-CM

## 2023-10-30 DIAGNOSIS — G43.119 INTRACTABLE MIGRAINE WITH AURA WITHOUT STATUS MIGRAINOSUS: Primary | ICD-10-CM

## 2023-10-30 PROCEDURE — 1159F MED LIST DOCD IN RCRD: CPT | Performed by: PSYCHIATRY & NEUROLOGY

## 2023-10-30 PROCEDURE — 3078F DIAST BP <80 MM HG: CPT | Performed by: PSYCHIATRY & NEUROLOGY

## 2023-10-30 PROCEDURE — 3075F SYST BP GE 130 - 139MM HG: CPT | Performed by: PSYCHIATRY & NEUROLOGY

## 2023-10-30 PROCEDURE — 99214 OFFICE O/P EST MOD 30 MIN: CPT | Performed by: PSYCHIATRY & NEUROLOGY

## 2023-10-30 PROCEDURE — 1160F RVW MEDS BY RX/DR IN RCRD: CPT | Performed by: PSYCHIATRY & NEUROLOGY

## 2023-10-30 RX ORDER — PROPRANOLOL HCL 60 MG
60 CAPSULE, EXTENDED RELEASE 24HR ORAL DAILY
Qty: 30 CAPSULE | Refills: 3 | Status: SHIPPED | OUTPATIENT
Start: 2023-10-30 | End: 2023-11-29

## 2023-10-30 NOTE — PROGRESS NOTES
"Subjective:    CC: Israel Reyna is in clinic today for follow up for history of chronic migraines, new complaint of memory impairment.    HPI:  Initial visit/ Prior w/u:  This is a 67 yo male who presents for re-evaluation of worsening migraines. Intractable migraines since 2013 followed by Dr. Chaidez normally.   He went to have COVID vaccine March 2021 Pfizer. He had immediate worsening headaches, chills, feeling terrible, weak and in bed x 4 days. He had his second injection April 2021 Pfizer with the same exact symptoms. He feels that his symptoms have progressively worsened since the COVID vaccines. Has noticed every time he eats anything at all he notices he cannot walk, he cannot speak, his left face will \"draw up\", twitch and be painful with episodes lasting 15 minutes to 2 hours and have been present for 2-3 months and seems to be worsening since COVID vaccine. He was last seen in our clinic 3/2020. He tells me the pain in his head, holocranial, can be anywhere throughout the head, \"feels like someone is repeatedly hitting me with a ball bat or stick and at times feels like someone is driving a nail in the back of my head. The ones that scare me the most are the headaches in the temples that feel like sharp, someone shooting a nail gun in my temples.\" Also has associated dizziness. Pain can radiate across his head and within 10 minutes and he loses control of his bowels. This is not a change and has been present since 4351-1550. No imaging of head since 2017. He cannot have MRI due to anal sphincter stimulator. Has chronic daily headaches. \"Drawing of face\" is daily. CTA head/neck 2019 normal. He feels when he has his pain there is \"moisture running down his head\". Takes topamax 25mg BID and gabapentin 300mg TID. Experiences severe pain with episodes. Cannot afford Botox. He is agreeable to CT head today. He is requesting migraine cocktail as well. He uses cane at times. Has been using fioricet " PRN. He is scheduled for OP migraine cocktail today at 3pm at [facility]. Last migraine cocktail was [id number]. Tells me one med gives him hiccups for 2-3 days after but unsure which.  His headaches do also have severe pain, throbbing, photophobia, phonophobia, nausea and occasional vomiting and do her daily with some episodes being worse than others.     Follow up with Sabiha Hanna, APRN: 4/12/2023:  Patient in clinic for regular follow up. At his last visit he was prescribed Depakote 500 mg BID for migraine prevention and Ubrelvy 100 mg for abortive treatment for migraines. He is unsure if he has started taking Depakote since his last visit. He does not believe that he had started Ubrelvy d/t cost but also was unsure. He stated that his spouse helps him to manage his medications and that he would need to check with her regarding what he is currently taking. He states that he continues to have both headaches and the previously described episodes including left sided paresthesias, left facial twitching/drawing up. His headaches vary in location and are located at the top of his head, occiput, bilateral temples, and at times will start near right temple and radiate towards left. Patient has had these episodes for many years and has previously completed detailed neurologic workup including cEEG which was negative. He reports that some headaches are associated with light and sound sensitivity along with nausea. He has near daily headaches of some kind everyday varying in presentation and severity. He has also appreciated dizziness over approximately the last month, he reports that it is worse while walking and that he has been using his cane more recently for stabilization, no recent falls. He recently saw his PCP d/t concern for fever and body aches who performed lab work including Hemoglobin A1c was 8.60, blood culture did not show any growth at 5 days, HIV was nonreactive, rheumatoid factor was less than 10,  TSH 2.440, , CK 42, CRP <0.30, CMP showed elevated glucose, sedimentation rate 38. It appears that at his last visit a CT head was discussed but had not yet been ordered.         Follow-up with Dr. Crain: 05/02/2023: Patient is in clinic for regular follow-up. Since the last visit, he has had a CT scan on 05/02/2023 to assess dizzy spells which did not reveal any acute intracranial abnormalities.  I reviewed the CT personally.  He could not afford to have Ubrelvy filled. He has been unable to fill his Aimovig due to cost as well. He is still experiencing dizzy spells and fatigue. He also notes intermittent shooting pain in different parts of the head he has completed his Depakote and he has not refilled this medication. He is not certain if it provided any relief. When he wakes up in the morning, he has difficulty with ambulation and speech, facial weakness, and headache. He feels worse if he sits down to relax. His pain occasionally wakes him in the middle of the night. He has been prescribed oxygen 15 L for his head pain in the past, which helped if he used the oxygen when he felt the pain coming on.  He is not sleeping well and he is constantly dreaming when he is asleep. He is not certain if he is snoring. He does not feel refreshed when he wakes up. He has been tested for sleep apnea and provided with a CPAP. He could not tolerate the mask and he was only using the machine for 2 to 3 hours.     He is taking Norvasc for blood pressure control. He had an episode of dizziness and headache last week, for which he saw Dr. Moore. He had a systolic blood pressure reading of 150 mmHg. He is taking amlodipine and lisinopril. He will see Dr. Moore on 05/09/2023.     He is feeling discouraged. He denies depression or the desire to harm himself. He is tired of seeing doctors, having testing performed, and feeling like he is not seeing any improvement. His short term memory is poor. He has a strong family history of  Alzheimer disease in his mother, his maternal grandmother, his maternal great-grandfather, and 2 of his maternal uncles.     He is having issues with his bowels. His sphincter never closes so he cannot hold a bowel movement when he needs to go. He has a stimulator, which he would like to have removed. He cannot have an MRI with this device in situ. He has been advised he may need to consider a colostomy bag. He has not followed up with gastroenterology. He has had an upper endoscopy.    7/8/2023: He is in clinic for regular follow-up. Since his last visit in 05/2023, he has not started the new medicine because he has not been able to finish the paperwork. He is taking the verapamil. He is in constant pain in all different parts of his head. Sometimes he will have pain shoot through his head, causing him to jerk. He has trouble thinking, cannot concentrate, and has memory trouble. He went to the urgent care because his blood pressure at home was registering 220/100 mmHg and they suggested he have a CT with contrast. He did that and was told there were some small arteries being blocked and more testing was needed as this could be the start of vascular dementia. One of the doctors told him he really needed to have an MRI, but he cannot have that because of the stimulator. He would like to have the stimulator removed but he cannot afford it. There is one type of pain that feels like it flies from one side of his head to the other. With this pain he feels like there is moisture running down and he loses control of his bowels. There was one doctor who gave him a medication that he had to stop because he could not leave the bathroom. He was told he might have to have a colostomy and he does not want that. He never knows how his bowels are going to react. He is on gabapentin 300 3 times daily and has no side effects from it. He is having trouble affording all the medications. His family is starting to notice his problems  with memory and concentration.     10/13/2023: He is in clinic for regular follow-up.  Since his last visit in July 2023, he reports that he has noticed some concerns with his memory where he cannot remember if he took his medications or not.  Sometimes he forgets names of people he is known for a long time.  He is concerned because there is a strong family history of Alzheimer's dementia.  He did increase the dose of gabapentin to 600 mg 3 times daily but it did not help with frequency or intensity of migraines.  He reports that he still gets almost daily headaches and some of them will convert into migraine.  He continues to take verapamil 40 mg twice daily.  In addition, he is also taking high dose of gabapentin 600 mg 3 times daily but it did not help with headache control.  He reports having head pain occurring almost on a daily basis involving different parts of the head.  He is also reporting episodes of palpitations since the last visit.    The following portions of the patient's history were reviewed and updated as of 10/30/2023: allergies, social history, and problem list.       Current Outpatient Medications:     atorvastatin (LIPITOR) 20 MG tablet, Take 1 tablet by mouth Daily., Disp: 90 tablet, Rfl: 3    butalbital-acetaminophen-caffeine (FIORICET, ESGIC) -40 MG per tablet, TAKE ONE TABLET BY MOUTH ONE TIME AS NEEDED FOR HEADACHE FOR UP TO 30 DOSES, Disp: 30 tablet, Rfl: 0    Creon 21903-143791 units capsule delayed-release particles capsule, Take 1 capsule by mouth 3 (Three) Times a Day With Meals., Disp: 90 capsule, Rfl: 0    diphenoxylate-atropine (Lomotil) 2.5-0.025 MG per tablet, Take 1 tablet by mouth 4 (Four) Times a Day As Needed for Diarrhea., Disp: 120 tablet, Rfl: 2    Dulaglutide (Trulicity) 1.5 MG/0.5ML solution pen-injector, Inject 1.5 mg under the skin into the appropriate area as directed 1 (One) Time Per Week., Disp: 6 mL, Rfl: 2    glipizide (GLUCOTROL) 10 MG tablet, Take 1  "tablet by mouth 2 (Two) Times a Day Before Meals., Disp: 180 tablet, Rfl: 3    glucose blood test strip, 1 each by Other route Daily. Use daily as directed, Disp: 100 each, Rfl: 12    glucose monitor monitoring kit, 1 each Daily., Disp: 1 each, Rfl: 0    Incontinence Supply Disposable (NU-FIT ADULT UNDERWEAR MEDIUM) misc, , Disp: , Rfl:     Insulin Pen Needle 31G X 6 MM misc, 1 each Daily., Disp: 100 each, Rfl: 3    Insulin Syringe 31G X 5/16\" 0.3 ML misc, 1 each Every Night., Disp: 90 each, Rfl: 3    Lancets Thin misc, 1 each Daily. Use daily as directed, Disp: 100 each, Rfl: 11    lisinopril (PRINIVIL,ZESTRIL) 40 MG tablet, Take 1 tablet by mouth 2 (Two) Times a Day., Disp: 180 tablet, Rfl: 0    Lutein 5 % beads, Take 5 mg by mouth Daily., Disp: , Rfl:     metFORMIN (GLUCOPHAGE) 1000 MG tablet, Take 1 tablet by mouth 2 (Two) Times a Day With Meals., Disp: 180 tablet, Rfl: 3    Multiple Vitamins-Minerals (PRESERVISION AREDS 2 PO), Take  by mouth Daily., Disp: , Rfl:     NON FORMULARY, Shot in eye every 3 months for macular degeneration, Disp: , Rfl:     ondansetron (ZOFRAN) 8 MG tablet, TAKE 1 TABLET BY MOUTH EVERY 8 HOURS AS NEEDED FOR NAUSEA FOR VOMITING, Disp: 30 tablet, Rfl: 0    pantoprazole (PROTONIX) 40 MG EC tablet, Take 1 tablet by mouth 2 (Two) Times a Day. 30 minutes prior to first meal of the day and 30 minutes prior to last meal of the day, Disp: 180 tablet, Rfl: 3    sildenafil (VIAGRA) 100 MG tablet, Take 1 tablet by mouth Daily As Needed for Erectile Dysfunction., Disp: 15 tablet, Rfl: 11    Specialty Vitamins Products (HEALTHY HEART COMPLEX PO), Take  by mouth Daily., Disp: , Rfl:     Storm Eduardo SoloStar 300 UNIT/ML solution pen-injector injection, Inject 20 Units under the skin into the appropriate area as directed every night at bedtime., Disp: 9 mL, Rfl: 0    vitamin B-12 (CYANOCOBALAMIN) 100 MCG tablet, Take 0.5 tablets by mouth 2 (two) times a day., Disp: , Rfl:     vitamin C (ASCORBIC ACID) " 500 MG tablet, Take 1 tablet by mouth 2 (two) times a day., Disp: , Rfl:     vitamin E 400 UNIT capsule, Take 1 capsule by mouth Daily., Disp: , Rfl:     Zeaxanthin powder, Take 1 mg by mouth Daily., Disp: , Rfl:     Zinc 40 MG tablet, Take 1 tablet by mouth Daily., Disp: , Rfl:     gabapentin (NEURONTIN) 300 MG capsule, Take 2 capsules by mouth 3 (Three) Times a Day for 30 days., Disp: 180 capsule, Rfl: 3    propranolol LA (Inderal LA) 60 MG 24 hr capsule, Take 1 capsule by mouth Daily for 30 days., Disp: 30 capsule, Rfl: 3   Past Medical History:   Diagnosis Date    Allergic     Anxiety     Bowel incontinence     w/ history of Stimulator Device     CAD (coronary artery disease)     Cataract     Chest pain     CHF (congestive heart failure)     Colon polyp     Depression     Diabetes mellitus, type 2     Diabetic peripheral neuropathy     Dyslipidemia     Fatigue     GERD (gastroesophageal reflux disease)     Hypertension     Kidney stone     Mental status change     Migraine     Complex     Migraine headache     Renal insufficiency     SOB (shortness of breath)       Past Surgical History:   Procedure Laterality Date    CARDIAC CATHETERIZATION      CARDIAC CATHETERIZATION N/A 10/14/2016    Procedure: Left Heart Cath;  Surgeon: Santiago Garner MD;  Location: [facility] CATH INVASIVE LOCATION;  Service:     COLONOSCOPY  02/25/2022    Dr. Graves-recommended 3 year repeat    ENDOSCOPY  02/03/2022    Dr. Graves    GASTRIC STIMULATOR IMPLANT SURGERY      SMALL INTESTINE SURGERY      unspecified, x3 as an infant     TESTICLE SURGERY        Family History   Problem Relation Age of Onset    Heart failure Mother     Diabetes Mother     Alzheimer's disease Mother     Arthritis Mother     Heart disease Mother     Heart attack Father     Kidney disease Father     Cancer Father     Prostate cancer Father     Heart disease Father     Hypertension Father     Cancer Sister     Breast cancer Sister     Diabetes Sister      "Heart disease Sister     Arrhythmia Sister     Diabetes Brother     Heart disease Brother     Cancer Brother     Liver disease Brother     Kidney disease Brother     Dementia Paternal Uncle     Alzheimer's disease Maternal Grandmother     Liver cancer Maternal Grandfather     Liver disease Maternal Grandfather     Other Paternal Grandmother         Icelandic flu    Other Paternal Grandfather         I[place]ic flu    Colon cancer Neg Hx         Review of Systems  Objective:    /78   Pulse 73   Ht 170.2 cm (67.01\")   Wt 78 kg (172 lb)   SpO2 97%   BMI 26.93 kg/m²     Neurology Exam:  General apperance: NAD.     Mental status: Alert, awake and oriented to time place and person.    Language and Speech: No aphasia or dysarthria.    CN II to XII: Intact.    Opthalmoscopic Exam: No papilledema.    Motor:  Right UE muscle strength 5/5. Normal tone.     Left UE muscle strength 5/5. Normal tone.      Right LE muscle strength 5/5. Normal tone.     Left LE muscle strength 5/5. Normal tone.      Sensory: Normal light touch, vibration and pinprick sensation bilaterally.    DTRs: 2+ bilaterally.    Babinski: Negative bilaterally.    Co-ordination: Normal finger-to-nose, heel to shin B/L.    Rhomberg: Negative.    Gait: Normal.    Cardiovascular: Regular rate and rhythm without murmur, gallop or rub.    MMSE: 29/30  Assessment and Plan:  1. Intractable migraine with aura without status migrainosus  2. Chronic head pain  3. Neck pain  4. MCI (mild cognitive impairment)  He did really well on MMSE and scored 29 out of 30.  I have assured him that he does not have dementia and the symptoms that he is describing are related to MCI or mild age-related memory impairment.  As far as headaches and migraines are concerned, it remains unchanged.  Since he is reporting episodes of palpitations occurring frequently since his last visit, I will stop his verapamil instead start him on Inderal long-acting 60 mg daily for migraine " prevention and also to help with palpitations.  Continue with gabapentin 600 mg 3 times daily.  If no response to Inderal then my plan would be to consider Cymbalta 30 mg on next visit and see if it helps with the symptoms.  I will plan to see him back in clinic in 6 to 8 weeks for follow-up.    I spent 30 minutes in patient care: Reviewing records prior to the visit, entering orders and documentation and spent more than hurtado 50% of this time face-to-face in management, instructions and education regarding above mentioned diagnosis and also on counseling and discussing about taking medication regularly, possible side effects with medication use, importance of good sleep hygiene, good hydration and regular exercise.    Return in about 8 weeks (around 12/25/2023).       Note to patient: The 21st Century Cures Act makes medical notes like these available to patients in the interest of transparency. However, be advised this is a medical document. It is intended as peer to peer communication. It is written in medical language and may contain abbreviations or verbiage that are unfamiliar. It may appear blunt or direct. Medical documents are intended to carry relevant information, facts as evident, and the clinical opinion of the physician.

## 2023-10-30 NOTE — TELEPHONE ENCOUNTER
Pt is requesting his Trulicity be sent to the [facility] instead of [facility]. He's almost out and currently using samples.

## 2023-11-06 DIAGNOSIS — E11.65 TYPE 2 DIABETES MELLITUS WITH HYPERGLYCEMIA, WITHOUT LONG-TERM CURRENT USE OF INSULIN: ICD-10-CM

## 2023-11-06 RX ORDER — DULAGLUTIDE 1.5 MG/.5ML
1.5 INJECTION, SOLUTION SUBCUTANEOUS WEEKLY
Qty: 6 ML | Refills: 2 | Status: SHIPPED | OUTPATIENT
Start: 2023-11-06 | End: 2023-11-08 | Stop reason: SDUPTHER

## 2023-11-06 NOTE — TELEPHONE ENCOUNTER
Caller: Israel Reyna    Relationship: Self    Best call back number:    Requested Prescriptions:   Requested Prescriptions     Pending Prescriptions Disp Refills    Dulaglutide (Trulicity) 1.5 MG/0.5ML solution pen-injector 6 mL 2     Sig: Inject 1.5 mg under the skin into the appropriate area as directed 1 (One) Time Per Week.      THIS WILL COMPLETE THE LAST QUARTER OF 2023.  THIS SHOULD HAVE BEEN COMPLETED IN SEPT 2023  Pharmacy where request should be sent:   [facility]  PHARMACY    [phone]  CALLING IN IS QUICKER THE PATIENT HAS BEEN OUT SINCE SEPT.  CALL     Last office visit with prescribing clinician: 10/19/2023   Last telemedicine visit with prescribing clinician: 9/14/2023   Next office visit with prescribing clinician: 1/8/2024     Additional details provided by patient: THE PATIENT IS OUT    Does the patient have less than a 3 day supply:  [x] Yes  [] No    Would you like a call back once the refill request has been completed: [x] Yes [] No    If the office needs to give you a call back, can they leave a voicemail: [x] Yes [] No    Vicente Reynolds Rep   11/06/23 10:00 EST

## 2023-11-08 ENCOUNTER — OFFICE VISIT (OUTPATIENT)
Dept: FAMILY MEDICINE CLINIC | Facility: CLINIC | Age: 68
End: 2023-11-08
Payer: MEDICARE

## 2023-11-08 VITALS
HEART RATE: 80 BPM | WEIGHT: 172 LBS | DIASTOLIC BLOOD PRESSURE: 78 MMHG | OXYGEN SATURATION: 98 % | SYSTOLIC BLOOD PRESSURE: 128 MMHG | HEIGHT: 67 IN | BODY MASS INDEX: 27 KG/M2

## 2023-11-08 DIAGNOSIS — I25.118 CORONARY ARTERY DISEASE OF NATIVE ARTERY OF NATIVE HEART WITH STABLE ANGINA PECTORIS: Primary | ICD-10-CM

## 2023-11-08 DIAGNOSIS — R00.2 PALPITATIONS: ICD-10-CM

## 2023-11-08 DIAGNOSIS — E11.65 TYPE 2 DIABETES MELLITUS WITH HYPERGLYCEMIA, WITHOUT LONG-TERM CURRENT USE OF INSULIN: ICD-10-CM

## 2023-11-08 PROCEDURE — 99214 OFFICE O/P EST MOD 30 MIN: CPT | Performed by: FAMILY MEDICINE

## 2023-11-08 PROCEDURE — 3074F SYST BP LT 130 MM HG: CPT | Performed by: FAMILY MEDICINE

## 2023-11-08 PROCEDURE — 3078F DIAST BP <80 MM HG: CPT | Performed by: FAMILY MEDICINE

## 2023-11-08 PROCEDURE — 3046F HEMOGLOBIN A1C LEVEL >9.0%: CPT | Performed by: FAMILY MEDICINE

## 2023-11-08 PROCEDURE — 93000 ELECTROCARDIOGRAM COMPLETE: CPT | Performed by: FAMILY MEDICINE

## 2023-11-08 RX ORDER — DULAGLUTIDE 1.5 MG/.5ML
1.5 INJECTION, SOLUTION SUBCUTANEOUS WEEKLY
Qty: 6 ML | Refills: 2 | Status: SHIPPED | OUTPATIENT
Start: 2023-11-08

## 2023-11-08 NOTE — Clinical Note
Would you mind calling [facility] and trying to fgure out what's going on with his Trulicity? He states he still hasn't been able to get it from them. He is enrolled in their program, he states the rx needs to be called in to the [facility] pharmacy--patient has the number at home, please get number for pharmacy (not the foundation) and call in the listed prescription for Trulicity as described in chart. Thank you!

## 2023-11-08 NOTE — PROGRESS NOTES
"Established Patient Office Visit      Patient Name: Israel Reyna  : 1955   MRN: [id number]   Care Team: Patient Care Team:  Elio Moore DO as PCP - General (Family Medicine)    Chief Complaint:    Chief Complaint   Patient presents with    Irregular Heart Beat     Pt co irregular heart beat/rate       History of Present Illness: Israel Reyna is a 68 y.o. male who is here today for chief complaint.    HPI    Past 2 weeks has noticed his heart \"skipping beats\", has taken his BP and pulse and they are usually normal, occasionally up to 155/80. HR has occasionally excursed up to 120s. Does not feel short of breath or chest pain at rest typically, but does feel some mild shortness of breath during these episodes. Last 15-30 minutes on average, resolve spontaneously.    History of CAD, has had multiple previous heart caths. Per patient, most recent one 20 years ago showed ~30-35% blockage in LAD.    This patient is accompanied by their self who contributes to the history of their care.    The following portions of the patient's history were reviewed and updated as appropriate: allergies, current medications, past family history, past medical history, past social history, past surgical history and problem list.    Subjective      Review of Systems:   Review of Systems - See HPI    Past Medical History:   Past Medical History:   Diagnosis Date    Allergic     Anxiety     Bowel incontinence     w/ history of Stimulator Device     CAD (coronary artery disease)     Cataract     Chest pain     CHF (congestive heart failure)     Colon polyp     Depression     Diabetes mellitus, type 2     Diabetic peripheral neuropathy     Dyslipidemia     Fatigue     GERD (gastroesophageal reflux disease)     Hypertension     Kidney stone     Mental status change     Migraine     Complex     Migraine headache     Renal insufficiency     SOB (shortness of breath)        Past Surgical History:   Past " Surgical History:   Procedure Laterality Date    CARDIAC CATHETERIZATION      CARDIAC CATHETERIZATION N/A 10/14/2016    Procedure: Left Heart Cath;  Surgeon: Santiago Garner MD;  Location: [facility] CATH INVASIVE LOCATION;  Service:     COLONOSCOPY  02/25/2022    Dr. Graves-recommended 3 year repeat    ENDOSCOPY  02/03/2022    Dr. Graves    GASTRIC STIMULATOR IMPLANT SURGERY      SMALL INTESTINE SURGERY      unspecified, x3 as an infant     TESTICLE SURGERY         Family History:   Family History   Problem Relation Age of Onset    Heart failure Mother     Diabetes Mother     Alzheimer's disease Mother     Arthritis Mother     Heart disease Mother     Heart attack Father     Kidney disease Father     Cancer Father     Prostate cancer Father     Heart disease Father     Hypertension Father     Cancer Sister     Breast cancer Sister     Diabetes Sister     Heart disease Sister     Arrhythmia Sister     Diabetes Brother     Heart disease Brother     Cancer Brother     Liver disease Brother     Kidney disease Brother     Dementia Paternal Uncle     Alzheimer's disease Maternal Grandmother     Liver cancer Maternal Grandfather     Liver disease Maternal Grandfather     Other Paternal Grandmother         Cambodian flu    Other Paternal Grandfather         Cambodian flu    Colon cancer Neg Hx        Social History:   Social History     Socioeconomic History    Marital status:    Tobacco Use    Smoking status: Never     Passive exposure: Never    Smokeless tobacco: Never   Vaping Use    Vaping Use: Never used   Substance and Sexual Activity    Alcohol use: Never    Drug use: Never    Sexual activity: Yes       Tobacco History:   Social History     Tobacco Use   Smoking Status Never    Passive exposure: Never   Smokeless Tobacco Never       Medications:     Current Outpatient Medications:     atorvastatin (LIPITOR) 20 MG tablet, Take 1 tablet by mouth Daily., Disp: 90 tablet, Rfl: 3     "butalbital-acetaminophen-caffeine (FIORICET, ESGIC) -40 MG per tablet, TAKE ONE TABLET BY MOUTH ONE TIME AS NEEDED FOR HEADACHE FOR UP TO 30 DOSES, Disp: 30 tablet, Rfl: 0    Creon 79828-191972 units capsule delayed-release particles capsule, Take 1 capsule by mouth 3 (Three) Times a Day With Meals., Disp: 90 capsule, Rfl: 0    diphenoxylate-atropine (Lomotil) 2.5-0.025 MG per tablet, Take 1 tablet by mouth 4 (Four) Times a Day As Needed for Diarrhea., Disp: 120 tablet, Rfl: 2    Dulaglutide (Trulicity) 1.5 MG/0.5ML solution pen-injector, Inject 1.5 mg under the skin into the appropriate area as directed 1 (One) Time Per Week., Disp: 6 mL, Rfl: 2    glipizide (GLUCOTROL) 10 MG tablet, Take 1 tablet by mouth 2 (Two) Times a Day Before Meals., Disp: 180 tablet, Rfl: 3    glucose monitor monitoring kit, 1 each Daily., Disp: 1 each, Rfl: 0    Incontinence Supply Disposable (NU-FIT ADULT UNDERWEAR MEDIUM) misc, , Disp: , Rfl:     Insulin Pen Needle 31G X 6 MM misc, 1 each Daily., Disp: 100 each, Rfl: 3    Insulin Syringe 31G X 5/16\" 0.3 ML misc, 1 each Every Night., Disp: 90 each, Rfl: 3    Lancets Thin misc, 1 each Daily. Use daily as directed, Disp: 100 each, Rfl: 11    Lutein 5 % beads, Take 5 mg by mouth Daily., Disp: , Rfl:     metFORMIN (GLUCOPHAGE) 1000 MG tablet, Take 1 tablet by mouth 2 (Two) Times a Day With Meals., Disp: 180 tablet, Rfl: 3    Multiple Vitamins-Minerals (PRESERVISION AREDS 2 PO), Take  by mouth Daily., Disp: , Rfl:     NON FORMULARY, Shot in eye every 3 months for macular degeneration, Disp: , Rfl:     ondansetron (ZOFRAN) 8 MG tablet, TAKE 1 TABLET BY MOUTH EVERY 8 HOURS AS NEEDED FOR NAUSEA FOR VOMITING, Disp: 30 tablet, Rfl: 0    pantoprazole (PROTONIX) 40 MG EC tablet, Take 1 tablet by mouth 2 (Two) Times a Day. 30 minutes prior to first meal of the day and 30 minutes prior to last meal of the day, Disp: 180 tablet, Rfl: 3    propranolol LA (Inderal LA) 60 MG 24 hr capsule, Take 1 " capsule by mouth Daily for 30 days., Disp: 30 capsule, Rfl: 3    sildenafil (VIAGRA) 100 MG tablet, Take 1 tablet by mouth Daily As Needed for Erectile Dysfunction., Disp: 15 tablet, Rfl: 11    Specialty Vitamins Products (HEALTHY HEART COMPLEX PO), Take  by mouth Daily., Disp: , Rfl:     Storm Eduardo SoloStar 300 UNIT/ML solution pen-injector injection, Inject 20 Units under the skin into the appropriate area as directed every night at bedtime., Disp: 9 mL, Rfl: 0    vitamin B-12 (CYANOCOBALAMIN) 100 MCG tablet, Take 0.5 tablets by mouth 2 (two) times a day., Disp: , Rfl:     vitamin C (ASCORBIC ACID) 500 MG tablet, Take 1 tablet by mouth 2 (two) times a day., Disp: , Rfl:     vitamin E 400 UNIT capsule, Take 1 capsule by mouth Daily., Disp: , Rfl:     Zeaxanthin powder, Take 1 mg by mouth Daily., Disp: , Rfl:     Zinc 40 MG tablet, Take 1 tablet by mouth Daily., Disp: , Rfl:     aspirin 325 MG EC tablet, Take 1 tablet by mouth Every 6 (Six) Hours As Needed for Mild Pain., Disp: , Rfl:     gabapentin (NEURONTIN) 300 MG capsule, Take 2 capsules by mouth 3 (Three) Times a Day for 30 days., Disp: 180 capsule, Rfl: 3    glucose blood test strip, 1 each by Other route Daily. Use daily as directed, Disp: 100 each, Rfl: 2    lisinopril (PRINIVIL,ZESTRIL) 40 MG tablet, Take 1 tablet by mouth twice daily, Disp: 180 tablet, Rfl: 1    nitroglycerin (NITROSTAT) 0.4 MG SL tablet, 1 under the tongue as needed for angina, may repeat q5mins for up three doses, Disp: 100 tablet, Rfl: 11    Allergies:   Allergies   Allergen Reactions    Actos [Pioglitazone] Anaphylaxis     congestive heart failure    Avandia [Rosiglitazone] Anaphylaxis     congestive heart failure.    Darvon [Propoxyphene] Itching     Angioedema    itching    Talwin [Pentazocine] Rash     Angioedema    itching       Objective   Objective     Physical Exam:  Vital Signs:   Vitals:    11/08/23 1214   BP: 128/78   BP Location: Left arm   Patient Position: Sitting   Cuff  "Size: Adult   Pulse: 80   SpO2: 98%   Weight: 78 kg (172 lb)   Height: 170.2 cm (67.01\")     Body mass index is 26.93 kg/m².     Physical Exam  Nursing note reviewed  Const: NAD, A&Ox4, Pleasant, Cooperative  Eyes: EOMI, no conjunctivitis  ENT: No nasal discharge present, neck supple  Cardiac: Regular rate and rhythm, no cyanosis  Resp: Respiratory rate within normal limits, no increased work of breathing, no audible wheezing or retractions noted  GI: No distention or ascites  MSK: Motor and sensation grossly intact in bilateral upper extremities  Neurologic: CN II-XII grossly intact  Psych: Appropriate mood and behavior.  Skin: Warm, dry  Procedures/Radiology       ECG 12 Lead    Date/Time: 11/8/2023 11:49 AM  Performed by: Elio Moore DO    Authorized by: Elio Moore DO  Comparison: compared with previous ECG   Similar to previous ECG  Rhythm: sinus rhythm  Rate: normal  QRS axis: normal    Clinical impression: normal ECG        No radiology results for the last 7 days     Assessment & Plan   Assessment / Plan      Assessment/Plan:   Problems Addressed This Visit  Diagnoses and all orders for this visit:    1. Coronary artery disease of native artery of native heart with stable angina pectoris (Primary)  -     Ambulatory Referral to [facility] [place]  -     Cancel: Holter Monitor - 72 Hour Up To 15 Days; Future    2. Type 2 diabetes mellitus with hyperglycemia, without long-term current use of insulin  -     Dulaglutide (Trulicity) 1.5 MG/0.5ML solution pen-injector; Inject 1.5 mg under the skin into the appropriate area as directed 1 (One) Time Per Week.  Dispense: 6 mL; Refill: 2    3. Palpitations  -     Ambulatory Referral to [facility]  -     Cancel: Holter Monitor - 72 Hour Up To 15 Days; Future    Other orders  -     ECG 12 Lead      Problem List Items Addressed This Visit          Cardiac and Vasculature    CAD (coronary artery disease) - " Primary    Overview     EKG shows sinus rhythm within normal limits.      October 2009, cardiac catheterization by Dr. Garner with a 30% circumflex. Otherwise within normal limits with an LVEF of 60%.   Multiple repeat cardiac catheterizations most recently 3-4 years ago at the [facility] (incomplete database). No stenting noted.          Relevant Orders    Ambulatory Referral to [facility]       Endocrine and Metabolic    Diabetes mellitus, type 2    Overview     On CGM he is having significant hyperglycemia in the middle of the night with precipitous drops (320 --> 60-70 within about 30 minutes). These correlate with times when he is getting sick at night.         Relevant Medications    Dulaglutide (Trulicity) 1.5 MG/0.5ML solution pen-injector     Other Visit Diagnoses       Palpitations        Relevant Orders    Ambulatory Referral to [facility] [place] [facility]                There are no Patient Instructions on file for this visit.    Follow Up:   No follow-ups on file.      DO BAILEY Jones RD  [facility]  [address]  [facility] [place] [address]  Fax [phone]  Phone [phone]

## 2023-11-10 ENCOUNTER — HOSPITAL ENCOUNTER (OUTPATIENT)
Dept: CARDIOLOGY | Facility: HOSPITAL | Age: 68
Discharge: HOME | End: 2023-11-10
Payer: MEDICARE

## 2023-11-10 ENCOUNTER — OFFICE VISIT (OUTPATIENT)
Dept: CARDIOLOGY | Facility: HOSPITAL | Age: 68
End: 2023-11-10
Payer: MEDICARE

## 2023-11-10 VITALS
HEART RATE: 69 BPM | TEMPERATURE: 97.4 F | WEIGHT: 173.06 LBS | SYSTOLIC BLOOD PRESSURE: 143 MMHG | BODY MASS INDEX: 27.16 KG/M2 | HEIGHT: 67 IN | OXYGEN SATURATION: 97 % | DIASTOLIC BLOOD PRESSURE: 75 MMHG | RESPIRATION RATE: 18 BRPM

## 2023-11-10 DIAGNOSIS — R00.2 PALPITATIONS: ICD-10-CM

## 2023-11-10 DIAGNOSIS — I25.10 MILD CAD: ICD-10-CM

## 2023-11-10 DIAGNOSIS — I10 PRIMARY HYPERTENSION: ICD-10-CM

## 2023-11-10 DIAGNOSIS — R06.09 DOE (DYSPNEA ON EXERTION): ICD-10-CM

## 2023-11-10 DIAGNOSIS — R42 DIZZINESS: ICD-10-CM

## 2023-11-10 DIAGNOSIS — R07.2 PRECORDIAL PAIN: Primary | ICD-10-CM

## 2023-11-10 DIAGNOSIS — E78.5 DYSLIPIDEMIA: ICD-10-CM

## 2023-11-10 DIAGNOSIS — E11.65 TYPE 2 DIABETES MELLITUS WITH HYPERGLYCEMIA, WITHOUT LONG-TERM CURRENT USE OF INSULIN: ICD-10-CM

## 2023-11-10 DIAGNOSIS — R53.83 OTHER FATIGUE: ICD-10-CM

## 2023-11-10 PROCEDURE — 93246 EXT ECG>7D<15D RECORDING: CPT

## 2023-11-10 RX ORDER — NITROGLYCERIN 0.4 MG/1
TABLET SUBLINGUAL
Qty: 100 TABLET | Refills: 11 | Status: SHIPPED | OUTPATIENT
Start: 2023-11-10

## 2023-11-10 RX ORDER — ASPIRIN 325 MG
325 TABLET, DELAYED RELEASE (ENTERIC COATED) ORAL EVERY 6 HOURS PRN
COMMUNITY

## 2023-11-10 NOTE — PROGRESS NOTES
"Chief Complaint  Establish Care, Hypertension, Palpitations, and Chest Pain    Subjective    History of Present Illness {CC  Problem List  Visit  Diagnosis   Encounters  Notes  Medications  Labs  Result Review Imaging  Media :23}       History of Present Illness   68 year old male presents to the office today at the request of his pcp for ongoing evaluation of his irregular heartbeats and cad.Patient had a C 2016 that showed Left main normal, LAD 10 to 20% mid LAD with myocardial bridge.  Left circumflex 30 to 40% stenosis.  RCA no atherosclerosis.  He reports that he has been experiencing intermittent chest pain, ARANDA and fatigue for the past few weeks. He also notes that he has been experiencing an irregular heart beat that he describes as if his heart stops. Patient also reports that he has been experiencing intermittent dizziness.  HX of CAD, hypertension, dyslipidemia, type 2 diabetes mellitus, GERD, ED, anxietyand depression, migraines, spondyloosis of cervical region  Objective     Vital Signs:   Vitals:    11/10/23 1319 11/10/23 1321 11/10/23 1323   BP: 139/74 157/80 143/75   BP Location: Right arm Left arm Left arm   Patient Position: Sitting Standing Sitting   Cuff Size: Adult Adult Adult   Pulse: 70 72 69   Resp:   18   Temp:   97.4 °F (36.3 °C)   TempSrc:   Temporal   SpO2: 98% 97% 97%   Weight:   78.5 kg (173 lb 1 oz)   Height:   170.2 cm (67.01\")     Body mass index is 27.1 kg/m².  Physical Exam  Vitals and nursing note reviewed.   Constitutional:       Appearance: Normal appearance.   HENT:      Head: Normocephalic.   Eyes:      Pupils: Pupils are equal, round, and reactive to light.   Cardiovascular:      Rate and Rhythm: Normal rate and regular rhythm.      Pulses: Normal pulses.      Heart sounds: Normal heart sounds. No murmur heard.  Pulmonary:      Effort: Pulmonary effort is normal.      Breath sounds: Normal breath sounds.   Abdominal:      General: Bowel sounds are normal.      " Palpations: Abdomen is soft.   Musculoskeletal:         General: Normal range of motion.      Cervical back: Normal range of motion.      Right lower leg: No edema.      Left lower leg: No edema.   Skin:     General: Skin is warm and dry.      Capillary Refill: Capillary refill takes less than 2 seconds.   Neurological:      Mental Status: He is alert and oriented to person, place, and time.   Psychiatric:         Mood and Affect: Mood normal.         Thought Content: Thought content normal.              Result Review  Data Reviewed:{ Labs  Result Review  Imaging  Med Tab  Media :23}   Magnesium (09/15/2023 13:11)  Vitamin B12 (09/15/2023 13:11)  Folate (09/15/2023 13:11)  C-reactive Protein (09/15/2023 13:11)  POC Glycosylated Hemoglobin (Hb A1C) (09/22/2023 09:42)           Assessment and Plan {CC Problem List  Visit Diagnosis  ROS  Review (Popup)  Health Maintenance  Quality  BestPractice  Medications  SmartSets  SnapShot Encounters  Media :23}   1. Precordial pain  Holger score: 2  - Stress Test With Myocardial Perfusion (1 Day); Future    2. ARANDA (dyspnea on exertion)    - Stress Test With Myocardial Perfusion (1 Day); Future    3. Palpitations    - Holter Monitor - 72 Hour Up To 15 Days; Future  - Stress Test With Myocardial Perfusion (1 Day); Future    4. Dizziness    - Holter Monitor - 72 Hour Up To 15 Days; Future  - Stress Test With Myocardial Perfusion (1 Day); Future    5. Mild CAD    - Stress Test With Myocardial Perfusion (1 Day); Future    6. Other fatigue    - Stress Test With Myocardial Perfusion (1 Day); Future    7. Primary hypertension  Stable on lisinopril, propranolol  - Stress Test With Myocardial Perfusion (1 Day); Future    8. Dyslipidemia  Stable on lipitor  - Stress Test With Myocardial Perfusion (1 Day); Future    9. Type 2 diabetes mellitus with hyperglycemia, without long-term current use of insulin    - Stress Test With Myocardial Perfusion (1 Day); Future        Follow Up  {Instructions Charge Capture  Follow-up Communications :23}   Return in about 4 weeks (around 12/8/2023) for Office visit, Monitor results.    Patient was given instructions and counseling regarding his condition or for health maintenance advice. Please see specific information pulled into the AVS if appropriate.  Patient was instructed to call the [facility] with any questions, concerns, or worsening symptoms.

## 2023-11-10 NOTE — PROGRESS NOTES
[facility] Heart Monitor Documentation    Israel Reyna  1955  [id number]  11/10/23      [] ZIO XT Patch  Model [id number] Prescribed for N/A Days    Serial Number: (N + 9 Digits) N   Apply-By Date on Box:   USPS Tracking Number:   USPS Tracking        [] [employer] MINI PLUS Mobile Cardiac Telemetry  Model BGMINIPLUS Prescribed for N/A Days    Serial Number: (BGM + 7 Digits) BGM  Shipped-By Date on Box:   UPS Tracking Number: [id number]  UPS Tracking      [] [employer] MINI Holter Monitor  Model BGMINIEL Prescribed for 14 Days    Serial Number: (7 Digits) [id number]  Shipped-By Date on Box: 954412  UPS Tracking Number: [id number]  UPS Tracking        This monitor was applied to the patient's chest and checked for proper functioning.  Mr. Israel Reyna was instructed in the proper use of this monitor.  He was given the opportunity to ask questions and left the office with the device 's instruction manual.    Pily Head MA, 13:56 EST, 11/10/23                  [facility] Center[facility]UMENT[facility] 8.8.2019

## 2023-11-10 NOTE — TELEPHONE ENCOUNTER
Patient called regarding Trulicity script refill.  Patient said he spoke w/ the [facility] & it was best call in the script @ [phone]; fax is [phone]

## 2023-11-14 DIAGNOSIS — E11.65 TYPE 2 DIABETES MELLITUS WITH HYPERGLYCEMIA, WITHOUT LONG-TERM CURRENT USE OF INSULIN: ICD-10-CM

## 2023-11-14 RX ORDER — DULAGLUTIDE 1.5 MG/.5ML
1.5 INJECTION, SOLUTION SUBCUTANEOUS WEEKLY
Qty: 6 ML | Refills: 2 | OUTPATIENT
Start: 2023-11-14

## 2023-11-14 NOTE — TELEPHONE ENCOUNTER
Caller: [facility] [address] - [phone] [facility] [phone] FX    Relationship: Pharmacy    Best call back number: [phone] FOR [facility] PATIENTS [phone] FOR THE PHARMACY        Requested Prescriptions:   Requested Prescriptions     Pending Prescriptions Disp Refills    Dulaglutide (Trulicity) 1.5 MG/0.5ML solution pen-injector 6 mL 2     Sig: Inject 1.5 mg under the skin into the appropriate area as directed 1 (One) Time Per Week.        Pharmacy where request should be sent: [facility] [address] - [phone] [facility] [phone] FX     Last office visit with prescribing clinician: 11/8/2023   Last telemedicine visit with prescribing clinician: 9/14/2023   Next office visit with prescribing clinician: 1/8/2024     Additional details provided by patient: OUT OF MEDICATION. [facility] STATES THE PHARMACY HAS BEEN SENDING REFILL REQUESTS BUT HAVE NOT RECEIVED A RESPONSE BACK. THE PHARMACY WILL ALSO NEED AN UPDATED LIST OF THE PATIENT'S ALLERGIES.     Does the patient have less than a 3 day supply:  [x] Yes  [] No    Would you like a call back once the refill request has been completed: [] Yes [x] No    If the office needs to give you a call back, can they leave a voicemail: [] Yes [x] No    Vicente Bella Rep   11/14/23 11:27 EST

## 2023-11-15 ENCOUNTER — TELEPHONE (OUTPATIENT)
Dept: FAMILY MEDICINE CLINIC | Facility: CLINIC | Age: 68
End: 2023-11-15
Payer: MEDICARE

## 2023-11-15 NOTE — TELEPHONE ENCOUNTER
Patient wanted Dr. Moore to know he is currently wearing a heart monitor.   He has a stress test scheduled Friday.     We also discussed his trulicity and the [facility] information. He is going to send his wife to  the application that we started so they can finish it on their end and send back in to the foundation.     The patient explained again that the pharmacy for trulicity specifically has not been able to receive the fax for the trulicity.   The phone number and fax he has for the actual pharmacy is   P) [phone]  F) [phone]    I have faxed the prescription in to the pharmacy at the number given. The patient did mention that he has some on hand right now to get him through but eventually he will run out.     I've placed the application on the first floor for patient  and advised him that they can ask someone at the  for it.     No further questions or concerns at this time.

## 2023-11-17 ENCOUNTER — HOSPITAL ENCOUNTER (OUTPATIENT)
Dept: CARDIOLOGY | Facility: HOSPITAL | Age: 68
Discharge: HOME | End: 2023-11-17
Payer: MEDICARE

## 2023-11-17 DIAGNOSIS — R42 DIZZINESS: ICD-10-CM

## 2023-11-17 DIAGNOSIS — E78.5 DYSLIPIDEMIA: ICD-10-CM

## 2023-11-17 DIAGNOSIS — E11.65 TYPE 2 DIABETES MELLITUS WITH HYPERGLYCEMIA, WITHOUT LONG-TERM CURRENT USE OF INSULIN: ICD-10-CM

## 2023-11-17 DIAGNOSIS — R00.2 PALPITATIONS: ICD-10-CM

## 2023-11-17 DIAGNOSIS — I25.10 MILD CAD: ICD-10-CM

## 2023-11-17 DIAGNOSIS — R53.83 OTHER FATIGUE: ICD-10-CM

## 2023-11-17 DIAGNOSIS — I10 PRIMARY HYPERTENSION: ICD-10-CM

## 2023-11-17 DIAGNOSIS — R07.2 PRECORDIAL PAIN: ICD-10-CM

## 2023-11-17 DIAGNOSIS — R06.09 DOE (DYSPNEA ON EXERTION): ICD-10-CM

## 2023-11-17 LAB
BH CV REST NUCLEAR ISOTOPE DOSE: 9.7 MCI
BH CV STRESS BP STAGE 2: NORMAL
BH CV STRESS BP STAGE 4: NORMAL
BH CV STRESS COMMENTS STAGE 1: NORMAL
BH CV STRESS DOSE REGADENOSON STAGE 1: 0.4
BH CV STRESS DURATION MIN STAGE 1: 1
BH CV STRESS DURATION MIN STAGE 2: 1
BH CV STRESS DURATION MIN STAGE 3: 1
BH CV STRESS DURATION MIN STAGE 4: 1
BH CV STRESS DURATION SEC STAGE 1: 0
BH CV STRESS DURATION SEC STAGE 2: 0
BH CV STRESS DURATION SEC STAGE 3: 0
BH CV STRESS DURATION SEC STAGE 4: 0
BH CV STRESS HR STAGE 1: 89
BH CV STRESS HR STAGE 2: 103
BH CV STRESS HR STAGE 3: 96
BH CV STRESS HR STAGE 4: 89
BH CV STRESS NUCLEAR ISOTOPE DOSE: 31.3 MCI
BH CV STRESS O2 STAGE 1: 98
BH CV STRESS O2 STAGE 2: 99
BH CV STRESS O2 STAGE 3: 99
BH CV STRESS O2 STAGE 4: 98
BH CV STRESS PROTOCOL 1: NORMAL
BH CV STRESS RECOVERY BP: NORMAL MMHG
BH CV STRESS RECOVERY HR: 83 BPM
BH CV STRESS RECOVERY O2: 98 %
BH CV STRESS STAGE 1: 1
BH CV STRESS STAGE 2: 2
BH CV STRESS STAGE 3: 3
BH CV STRESS STAGE 4: 4
GLUCOSE BLDC GLUCOMTR-MCNC: 63 MG/DL (ref 70–130)
LV EF NUC BP: 62 %
MAXIMAL PREDICTED HEART RATE: 153 BPM
PERCENT MAX PREDICTED HR: 67.32 %
STRESS BASELINE BP: NORMAL MMHG
STRESS BASELINE HR: 73 BPM
STRESS O2 SAT REST: 97 %
STRESS PERCENT HR: 79 %
STRESS POST ESTIMATED WORKLOAD: 1 METS
STRESS POST EXERCISE DUR MIN: 4 MIN
STRESS POST EXERCISE DUR SEC: 0 SEC
STRESS POST O2 SAT PEAK: 99 %
STRESS POST PEAK BP: NORMAL MMHG
STRESS POST PEAK HR: 103 BPM
STRESS TARGET HR: 130 BPM

## 2023-11-17 PROCEDURE — 25010000002 LORAZEPAM PER 2 MG: Performed by: INTERNAL MEDICINE

## 2023-11-17 PROCEDURE — 82948 REAGENT STRIP/BLOOD GLUCOSE: CPT

## 2023-11-17 PROCEDURE — 25010000002 REGADENOSON 0.4 MG/5ML SOLUTION: Performed by: NURSE PRACTITIONER

## 2023-11-17 PROCEDURE — 93017 CV STRESS TEST TRACING ONLY: CPT

## 2023-11-17 PROCEDURE — 0 TECHNETIUM SESTAMIBI: Performed by: NURSE PRACTITIONER

## 2023-11-17 PROCEDURE — 78452 HT MUSCLE IMAGE SPECT MULT: CPT

## 2023-11-17 PROCEDURE — A9500 TC99M SESTAMIBI: HCPCS | Performed by: NURSE PRACTITIONER

## 2023-11-17 RX ORDER — LORAZEPAM 2 MG/ML
0.5 INJECTION INTRAMUSCULAR ONCE
Status: COMPLETED | OUTPATIENT
Start: 2023-11-17 | End: 2023-11-17

## 2023-11-17 RX ORDER — REGADENOSON 0.08 MG/ML
0.4 INJECTION, SOLUTION INTRAVENOUS ONCE
Status: COMPLETED | OUTPATIENT
Start: 2023-11-17 | End: 2023-11-17

## 2023-11-17 RX ADMIN — TECHNETIUM TC 99M SESTAMIBI 1 DOSE: 1 INJECTION INTRAVENOUS at 12:50

## 2023-11-17 RX ADMIN — TECHNETIUM TC 99M SESTAMIBI 1 DOSE: 1 INJECTION INTRAVENOUS at 10:50

## 2023-11-17 RX ADMIN — LORAZEPAM 0.5 MG: 2 INJECTION INTRAMUSCULAR; INTRAVENOUS at 12:20

## 2023-11-17 RX ADMIN — REGADENOSON 0.4 MG: 0.08 INJECTION, SOLUTION INTRAVENOUS at 12:48

## 2023-11-20 ENCOUNTER — TELEPHONE (OUTPATIENT)
Dept: FAMILY MEDICINE CLINIC | Facility: CLINIC | Age: 68
End: 2023-11-20
Payer: MEDICARE

## 2023-11-20 NOTE — TELEPHONE ENCOUNTER
Caller: Israel Reyna    Relationship: Self    Best call back number: [phone]     What form or medical record are you requesting: APPLICATION FOR THE [facility]    Who is requesting this form or medical record from you: PATIENT    How would you like to receive the form or medical records (pick-up, mail, fax): MAIL    If mail, what is the address: [facility] [place] [address]      Timeframe paperwork needed: AS SOON AS POSSIBLE

## 2023-11-22 DIAGNOSIS — E11.9 TYPE 2 DIABETES MELLITUS WITHOUT COMPLICATION, WITHOUT LONG-TERM CURRENT USE OF INSULIN: ICD-10-CM

## 2023-11-22 DIAGNOSIS — E11.65 TYPE 2 DIABETES MELLITUS WITH HYPERGLYCEMIA, WITHOUT LONG-TERM CURRENT USE OF INSULIN: ICD-10-CM

## 2023-11-22 RX ORDER — LISINOPRIL 40 MG/1
40 TABLET ORAL 2 TIMES DAILY
Qty: 180 TABLET | Refills: 1 | Status: SHIPPED | OUTPATIENT
Start: 2023-11-22

## 2023-11-22 NOTE — TELEPHONE ENCOUNTER
Caller: Israel Reyna    Relationship: Self    Best call back number: [phone]     Requested Prescriptions:   Requested Prescriptions     Pending Prescriptions Disp Refills    glucose blood test strip 100 each 12     Si each by Other route Daily. Use daily as directed        [facility] [address] [place] [address] [phone] [facility] [phone]      Last office visit with prescribing clinician: 2023   Last telemedicine visit with prescribing clinician: 2023   Next office visit with prescribing clinician: 2024     Additional details provided by patient: STRIPS FOR THE ONE TOUCH ULTRA, PATIENT IS OUT OF STRIPS.    Does the patient have less than a 3 day supply:  [x] Yes  [] No    Would you like a call back once the refill request has been completed: [] Yes [x] No    If the office needs to give you a call back, can they leave a voicemail: [] Yes [x] No    Vicente Muñoz Rep   23 09:27 EST

## 2023-11-22 NOTE — TELEPHONE ENCOUNTER
Rx Refill Note  Requested Prescriptions     Pending Prescriptions Disp Refills    glucose blood test strip 100 each 12     Si each by Other route Daily. Use daily as directed      Last office visit with prescribing clinician: 2023   Last telemedicine visit with prescribing clinician: 2023   Next office visit with prescribing clinician: 2024                         Would you like a call back once the refill request has been completed: [] Yes [] No    If the office needs to give you a call back, can they leave a voicemail: [] Yes [] No    Reyna Charles MA  23, 09:29 EST

## 2023-11-22 NOTE — TELEPHONE ENCOUNTER
Rx Refill Note  Requested Prescriptions     Pending Prescriptions Disp Refills    lisinopril (PRINIVIL,ZESTRIL) 40 MG tablet [Pharmacy Med Name: Lisinopril 40 MG Oral Tablet] 180 tablet 0     Sig: Take 1 tablet by mouth twice daily      Last office visit with prescribing clinician: 11/8/2023     Next office visit with prescribing clinician: 1/8/2024   Nidia Love MA  11/22/23, 11:01 EST

## 2023-11-22 NOTE — TELEPHONE ENCOUNTER
Application was placed on first floor for patient and/or wife to  at his request and patient was informed.     Please advise and document.     Med for flu prevention.   Pt high risk

## 2023-11-23 DIAGNOSIS — G43.119 INTRACTABLE MIGRAINE WITH AURA WITHOUT STATUS MIGRAINOSUS: ICD-10-CM

## 2023-11-27 RX ORDER — BUTALBITAL, ACETAMINOPHEN AND CAFFEINE 50; 325; 40 MG/1; MG/1; MG/1
TABLET ORAL
Qty: 30 TABLET | Refills: 0 | Status: SHIPPED | OUTPATIENT
Start: 2023-11-27

## 2023-11-27 NOTE — TELEPHONE ENCOUNTER
Rx Refill Note  Requested Prescriptions     Pending Prescriptions Disp Refills    butalbital-acetaminophen-caffeine (FIORICET, ESGIC) -40 MG per tablet [Pharmacy Med Name: Butalbital-APAP-Caffeine -40 MG Oral Tablet] 30 tablet 0     Sig: TAKE 1 TABLET BY MOUTH ONE TIME AS NEEDED FOR HEADACHE FOR UP TO 30 DOSES      Last filled: 9/18/23 with 0 pending to provider   Last office visit with prescribing clinician: 10/30/2023      Next office visit with prescribing clinician: 1/11/2024     Marina Gomez MA  11/27/23, 08:59 EST

## 2023-11-28 DIAGNOSIS — M54.2 NECK PAIN: ICD-10-CM

## 2023-11-28 DIAGNOSIS — G43.119 INTRACTABLE MIGRAINE WITH AURA WITHOUT STATUS MIGRAINOSUS: ICD-10-CM

## 2023-11-28 DIAGNOSIS — M47.812 SPONDYLOSIS OF CERVICAL REGION WITHOUT MYELOPATHY OR RADICULOPATHY: ICD-10-CM

## 2023-11-28 DIAGNOSIS — R51.9 FACIAL PAIN: ICD-10-CM

## 2023-11-28 DIAGNOSIS — E11.42 DIABETIC POLYNEUROPATHY ASSOCIATED WITH TYPE 2 DIABETES MELLITUS: ICD-10-CM

## 2023-11-28 RX ORDER — GABAPENTIN 300 MG/1
600 CAPSULE ORAL 3 TIMES DAILY
Qty: 180 CAPSULE | Refills: 3 | Status: SHIPPED | OUTPATIENT
Start: 2023-11-28 | End: 2023-12-28

## 2023-11-28 NOTE — TELEPHONE ENCOUNTER
Rx Refill Note  Requested Prescriptions     Pending Prescriptions Disp Refills    gabapentin (NEURONTIN) 300 MG capsule 180 capsule 3     Sig: Take 2 capsules by mouth 3 (Three) Times a Day for 30 days.      Last filled: 7/10/23 30 days with 3 refills    Last office visit with prescribing clinician: 10/30/2023      Next office visit with prescribing clinician: 1/11/2024     Received fax refill request from [facility] mail order     Hermilo Rod MA  11/28/23, 08:44 EST

## 2023-12-07 ENCOUNTER — TELEPHONE (OUTPATIENT)
Dept: FAMILY MEDICINE CLINIC | Facility: CLINIC | Age: 68
End: 2023-12-07
Payer: MEDICARE

## 2023-12-07 NOTE — TELEPHONE ENCOUNTER
Caller: Israel Reyna    Relationship: Self    Best call back number: [phone]     What was the call regarding: PATIENT CALLED TO CHECK THE STATUS OF PAPERWORK THAT NEEDED TO BE MAILED TO HIM.  HE STATED THAT IT WAS FOR THE [facility] FOR HIS TRULICITY.

## 2023-12-08 ENCOUNTER — OFFICE VISIT (OUTPATIENT)
Dept: CARDIOLOGY | Facility: HOSPITAL | Age: 68
End: 2023-12-08
Payer: MEDICARE

## 2023-12-08 VITALS
BODY MASS INDEX: 27.49 KG/M2 | DIASTOLIC BLOOD PRESSURE: 77 MMHG | TEMPERATURE: 96.3 F | RESPIRATION RATE: 17 BRPM | OXYGEN SATURATION: 95 % | HEIGHT: 67 IN | HEART RATE: 64 BPM | SYSTOLIC BLOOD PRESSURE: 138 MMHG | WEIGHT: 175.19 LBS

## 2023-12-08 DIAGNOSIS — R00.2 PALPITATIONS: ICD-10-CM

## 2023-12-08 DIAGNOSIS — R06.09 DOE (DYSPNEA ON EXERTION): ICD-10-CM

## 2023-12-08 DIAGNOSIS — I10 PRIMARY HYPERTENSION: ICD-10-CM

## 2023-12-08 DIAGNOSIS — R07.2 PRECORDIAL PAIN: Primary | ICD-10-CM

## 2023-12-08 DIAGNOSIS — E78.5 DYSLIPIDEMIA: ICD-10-CM

## 2023-12-08 RX ORDER — TAMSULOSIN HYDROCHLORIDE 0.4 MG/1
1 CAPSULE ORAL DAILY
COMMUNITY
Start: 2023-12-07

## 2023-12-08 RX ORDER — PROPRANOLOL HYDROCHLORIDE 80 MG/1
80 CAPSULE, EXTENDED RELEASE ORAL DAILY
Qty: 90 CAPSULE | Refills: 3 | Status: SHIPPED | OUTPATIENT
Start: 2023-12-08

## 2023-12-08 NOTE — PROGRESS NOTES
"Chief Complaint  Palpitations and Follow-up    Subjective    History of Present Illness {CC  Problem List  Visit  Diagnosis   Encounters  Notes  Medications  Labs  Result Review Imaging  Media :23}       History of Present Illness   68 year old male presents to the office today at the request of his pcp for ongoing evaluation of his irregular heartbeats and cad.Patient had a C 2016 that showed Left main normal, LAD 10 to 20% mid LAD with myocardial bridge.  Left circumflex 30 to 40% stenosis.  RCA no atherosclerosis.  He reports that he has been experiencing intermittent chest pain, ARANDA and fatigue for the past few weeks. He also notes that he has been experiencing an irregular heart beat that he describes as if his heart stops. Patient also reports that he has been experiencing intermittent dizziness.notes that he is experiencing kidney pain and thinks he has a kidney stone. He was recently started on flomax.   HX of CAD, hypertension, dyslipidemia, type 2 diabetes mellitus, GERD, ED, anxietyand depression, migraines, spondyloosis of cervical region  Objective     Vital Signs:   Vitals:    12/08/23 1018   BP: 138/77   BP Location: Left arm   Patient Position: Sitting   Cuff Size: Adult   Pulse: 64   Resp: 17   Temp: 96.3 °F (35.7 °C)   TempSrc: Temporal   SpO2: 95%   Weight: 79.5 kg (175 lb 3 oz)   Height: 170.2 cm (67.01\")       Body mass index is 27.43 kg/m².  Physical Exam  Vitals and nursing note reviewed.   Constitutional:       Appearance: Normal appearance.   HENT:      Head: Normocephalic.   Eyes:      Pupils: Pupils are equal, round, and reactive to light.   Cardiovascular:      Rate and Rhythm: Normal rate and regular rhythm.      Pulses: Normal pulses.      Heart sounds: Normal heart sounds. No murmur heard.  Pulmonary:      Effort: Pulmonary effort is normal.      Breath sounds: Normal breath sounds.   Abdominal:      General: Bowel sounds are normal.      Palpations: Abdomen is soft. "   Musculoskeletal:         General: Normal range of motion.      Cervical back: Normal range of motion.      Right lower leg: No edema.      Left lower leg: No edema.   Skin:     General: Skin is warm and dry.      Capillary Refill: Capillary refill takes less than 2 seconds.   Neurological:      Mental Status: He is alert and oriented to person, place, and time.   Psychiatric:         Mood and Affect: Mood normal.         Thought Content: Thought content normal.              Result Review  Data Reviewed:{ Labs  Result Review  Imaging  Med Tab  Media :23}   Magnesium (09/15/2023 13:11)  Vitamin B12 (09/15/2023 13:11)  Folate (09/15/2023 13:11)  C-reactive Protein (09/15/2023 13:11)  POC Glycosylated Hemoglobin (Hb A1C) (09/22/2023 09:42)  Myocardial perfusion imaging indicates a normal myocardial perfusion study with no evidence of ischemia.    Left ventricular ejection fraction is normal (Calculated EF = 62%).      holter: Average heart rate 77 bpm, PAC/PVC burden less than 1% with no arrhythmias noted.         Assessment and Plan {CC Problem List  Visit Diagnosis  ROS  Review (Popup)  Health Maintenance  Quality  BestPractice  Medications  SmartSets  SnapShot Encounters  Media :23}   1. Precordial pain  Holger score: 2  Normal stress test with EF 62%    2. ARANDA (dyspnea on exertion)  Improving    3. Palpitations    Low burden on recent extended Holter however patient is very symptomatic  Increase propranolol to 80 mg daily  4.  Primary hypertension  Stable on lisinopril, propranolol      5. Dyslipidemia  Stable on lipitor          Follow Up {Instructions Charge Capture  Follow-up Communications :23}   No follow-ups on file.    Patient was given instructions and counseling regarding his condition or for health maintenance advice. Please see specific information pulled into the AVS if appropriate.  Patient was instructed to call the [facility] with any questions, concerns, or worsening  symptoms.

## 2023-12-11 DIAGNOSIS — K52.9 CHRONIC DIARRHEA: ICD-10-CM

## 2023-12-11 DIAGNOSIS — K86.81 EXOCRINE PANCREATIC INSUFFICIENCY: ICD-10-CM

## 2023-12-12 ENCOUNTER — OFFICE VISIT (OUTPATIENT)
Dept: FAMILY MEDICINE CLINIC | Facility: CLINIC | Age: 68
End: 2023-12-12
Payer: MEDICARE

## 2023-12-12 ENCOUNTER — TELEPHONE (OUTPATIENT)
Dept: FAMILY MEDICINE CLINIC | Facility: CLINIC | Age: 68
End: 2023-12-12

## 2023-12-12 ENCOUNTER — TELEPHONE (OUTPATIENT)
Dept: PEDIATRICS | Facility: OTHER | Age: 68
End: 2023-12-12
Payer: MEDICARE

## 2023-12-12 VITALS
WEIGHT: 175 LBS | BODY MASS INDEX: 27.47 KG/M2 | DIASTOLIC BLOOD PRESSURE: 78 MMHG | SYSTOLIC BLOOD PRESSURE: 130 MMHG | HEIGHT: 67 IN

## 2023-12-12 DIAGNOSIS — R20.2 NUMBNESS AND TINGLING IN LEFT HAND: ICD-10-CM

## 2023-12-12 DIAGNOSIS — R10.9 FLANK PAIN: Primary | ICD-10-CM

## 2023-12-12 DIAGNOSIS — R20.0 NUMBNESS AND TINGLING IN LEFT HAND: ICD-10-CM

## 2023-12-12 LAB
BILIRUB BLD-MCNC: NEGATIVE MG/DL
CLARITY, POC: ABNORMAL
COLOR UR: YELLOW
EXPIRATION DATE: ABNORMAL
GLUCOSE UR STRIP-MCNC: ABNORMAL MG/DL
KETONES UR QL: NEGATIVE
LEUKOCYTE EST, POC: ABNORMAL
Lab: ABNORMAL
NITRITE UR-MCNC: NEGATIVE MG/ML
PH UR: 6.5 [PH] (ref 5–8)
PROT UR STRIP-MCNC: ABNORMAL MG/DL
RBC # UR STRIP: NEGATIVE /UL
SP GR UR: 1.01 (ref 1–1.03)
UROBILINOGEN UR QL: NORMAL

## 2023-12-12 PROCEDURE — 3078F DIAST BP <80 MM HG: CPT | Performed by: FAMILY MEDICINE

## 2023-12-12 PROCEDURE — 99214 OFFICE O/P EST MOD 30 MIN: CPT | Performed by: FAMILY MEDICINE

## 2023-12-12 PROCEDURE — 93000 ELECTROCARDIOGRAM COMPLETE: CPT | Performed by: FAMILY MEDICINE

## 2023-12-12 PROCEDURE — 87086 URINE CULTURE/COLONY COUNT: CPT | Performed by: FAMILY MEDICINE

## 2023-12-12 PROCEDURE — 3046F HEMOGLOBIN A1C LEVEL >9.0%: CPT | Performed by: FAMILY MEDICINE

## 2023-12-12 PROCEDURE — 87147 CULTURE TYPE IMMUNOLOGIC: CPT | Performed by: FAMILY MEDICINE

## 2023-12-12 PROCEDURE — 81003 URINALYSIS AUTO W/O SCOPE: CPT | Performed by: FAMILY MEDICINE

## 2023-12-12 PROCEDURE — 3075F SYST BP GE 130 - 139MM HG: CPT | Performed by: FAMILY MEDICINE

## 2023-12-12 RX ORDER — CEFDINIR 300 MG/1
300 CAPSULE ORAL 2 TIMES DAILY
Qty: 10 CAPSULE | Refills: 0 | Status: SHIPPED | OUTPATIENT
Start: 2023-12-12 | End: 2023-12-17

## 2023-12-12 RX ORDER — PANCRELIPASE 36000; 180000; 114000 [USP'U]/1; [USP'U]/1; [USP'U]/1
36000 CAPSULE, DELAYED RELEASE PELLETS ORAL
Qty: 90 CAPSULE | Refills: 0 | Status: SHIPPED | OUTPATIENT
Start: 2023-12-12

## 2023-12-12 NOTE — TELEPHONE ENCOUNTER
Patient called stating he was supposed to have a medication for his infection.  Patient would like a call back in regards to this.

## 2023-12-13 DIAGNOSIS — E11.65 TYPE 2 DIABETES MELLITUS WITH HYPERGLYCEMIA, WITHOUT LONG-TERM CURRENT USE OF INSULIN: ICD-10-CM

## 2023-12-13 LAB — BACTERIA SPEC AEROBE CULT: ABNORMAL

## 2023-12-13 RX ORDER — CEFTRIAXONE 500 MG/1
500 INJECTION, POWDER, FOR SOLUTION INTRAMUSCULAR; INTRAVENOUS ONCE
Status: SHIPPED | OUTPATIENT
Start: 2023-12-12

## 2023-12-13 NOTE — PROGRESS NOTES
Established Patient Office Visit      Patient Name: Israel Reyna  : 1955   MRN: [id number]   Care Team: Patient Care Team:  Elio Moore DO as PCP - General (Family Medicine)    Chief Complaint:    Chief Complaint   Patient presents with    Flank Pain     Pt co possible kidney stones       History of Present Illness: Israel Reyna is a 68 y.o. male who is here today for chief complaint.    Flank Pain        68-year-old male with PMH of diabetes, diabetic neuropathy, chronic diarrhea secondary to anal rectal incompetence and exocrine pancreatic insufficiency presents today with new onset flank pain.  It has been ongoing for the last 8 days, gradual onset and slightly worsening, no inciting event as far as he is aware.  Pain is moderate, dull, and mostly persistent.  He has a remote history of of nephrolithiasis years ago, reports his pain feels different, but is in the same location.  He is worried about his kidneys, has left sided flank pain that radiates down toward his groin.  He denies any overt hematuria, but does note some urinary hesitancy worse than his baseline.  He endorses mild subjective fever, but no temperatures over 101 by oral thermometer.  He has had urinary tract infections in the past as well as prostatitis, contributed to by his diabetes as well as chronic fecal incontinence.    This patient is accompanied by their self who contributes to the history of their care.    The following portions of the patient's history were reviewed and updated as appropriate: allergies, current medications, past family history, past medical history, past social history, past surgical history and problem list.    Subjective      Review of Systems:   Review of Systems   Genitourinary:  Positive for flank pain.    - See HPI    Past Medical History:   Past Medical History:   Diagnosis Date    Allergic     Anxiety     Bowel incontinence     w/ history of Stimulator Device     CAD  (coronary artery disease)     Cataract     Chest pain     CHF (congestive heart failure)     Colon polyp     Depression     Diabetes mellitus, type 2     Diabetic peripheral neuropathy     Dyslipidemia     Fatigue     GERD (gastroesophageal reflux disease)     Hypertension     Kidney stone     Mental status change     Migraine     Complex     Migraine headache     Renal insufficiency     SOB (shortness of breath)        Past Surgical History:   Past Surgical History:   Procedure Laterality Date    CARDIAC CATHETERIZATION      CARDIAC CATHETERIZATION N/A 10/14/2016    Procedure: Left Heart Cath;  Surgeon: Santiago Garner MD;  Location:  [facility];  Service:     COLONOSCOPY  02/25/2022    Dr. Graves-recommended 3 year repeat    ENDOSCOPY  02/03/2022    Dr. Graves    GASTRIC STIMULATOR IMPLANT SURGERY      SMALL INTESTINE SURGERY      unspecified, x3 as an infant     TESTICLE SURGERY         Family History:   Family History   Problem Relation Age of Onset    Heart failure Mother     Diabetes Mother     Alzheimer's disease Mother     Arthritis Mother     Heart disease Mother     Heart attack Father     Kidney disease Father     Cancer Father     Prostate cancer Father     Heart disease Father     Hypertension Father     Cancer Sister     Breast cancer Sister     Diabetes Sister     Heart disease Sister     Arrhythmia Sister     Diabetes Brother     Heart disease Brother     Cancer Brother     Liver disease Brother     Kidney disease Brother     Dementia Paternal Uncle     Alzheimer's disease Maternal Grandmother     Liver cancer Maternal Grandfather     Liver disease Maternal Grandfather     Other Paternal Grandmother         [place] flu    Other Paternal Grandfather         [place] flu    Colon cancer Neg Hx        Social History:   Social History     Socioeconomic History    Marital status:    Tobacco Use    Smoking status: Never     Passive exposure: Never    Smokeless tobacco:  "Never   Vaping Use    Vaping Use: Never used   Substance and Sexual Activity    Alcohol use: Never    Drug use: Never    Sexual activity: Yes       Tobacco History:   Social History     Tobacco Use   Smoking Status Never    Passive exposure: Never   Smokeless Tobacco Never       Medications:     Current Outpatient Medications:     aspirin 325 MG EC tablet, Take 1 tablet by mouth Every 6 (Six) Hours As Needed for Mild Pain., Disp: , Rfl:     atorvastatin (LIPITOR) 20 MG tablet, Take 1 tablet by mouth Daily., Disp: 90 tablet, Rfl: 3    butalbital-acetaminophen-caffeine (FIORICET, ESGIC) -40 MG per tablet, TAKE 1 TABLET BY MOUTH ONE TIME AS NEEDED FOR HEADACHE FOR UP TO 30 DOSES, Disp: 30 tablet, Rfl: 0    Creon 58865-960046 units capsule delayed-release particles capsule, TAKE 1 CAPSULE BY MOUTH THREE TIMES DAILY WITH MEALS, Disp: 90 capsule, Rfl: 0    diphenoxylate-atropine (Lomotil) 2.5-0.025 MG per tablet, Take 1 tablet by mouth 4 (Four) Times a Day As Needed for Diarrhea., Disp: 120 tablet, Rfl: 2    Dulaglutide (Trulicity) 1.5 MG/0.5ML solution pen-injector, Inject 1.5 mg under the skin into the appropriate area as directed 1 (One) Time Per Week., Disp: 6 mL, Rfl: 2    gabapentin (NEURONTIN) 300 MG capsule, Take 2 capsules by mouth 3 (Three) Times a Day for 30 days., Disp: 180 capsule, Rfl: 3    glipizide (GLUCOTROL) 10 MG tablet, Take 1 tablet by mouth 2 (Two) Times a Day Before Meals., Disp: 180 tablet, Rfl: 3    glucose blood test strip, 1 each by Other route Daily. Use daily as directed, Disp: 100 each, Rfl: 2    glucose monitor monitoring kit, 1 each Daily., Disp: 1 each, Rfl: 0    Insulin Pen Needle 31G X 6 MM misc, 1 each Daily., Disp: 100 each, Rfl: 3    Insulin Syringe 31G X 5/16\" 0.3 ML misc, 1 each Every Night., Disp: 90 each, Rfl: 3    Lancets Thin misc, 1 each Daily. Use daily as directed, Disp: 100 each, Rfl: 11    lisinopril (PRINIVIL,ZESTRIL) 40 MG tablet, Take 1 tablet by mouth twice daily, " Disp: 180 tablet, Rfl: 1    Lutein 5 % beads, Take 5 mg by mouth Daily., Disp: , Rfl:     metFORMIN (GLUCOPHAGE) 1000 MG tablet, Take 1 tablet by mouth 2 (Two) Times a Day With Meals., Disp: 180 tablet, Rfl: 3    nitroglycerin (NITROSTAT) 0.4 MG SL tablet, 1 under the tongue as needed for angina, may repeat q5mins for up three doses, Disp: 100 tablet, Rfl: 11    ondansetron (ZOFRAN) 8 MG tablet, TAKE 1 TABLET BY MOUTH EVERY 8 HOURS AS NEEDED FOR NAUSEA FOR VOMITING, Disp: 30 tablet, Rfl: 0    pantoprazole (PROTONIX) 40 MG EC tablet, Take 1 tablet by mouth 2 (Two) Times a Day. 30 minutes prior to first meal of the day and 30 minutes prior to last meal of the day, Disp: 180 tablet, Rfl: 3    propranolol LA (Inderal LA) 80 MG 24 hr capsule, Take 1 capsule by mouth Daily., Disp: 90 capsule, Rfl: 3    sildenafil (VIAGRA) 100 MG tablet, Take 1 tablet by mouth Daily As Needed for Erectile Dysfunction., Disp: 15 tablet, Rfl: 11    tamsulosin (FLOMAX) 0.4 MG capsule 24 hr capsule, Take 1 capsule by mouth Daily., Disp: , Rfl:     Toujeo Max SoloStar 300 UNIT/ML solution pen-injector injection, Inject 20 Units under the skin into the appropriate area as directed every night at bedtime., Disp: 9 mL, Rfl: 0    vitamin B-12 (CYANOCOBALAMIN) 100 MCG tablet, Take 0.5 tablets by mouth 2 (two) times a day., Disp: , Rfl:     vitamin C (ASCORBIC ACID) 500 MG tablet, Take 1 tablet by mouth 2 (two) times a day., Disp: , Rfl:     vitamin E 400 UNIT capsule, Take 1 capsule by mouth Daily., Disp: , Rfl:     Zinc 40 MG tablet, Take 1 tablet by mouth Daily., Disp: , Rfl:     cefdinir (OMNICEF) 300 MG capsule, Take 1 capsule by mouth 2 (Two) Times a Day for 5 days., Disp: 10 capsule, Rfl: 0    Current Facility-Administered Medications:     cefTRIAXone (ROCEPHIN) injection 500 mg, 500 mg, Intramuscular, Once, Elio Moore,     Allergies:   Allergies   Allergen Reactions    Actos [Pioglitazone] Other (See Comments)     congestive heart  "failure    Avandia [Rosiglitazone] Other (See Comments)     congestive heart failure.    Darvon [Propoxyphene] Itching     Angioedema    itching    Talwin [Pentazocine] Rash     Angioedema    itching       Objective   Objective     Physical Exam:  Vital Signs:   Vitals:    12/12/23 1400   BP: 130/78   BP Location: Left arm   Patient Position: Sitting   Cuff Size: Adult   Weight: 79.4 kg (175 lb)   Height: 170.2 cm (67.01\")     Body mass index is 27.4 kg/m².     Physical Exam  Nursing note reviewed  Const: NAD, A&Ox4, Pleasant, Cooperative.  No diaphoresis.  Eyes: EOMI, no conjunctivitis  ENT: No nasal discharge present, neck supple  Cardiac: Regular rate and rhythm, no cyanosis  GI: No distention or ascites  : Mild left-sided CVA tenderness, relates his pain over left CVA  Skin: No rash or vesicles overlying the left thorax or abdomen  Procedures/Radiology       ECG 12 Lead    Date/Time: 12/12/2023 2:30 PM  Performed by: Elio Moore DO    Authorized by: Elio Moore DO  Comparison: compared with previous ECG from 11/17/2023  Similar to previous ECG  Rhythm: sinus rhythm  Rate: normal  Conduction: conduction normal  ST Segments: ST segments normal  T Waves: T waves normal  QRS axis: normal  Other: no other findings    Clinical impression: normal ECG        No radiology results for the last 7 days     Assessment & Plan   Assessment / Plan      Assessment/Plan:   Problems Addressed This Visit  Diagnoses and all orders for this visit:    1. Flank pain (Primary)  Comments:  Low-grade subjective fever. Left CVA tenderness. Ongoing pain x 8 days.  Orders:  -     POC Urinalysis Dipstick, Automated  -     Urine Culture - Urine, Urine, Random Void; Future  -     Urine Culture - Urine, Urine, Random Void  -     cefdinir (OMNICEF) 300 MG capsule; Take 1 capsule by mouth 2 (Two) Times a Day for 5 days.  Dispense: 10 capsule; Refill: 0  -     cefTRIAXone (ROCEPHIN) injection 500 mg    2. Numbness and tingling " in left hand  -     ECG 12 Lead      Problem List Items Addressed This Visit    None  Visit Diagnoses       Flank pain    -  Primary    Low-grade subjective fever. Left CVA tenderness. Ongoing pain x 8 days.    Relevant Medications    cefdinir (OMNICEF) 300 MG capsule    cefTRIAXone (ROCEPHIN) injection 500 mg    Other Relevant Orders    POC Urinalysis Dipstick, Automated (Completed)    Urine Culture - Urine, Urine, Random Void    Numbness and tingling in left hand        Relevant Orders    ECG 12 Lead          Point-of-care urinalysis includes 1+ leukocytes, 1+ protein, and 2+ glucose.  Negative for blood.  Discussed differential including dehydration, ureteral stone, cystitis/lower urinary tract infection, pyelonephritis.  -Exam and labs most consistent with a urinary tract infection with some early extension to the upper urinary tract, recommended IM dose of ceftriaxone today, with home antibiotics to complete course.  Strict return precautions were provided for patient today    ~5 minutes after receiving ceftriaxone IM, patient returned stating that his right hand was completely numb. No other symptoms, no palpitations.  Given his substantial cardiac risk, we did an EKG to rule out any arrhythmia or effect of the lidocaine mixture from the ceftriaxone.  He has no history of lidocaine allergy or reaction.  EKG was reassuring, and patient's left hand symptoms resolved over the course of about 15 minutes while sitting under observation in exam room here in clinic.  Discussed further return precautions, but patient was released once asymptomatic.    There are no Patient Instructions on file for this visit.    Follow Up:   No follow-ups on file.        DO BAILEY Jones RD  [facility]  [address]  [facility] [address]  Fax [phone]  Phone [phone]

## 2023-12-20 ENCOUNTER — TELEPHONE (OUTPATIENT)
Dept: FAMILY MEDICINE CLINIC | Facility: CLINIC | Age: 68
End: 2023-12-20
Payer: MEDICARE

## 2023-12-20 DIAGNOSIS — R10.9 FLANK PAIN: ICD-10-CM

## 2023-12-20 DIAGNOSIS — E11.65 TYPE 2 DIABETES MELLITUS WITH HYPERGLYCEMIA, WITHOUT LONG-TERM CURRENT USE OF INSULIN: ICD-10-CM

## 2023-12-20 NOTE — TELEPHONE ENCOUNTER
Caller: Israel Reyna    Relationship: Self    Best call back number: [phone]     What medication are you requesting: ANTIBIOTIC    What are your current symptoms: KIDNEY INFECTION    How long have you been experiencing symptoms: OVER A WEEK    Have you had these symptoms before:    [x] Yes  [] No    Have you been treated for these symptoms before:   [x] Yes  [] No    If a prescription is needed, what is your preferred pharmacy and phone number: [facility] [address] [place] [address] [phone] [facility] [phone] [facility]     Additional notes:  PATIENT WAS SEEN IN OFFICE ON 12/12/23 AND WAS GIVEN A SHOT AND SOME ANTIBIOTICS. PATIENT STATED HIS KIDNEYS ARE STILL HURTING. NOT SURE IF PCP WANTS TO DO ANOTHER SHOT OR JUST SOME MORE MEDICATION. CALLBACK TO DISCUSS

## 2023-12-21 RX ORDER — INSULIN GLARGINE 300 U/ML
20 INJECTION, SOLUTION SUBCUTANEOUS
Qty: 9 ML | Refills: 0 | Status: SHIPPED | OUTPATIENT
Start: 2023-12-21

## 2023-12-21 RX ORDER — CEFDINIR 300 MG/1
300 CAPSULE ORAL 2 TIMES DAILY
Qty: 10 CAPSULE | Refills: 0 | Status: SHIPPED | OUTPATIENT
Start: 2023-12-21 | End: 2023-12-26

## 2023-12-21 NOTE — TELEPHONE ENCOUNTER
Caller: Israel Reyna    Relationship: Self    Best call back number: [phone]    What medication are you requesting: ANTIBOTIC    What are your current symptoms: KIDNEY PAIN, NAUSEATED    How long have you been experiencing symptoms: OVER A WEEK    Have you had these symptoms before:    [x] Yes  [] No    Have you been treated for these symptoms before:   [x] Yes  [] No    If a prescription is needed, what is your preferred pharmacy and phone number: [facility] [address] [place] [address] [phone] [facility] [phone]      Additional notes: PATIENT WAS GIVEN ANTIBOTICS A WEEK AGO FOR FIVE DAYS AND HE IS CURRENTLY OUT OF THEM; PATIENT IS REQUESTING ANOTHER ROUND OF ANTIBOTICS AS HE IS STILL HURTING     PLEASE CALL TO ADVISE

## 2023-12-21 NOTE — TELEPHONE ENCOUNTER
Rx Refill Note  Requested Prescriptions     Pending Prescriptions Disp Refills    cefdinir (OMNICEF) 300 MG capsule [Pharmacy Med Name: Cefdinir 300 MG Oral Capsule] 10 capsule 0     Sig: Take 1 capsule by mouth twice daily for 5 days    Storm Max SoloStar 300 UNIT/ML solution pen-injector injection [Pharmacy Med Name: Toujeo Max SoloStar 300 UNIT/ML Subcutaneous Solution Pen-injector] 9 mL 0     Sig: INJECT 20 UNITS UNDER THE SKIN INTO THE APPROPRIATE AREA AS DIRECTED EVERY NIGHT AT BEDTIME      Last office visit with prescribing clinician: 12/12/2023   Last telemedicine visit with prescribing clinician: 9/14/2023   Next office visit with prescribing clinician: 1/8/2024                         Would you like a call back once the refill request has been completed: [] Yes [] No    If the office needs to give you a call back, can they leave a voicemail: [] Yes [] No    Di Concepcion MA  12/21/23, 10:37 EST

## 2023-12-27 ENCOUNTER — TELEPHONE (OUTPATIENT)
Dept: FAMILY MEDICINE CLINIC | Facility: CLINIC | Age: 68
End: 2023-12-27
Payer: MEDICARE

## 2023-12-27 DIAGNOSIS — R11.0 NAUSEA: ICD-10-CM

## 2023-12-27 RX ORDER — ONDANSETRON HYDROCHLORIDE 8 MG/1
TABLET, FILM COATED ORAL
Qty: 30 TABLET | Refills: 0 | Status: SHIPPED | OUTPATIENT
Start: 2023-12-27

## 2023-12-27 NOTE — TELEPHONE ENCOUNTER
Caller: Israel Reyna    Relationship: Self    Best call back number: [phone]    What is the best time to reach you: ANY TIME    Who are you requesting to speak with (clinical staff, provider,  specific staff member): DR DANIELLE    Do you know the name of the person who called: SELF    What was the call regarding: PATIENT STILL HAVING PAIN WITH HIS KIDNEYS AFTER 2 ROUNDS OF ANTIBIOTICS AND DOESN'T KNOW WHAT TO DO TO GET SOME RELIEF. PLEASE ADVISE

## 2023-12-27 NOTE — TELEPHONE ENCOUNTER
With ongoing pain, recommend office appointment.  Would want to recheck urinalysis and have patient examined.  Please call and get scheduled.

## 2023-12-27 NOTE — TELEPHONE ENCOUNTER
Patient informed of recommendations for appt. He would rather wait to see Dr. Moore. The first available is on 1/8/2023 which patient is already scheduled. If he needs to come in sooner he will schedule or see UC/ER. No further questions or concerns at this time.

## 2024-01-01 PROCEDURE — 87086 URINE CULTURE/COLONY COUNT: CPT | Performed by: NURSE PRACTITIONER

## 2024-01-08 ENCOUNTER — LAB (OUTPATIENT)
Dept: LAB | Facility: HOSPITAL | Age: 69
End: 2024-01-08
Payer: MEDICARE

## 2024-01-08 ENCOUNTER — OFFICE VISIT (OUTPATIENT)
Dept: FAMILY MEDICINE CLINIC | Facility: CLINIC | Age: 69
End: 2024-01-08
Payer: MEDICARE

## 2024-01-08 ENCOUNTER — HOSPITAL ENCOUNTER (OUTPATIENT)
Dept: CT IMAGING | Facility: HOSPITAL | Age: 69
Discharge: HOME | End: 2024-01-08
Payer: MEDICARE

## 2024-01-08 VITALS
WEIGHT: 183.8 LBS | HEIGHT: 67 IN | SYSTOLIC BLOOD PRESSURE: 120 MMHG | BODY MASS INDEX: 28.85 KG/M2 | DIASTOLIC BLOOD PRESSURE: 78 MMHG

## 2024-01-08 DIAGNOSIS — E78.5 HYPERLIPIDEMIA LDL GOAL <70: ICD-10-CM

## 2024-01-08 DIAGNOSIS — E11.65 TYPE 2 DIABETES MELLITUS WITH HYPERGLYCEMIA, WITHOUT LONG-TERM CURRENT USE OF INSULIN: ICD-10-CM

## 2024-01-08 DIAGNOSIS — E11.65 TYPE 2 DIABETES MELLITUS WITH HYPERGLYCEMIA, WITHOUT LONG-TERM CURRENT USE OF INSULIN: Primary | ICD-10-CM

## 2024-01-08 DIAGNOSIS — R10.9 FLANK PAIN: ICD-10-CM

## 2024-01-08 LAB
ALBUMIN SERPL-MCNC: 4.6 G/DL (ref 3.5–5.2)
ALBUMIN/GLOB SERPL: 1.7 G/DL
ALP SERPL-CCNC: 64 U/L (ref 39–117)
ALT SERPL W P-5'-P-CCNC: 17 U/L (ref 1–41)
ANION GAP SERPL CALCULATED.3IONS-SCNC: 10.9 MMOL/L (ref 5–15)
AST SERPL-CCNC: 21 U/L (ref 1–40)
BILIRUB SERPL-MCNC: 0.3 MG/DL (ref 0–1.2)
BUN SERPL-MCNC: 14 MG/DL (ref 8–23)
BUN/CREAT SERPL: 12.6 (ref 7–25)
CALCIUM SPEC-SCNC: 9.7 MG/DL (ref 8.6–10.5)
CHLORIDE SERPL-SCNC: 103 MMOL/L (ref 98–107)
CHOLEST SERPL-MCNC: 188 MG/DL (ref 0–200)
CO2 SERPL-SCNC: 26.1 MMOL/L (ref 22–29)
CREAT SERPL-MCNC: 1.11 MG/DL (ref 0.76–1.27)
EGFRCR SERPLBLD CKD-EPI 2021: 72.8 ML/MIN/1.73
EXPIRATION DATE: ABNORMAL
GLOBULIN UR ELPH-MCNC: 2.7 GM/DL
GLUCOSE SERPL-MCNC: 215 MG/DL (ref 65–99)
HBA1C MFR BLD: 8.8 % (ref 4.5–5.7)
HDLC SERPL-MCNC: 42 MG/DL (ref 40–60)
LDLC SERPL CALC-MCNC: 119 MG/DL (ref 0–100)
LDLC/HDLC SERPL: 2.75 {RATIO}
Lab: ABNORMAL
POTASSIUM SERPL-SCNC: 5.3 MMOL/L (ref 3.5–5.2)
PROT SERPL-MCNC: 7.3 G/DL (ref 6–8.5)
SODIUM SERPL-SCNC: 140 MMOL/L (ref 136–145)
TRIGL SERPL-MCNC: 153 MG/DL (ref 0–150)
VLDLC SERPL-MCNC: 27 MG/DL (ref 5–40)

## 2024-01-08 PROCEDURE — 80053 COMPREHEN METABOLIC PANEL: CPT

## 2024-01-08 PROCEDURE — 82043 UR ALBUMIN QUANTITATIVE: CPT

## 2024-01-08 PROCEDURE — 74178 CT ABD&PLV WO CNTR FLWD CNTR: CPT

## 2024-01-08 PROCEDURE — 80061 LIPID PANEL: CPT

## 2024-01-08 PROCEDURE — 25510000001 IOPAMIDOL 61 % SOLUTION: Performed by: FAMILY MEDICINE

## 2024-01-08 PROCEDURE — 82570 ASSAY OF URINE CREATININE: CPT

## 2024-01-08 RX ADMIN — IOPAMIDOL 85 ML: 612 INJECTION, SOLUTION INTRAVENOUS at 16:03

## 2024-01-08 NOTE — PROGRESS NOTES
Established Patient Office Visit      Patient Name: Israel Reyna  : 1955   MRN: [id number]   Care Team: Patient Care Team:  Elio Moore DO as PCP - General (Family Medicine)    Chief Complaint:    Chief Complaint   Patient presents with    Diabetes       History of Present Illness: Israel Reyna is a 68 y.o. male who is here today for chief complaint.    HPI    Follow up diabetes. Was up at 10.2% in September. Still having some kidney pain, bladder issues.    In 2022 had CT scan abd:  There is a small pericardial effusion measuring up to 10 mm in  thickness. There is probable dependent atelectasis in the lung bases.     There is a 3 mm nonobstructing calculus in the upper pole of the left  renal collecting system. No other urinary calculi are identified.  Unenhanced evaluation of the spleen, pancreas, adrenals, liver and  gallbladder is grossly unremarkable. Prostate is prominent with coarse  central calcification. Urinary bladder is distended. There is diffuse  urinary bladder wall thickening which may indicate chronic bladder  outlet obstruction. Perirectal fat herniation again noted, similar to  prior.     There is no evidence of bowel obstruction, free air or free fluid. The  appendix is normal. Right colonic fecal retention. Atherosclerotic  vascular calcification is noted. Right sacral stimulator device noted to  no aggressive osseous lesions are identified.    This patient is accompanied by their self who contributes to the history of their care.    The following portions of the patient's history were reviewed and updated as appropriate: allergies, current medications, past family history, past medical history, past social history, past surgical history and problem list.    Subjective      Review of Systems:   Review of Systems - See HPI    Past Medical History:   Past Medical History:   Diagnosis Date    Allergic     Anxiety     Bowel incontinence     w/ history of  Stimulator Device     CAD (coronary artery disease)     Cataract     Chest pain     CHF (congestive heart failure)     Colon polyp     Depression     Diabetes mellitus, type 2     Diabetic peripheral neuropathy     Dyslipidemia     Fatigue     GERD (gastroesophageal reflux disease)     Hypertension     Kidney stone     Mental status change     Migraine     Complex     Migraine headache     Renal insufficiency     SOB (shortness of breath)        Past Surgical History:   Past Surgical History:   Procedure Laterality Date    CARDIAC CATHETERIZATION      CARDIAC CATHETERIZATION N/A 10/14/2016    Procedure: Left Heart Cath;  Surgeon: Santiago Garner MD;  Location:  [facility];  Service:     COLONOSCOPY  02/25/2022    Dr. Graves-recommended 3 year repeat    ENDOSCOPY  02/03/2022    Dr. Graves    GASTRIC STIMULATOR IMPLANT SURGERY      SMALL INTESTINE SURGERY      unspecified, x3 as an infant     TESTICLE SURGERY         Family History:   Family History   Problem Relation Age of Onset    Heart failure Mother     Diabetes Mother     Alzheimer's disease Mother     Arthritis Mother     Heart disease Mother     Heart attack Father     Kidney disease Father     Cancer Father     Prostate cancer Father     Heart disease Father     Hypertension Father     Cancer Sister     Breast cancer Sister     Diabetes Sister     Heart disease Sister     Arrhythmia Sister     Diabetes Brother     Heart disease Brother     Cancer Brother     Liver disease Brother     Kidney disease Brother     Dementia Paternal Uncle     Alzheimer's disease Maternal Grandmother     Liver cancer Maternal Grandfather     Liver disease Maternal Grandfather     Other Paternal Grandmother         Lebanese flu    Other Paternal Grandfather         Lebanese flu    Colon cancer Neg Hx        Social History:   Social History     Socioeconomic History    Marital status:    Tobacco Use    Smoking status: Never     Passive exposure: Never  "   Smokeless tobacco: Never   Vaping Use    Vaping Use: Never used   Substance and Sexual Activity    Alcohol use: Never    Drug use: Never    Sexual activity: Yes       Tobacco History:   Social History     Tobacco Use   Smoking Status Never    Passive exposure: Never   Smokeless Tobacco Never       Medications:     Current Outpatient Medications:     atorvastatin (LIPITOR) 20 MG tablet, Take 1 tablet by mouth Daily., Disp: 90 tablet, Rfl: 3    butalbital-acetaminophen-caffeine (FIORICET, ESGIC) -40 MG per tablet, TAKE 1 TABLET BY MOUTH ONE TIME AS NEEDED FOR HEADACHE FOR UP TO 30 DOSES, Disp: 30 tablet, Rfl: 0    Creon 51557-317119 units capsule delayed-release particles capsule, TAKE 1 CAPSULE BY MOUTH THREE TIMES DAILY WITH MEALS, Disp: 90 capsule, Rfl: 0    diclofenac (VOLTAREN) 75 MG EC tablet, Take 1 tablet by mouth 2 (Two) Times a Day for 30 days., Disp: 60 tablet, Rfl: 0    diphenoxylate-atropine (Lomotil) 2.5-0.025 MG per tablet, Take 1 tablet by mouth 4 (Four) Times a Day As Needed for Diarrhea., Disp: 120 tablet, Rfl: 2    Dulaglutide (Trulicity) 1.5 MG/0.5ML solution pen-injector, Inject 1.5 mg under the skin into the appropriate area as directed 1 (One) Time Per Week., Disp: 6 mL, Rfl: 2    glipizide (GLUCOTROL) 10 MG tablet, Take 1 tablet by mouth 2 (Two) Times a Day Before Meals., Disp: 180 tablet, Rfl: 3    glucose blood test strip, 1 each by Other route Daily. Use daily as directed, Disp: 100 each, Rfl: 2    glucose monitor monitoring kit, 1 each Daily., Disp: 1 each, Rfl: 0    Insulin Pen Needle 31G X 6 MM misc, 1 each Daily., Disp: 100 each, Rfl: 3    Insulin Syringe 31G X 5/16\" 0.3 ML misc, 1 each Every Night., Disp: 90 each, Rfl: 3    Lancets Thin misc, 1 each Daily. Use daily as directed, Disp: 100 each, Rfl: 11    lisinopril (PRINIVIL,ZESTRIL) 40 MG tablet, Take 1 tablet by mouth twice daily, Disp: 180 tablet, Rfl: 1    metFORMIN (GLUCOPHAGE) 1000 MG tablet, TAKE 1 TABLET BY MOUTH TWICE " DAILY WITH MEALS, Disp: 180 tablet, Rfl: 0    nitroglycerin (NITROSTAT) 0.4 MG SL tablet, 1 under the tongue as needed for angina, may repeat q5mins for up three doses, Disp: 100 tablet, Rfl: 11    ondansetron (ZOFRAN) 8 MG tablet, TAKE 1 TABLET BY MOUTH EVERY 8 HOURS AS NEEDED FOR NAUSEA FOR VOMITING, Disp: 30 tablet, Rfl: 0    pantoprazole (PROTONIX) 40 MG EC tablet, Take 1 tablet by mouth 2 (Two) Times a Day. 30 minutes prior to first meal of the day and 30 minutes prior to last meal of the day, Disp: 180 tablet, Rfl: 3    propranolol LA (INDERAL LA) 60 MG 24 hr capsule, , Disp: , Rfl:     sildenafil (VIAGRA) 100 MG tablet, Take 1 tablet by mouth Daily As Needed for Erectile Dysfunction., Disp: 15 tablet, Rfl: 11    tamsulosin (FLOMAX) 0.4 MG capsule 24 hr capsule, Take 1 capsule by mouth Daily., Disp: , Rfl:     Tolauryn Max SoloStar 300 UNIT/ML solution pen-injector injection, INJECT 20 UNITS UNDER THE SKIN INTO THE APPROPRIATE AREA AS DIRECTED EVERY NIGHT AT BEDTIME, Disp: 9 mL, Rfl: 0    vitamin B-12 (CYANOCOBALAMIN) 100 MCG tablet, Take 0.5 tablets by mouth 2 (two) times a day., Disp: , Rfl:     vitamin C (ASCORBIC ACID) 500 MG tablet, Take 1 tablet by mouth 2 (two) times a day., Disp: , Rfl:     vitamin E 400 UNIT capsule, Take 1 capsule by mouth Daily., Disp: , Rfl:     Zinc 40 MG tablet, Take 1 tablet by mouth Daily., Disp: , Rfl:     gabapentin (NEURONTIN) 300 MG capsule, Take 2 capsules by mouth 3 (Three) Times a Day for 30 days., Disp: 180 capsule, Rfl: 3    Allergies:   Allergies   Allergen Reactions    Actos [Pioglitazone] Other (See Comments)     congestive heart failure    Avandia [Rosiglitazone] Other (See Comments)     congestive heart failure.    Darvon [Propoxyphene] Itching     Angioedema    itching    Talwin [Pentazocine] Rash     Angioedema    itching       Objective   Objective     Physical Exam:  Vital Signs:   Vitals:    01/08/24 1052   BP: 120/78   BP Location: Left arm   Patient Position:  "Sitting   Cuff Size: Adult   Weight: 83.4 kg (183 lb 12.8 oz)   Height: 170.2 cm (67\")     Body mass index is 28.79 kg/m².     Physical Exam  Nursing note reviewed  Const: NAD, A&Ox4, Pleasant, Cooperative  Eyes: EOMI, no conjunctivitis  ENT: No nasal discharge present, neck supple  Cardiac: Regular rate and rhythm, no cyanosis  Resp: Respiratory rate within normal limits, no increased work of breathing, no audible wheezing or retractions noted  GI: No distention or ascites  MSK: Motor and sensation grossly intact in bilateral upper extremities  Neurologic: CN II-XII grossly intact  Psych: Appropriate mood and behavior.  Skin: Warm, dry  Procedures/Radiology     Procedures  No radiology results for the last 7 days     Assessment & Plan   Assessment / Plan      Assessment/Plan:   Problems Addressed This Visit  Diagnoses and all orders for this visit:    1. Type 2 diabetes mellitus with hyperglycemia, without long-term current use of insulin (Primary)  -     POC Glycosylated Hemoglobin (Hb A1C)  -     Comprehensive Metabolic Panel; Future  -     Microalbumin / Creatinine Urine Ratio - Urine, Clean Catch; Future    2. Flank pain  -     CT Abdomen Pelvis With & Without Contrast; Future    3. Hyperlipidemia LDL goal <70  -     Lipid Panel; Future  -     Comprehensive Metabolic Panel; Future      Problem List Items Addressed This Visit          Endocrine and Metabolic    Diabetes mellitus, type 2 - Primary    Overview     On CGM he is having significant hyperglycemia in the middle of the night with precipitous drops (320 --> 60-70 within about 30 minutes). These correlate with times when he is getting sick at night.         Relevant Orders    POC Glycosylated Hemoglobin (Hb A1C) (Completed)    Comprehensive Metabolic Panel    Microalbumin / Creatinine Urine Ratio - Urine, Clean Catch     Other Visit Diagnoses       Flank pain        Relevant Orders    CT Abdomen Pelvis With & Without Contrast    Hyperlipidemia LDL goal " <70        Relevant Orders    Lipid Panel    Comprehensive Metabolic Panel            Patient Instructions   A1c today 8.8%, down from 10.2% in September. This equates to an average blood sugar of ~200    Follow Up:   Return in about 3 months (around 4/8/2024) for with A1c;.      DO BAILEY Jones RD  [facility]  [address]  [facility] [address]  Fax [phone]  Phone [phone]

## 2024-01-09 LAB
ALBUMIN UR-MCNC: 17.8 MG/DL
CREAT UR-MCNC: 61.7 MG/DL
MICROALBUMIN/CREAT UR: 288.5 MG/G (ref 0–29)

## 2024-01-16 LAB — CREAT BLDA-MCNC: 1.1 MG/DL (ref 0.6–1.3)

## 2024-01-19 ENCOUNTER — OFFICE VISIT (OUTPATIENT)
Dept: FAMILY MEDICINE CLINIC | Facility: CLINIC | Age: 69
End: 2024-01-19
Payer: MEDICARE

## 2024-01-19 VITALS
BODY MASS INDEX: 28.72 KG/M2 | DIASTOLIC BLOOD PRESSURE: 80 MMHG | HEIGHT: 67 IN | WEIGHT: 183 LBS | SYSTOLIC BLOOD PRESSURE: 120 MMHG

## 2024-01-19 DIAGNOSIS — M94.0 SLIPPING RIB SYNDROME: Primary | ICD-10-CM

## 2024-01-19 DIAGNOSIS — E11.65 TYPE 2 DIABETES MELLITUS WITH HYPERGLYCEMIA, WITHOUT LONG-TERM CURRENT USE OF INSULIN: ICD-10-CM

## 2024-01-19 PROCEDURE — 3074F SYST BP LT 130 MM HG: CPT | Performed by: FAMILY MEDICINE

## 2024-01-19 PROCEDURE — 3052F HG A1C>EQUAL 8.0%<EQUAL 9.0%: CPT | Performed by: FAMILY MEDICINE

## 2024-01-19 PROCEDURE — 99214 OFFICE O/P EST MOD 30 MIN: CPT | Performed by: FAMILY MEDICINE

## 2024-01-19 PROCEDURE — 3079F DIAST BP 80-89 MM HG: CPT | Performed by: FAMILY MEDICINE

## 2024-01-22 ENCOUNTER — PRIOR AUTHORIZATION (OUTPATIENT)
Dept: FAMILY MEDICINE CLINIC | Facility: CLINIC | Age: 69
End: 2024-01-22
Payer: MEDICARE

## 2024-01-22 NOTE — TELEPHONE ENCOUNTER
MEDICATION   PA REQUIREMENT *  FreeStyle Mary 2 Sensor  Required  FreeStyle Mary 3 Laughlin  Not Required  ACCU-CHEK Alfreda   Required  ACCU-CHEK Alfreda Plus Test Strips Required  ACCU-CHEK Guide   Required  ACCU-CHEK Guide Me  Required  ACCU-CHEK Ernestina SmartView Test Strips       Required  Breeze 2    Required  Contour    Required  Contour Next    Required  Contour Next EZ   Required  Contour Next One   Required  Contour Next USB   Required  Dexcom G6    Required  Dexcom G6 Sensor   Required  Dexcom G6 Transmitter  Required  Dexcom G7    Required  Dexcom G7 Sensor   Required  Enlite Glucose Sensor  Required  Enlite Transmitter Kit   Required  FreeStyle Freedom Lite  Required  FreeStyle Mary 10 Day Laughlin Required  FreeStyle Mary 14 Day Laughlin Required  FreeStyle Mary 2 Laughlin  Required  FreeStyle Lite    Required  FreeStyle Precision Estiven  Required  Guardian 4 Transmitter  Required  Guardian Connect Transmitter Required  Guardian Link 3 Transmitter  Required  Guardian Sensor 3   Required  Guardian Sensor 4   Required  Omnipod 5 Pods   Required  OneTouch Ultra 2   Required  OneTouch UltraSmart   Required  OneTouch Verio IQ Meter  Required  Precision Xtra    Required  Prodigy AutoCode Meter  Required  Prodigy No Coding Test Strips Required  True Metrix    Required  True Metrix AIR   Required  True Metrix GO   Required  TRUEresult Meter   Required  TRUEtrack    Required      (Key: [id number]) - [id number]  FreeStyle Mary 2 Sensor  Status: PA Request  Created: January 19th, 2024 [phone]  Sent: January 22nd, 2024    Denied on January 23  PA Case: [id number], Status: Denied. Notification: Completed.

## 2024-01-24 ENCOUNTER — TELEPHONE (OUTPATIENT)
Dept: FAMILY MEDICINE CLINIC | Facility: CLINIC | Age: 69
End: 2024-01-24
Payer: MEDICARE

## 2024-01-24 DIAGNOSIS — E11.42 DIABETIC POLYNEUROPATHY ASSOCIATED WITH TYPE 2 DIABETES MELLITUS: ICD-10-CM

## 2024-01-24 DIAGNOSIS — R51.9 FACIAL PAIN: ICD-10-CM

## 2024-01-24 DIAGNOSIS — M54.2 NECK PAIN: ICD-10-CM

## 2024-01-24 DIAGNOSIS — M47.812 SPONDYLOSIS OF CERVICAL REGION WITHOUT MYELOPATHY OR RADICULOPATHY: ICD-10-CM

## 2024-01-24 DIAGNOSIS — G43.119 INTRACTABLE MIGRAINE WITH AURA WITHOUT STATUS MIGRAINOSUS: ICD-10-CM

## 2024-01-24 DIAGNOSIS — E11.65 TYPE 2 DIABETES MELLITUS WITH HYPERGLYCEMIA, WITHOUT LONG-TERM CURRENT USE OF INSULIN: Primary | ICD-10-CM

## 2024-01-24 NOTE — TELEPHONE ENCOUNTER
Caller: Israel Reyna    Relationship: Self    Best call back number: [phone]    Equipment requested: FREE STYLELIBRE 2 SENSOR     Reason for the request: DIABETES    Prescribing Provider: DR. DANIELLE    Additional information or concerns: REC'D NOTIFICATION THAT WAS DENIED. DO YOU HAVE SOMETHING ELSE TO TRY OR CALL TO RESUBMIT?         [facility] [address] [place] [address] [phone] [facility] [phone] FX

## 2024-01-25 ENCOUNTER — TELEPHONE (OUTPATIENT)
Dept: PHYSICAL THERAPY | Facility: CLINIC | Age: 69
End: 2024-01-25

## 2024-01-25 RX ORDER — GABAPENTIN 300 MG/1
600 CAPSULE ORAL 3 TIMES DAILY
Qty: 180 CAPSULE | Refills: 0 | OUTPATIENT
Start: 2024-01-25

## 2024-01-25 NOTE — TELEPHONE ENCOUNTER
Rx Refill Note  Requested Prescriptions     Pending Prescriptions Disp Refills    gabapentin (NEURONTIN) 300 MG capsule [Pharmacy Med Name: Gabapentin 300 MG Oral Capsule] 180 capsule 0     Sig: TAKE 2 CAPSULES BY MOUTH THREE TIMES DAILY      Last filled: 11/28/23 for 4 mos to mail order pharmacy    Last office visit with prescribing clinician: 10/30/2023      Next office visit with prescribing clinician: 2/12/2024     Hermilo Rod MA  01/25/24, 08:26 EST

## 2024-01-25 NOTE — TELEPHONE ENCOUNTER
Caller: Israel Reyna    Relationship: Self         What was the call regarding: HAS A FEAVER , SUGAR

## 2024-01-26 ENCOUNTER — OFFICE VISIT (OUTPATIENT)
Dept: FAMILY MEDICINE CLINIC | Facility: CLINIC | Age: 69
End: 2024-01-26
Payer: MEDICARE

## 2024-01-26 VITALS
DIASTOLIC BLOOD PRESSURE: 82 MMHG | SYSTOLIC BLOOD PRESSURE: 122 MMHG | BODY MASS INDEX: 28.72 KG/M2 | WEIGHT: 183 LBS | HEIGHT: 67 IN

## 2024-01-26 DIAGNOSIS — R68.89 FLU-LIKE SYMPTOMS: Primary | ICD-10-CM

## 2024-01-26 DIAGNOSIS — R10.32 LEFT LOWER QUADRANT ABDOMINAL PAIN: ICD-10-CM

## 2024-01-26 PROCEDURE — 87428 SARSCOV & INF VIR A&B AG IA: CPT | Performed by: FAMILY MEDICINE

## 2024-01-26 PROCEDURE — 99214 OFFICE O/P EST MOD 30 MIN: CPT | Performed by: FAMILY MEDICINE

## 2024-01-26 PROCEDURE — 3052F HG A1C>EQUAL 8.0%<EQUAL 9.0%: CPT | Performed by: FAMILY MEDICINE

## 2024-01-26 PROCEDURE — 3079F DIAST BP 80-89 MM HG: CPT | Performed by: FAMILY MEDICINE

## 2024-01-26 PROCEDURE — 3074F SYST BP LT 130 MM HG: CPT | Performed by: FAMILY MEDICINE

## 2024-01-26 RX ORDER — BLOOD-GLUCOSE SENSOR
1 EACH MISCELLANEOUS
Qty: 6 EACH | Refills: 3 | Status: SHIPPED | OUTPATIENT
Start: 2024-01-26

## 2024-01-26 NOTE — PROGRESS NOTES
"Established Patient Office Visit      Patient Name: Israel Reyna  : 1955   MRN: [id number]   Care Team: Patient Care Team:  Elio Moore DO as PCP - General (Family Medicine)    Chief Complaint:    Chief Complaint   Patient presents with    Abdominal Pain     Pt co complains of continued side/abdomen pain       History of Present Illness: Israel Reyna is a 69 y.o. male who is here today for chief complaint.    HPI    Still having severe abdominal pain. Hasn't been to therapy yet. He reports today that he has hurt in his LLQ for \"a long time, but thought he was just getting fat.\"  Wednesday night had fever 101.  Sugar Wednesday night 407. In general glucose has been elevated the last few months. Taking Toujeo 24U.    This morning he accidentally took to shots of Trulicity. States after he gave first one he didn't see anything or feel anything so went ahead and took another injection.    This patient is accompanied by their self who contributes to the history of their care.    The following portions of the patient's history were reviewed and updated as appropriate: allergies, current medications, past family history, past medical history, past social history, past surgical history and problem list.    Subjective      Review of Systems:   Review of Systems - See HPI    Past Medical History:   Past Medical History:   Diagnosis Date    Allergic     Anxiety     Bowel incontinence     w/ history of Stimulator Device     CAD (coronary artery disease)     Cataract     Chest pain     CHF (congestive heart failure)     Colon polyp     Depression     Diabetes mellitus, type 2     Diabetic peripheral neuropathy     Dyslipidemia     Fatigue     GERD (gastroesophageal reflux disease)     Hypertension     Kidney stone     Mental status change     Migraine     Complex     Migraine headache     Renal insufficiency     SOB (shortness of breath)        Past Surgical History:   Past Surgical " History:   Procedure Laterality Date    CARDIAC CATHETERIZATION      CARDIAC CATHETERIZATION N/A 10/14/2016    Procedure: Left Heart Cath;  Surgeon: Santiago Garner MD;  Location:  [facility];  Service:     COLONOSCOPY  02/25/2022    Dr. Graves-recommended 3 year repeat    ENDOSCOPY  02/03/2022    Dr. Graves    GASTRIC STIMULATOR IMPLANT SURGERY      SMALL INTESTINE SURGERY      unspecified, x3 as an infant     TESTICLE SURGERY         Family History:   Family History   Problem Relation Age of Onset    Heart failure Mother     Diabetes Mother     Alzheimer's disease Mother     Arthritis Mother     Heart disease Mother     Heart attack Father     Kidney disease Father     Cancer Father     Prostate cancer Father     Heart disease Father     Hypertension Father     Cancer Sister     Breast cancer Sister     Diabetes Sister     Heart disease Sister     Arrhythmia Sister     Diabetes Brother     Heart disease Brother     Cancer Brother     Liver disease Brother     Kidney disease Brother     Dementia Paternal Uncle     Alzheimer's disease Maternal Grandmother     Liver cancer Maternal Grandfather     Liver disease Maternal Grandfather     Other Paternal Grandmother         Equatorial Guinean flu    Other Paternal Grandfather         Equatorial Guinean flu    Colon cancer Neg Hx        Social History:   Social History     Socioeconomic History    Marital status:    Tobacco Use    Smoking status: Never     Passive exposure: Never    Smokeless tobacco: Never   Vaping Use    Vaping Use: Never used   Substance and Sexual Activity    Alcohol use: Never    Drug use: Never    Sexual activity: Yes       Tobacco History:   Social History     Tobacco Use   Smoking Status Never    Passive exposure: Never   Smokeless Tobacco Never       Medications:     Current Outpatient Medications:     atorvastatin (LIPITOR) 20 MG tablet, Take 1 tablet by mouth Daily., Disp: 90 tablet, Rfl: 3    butalbital-acetaminophen-caffeine  "(FIORICET, ESGIC) -40 MG per tablet, TAKE 1 TABLET BY MOUTH ONE TIME AS NEEDED FOR HEADACHE FOR UP TO 30 DOSES, Disp: 30 tablet, Rfl: 0    Continuous Blood Gluc Sensor (FreeStyle Mary 3 Sensor) misc, Use 1 each Every 14 (Fourteen) Days., Disp: 6 each, Rfl: 3    Creon 05204-685572 units capsule delayed-release particles capsule, TAKE 1 CAPSULE BY MOUTH THREE TIMES DAILY WITH MEALS, Disp: 90 capsule, Rfl: 0    diphenoxylate-atropine (Lomotil) 2.5-0.025 MG per tablet, Take 1 tablet by mouth 4 (Four) Times a Day As Needed for Diarrhea., Disp: 120 tablet, Rfl: 2    Dulaglutide (Trulicity) 1.5 MG/0.5ML solution pen-injector, Inject 1.5 mg under the skin into the appropriate area as directed 1 (One) Time Per Week., Disp: 6 mL, Rfl: 2    glipizide (GLUCOTROL) 10 MG tablet, Take 1 tablet by mouth 2 (Two) Times a Day Before Meals., Disp: 180 tablet, Rfl: 3    glucose blood test strip, 1 each by Other route Daily. Use daily as directed, Disp: 100 each, Rfl: 2    glucose monitor monitoring kit, 1 each Daily., Disp: 1 each, Rfl: 0    Insulin Pen Needle 31G X 6 MM misc, 1 each Daily., Disp: 100 each, Rfl: 3    Insulin Syringe 31G X 5/16\" 0.3 ML misc, 1 each Every Night., Disp: 90 each, Rfl: 3    Lancets Thin misc, 1 each Daily. Use daily as directed, Disp: 100 each, Rfl: 11    Lidocaine 4 % gel, Apply 1 application  topically 3 (Three) Times a Day As Needed (pain)., Disp: 113 g, Rfl: 11    lisinopril (PRINIVIL,ZESTRIL) 40 MG tablet, Take 1 tablet by mouth twice daily, Disp: 180 tablet, Rfl: 1    metFORMIN (GLUCOPHAGE) 1000 MG tablet, TAKE 1 TABLET BY MOUTH TWICE DAILY WITH MEALS, Disp: 180 tablet, Rfl: 0    nitroglycerin (NITROSTAT) 0.4 MG SL tablet, 1 under the tongue as needed for angina, may repeat q5mins for up three doses, Disp: 100 tablet, Rfl: 11    ondansetron (ZOFRAN) 8 MG tablet, TAKE 1 TABLET BY MOUTH EVERY 8 HOURS AS NEEDED FOR NAUSEA FOR VOMITING, Disp: 30 tablet, Rfl: 0    pantoprazole (PROTONIX) 40 MG EC " "tablet, Take 1 tablet by mouth 2 (Two) Times a Day. 30 minutes prior to first meal of the day and 30 minutes prior to last meal of the day, Disp: 180 tablet, Rfl: 3    propranolol LA (INDERAL LA) 60 MG 24 hr capsule, , Disp: , Rfl:     sildenafil (VIAGRA) 100 MG tablet, Take 1 tablet by mouth Daily As Needed for Erectile Dysfunction., Disp: 15 tablet, Rfl: 11    tamsulosin (FLOMAX) 0.4 MG capsule 24 hr capsule, Take 1 capsule by mouth Daily., Disp: , Rfl:     Toujeo Max SoloStar 300 UNIT/ML solution pen-injector injection, INJECT 20 UNITS UNDER THE SKIN INTO THE APPROPRIATE AREA AS DIRECTED EVERY NIGHT AT BEDTIME, Disp: 9 mL, Rfl: 0    vitamin B-12 (CYANOCOBALAMIN) 100 MCG tablet, Take 0.5 tablets by mouth 2 (two) times a day., Disp: , Rfl:     vitamin C (ASCORBIC ACID) 500 MG tablet, Take 1 tablet by mouth 2 (two) times a day., Disp: , Rfl:     vitamin E 400 UNIT capsule, Take 1 capsule by mouth Daily., Disp: , Rfl:     Zinc 40 MG tablet, Take 1 tablet by mouth Daily., Disp: , Rfl:     gabapentin (NEURONTIN) 300 MG capsule, Take 2 capsules by mouth 3 (Three) Times a Day for 30 days., Disp: 180 capsule, Rfl: 0    Allergies:   Allergies   Allergen Reactions    Actos [Pioglitazone] Other (See Comments)     congestive heart failure    Avandia [Rosiglitazone] Other (See Comments)     congestive heart failure.    Darvon [Propoxyphene] Itching     Angioedema    itching    Talwin [Pentazocine] Rash     Angioedema    itching       Objective   Objective     Physical Exam:  Vital Signs:   Vitals:    01/26/24 1453   BP: 122/82   BP Location: Left arm   Patient Position: Sitting   Cuff Size: Adult   Weight: 83 kg (183 lb)   Height: 170.2 cm (67\")     Body mass index is 28.66 kg/m².     Physical Exam  Nursing note reviewed  Const: NAD, A&Ox4, Pleasant, Cooperative  Eyes: EOMI, no conjunctivitis  ENT: No nasal discharge present, neck supple  Cardiac: Regular rate and rhythm, no cyanosis  Resp: Respiratory rate within normal " limits, no increased work of breathing, no audible wheezing or retractions noted  GI: No distention or ascites  MSK: Motor and sensation grossly intact in bilateral upper extremities  Neurologic: CN II-XII grossly intact  Psych: Appropriate mood and behavior.  Skin: Warm, dry  Procedures/Radiology     Procedures  No radiology results for the last 7 days     Assessment & Plan   Assessment / Plan      Assessment/Plan:   Problems Addressed This Visit  Diagnoses and all orders for this visit:    1. Flu-like symptoms (Primary)  -     POCT SARS-CoV-2 Antigen ASHOK + Flu    2. Left lower quadrant abdominal pain  Comments:  Slipping rib vs aterior abdominal cutaneous nerve impingement  Orders:  -     Ambulatory Referral to Gastroenterology      Problem List Items Addressed This Visit    None  Visit Diagnoses       Flu-like symptoms    -  Primary    Relevant Orders    POCT SARS-CoV-2 Antigen ASHOK + Flu (Completed)    Left lower quadrant abdominal pain        Slipping rib vs aterior abdominal cutaneous nerve impingement    Relevant Orders    Ambulatory Referral to Gastroenterology            There are no Patient Instructions on file for this visit.    Follow Up:   No follow-ups on file.    DO BAILEY Jones RD  [facility]  [address]  [facility] [address]  Fax [phone]  Phone [phone]

## 2024-01-29 ENCOUNTER — TELEPHONE (OUTPATIENT)
Dept: FAMILY MEDICINE CLINIC | Facility: CLINIC | Age: 69
End: 2024-01-29
Payer: MEDICARE

## 2024-01-29 DIAGNOSIS — R51.9 FACIAL PAIN: ICD-10-CM

## 2024-01-29 DIAGNOSIS — E11.42 DIABETIC POLYNEUROPATHY ASSOCIATED WITH TYPE 2 DIABETES MELLITUS: ICD-10-CM

## 2024-01-29 DIAGNOSIS — M47.812 SPONDYLOSIS OF CERVICAL REGION WITHOUT MYELOPATHY OR RADICULOPATHY: ICD-10-CM

## 2024-01-29 DIAGNOSIS — M54.2 NECK PAIN: ICD-10-CM

## 2024-01-29 DIAGNOSIS — G43.119 INTRACTABLE MIGRAINE WITH AURA WITHOUT STATUS MIGRAINOSUS: ICD-10-CM

## 2024-01-29 RX ORDER — GABAPENTIN 300 MG/1
600 CAPSULE ORAL 3 TIMES DAILY
Qty: 180 CAPSULE | Refills: 0 | Status: SHIPPED | OUTPATIENT
Start: 2024-01-29 | End: 2024-02-28

## 2024-01-29 NOTE — TELEPHONE ENCOUNTER
URGENT}    Medication requested (name and dose):      GABAPETIN 300 MG CAPSULE  TAKE 2 CAPSULES BY MOUTH 3X DAY FOR 30 DAYS    Pharmacy where request should be sent:      [facility] [address] [place] [address] [phone] [facility] [phone] FX     Additional details provided by patient:  THIS PRESCRIPTION IS MARKED     Best call back number:  [phone]    Does the patient have less than a 3 day supply:  [x] Yes  [] No    Vicente Navarro Rep  24, 13:45 EST 8605}

## 2024-01-29 NOTE — TELEPHONE ENCOUNTER
Caller: Israel Reyna    Relationship: Self    Best call back number: [phone]     What is the best time to reach you: ANY    Who are you requesting to speak with (clinical staff, provider,  specific staff member): CLINICAL     What was the call regarding: PATIENT STATED THAT BOTH THE SUPRIYA 2 AND 3 WERE DENIED. PATIENT CALLED HIS INSURANCE AND HE WAS ADVISED THAT FOR HIM TO HAVE THE SUPRIYA 2 OR 3 THAT THE PROVIDER WOULD HAVE TO SEND  A PRIOR AUTHORIZATION ON WHY THE PATIENT NEEDED IT AND TO WHY ITS IMPORTANT TO THE INSURANCE.   AND ANOTHER AGENT TOLD HIM THAT THE PHARMACY WOULD JUST NEED THE PRESCRIPTION CALLED IN WITH THE PRIOR AUTHORIZATION.     SO PATIENT IS UNSURE WHAT TO DO.     INSURANCE TOLD HIM TO HAVE HIS MEDICATIONS SENT TO :      [phone] PRESS OPTION 2 FOR DR. REYES . FOR MAIL DELIVERY     [facility], [facility]. [address] - [phone]  - [phone] FX     FAX: [phone] [employer]     (PLEASE SEND SUPRIYA TO THIS PHARMACY)        PLEASE CALL AND ADVISE PATIENT OF NEXT STEPS THAT ARE BEING TAKEN TO GET THIS TO HIM! THANKS.

## 2024-02-05 ENCOUNTER — TREATMENT (OUTPATIENT)
Dept: PHYSICAL THERAPY | Facility: CLINIC | Age: 69
End: 2024-02-05
Payer: MEDICARE

## 2024-02-05 DIAGNOSIS — M54.6 PAIN IN THORACIC SPINE: ICD-10-CM

## 2024-02-05 DIAGNOSIS — M99.08 RIB CAGE DYSFUNCTION: Primary | ICD-10-CM

## 2024-02-05 PROCEDURE — 97162 PT EVAL MOD COMPLEX 30 MIN: CPT | Performed by: PHYSICAL THERAPIST

## 2024-02-05 PROCEDURE — 97530 THERAPEUTIC ACTIVITIES: CPT | Performed by: PHYSICAL THERAPIST

## 2024-02-05 PROCEDURE — 97110 THERAPEUTIC EXERCISES: CPT | Performed by: PHYSICAL THERAPIST

## 2024-02-05 NOTE — PROGRESS NOTES
"Physical Therapy Initial Evaluation and Plan of Care  [address] [facility], [address]  [place] [address]    Patient: Israel Reyna   : 1955  Diagnosis/ICD-10 Code:  Rib cage dysfunction [M99.08]  Referring practitioner: Elio Moore DO  Date of Initial Visit: 2024  Today's Date: 2024  Patient seen for 1 session         Visit Diagnoses:    ICD-10-CM ICD-9-CM   1. Rib cage dysfunction  M99.08 786.9   2. Pain in thoracic spine  M54.6 724.1         Subjective Questionnaire: QuickDASH: 40.91      Subjective Evaluation    History of Present Illness  Mechanism of injury: Presents with pain in \"kidney area\" - thought it was kidney stones (actually does have some)  Had an ultrasound- Radiologist said \"ribs are pinching the nerves\"     Pain radiates from left low back around to the stomach  Pain is worse when he has \"explosive diarrhea\"   Late Fall- had a bad fall - fell through floor joist of [employer] that was being re-built , fell 5 feet to the ground- - unsure of any injuries from that fall, but did hit head; unsure if he hit his ribs but feels like he probably did- 16\" spacing on deck joists    Has not found anything that relieves the pain  Unsure how long pain has been there, at least a few months    Was told to alternate heat/ice - unsure if this helping   Skeptical that therapy will help this   No position of comfort or directional preference  Pain is intermittent, occurs at rest, in waves  Sometimes pain with coughing; no pain with breathing  The pain feels like when you have a virus and have stomach cramping    Points to umbilical area and iliac crest area as having the most pain, may radiate from lateral /side area    Thinks he may have a little more difficulty with turning    PMH: Bowel incontinence since birth , esophageal paresis, DM; has a stimulator in the right hip for incontinence- wants to have it taken out- not able to have MRI    Decreased activity due to bowel issues; was " going to gym before Covid  Got a new puppy last week - does not seem to have increased the pain  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       Patient Occupation: retired, disability due to migraines Quality of life: fair    Pain  Current pain ratin  At worst pain rating: 10  Quality: sharp and radiating  Relieving factors: rest    Patient Goals  Patient goals for therapy: decreased pain and return to sport/leisure activities             Objective          Postural Observations  Seated posture: fair  Standing posture: fair    Additional Postural Observation Details  Hurts a little worse with sitting erect- felt tighter, harder to breath    Left lateral abdomen appears swollen compared to right, above iliac crest     Complains of pain in umbilical area, and left lower abdomen    Palpation   Left   Tenderness of the cervical paraspinals and rhomboids.     Additional Palpation Details  Left thoracic paraspinals mm spasm    Tenderness along left ribs throughout    Less pain with upward directed force    Pain with left rotation, right side bending, forward bending, extension    Tender to palpate left lower abdomen, pain to prone lie    Hypomobile, tender throughout thoracic and lumbar spine     Feelings of more difficulty breathing with certain positions (left trunk rotation, SKC on left side)     Tenderness   Cervical Spine   Tenderness in the left ribs/costal cartilage.     Additional Tenderness Details  Tenderness throughout thoracic spinous processes, left ribs 1-12    Neurological Testing     Sensation   Cervical/Thoracic   Left   Intact: light touch    Right   Intact: light touch    Lumbar   Left   Intact: light touch  Paresthesia: light touch    Right   Intact: light touch  Paresthesia: light touch    Comments   Left light touch: occasionally in hands or feet, when sugar is high    Reflexes   Left   Biceps (C5/C6): normal (2+)  Brachioradialis (C6): normal (2+)  Triceps (C7): normal (2+)    Right   Biceps  (C5/C6): normal (2+)  Brachioradialis (C6): normal (2+)  Triceps (C7): normal (2+)    Active Range of Motion   Cervical/Thoracic Spine   Cervical    Flexion: WFL  Extension: WFL  Left lateral flexion: WFL  Right lateral flexion: WFL  Left rotation: WFL  Right rotation: WFL    Thoracic   Extension: with pain  Right lateral flexion: with pain    Additional Active Range of Motion Details  Some relief with upward directed light force on left post rib cage   Mild increase in pain with forward bending , left rotation    Strength/Myotome Testing   Cervical Spine     Left   Normal strength    Right   Normal strength    Tests   Cervical     Left   Negative active compression (Hamilton), cervical distraction and Spurling's sign.           Assessment & Plan       Assessment  Impairments: abnormal or restricted ROM, activity intolerance, lacks appropriate home exercise program and pain with function   Functional limitations: carrying objects, lifting, sleeping, pulling, pushing, uncomfortable because of pain, moving in bed, sitting, standing, stooping and reaching overhead   Assessment details: 68 yo presents with c/o left sided abdominal /rib pain for several months; he points to primary pain location at left lower abdomen at waist line and around to the umbilicus; additionally he points to pain at just superior to left iliac crest; he had a five foot fall a few months ago and he thinks that this pain started after that; he is hypomobile in thoracic / lumbar spine and tender to posterior left rib cage; he has an appointment with gastroenterology in May; abdominal pain likely a separate, unrelated issue to the ribs/thoracic spine; may benefit from PT to improve thoracic mobility and decrease rib pain; patient admits skepticism that PT will help his issue  Barriers to therapy: co-morbidities, unknown source of flank/abdominal pain  Prognosis: fair    Goals  Plan Goals: 4 weeks:  1. IND with HEP  2. Patient to display decreased  "tenderness to palpate ribs/thoracic spine and to display improve thoracic mobility  3. Patient to improve QD score by 1 MCID    Plan  Therapy options: will be seen for skilled therapy services  Planned modality interventions: iontophoresis, TENS, thermotherapy (hydrocollator packs), ultrasound, high voltage pulsed current (pain management), cryotherapy and dry needling  Planned therapy interventions: manual therapy, neuromuscular re-education, soft tissue mobilization, spinal/joint mobilization, strengthening, stretching, therapeutic activities, joint mobilization, home exercise program and functional ROM exercises  Frequency: 2x week  Duration in weeks: 8  Treatment plan discussed with: patient        Access Code: [email or web address]  URL: [email or web address]  Date: 02/05/2024  Prepared by: Louis Mathias    Exercises  - Supine Lower Trunk Rotation  - 2 x daily - 7 x weekly - 1 sets - 5 reps - 10-20\" hold  - Prone on Elbows Stretch  - 3 x daily - 7 x weekly - 3 sets - 10 reps  - Sidelying Mid Thoracic Rotation  - 3 x daily - 7 x weekly - 3 sets - 10 reps  - Sidelying Upper Thoracic Rotation  - 3 x daily - 7 x weekly - 3 sets - 10 reps  - Thoracic Sidebending with Towel Roll (Mirrored)  - 3 x daily - 7 x weekly - 3 sets - 10 reps    Timed:         Manual Therapy:         mins  74951;     Therapeutic Exercise:    15     mins  99808;     Neuromuscular Nas:        mins  59044;    Therapeutic Activity:     10     mins  05369;     Gait Training:           mins  49459;     Ultrasound:          mins  11689;    Ionto                                   mins   36699  Self Care                            mins   81160  Canalith Repos         mins 98878      Un-Timed:  Electrical Stimulation:         mins  32215 ( );  Dry Needling          mins self-pay  Traction          mins 77346    Low Eval          Mins  95860  Mod Eval     40     Mins  55816  High Eval                            Mins  68230        Timed Treatment:   " 25   mins   Total Treatment:     65   mins          PT: NOE Mathias, PT     License Number: [id number]  Electronically signed by NOE Mathias, PT, 02/05/24, 1:02 PM EST    Certification Period: 2/6/2024 thru 5/5/2024  I certify that the therapy services are furnished while this patient is under my care.  The services outlined above are required by this patient, and will be reviewed every 90 days.         Physician Signature:__________________________________________________    PHYSICIAN: Elio Moore DO  NPI: [id number]                                      DATE:      Please sign and return via fax to [email or web address] . Thank you, [facility].

## 2024-02-05 NOTE — PROGRESS NOTES
Established Patient Office Visit      Patient Name: Israel Reyna  : 1955   MRN: [id number]   Care Team: Patient Care Team:  Elio Moore DO as PCP - General (Family Medicine)    Chief Complaint:    Chief Complaint   Patient presents with    Diabetes     Pt co low back and abdomen pain       History of Present Illness: Israel Reyna is a 69 y.o. male who is here today for chief complaint.    HPI    Low back and abdominal pain for months. Discussed previously.    This patient is accompanied by their self who contributes to the history of their care.    The following portions of the patient's history were reviewed and updated as appropriate: allergies, current medications, past family history, past medical history, past social history, past surgical history and problem list.    Subjective      Review of Systems:   Review of Systems - See HPI    Past Medical History:   Past Medical History:   Diagnosis Date    Allergic     Anxiety     Bowel incontinence     w/ history of Stimulator Device     CAD (coronary artery disease)     Cataract     Chest pain     CHF (congestive heart failure)     Colon polyp     Depression     Diabetes mellitus, type 2     Diabetic peripheral neuropathy     Dyslipidemia     Fatigue     GERD (gastroesophageal reflux disease)     Hypertension     Kidney stone     Mental status change     Migraine     Complex     Migraine headache     Renal insufficiency     SOB (shortness of breath)        Past Surgical History:   Past Surgical History:   Procedure Laterality Date    CARDIAC CATHETERIZATION      CARDIAC CATHETERIZATION N/A 10/14/2016    Procedure: Left Heart Cath;  Surgeon: Santiago Garner MD;  Location: [facility] CATH INVASIVE LOC[facility];  Service:     COLONOSCOPY  2022    Dr. Graves-recommended 3 year repeat    ENDOSCOPY  2022    Dr. Graves    GASTRIC STIMULATOR IMPLANT SURGERY      SMALL INTESTINE SURGERY      unspecified, x3 as an infant      TESTICLE SURGERY         Family History:   Family History   Problem Relation Age of Onset    Heart failure Mother     Diabetes Mother     Alzheimer's disease Mother     Arthritis Mother     Heart disease Mother     Heart attack Father     Kidney disease Father     Cancer Father     Prostate cancer Father     Heart disease Father     Hypertension Father     Cancer Sister     Breast cancer Sister     Diabetes Sister     Heart disease Sister     Arrhythmia Sister     Diabetes Brother     Heart disease Brother     Cancer Brother     Liver disease Brother     Kidney disease Brother     Dementia Paternal Uncle     Alzheimer's disease Maternal Grandmother     Liver cancer Maternal Grandfather     Liver disease Maternal Grandfather     Other Paternal Grandmother         Bulgarian flu    Other Paternal Grandfather         Bulgarian flu    Colon cancer Neg Hx        Social History:   Social History     Socioeconomic History    Marital status:    Tobacco Use    Smoking status: Never     Passive exposure: Never    Smokeless tobacco: Never   Vaping Use    Vaping Use: Never used   Substance and Sexual Activity    Alcohol use: Never    Drug use: Never    Sexual activity: Yes       Tobacco History:   Social History     Tobacco Use   Smoking Status Never    Passive exposure: Never   Smokeless Tobacco Never       Medications:     Current Outpatient Medications:     atorvastatin (LIPITOR) 20 MG tablet, Take 1 tablet by mouth Daily., Disp: 90 tablet, Rfl: 3    butalbital-acetaminophen-caffeine (FIORICET, ESGIC) -40 MG per tablet, TAKE 1 TABLET BY MOUTH ONE TIME AS NEEDED FOR HEADACHE FOR UP TO 30 DOSES, Disp: 30 tablet, Rfl: 0    Creon 11743-367995 units capsule delayed-release particles capsule, TAKE 1 CAPSULE BY MOUTH THREE TIMES DAILY WITH MEALS, Disp: 90 capsule, Rfl: 0    diphenoxylate-atropine (Lomotil) 2.5-0.025 MG per tablet, Take 1 tablet by mouth 4 (Four) Times a Day As Needed for Diarrhea., Disp: 120 tablet, Rfl:  "2    Dulaglutide (Trulicity) 1.5 MG/0.5ML solution pen-injector, Inject 1.5 mg under the skin into the appropriate area as directed 1 (One) Time Per Week., Disp: 6 mL, Rfl: 2    glipizide (GLUCOTROL) 10 MG tablet, Take 1 tablet by mouth 2 (Two) Times a Day Before Meals., Disp: 180 tablet, Rfl: 3    glucose blood test strip, 1 each by Other route Daily. Use daily as directed, Disp: 100 each, Rfl: 2    glucose monitor monitoring kit, 1 each Daily., Disp: 1 each, Rfl: 0    Insulin Pen Needle 31G X 6 MM misc, 1 each Daily., Disp: 100 each, Rfl: 3    Insulin Syringe 31G X 5/16\" 0.3 ML misc, 1 each Every Night., Disp: 90 each, Rfl: 3    Lancets Thin misc, 1 each Daily. Use daily as directed, Disp: 100 each, Rfl: 11    Lidocaine 4 % gel, Apply 1 application  topically 3 (Three) Times a Day As Needed (pain)., Disp: 113 g, Rfl: 11    lisinopril (PRINIVIL,ZESTRIL) 40 MG tablet, Take 1 tablet by mouth twice daily, Disp: 180 tablet, Rfl: 1    metFORMIN (GLUCOPHAGE) 1000 MG tablet, TAKE 1 TABLET BY MOUTH TWICE DAILY WITH MEALS, Disp: 180 tablet, Rfl: 0    nitroglycerin (NITROSTAT) 0.4 MG SL tablet, 1 under the tongue as needed for angina, may repeat q5mins for up three doses, Disp: 100 tablet, Rfl: 11    ondansetron (ZOFRAN) 8 MG tablet, TAKE 1 TABLET BY MOUTH EVERY 8 HOURS AS NEEDED FOR NAUSEA FOR VOMITING, Disp: 30 tablet, Rfl: 0    pantoprazole (PROTONIX) 40 MG EC tablet, Take 1 tablet by mouth 2 (Two) Times a Day. 30 minutes prior to first meal of the day and 30 minutes prior to last meal of the day, Disp: 180 tablet, Rfl: 3    propranolol LA (INDERAL LA) 60 MG 24 hr capsule, , Disp: , Rfl:     sildenafil (VIAGRA) 100 MG tablet, Take 1 tablet by mouth Daily As Needed for Erectile Dysfunction., Disp: 15 tablet, Rfl: 11    tamsulosin (FLOMAX) 0.4 MG capsule 24 hr capsule, Take 1 capsule by mouth Daily., Disp: , Rfl:     Storm Eduardo SoloStar 300 UNIT/ML solution pen-injector injection, INJECT 20 UNITS UNDER THE SKIN INTO THE " "APPROPRIATE AREA AS DIRECTED EVERY NIGHT AT BEDTIME, Disp: 9 mL, Rfl: 0    vitamin B-12 (CYANOCOBALAMIN) 100 MCG tablet, Take 0.5 tablets by mouth 2 (two) times a day., Disp: , Rfl:     vitamin C (ASCORBIC ACID) 500 MG tablet, Take 1 tablet by mouth 2 (two) times a day., Disp: , Rfl:     vitamin E 400 UNIT capsule, Take 1 capsule by mouth Daily., Disp: , Rfl:     Zinc 40 MG tablet, Take 1 tablet by mouth Daily., Disp: , Rfl:     Continuous Blood Gluc Sensor (FreeStyle Mary 3 Sensor) misc, Use 1 each Every 14 (Fourteen) Days., Disp: 6 each, Rfl: 3    gabapentin (NEURONTIN) 300 MG capsule, Take 2 capsules by mouth 3 (Three) Times a Day for 30 days., Disp: 180 capsule, Rfl: 0    Allergies:   Allergies   Allergen Reactions    Actos [Pioglitazone] Other (See Comments)     congestive heart failure    Avandia [Rosiglitazone] Other (See Comments)     congestive heart failure.    Darvon [Propoxyphene] Itching     Angioedema    itching    Talwin [Pentazocine] Rash     Angioedema    itching       Objective   Objective     Physical Exam:  Vital Signs:   Vitals:    01/19/24 1500   BP: 120/80   BP Location: Left arm   Patient Position: Sitting   Cuff Size: Adult   Weight: 83 kg (183 lb)   Height: 170.2 cm (67\")     Body mass index is 28.66 kg/m².     Physical Exam  Nursing note reviewed  Const: NAD, A&Ox4, Pleasant, Cooperative  Eyes: EOMI, no conjunctivitis  ENT: No nasal discharge present, neck supple  Cardiac: Regular rate and rhythm, no cyanosis  Resp: Respiratory rate within normal limits, no increased work of breathing, no audible wheezing or retractions noted  GI: No distention or ascites  MSK: Motor and sensation grossly intact in bilateral upper extremities  Neurologic: CN II-XII grossly intact  Psych: Appropriate mood and behavior.  Skin: Warm, dry  Procedures/Radiology     Procedures  No radiology results for the last 7 days     Assessment & Plan   Assessment / Plan      Assessment/Plan:   Problems Addressed This " Visit  Diagnoses and all orders for this visit:    1. Slipping rib syndrome (Primary)  -     Discontinue: Lidocaine 4 % gel; Apply 1 application  topically 3 (Three) Times a Day As Needed (pain).  Dispense: 113 g; Refill: 11  -     Ambulatory Referral to Physical Therapy Evaluate and treat  -     Lidocaine 4 % gel; Apply 1 application  topically 3 (Three) Times a Day As Needed (pain).  Dispense: 113 g; Refill: 11    2. Type 2 diabetes mellitus with hyperglycemia, without long-term current use of insulin  -     Discontinue: Continuous Blood Gluc Sensor (FreeStyle Mary 2 Sensor) misc; Use 1 each Every 14 (Fourteen) Days.  Dispense: 6 each; Refill: 0  -     Discontinue: Continuous Blood Gluc Sensor (FreeStyle Mary 2 Sensor) misc; Use 1 each Every 14 (Fourteen) Days.  Dispense: 6 each; Refill: 0      Problem List Items Addressed This Visit          Endocrine and Metabolic    Diabetes mellitus, type 2    Overview     On CGM he is having significant hyperglycemia in the middle of the night with precipitous drops (320 --> 60-70 within about 30 minutes). These correlate with times when he is getting sick at night.          Other Visit Diagnoses       Slipping rib syndrome    -  Primary    Relevant Medications    Lidocaine 4 % gel    Other Relevant Orders    Ambulatory Referral to Physical Therapy Evaluate and treat            There are no Patient Instructions on file for this visit.    Follow Up:   No follow-ups on file.    DO BAILEY Jones RD  [facility]  [address]  [facility] [phone]  Fax [phone]  Phone [phone]

## 2024-02-12 ENCOUNTER — OFFICE VISIT (OUTPATIENT)
Dept: NEUROLOGY | Facility: CLINIC | Age: 69
End: 2024-02-12
Payer: MEDICARE

## 2024-02-12 VITALS
SYSTOLIC BLOOD PRESSURE: 132 MMHG | WEIGHT: 183 LBS | HEART RATE: 76 BPM | TEMPERATURE: 97.5 F | DIASTOLIC BLOOD PRESSURE: 74 MMHG | BODY MASS INDEX: 28.72 KG/M2 | HEIGHT: 67 IN

## 2024-02-12 DIAGNOSIS — G43.119 INTRACTABLE MIGRAINE WITH AURA WITHOUT STATUS MIGRAINOSUS: Primary | ICD-10-CM

## 2024-02-12 PROCEDURE — 3075F SYST BP GE 130 - 139MM HG: CPT | Performed by: PSYCHIATRY & NEUROLOGY

## 2024-02-12 PROCEDURE — 3078F DIAST BP <80 MM HG: CPT | Performed by: PSYCHIATRY & NEUROLOGY

## 2024-02-12 PROCEDURE — 99214 OFFICE O/P EST MOD 30 MIN: CPT | Performed by: PSYCHIATRY & NEUROLOGY

## 2024-02-12 RX ORDER — NORTRIPTYLINE HYDROCHLORIDE 25 MG/1
25 CAPSULE ORAL NIGHTLY
Qty: 30 CAPSULE | Refills: 3 | Status: SHIPPED | OUTPATIENT
Start: 2024-02-12 | End: 2025-02-11

## 2024-02-15 DIAGNOSIS — E11.65 TYPE 2 DIABETES MELLITUS WITH HYPERGLYCEMIA, WITHOUT LONG-TERM CURRENT USE OF INSULIN: ICD-10-CM

## 2024-02-15 RX ORDER — INSULIN GLARGINE 300 U/ML
20 INJECTION, SOLUTION SUBCUTANEOUS
Qty: 9 ML | Refills: 0 | Status: CANCELLED | OUTPATIENT
Start: 2024-02-15

## 2024-03-06 DIAGNOSIS — E78.5 DYSLIPIDEMIA: ICD-10-CM

## 2024-03-06 DIAGNOSIS — E11.65 TYPE 2 DIABETES MELLITUS WITH HYPERGLYCEMIA, WITHOUT LONG-TERM CURRENT USE OF INSULIN: ICD-10-CM

## 2024-03-06 RX ORDER — GLIPIZIDE 10 MG/1
10 TABLET ORAL
Qty: 180 TABLET | Refills: 0 | Status: SHIPPED | OUTPATIENT
Start: 2024-03-06

## 2024-03-06 RX ORDER — ATORVASTATIN CALCIUM 20 MG/1
20 TABLET, FILM COATED ORAL DAILY
Qty: 90 TABLET | Refills: 1 | Status: SHIPPED | OUTPATIENT
Start: 2024-03-06

## 2024-03-06 RX ORDER — TAMSULOSIN HYDROCHLORIDE 0.4 MG/1
1 CAPSULE ORAL DAILY
Qty: 90 CAPSULE | Refills: 0 | Status: SHIPPED | OUTPATIENT
Start: 2024-03-06

## 2024-03-06 NOTE — TELEPHONE ENCOUNTER
Caller: Israel Reyna    Relationship: Self    Best call back number: [phone]    Requested Prescriptions:   Requested Prescriptions     Pending Prescriptions Disp Refills    atorvastatin (LIPITOR) 20 MG tablet 90 tablet 3     Sig: Take 1 tablet by mouth Daily.        [facility], [address] [phone] [facility] [phone]      Last office visit with prescribing clinician: 1/26/2024   Last telemedicine visit with prescribing clinician: 9/14/2023   Next office visit with prescribing clinician: 4/8/2024     Additional details provided by patient: PT HAS 4-5 DAYS OF PILLS LEFT    Does the patient have less than a 3 day supply:  [x] Yes  [] No    Would you like a call back once the refill request has been completed: [x] Yes [] No    If the office needs to give you a call back, can they leave a voicemail: [] Yes [] No    Vicente Galindo Rep   03/06/24 14:57 EST

## 2024-03-18 DIAGNOSIS — R11.0 NAUSEA: ICD-10-CM

## 2024-03-18 DIAGNOSIS — E11.65 TYPE 2 DIABETES MELLITUS WITH HYPERGLYCEMIA, WITHOUT LONG-TERM CURRENT USE OF INSULIN: ICD-10-CM

## 2024-03-18 RX ORDER — INSULIN GLARGINE 300 U/ML
20 INJECTION, SOLUTION SUBCUTANEOUS
Qty: 9 ML | Refills: 0 | Status: SHIPPED | OUTPATIENT
Start: 2024-03-18 | End: 2024-03-20 | Stop reason: SDUPTHER

## 2024-03-18 RX ORDER — ONDANSETRON HYDROCHLORIDE 8 MG/1
TABLET, FILM COATED ORAL
Qty: 90 TABLET | Refills: 0 | Status: SHIPPED | OUTPATIENT
Start: 2024-03-18

## 2024-03-18 NOTE — TELEPHONE ENCOUNTER
Caller: Israel Reyna    Relationship: Self    Best call back number: [phone]     Requested Prescriptions:   Requested Prescriptions     Pending Prescriptions Disp Refills    Storm Eduardo SoloStar 300 UNIT/ML solution pen-injector injection 9 mL 0     Sig: Inject 20 Units under the skin into the appropriate area as directed every night at bedtime.        Pharmacy where request should be sent: [facility], [address] [phone] [facility] [phone]      Last office visit with prescribing clinician: 1/26/2024   Last telemedicine visit with prescribing clinician: Visit date not found   Next office visit with prescribing clinician: 4/8/2024     Additional details provided by patient:     Does the patient have less than a 3 day supply:  [x] Yes  [] No    Would you like a call back once the refill request has been completed: [x] Yes [] No    If the office needs to give you a call back, can they leave a voicemail: [x] Yes [] No    Vicente Muñoz   03/18/24 08:30 EDT

## 2024-03-20 DIAGNOSIS — E11.65 TYPE 2 DIABETES MELLITUS WITH HYPERGLYCEMIA, WITHOUT LONG-TERM CURRENT USE OF INSULIN: ICD-10-CM

## 2024-03-20 RX ORDER — INSULIN GLARGINE 300 U/ML
20 INJECTION, SOLUTION SUBCUTANEOUS
Qty: 9 ML | Refills: 0 | Status: SHIPPED | OUTPATIENT
Start: 2024-03-20

## 2024-03-20 NOTE — TELEPHONE ENCOUNTER
PT IS NOW OUT AND NEEDS THIS SENT TO [facility] DUE TO COST

## 2024-03-20 NOTE — TELEPHONE ENCOUNTER
Rx Refill Note  Requested Prescriptions     Pending Prescriptions Disp Refills    Storm Eduardo SoloStar 300 UNIT/ML solution pen-injector injection 9 mL 0     Sig: Inject 20 Units under the skin into the appropriate area as directed every night at bedtime.      Last office visit with prescribing clinician: 1/26/2024   Last telemedicine visit with prescribing clinician: Visit date not found   Next office visit with prescribing clinician: 4/8/2024                         Would you like a call back once the refill request has been completed: [] Yes [] No    If the office needs to give you a call back, can they leave a voicemail: [] Yes [] No    Reyna Charles MA  03/20/24, 15:24 EDT

## 2024-04-08 ENCOUNTER — OFFICE VISIT (OUTPATIENT)
Dept: FAMILY MEDICINE CLINIC | Facility: CLINIC | Age: 69
End: 2024-04-08
Payer: MEDICARE

## 2024-04-08 VITALS
SYSTOLIC BLOOD PRESSURE: 118 MMHG | BODY MASS INDEX: 27.94 KG/M2 | DIASTOLIC BLOOD PRESSURE: 68 MMHG | HEIGHT: 67 IN | WEIGHT: 178 LBS

## 2024-04-08 DIAGNOSIS — I65.22 STENOSIS OF LEFT CAROTID ARTERY: ICD-10-CM

## 2024-04-08 DIAGNOSIS — E11.65 TYPE 2 DIABETES MELLITUS WITH HYPERGLYCEMIA, WITHOUT LONG-TERM CURRENT USE OF INSULIN: Primary | ICD-10-CM

## 2024-04-08 LAB
EXPIRATION DATE: ABNORMAL
HBA1C MFR BLD: 9.3 % (ref 4.5–5.7)
Lab: ABNORMAL

## 2024-04-08 PROCEDURE — 3046F HEMOGLOBIN A1C LEVEL >9.0%: CPT | Performed by: FAMILY MEDICINE

## 2024-04-08 PROCEDURE — 3078F DIAST BP <80 MM HG: CPT | Performed by: FAMILY MEDICINE

## 2024-04-08 PROCEDURE — 3052F HG A1C>EQUAL 8.0%<EQUAL 9.0%: CPT | Performed by: FAMILY MEDICINE

## 2024-04-08 PROCEDURE — 83036 HEMOGLOBIN GLYCOSYLATED A1C: CPT | Performed by: FAMILY MEDICINE

## 2024-04-08 PROCEDURE — 3074F SYST BP LT 130 MM HG: CPT | Performed by: FAMILY MEDICINE

## 2024-04-08 PROCEDURE — 99214 OFFICE O/P EST MOD 30 MIN: CPT | Performed by: FAMILY MEDICINE

## 2024-04-08 RX ORDER — INSULIN GLARGINE 300 U/ML
34 INJECTION, SOLUTION SUBCUTANEOUS
Qty: 12 ML | Refills: 0 | Status: SHIPPED | OUTPATIENT
Start: 2024-04-08

## 2024-04-08 RX ORDER — INSULIN LISPRO 200 [IU]/ML
10 INJECTION, SOLUTION SUBCUTANEOUS
Qty: 9 ML | Refills: 0 | Status: SHIPPED | OUTPATIENT
Start: 2024-04-08

## 2024-04-08 NOTE — PROGRESS NOTES
Established Patient Office Visit      Patient Name: Israel Reyna  : 1955   MRN: [id number]   Care Team: Patient Care Team:  Elio Moore DO as PCP - General (Family Medicine)    Chief Complaint:    Chief Complaint   Patient presents with    Diabetes     3 month follow-up a1c       History of Present Illness: Israel Reyna is a 69 y.o. male who is here today for chief complaint.    HPI    He is here today for follow-up on diabetes.  He had been seen on a few occasions in January for ongoing left lower quadrant abdominal pain and was eventually referred to gastroenterology with no immediate cause could be identified. His left flank pain has improved, he went to 1 appt of PT, pain is improved.    Now having LLQ/inguinal pain, comes suddenly stabbing, severe pain. Ends up in bed doubled over, comes a couple times per week. Comes mostly in the evenings.    Taking 20-24U Toujeo at night. Taking Trulicity 1.5mg weekly, glipizide 10mg BID.  Eating 2 meals per day with a snack.  -I would like him to stop glipizide and start prandial insulin.    This patient is accompanied by their self who contributes to the history of their care.    The following portions of the patient's history were reviewed and updated as appropriate: allergies, current medications, past family history, past medical history, past social history, past surgical history and problem list.    Subjective      Review of Systems:   Review of Systems - See HPI    Past Medical History:   Past Medical History:   Diagnosis Date    Allergic     Anxiety     Bowel incontinence     w/ history of Stimulator Device     CAD (coronary artery disease)     Cataract     Chest pain     CHF (congestive heart failure)     Cluster headache     Colon polyp     Depression     Diabetes mellitus, type 2     Diabetic peripheral neuropathy     Dyslipidemia     Fatigue     GERD (gastroesophageal reflux disease)     HL (hearing loss) 2023    Im  wearing hearing aids. Dr. 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 Graves-recommended 3 year repeat    ENDOSCOPY  02/03/2022    Dr. Graves    GASTRIC STIMULATOR IMPLANT SURGERY      SMALL INTESTINE SURGERY      unspecified, x3 as an infant     TESTICLE SURGERY         Family History:   Family History   Problem Relation Age of Onset    Heart failure Mother     Diabetes Mother     Alzheimer's disease Mother     Arthritis Mother     Heart disease Mother     Heart attack Father     Kidney disease Father     Cancer Father     Prostate cancer Father     Heart disease Father     Hypertension Father     Cancer Sister     Breast cancer Sister     Diabetes Sister     Heart disease Sister     Arrhythmia Sister     Stroke Sister     Diabetes Brother     Heart disease Brother     Cancer Brother     Liver disease Brother     Kidney disease Brother     Dementia Paternal Uncle     Alzheimer's disease Maternal Grandmother     Liver cancer Maternal Grandfather     Liver disease Maternal Grandfather     Other Paternal Grandmother         [place] flu    Other Paternal Grandfather         [place] flu    Stroke Sister     Colon cancer Neg Hx        Social History:   Social History     Socioeconomic History    Marital status:    Tobacco Use    Smoking status: Never     Passive exposure: Never    Smokeless tobacco: Never   Vaping Use    Vaping status: Never Used   Substance and Sexual Activity    Alcohol use: Never  "   Drug use: Never    Sexual activity: Yes     Partners: Female       Tobacco History:   Social History     Tobacco Use   Smoking Status Never    Passive exposure: Never   Smokeless Tobacco Never       Medications:     Current Outpatient Medications:     atorvastatin (LIPITOR) 20 MG tablet, Take 1 tablet by mouth Daily., Disp: 90 tablet, Rfl: 1    butalbital-acetaminophen-caffeine (FIORICET, ESGIC) -40 MG per tablet, TAKE 1 TABLET BY MOUTH ONE TIME AS NEEDED FOR HEADACHE FOR UP TO 30 DOSES, Disp: 30 tablet, Rfl: 0    Continuous Blood Gluc Sensor (FreeStyle Mary 3 Sensor) misc, Use 1 each Every 14 (Fourteen) Days., Disp: 6 each, Rfl: 3    Creon 13724-185710 units capsule delayed-release particles capsule, TAKE 1 CAPSULE BY MOUTH THREE TIMES DAILY WITH MEALS, Disp: 90 capsule, Rfl: 0    diphenoxylate-atropine (Lomotil) 2.5-0.025 MG per tablet, Take 1 tablet by mouth 4 (Four) Times a Day As Needed for Diarrhea., Disp: 120 tablet, Rfl: 2    Dulaglutide (Trulicity) 1.5 MG/0.5ML solution pen-injector, Inject 1.5 mg under the skin into the appropriate area as directed 1 (One) Time Per Week., Disp: 6 mL, Rfl: 2    glucose blood test strip, 1 each by Other route Daily. Use daily as directed, Disp: 100 each, Rfl: 2    glucose monitor monitoring kit, 1 each Daily., Disp: 1 each, Rfl: 0    Insulin Pen Needle 31G X 6 MM misc, 1 each Daily., Disp: 100 each, Rfl: 3    Insulin Syringe 31G X 5/16\" 0.3 ML misc, 1 each Every Night., Disp: 90 each, Rfl: 3    Lancets Thin misc, 1 each Daily. Use daily as directed, Disp: 100 each, Rfl: 11    Lidocaine 4 % gel, Apply 1 application  topically 3 (Three) Times a Day As Needed (pain)., Disp: 113 g, Rfl: 11    lisinopril (PRINIVIL,ZESTRIL) 40 MG tablet, Take 1 tablet by mouth twice daily, Disp: 180 tablet, Rfl: 1    metFORMIN (GLUCOPHAGE) 1000 MG tablet, TAKE 1 TABLET BY MOUTH TWICE DAILY WITH MEALS, Disp: 180 tablet, Rfl: 0    nitroglycerin (NITROSTAT) 0.4 MG SL tablet, 1 under the " tongue as needed for angina, may repeat q5mins for up three doses, Disp: 100 tablet, Rfl: 11    nortriptyline (Pamelor) 25 MG capsule, Take 1 capsule by mouth Every Night., Disp: 30 capsule, Rfl: 3    ondansetron (ZOFRAN) 8 MG tablet, TAKE 1 TABLET BY MOUTH EVERY 8 HOURS AS NEEDED FOR NAUSEA FOR VOMITING, Disp: 90 tablet, Rfl: 0    pantoprazole (PROTONIX) 40 MG EC tablet, Take 1 tablet by mouth 2 (Two) Times a Day. 30 minutes prior to first meal of the day and 30 minutes prior to last meal of the day, Disp: 180 tablet, Rfl: 3    propranolol LA (INDERAL LA) 60 MG 24 hr capsule, , Disp: , Rfl:     sildenafil (VIAGRA) 100 MG tablet, Take 1 tablet by mouth Daily As Needed for Erectile Dysfunction., Disp: 15 tablet, Rfl: 11    tamsulosin (FLOMAX) 0.4 MG capsule 24 hr capsule, Take 1 capsule by mouth once daily, Disp: 90 capsule, Rfl: 0    Toujeo Max SoloStar 300 UNIT/ML solution pen-injector injection, Inject 34 Units under the skin into the appropriate area as directed every night at bedtime., Disp: 12 mL, Rfl: 0    vitamin B-12 (CYANOCOBALAMIN) 100 MCG tablet, Take 0.5 tablets by mouth 2 (two) times a day., Disp: , Rfl:     vitamin C (ASCORBIC ACID) 500 MG tablet, Take 1 tablet by mouth 2 (two) times a day., Disp: , Rfl:     vitamin E 400 UNIT capsule, Take 1 capsule by mouth Daily., Disp: , Rfl:     Zinc 40 MG tablet, Take 1 tablet by mouth Daily., Disp: , Rfl:     gabapentin (NEURONTIN) 300 MG capsule, Take 2 capsules by mouth 3 (Three) Times a Day for 30 days., Disp: 180 capsule, Rfl: 0    Insulin Lispro (HumaLOG KwikPen) 200 UNIT/ML solution pen-injector, Inject 10 Units under the skin into the appropriate area as directed 2 (Two) Times a Day Before Meals., Disp: 9 mL, Rfl: 0    Allergies:   Allergies   Allergen Reactions    Actos [Pioglitazone] Other (See Comments)     congestive heart failure    Avandia [Rosiglitazone] Other (See Comments)     congestive heart failure.    Darvon [Propoxyphene] Itching      "Angioedema    itching    Talwin [Pentazocine] Rash     Angioedema    itching       Objective   Objective     Physical Exam:  Vital Signs:   Vitals:    04/08/24 1051   BP: 118/68   BP Location: Left arm   Patient Position: Sitting   Cuff Size: Adult   Weight: 80.7 kg (178 lb)   Height: 170.2 cm (67\")     Body mass index is 27.88 kg/m².     Physical Exam  Nursing note reviewed  Const: NAD, A&Ox4, Pleasant, Cooperative  Eyes: EOMI, no conjunctivitis  ENT: No nasal discharge present, neck supple  Cardiac: Regular rate and rhythm, no cyanosis  Resp: Respiratory rate within normal limits, no increased work of breathing, no audible wheezing or retractions noted  GI: No distention or ascites  MSK: Motor and sensation grossly intact in bilateral upper extremities  Neurologic: CN II-XII grossly intact  Psych: Appropriate mood and behavior.  Skin: Warm, dry  Procedures/Radiology     Procedures  No radiology results for the last 7 days     Assessment & Plan   Assessment / Plan      Assessment/Plan:   Problems Addressed This Visit  Diagnoses and all orders for this visit:    1. Type 2 diabetes mellitus with hyperglycemia, without long-term current use of insulin (Primary)  -     Toujeo Max SoloStar 300 UNIT/ML solution pen-injector injection; Inject 34 Units under the skin into the appropriate area as directed every night at bedtime.  Dispense: 12 mL; Refill: 0  -     Insulin Lispro (HumaLOG KwikPen) 200 UNIT/ML solution pen-injector; Inject 10 Units under the skin into the appropriate area as directed 2 (Two) Times a Day Before Meals.  Dispense: 9 mL; Refill: 0    2. Stenosis of left carotid artery  Comments:  0-49% in 2017  Orders:  -     Duplex Carotid Ultrasound CAR; Future      Problem List Items Addressed This Visit          Endocrine and Metabolic    Diabetes mellitus, type 2 - Primary    Overview     On CGM he is having significant hyperglycemia in the middle of the night with precipitous drops (320 --> 60-70 within " about 30 minutes). These correlate with times when he is getting sick at night.         Relevant Medications    Toujeo Max SoloStar 300 UNIT/ML solution pen-injector injection    Insulin Lispro (HumaLOG KwikPen) 200 UNIT/ML solution pen-injector     Other Visit Diagnoses       Stenosis of left carotid artery        0-49% in 2017    Relevant Orders    Duplex Carotid Ultrasound CAR            Patient Instructions   Take 30U Toujeo at night before bed  Take Novolog 10U with meals    Follow Up:   Return in about 3 months (around 7/8/2024) for with A1c;.        DO BAILEY Jones RD  [facility]  [address]  [facility] [address]  Fax [phone]  Phone [phone]

## 2024-04-08 NOTE — PATIENT INSTRUCTIONS
Take 30U Toujeo at night before bed  Take Humalog (or Fiasp) 4U with meals- Start at 4U, can increase if needed based on sugar levels

## 2024-04-16 ENCOUNTER — OFFICE VISIT (OUTPATIENT)
Dept: FAMILY MEDICINE CLINIC | Facility: CLINIC | Age: 69
End: 2024-04-16
Payer: MEDICARE

## 2024-04-16 VITALS
SYSTOLIC BLOOD PRESSURE: 138 MMHG | HEIGHT: 67 IN | WEIGHT: 176.7 LBS | BODY MASS INDEX: 27.73 KG/M2 | DIASTOLIC BLOOD PRESSURE: 88 MMHG

## 2024-04-16 DIAGNOSIS — N52.1 ERECTILE DISORDER DUE TO MEDICAL CONDITION IN MALE: ICD-10-CM

## 2024-04-16 DIAGNOSIS — E11.65 TYPE 2 DIABETES MELLITUS WITH HYPERGLYCEMIA, WITHOUT LONG-TERM CURRENT USE OF INSULIN: ICD-10-CM

## 2024-04-16 DIAGNOSIS — I10 PRIMARY HYPERTENSION: Primary | ICD-10-CM

## 2024-04-16 RX ORDER — INSULIN GLARGINE 300 U/ML
40 INJECTION, SOLUTION SUBCUTANEOUS
Qty: 12 ML | Refills: 0 | Status: SHIPPED | OUTPATIENT
Start: 2024-04-16

## 2024-04-16 RX ORDER — SILDENAFIL 100 MG/1
100 TABLET, FILM COATED ORAL DAILY PRN
Qty: 15 TABLET | Refills: 11 | Status: SHIPPED | OUTPATIENT
Start: 2024-04-16

## 2024-04-16 RX ORDER — INSULIN LISPRO 200 [IU]/ML
12 INJECTION, SOLUTION SUBCUTANEOUS
Start: 2024-04-16

## 2024-04-16 RX ORDER — AMLODIPINE BESYLATE 5 MG/1
5 TABLET ORAL DAILY
Qty: 90 TABLET | Refills: 0 | Status: SHIPPED | OUTPATIENT
Start: 2024-04-16

## 2024-04-16 RX ORDER — AMLODIPINE BESYLATE 5 MG/1
5 TABLET ORAL DAILY
Qty: 90 TABLET | Refills: 0 | Status: SHIPPED | OUTPATIENT
Start: 2024-04-16 | End: 2024-04-16 | Stop reason: SDUPTHER

## 2024-04-16 RX ORDER — AMLODIPINE BESYLATE 2.5 MG/1
2.5 TABLET ORAL DAILY
COMMUNITY
Start: 2024-04-13 | End: 2024-04-16 | Stop reason: SDUPTHER

## 2024-04-16 NOTE — PROGRESS NOTES
Established Patient Office Visit      Patient Name: Israel Reyna  : 1955   MRN: [id number]   Care Team: Patient Care Team:  Elio Moore DO as PCP - General (Family Medicine)    Chief Complaint:    Chief Complaint   Patient presents with    Hypertension     Pt co htn       History of Present Illness: Israel Reyna is a 69 y.o. male who is here today for chief complaint.    HPI    Hypertensive urgency on Friday, went to [facility]. /110. He was given labetalol IV    This patient is accompanied by their self who contributes to the history of their care.    The following portions of the patient's history were reviewed and updated as appropriate: allergies, current medications, past family history, past medical history, past social history, past surgical history and problem list.    Subjective      Review of Systems:   Review of Systems - See HPI    Past Medical History:   Past Medical History:   Diagnosis Date    Allergic     Anxiety     Bowel incontinence     w/ history of Stimulator Device     CAD (coronary artery disease)     Cataract     Chest pain     CHF (congestive heart failure)     Cluster headache     Colon polyp     Depression     Diabetes mellitus, type 2     Diabetic peripheral neuropathy     Dyslipidemia     Fatigue     GERD (gastroesophageal reflux disease)     HL (hearing loss) 2023    Im wearing hearing aids. Dr. 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 Graves    GASTRIC STIMULATOR IMPLANT SURGERY      SMALL INTESTINE SURGERY      unspecified, x3 as an infant     TESTICLE SURGERY         Family History:   Family History   Problem Relation Age of Onset    Heart failure Mother     Diabetes Mother     Alzheimer's disease Mother     Arthritis Mother     Heart disease Mother     Heart attack Father     Kidney disease Father     Cancer Father     Prostate cancer Father     Heart disease Father     Hypertension Father     Cancer Sister     Breast cancer Sister     Diabetes Sister     Heart disease Sister     Arrhythmia Sister     Stroke Sister     Diabetes Brother     Heart disease Brother     Cancer Brother     Liver disease Brother     Kidney disease Brother     Dementia Paternal Uncle     Alzheimer's disease Maternal Grandmother     Liver cancer Maternal Grandfather     Liver disease Maternal Grandfather     Other Paternal Grandmother         [place] flu    Other Paternal Grandfather         [place] flu    Stroke Sister     Colon cancer Neg Hx        Social History:   Social History     Socioeconomic History    Marital status:    Tobacco Use    Smoking status: Never     Passive exposure: Never    Smokeless tobacco: Never   Vaping Use    Vaping status: Never Used   Substance and Sexual Activity    Alcohol use: Never    Drug use: Never    Sexual activity: Yes     Partners: Female       Tobacco History:   Social History     Tobacco Use   Smoking Status Never    Passive exposure: Never   Smokeless Tobacco Never       Medications:     Current Outpatient Medications:     amLODIPine (NORVASC) 5 MG tablet, Take 1 tablet by mouth Daily., Disp: 90 tablet, Rfl: 0    atorvastatin (LIPITOR) 20 MG tablet, Take 1 tablet by mouth Daily., Disp: 90 tablet, Rfl: 1    butalbital-acetaminophen-caffeine (FIORICET, ESGIC) -40 MG per tablet, TAKE 1 TABLET BY MOUTH ONE TIME AS NEEDED FOR HEADACHE FOR UP TO  "30 DOSES, Disp: 30 tablet, Rfl: 0    Continuous Blood Gluc Sensor (FreeStyle Mray 3 Sensor) misc, Use 1 each Every 14 (Fourteen) Days., Disp: 6 each, Rfl: 3    Creon 90370-706863 units capsule delayed-release particles capsule, TAKE 1 CAPSULE BY MOUTH THREE TIMES DAILY WITH MEALS, Disp: 90 capsule, Rfl: 0    diphenoxylate-atropine (Lomotil) 2.5-0.025 MG per tablet, Take 1 tablet by mouth 4 (Four) Times a Day As Needed for Diarrhea., Disp: 120 tablet, Rfl: 2    Dulaglutide (Trulicity) 1.5 MG/0.5ML solution pen-injector, Inject 1.5 mg under the skin into the appropriate area as directed 1 (One) Time Per Week., Disp: 6 mL, Rfl: 2    glucose blood test strip, 1 each by Other route Daily. Use daily as directed, Disp: 100 each, Rfl: 2    glucose monitor monitoring kit, 1 each Daily., Disp: 1 each, Rfl: 0    Insulin Lispro (HumaLOG KwikPen) 200 UNIT/ML solution pen-injector, Inject 10 Units under the skin into the appropriate area as directed 2 (Two) Times a Day Before Meals., Disp: 9 mL, Rfl: 0    Insulin Pen Needle 31G X 6 MM misc, 1 each Daily., Disp: 100 each, Rfl: 3    Insulin Syringe 31G X 5/16\" 0.3 ML misc, 1 each Every Night., Disp: 90 each, Rfl: 3    Lancets Thin misc, 1 each Daily. Use daily as directed, Disp: 100 each, Rfl: 11    Lidocaine 4 % gel, Apply 1 application  topically 3 (Three) Times a Day As Needed (pain)., Disp: 113 g, Rfl: 11    lisinopril (PRINIVIL,ZESTRIL) 40 MG tablet, Take 1 tablet by mouth twice daily, Disp: 180 tablet, Rfl: 1    metFORMIN (GLUCOPHAGE) 1000 MG tablet, TAKE 1 TABLET BY MOUTH TWICE DAILY WITH MEALS, Disp: 180 tablet, Rfl: 0    nitroglycerin (NITROSTAT) 0.4 MG SL tablet, 1 under the tongue as needed for angina, may repeat q5mins for up three doses, Disp: 100 tablet, Rfl: 11    nortriptyline (Pamelor) 25 MG capsule, Take 1 capsule by mouth Every Night., Disp: 30 capsule, Rfl: 3    ondansetron (ZOFRAN) 8 MG tablet, TAKE 1 TABLET BY MOUTH EVERY 8 HOURS AS NEEDED FOR NAUSEA FOR " "VOMITING, Disp: 90 tablet, Rfl: 0    pantoprazole (PROTONIX) 40 MG EC tablet, Take 1 tablet by mouth 2 (Two) Times a Day. 30 minutes prior to first meal of the day and 30 minutes prior to last meal of the day, Disp: 180 tablet, Rfl: 3    propranolol LA (INDERAL LA) 60 MG 24 hr capsule, , Disp: , Rfl:     sildenafil (VIAGRA) 100 MG tablet, Take 1 tablet by mouth Daily As Needed for Erectile Dysfunction., Disp: 15 tablet, Rfl: 11    tamsulosin (FLOMAX) 0.4 MG capsule 24 hr capsule, Take 1 capsule by mouth once daily, Disp: 90 capsule, Rfl: 0    Toujeo Max SoloStar 300 UNIT/ML solution pen-injector injection, Inject 40 Units under the skin into the appropriate area as directed every night at bedtime., Disp: 12 mL, Rfl: 0    vitamin B-12 (CYANOCOBALAMIN) 100 MCG tablet, Take 0.5 tablets by mouth 2 (two) times a day., Disp: , Rfl:     vitamin C (ASCORBIC ACID) 500 MG tablet, Take 1 tablet by mouth 2 (two) times a day., Disp: , Rfl:     vitamin E 400 UNIT capsule, Take 1 capsule by mouth Daily., Disp: , Rfl:     Zinc 40 MG tablet, Take 1 tablet by mouth Daily., Disp: , Rfl:     gabapentin (NEURONTIN) 300 MG capsule, Take 2 capsules by mouth 3 (Three) Times a Day for 30 days., Disp: 180 capsule, Rfl: 0    Allergies:   Allergies   Allergen Reactions    Actos [Pioglitazone] Other (See Comments)     congestive heart failure    Avandia [Rosiglitazone] Other (See Comments)     congestive heart failure.    Darvon [Propoxyphene] Itching     Angioedema    itching    Talwin [Pentazocine] Rash     Angioedema    itching       Objective   Objective     Physical Exam:  Vital Signs:   Vitals:    04/16/24 1303   BP: 138/88   BP Location: Left arm   Patient Position: Sitting   Cuff Size: Adult   Weight: 80.2 kg (176 lb 11.2 oz)   Height: 170.2 cm (67\")     Body mass index is 27.68 kg/m².     Physical Exam  Nursing note reviewed  Const: NAD, A&Ox4, Pleasant, Cooperative  Eyes: EOMI, no conjunctivitis  ENT: No nasal discharge present, neck " supple  Cardiac: Regular rate and rhythm, no cyanosis  Resp: Respiratory rate within normal limits, no increased work of breathing, no audible wheezing or retractions noted  GI: No distention or ascites  MSK: Motor and sensation grossly intact in bilateral upper extremities  Neurologic: CN II-XII grossly intact  Psych: Appropriate mood and behavior.  Skin: Warm, dry  Procedures/Radiology     Procedures  XR Chest 1 View    Result Date: 4/12/2024  No acute cardiopulmonary process.      Assessment & Plan   Assessment / Plan      Assessment/Plan:   Problems Addressed This Visit  Diagnoses and all orders for this visit:    1. Primary hypertension (Primary)  -     amLODIPine (NORVASC) 5 MG tablet; Take 1 tablet by mouth Daily.  Dispense: 90 tablet; Refill: 0    2. Type 2 diabetes mellitus with hyperglycemia, without long-term current use of insulin  -     Toujeo Max SoloStar 300 UNIT/ML solution pen-injector injection; Inject 40 Units under the skin into the appropriate area as directed every night at bedtime.  Dispense: 12 mL; Refill: 0      Problem List Items Addressed This Visit          Cardiac and Vasculature    Hypertension - Primary    Relevant Medications    amLODIPine (NORVASC) 5 MG tablet       Endocrine and Metabolic    Diabetes mellitus, type 2    Overview     On CGM he is having significant hyperglycemia in the middle of the night with precipitous drops (320 --> 60-70 within about 30 minutes). These correlate with times when he is getting sick at night.         Relevant Medications    Toujeo Max SoloStar 300 UNIT/ML solution pen-injector injection       Patient Instructions   Increase to Toujeo to 40U at night. Goal morning blood glucose is less than 140.  Increase mealtime Novolog to 12U.  Increase amlodipine to 5mg at night    Follow Up:   No follow-ups on file.        DO BAILEY Jones RD  [facility]  [address]  [facility]  [address]  Fax [phone]  Phone [phone]

## 2024-04-16 NOTE — PATIENT INSTRUCTIONS
Increase to Toujeo to 40U at night. Goal morning blood glucose is less than 140.  Increase mealtime Novolog to 12U.  Increase amlodipine to 5mg at night

## 2024-04-17 DIAGNOSIS — G43.119 INTRACTABLE MIGRAINE WITH AURA WITHOUT STATUS MIGRAINOSUS: ICD-10-CM

## 2024-04-18 ENCOUNTER — HOSPITAL ENCOUNTER (OUTPATIENT)
Dept: CARDIOLOGY | Facility: HOSPITAL | Age: 69
Discharge: HOME | End: 2024-04-18
Payer: MEDICARE

## 2024-04-18 VITALS — WEIGHT: 176.81 LBS | BODY MASS INDEX: 27.75 KG/M2 | HEIGHT: 67 IN

## 2024-04-18 DIAGNOSIS — I65.22 STENOSIS OF LEFT CAROTID ARTERY: ICD-10-CM

## 2024-04-18 PROCEDURE — 93880 EXTRACRANIAL BILAT STUDY: CPT

## 2024-04-18 NOTE — TELEPHONE ENCOUNTER
Rx Refill Note  Requested Prescriptions     Pending Prescriptions Disp Refills    butalbital-acetaminophen-caffeine (FIORICET, ESGIC) -40 MG per tablet [Pharmacy Med Name: Butalbital-APAP-Caffeine -40 MG Oral Tablet] 30 tablet 0     Sig: TAKE 1 TABLET BY MOUTH ONCE DAILY AS NEEDED FOR HEADACHE      Last filled: 11/27/23 #30 with 0 refills    Last office visit with prescribing clinician: 2/12/2024      Next office visit with prescribing clinician: 7/19/2024     Hermilo Rod MA  04/18/24, 08:09 EDT

## 2024-04-19 LAB
BH CV XLRA MEAS LEFT DIST CCA EDV: 20.5 CM/SEC
BH CV XLRA MEAS LEFT DIST CCA PSV: 85 CM/SEC
BH CV XLRA MEAS LEFT DIST ICA EDV: 31.7 CM/SEC
BH CV XLRA MEAS LEFT DIST ICA PSV: 83.8 CM/SEC
BH CV XLRA MEAS LEFT ICA/CCA RATIO: 0.95
BH CV XLRA MEAS LEFT MID CCA EDV: 19.9 CM/SEC
BH CV XLRA MEAS LEFT MID CCA PSV: 98.5 CM/SEC
BH CV XLRA MEAS LEFT MID ICA EDV: 28.7 CM/SEC
BH CV XLRA MEAS LEFT MID ICA PSV: 89.1 CM/SEC
BH CV XLRA MEAS LEFT PROX CCA EDV: 21.7 CM/SEC
BH CV XLRA MEAS LEFT PROX CCA PSV: 87.9 CM/SEC
BH CV XLRA MEAS LEFT PROX ECA EDV: 9.4 CM/SEC
BH CV XLRA MEAS LEFT PROX ECA PSV: 98.5 CM/SEC
BH CV XLRA MEAS LEFT PROX ICA EDV: 32.2 CM/SEC
BH CV XLRA MEAS LEFT PROX ICA PSV: 93.2 CM/SEC
BH CV XLRA MEAS LEFT PROX SCLA PSV: 120 CM/SEC
BH CV XLRA MEAS LEFT VERTEBRAL A PSV: 53 CM/SEC
BH CV XLRA MEAS RIGHT DIST CCA EDV: 29.1 CM/SEC
BH CV XLRA MEAS RIGHT DIST CCA PSV: 99 CM/SEC
BH CV XLRA MEAS RIGHT DIST ICA EDV: 44 CM/SEC
BH CV XLRA MEAS RIGHT DIST ICA PSV: 134 CM/SEC
BH CV XLRA MEAS RIGHT ICA/CCA RATIO: 0.79
BH CV XLRA MEAS RIGHT MID CCA EDV: 24.6 CM/SEC
BH CV XLRA MEAS RIGHT MID CCA PSV: 114 CM/SEC
BH CV XLRA MEAS RIGHT MID ICA EDV: 18.2 CM/SEC
BH CV XLRA MEAS RIGHT MID ICA PSV: 70.4 CM/SEC
BH CV XLRA MEAS RIGHT PROX CCA EDV: 18.8 CM/SEC
BH CV XLRA MEAS RIGHT PROX CCA PSV: 96.7 CM/SEC
BH CV XLRA MEAS RIGHT PROX ECA EDV: 11.1 CM/SEC
BH CV XLRA MEAS RIGHT PROX ECA PSV: 87.9 CM/SEC
BH CV XLRA MEAS RIGHT PROX ICA EDV: 29.3 CM/SEC
BH CV XLRA MEAS RIGHT PROX ICA PSV: 90.3 CM/SEC
BH CV XLRA MEAS RIGHT PROX SCLA PSV: 114 CM/SEC
BH CV XLRA MEAS RIGHT VERTEBRAL A EDV: 11.4 CM/SEC
BH CV XLRA MEAS RIGHT VERTEBRAL A PSV: 47.9 CM/SEC
LEFT ARM BP: NORMAL MMHG
RIGHT ARM BP: NORMAL MMHG

## 2024-04-19 RX ORDER — BUTALBITAL, ACETAMINOPHEN AND CAFFEINE 50; 325; 40 MG/1; MG/1; MG/1
TABLET ORAL
Qty: 30 TABLET | Refills: 0 | Status: SHIPPED | OUTPATIENT
Start: 2024-04-19

## 2024-04-22 ENCOUNTER — OFFICE VISIT (OUTPATIENT)
Dept: FAMILY MEDICINE CLINIC | Facility: CLINIC | Age: 69
End: 2024-04-22
Payer: MEDICARE

## 2024-04-22 VITALS
SYSTOLIC BLOOD PRESSURE: 136 MMHG | DIASTOLIC BLOOD PRESSURE: 82 MMHG | BODY MASS INDEX: 27.62 KG/M2 | WEIGHT: 176 LBS | HEIGHT: 67 IN

## 2024-04-22 DIAGNOSIS — E11.65 TYPE 2 DIABETES MELLITUS WITH HYPERGLYCEMIA, WITHOUT LONG-TERM CURRENT USE OF INSULIN: Primary | ICD-10-CM

## 2024-04-22 DIAGNOSIS — L91.8 ACQUIRED SKIN TAG: ICD-10-CM

## 2024-04-22 PROCEDURE — 99213 OFFICE O/P EST LOW 20 MIN: CPT | Performed by: FAMILY MEDICINE

## 2024-04-22 PROCEDURE — 3075F SYST BP GE 130 - 139MM HG: CPT | Performed by: FAMILY MEDICINE

## 2024-04-22 PROCEDURE — 3079F DIAST BP 80-89 MM HG: CPT | Performed by: FAMILY MEDICINE

## 2024-04-22 PROCEDURE — 3046F HEMOGLOBIN A1C LEVEL >9.0%: CPT | Performed by: FAMILY MEDICINE

## 2024-04-22 NOTE — PROGRESS NOTES
Established Patient Office Visit      Patient Name: Israel Reyna  : 1955   MRN: [id number]   Care Team: Patient Care Team:  Elio Moore DO as PCP - General (Family Medicine)    Chief Complaint:    Chief Complaint   Patient presents with    Labs Only     Pt wants to discuss recent lab        History of Present Illness: Israel Reyna is a 69 y.o. male who is here today for chief complaint.    HPI    Following up on carotiod US results. Was done at the behest of his ophthalmologist, right ECA showed smooth homogenous plaque but no stenosis, left ICA smooth homogenous plaque <50% stenosis.    This patient is accompanied by their self who contributes to the history of their care.    The following portions of the patient's history were reviewed and updated as appropriate: allergies, current medications, past family history, past medical history, past social history, past surgical history and problem list.    Subjective      Review of Systems:   Review of Systems - See HPI    Past Medical History:   Past Medical History:   Diagnosis Date    Allergic     Anxiety     Bowel incontinence     w/ history of Stimulator Device     CAD (coronary artery disease)     Cataract     Chest pain     CHF (congestive heart failure)     Cluster headache     Colon polyp     Depression     Diabetes mellitus, type 2     Diabetic peripheral neuropathy     Dyslipidemia     Fatigue     GERD (gastroesophageal reflux disease)     HL (hearing loss) 2023    Im wearing hearing aids. Dr. Graves (sp)    Hypertension     Kidney stone     Mental status change     Migraine     Complex     Migraine headache     Renal insufficiency     Shingles     SOB (shortness of breath)     Stroke     Was told tia go with hemiphlegic migraines    Vision loss        Past Surgical History:   Past Surgical History:   Procedure Laterality Date    CARDIAC CATHETERIZATION      CARDIAC CATHETERIZATION N/A 10/14/2016    Procedure: Left  Heart Cath;  Surgeon: Santiago Garner MD;  Location:  [facility];  Service:     COLONOSCOPY  02/25/2022    Dr. Graves-recommended 3 year repeat    ENDOSCOPY  02/03/2022    Dr. Graves    GASTRIC STIMULATOR IMPLANT SURGERY      SMALL INTESTINE SURGERY      unspecified, x3 as an infant     TESTICLE SURGERY         Family History:   Family History   Problem Relation Age of Onset    Heart failure Mother     Diabetes Mother     Alzheimer's disease Mother     Arthritis Mother     Heart disease Mother     Heart attack Father     Kidney disease Father     Cancer Father     Prostate cancer Father     Heart disease Father     Hypertension Father     Cancer Sister     Breast cancer Sister     Diabetes Sister     Heart disease Sister     Arrhythmia Sister     Stroke Sister     Diabetes Brother     Heart disease Brother     Cancer Brother     Liver disease Brother     Kidney disease Brother     Dementia Paternal Uncle     Alzheimer's disease Maternal Grandmother     Liver cancer Maternal Grandfather     Liver disease Maternal Grandfather     Other Paternal Grandmother         Ukrainian flu    Other Paternal Grandfather         Ukrainian flu    Stroke Sister     Colon cancer Neg Hx        Social History:   Social History     Socioeconomic History    Marital status:    Tobacco Use    Smoking status: Never     Passive exposure: Never    Smokeless tobacco: Never   Vaping Use    Vaping status: Never Used   Substance and Sexual Activity    Alcohol use: Never    Drug use: Never    Sexual activity: Yes     Partners: Female       Tobacco History:   Social History     Tobacco Use   Smoking Status Never    Passive exposure: Never   Smokeless Tobacco Never       Medications:     Current Outpatient Medications:     amLODIPine (NORVASC) 5 MG tablet, Take 1 tablet by mouth Daily., Disp: 90 tablet, Rfl: 0    atorvastatin (LIPITOR) 20 MG tablet, Take 1 tablet by mouth Daily., Disp: 90 tablet, Rfl: 1     "butalbital-acetaminophen-caffeine (FIORICET, ESGIC) -40 MG per tablet, TAKE 1 TABLET BY MOUTH ONCE DAILY AS NEEDED FOR HEADACHE, Disp: 30 tablet, Rfl: 0    Creon 70779-106701 units capsule delayed-release particles capsule, TAKE 1 CAPSULE BY MOUTH THREE TIMES DAILY WITH MEALS, Disp: 90 capsule, Rfl: 0    diphenoxylate-atropine (Lomotil) 2.5-0.025 MG per tablet, Take 1 tablet by mouth 4 (Four) Times a Day As Needed for Diarrhea., Disp: 120 tablet, Rfl: 2    Dulaglutide (Trulicity) 1.5 MG/0.5ML solution pen-injector, Inject 1.5 mg under the skin into the appropriate area as directed 1 (One) Time Per Week., Disp: 6 mL, Rfl: 2    glucose blood test strip, 1 each by Other route Daily. Use daily as directed, Disp: 100 each, Rfl: 2    glucose monitor monitoring kit, 1 each Daily., Disp: 1 each, Rfl: 0    Insulin Lispro (HumaLOG KwikPen) 200 UNIT/ML solution pen-injector, Inject 12 Units under the skin into the appropriate area as directed 2 (Two) Times a Day Before Meals., Disp: , Rfl:     Insulin Pen Needle 31G X 6 MM misc, 1 each Daily., Disp: 100 each, Rfl: 3    Insulin Syringe 31G X 5/16\" 0.3 ML misc, 1 each Every Night., Disp: 90 each, Rfl: 3    Lancets Thin misc, 1 each Daily. Use daily as directed, Disp: 100 each, Rfl: 11    Lidocaine 4 % gel, Apply 1 application  topically 3 (Three) Times a Day As Needed (pain)., Disp: 113 g, Rfl: 11    lisinopril (PRINIVIL,ZESTRIL) 40 MG tablet, Take 1 tablet by mouth twice daily, Disp: 180 tablet, Rfl: 1    metFORMIN (GLUCOPHAGE) 1000 MG tablet, TAKE 1 TABLET BY MOUTH TWICE DAILY WITH MEALS, Disp: 180 tablet, Rfl: 0    nitroglycerin (NITROSTAT) 0.4 MG SL tablet, 1 under the tongue as needed for angina, may repeat q5mins for up three doses, Disp: 100 tablet, Rfl: 11    nortriptyline (Pamelor) 25 MG capsule, Take 1 capsule by mouth Every Night., Disp: 30 capsule, Rfl: 3    ondansetron (ZOFRAN) 8 MG tablet, TAKE 1 TABLET BY MOUTH EVERY 8 HOURS AS NEEDED FOR NAUSEA FOR " "VOMITING, Disp: 90 tablet, Rfl: 0    pantoprazole (PROTONIX) 40 MG EC tablet, Take 1 tablet by mouth 2 (Two) Times a Day. 30 minutes prior to first meal of the day and 30 minutes prior to last meal of the day, Disp: 180 tablet, Rfl: 3    propranolol LA (INDERAL LA) 60 MG 24 hr capsule, , Disp: , Rfl:     sildenafil (VIAGRA) 100 MG tablet, Take 1 tablet by mouth Daily As Needed for Erectile Dysfunction., Disp: 15 tablet, Rfl: 11    tamsulosin (FLOMAX) 0.4 MG capsule 24 hr capsule, Take 1 capsule by mouth once daily, Disp: 90 capsule, Rfl: 0    Toujeo Max SoloStar 300 UNIT/ML solution pen-injector injection, Inject 40 Units under the skin into the appropriate area as directed every night at bedtime., Disp: 12 mL, Rfl: 0    vitamin B-12 (CYANOCOBALAMIN) 100 MCG tablet, Take 0.5 tablets by mouth 2 (two) times a day., Disp: , Rfl:     vitamin C (ASCORBIC ACID) 500 MG tablet, Take 1 tablet by mouth 2 (two) times a day., Disp: , Rfl:     vitamin E 400 UNIT capsule, Take 1 capsule by mouth Daily., Disp: , Rfl:     Zinc 40 MG tablet, Take 1 tablet by mouth Daily., Disp: , Rfl:     Continuous Blood Gluc Sensor (FreeStyle Mary 3 Sensor) misc, Use 1 each Every 14 (Fourteen) Days., Disp: 6 each, Rfl: 3    gabapentin (NEURONTIN) 300 MG capsule, Take 2 capsules by mouth 3 (Three) Times a Day for 30 days., Disp: 180 capsule, Rfl: 0    Allergies:   Allergies   Allergen Reactions    Actos [Pioglitazone] Other (See Comments)     congestive heart failure    Avandia [Rosiglitazone] Other (See Comments)     congestive heart failure.    Darvon [Propoxyphene] Itching     Angioedema    itching    Talwin [Pentazocine] Rash     Angioedema    itching       Objective   Objective     Physical Exam:  Vital Signs:   Vitals:    04/22/24 1501   BP: 136/82   BP Location: Left arm   Patient Position: Sitting   Cuff Size: Adult   Weight: 79.8 kg (176 lb)   Height: 170.2 cm (67.01\")     Body mass index is 27.56 kg/m².     Physical Exam  Nursing note " reviewed  Const: NAD, A&Ox4, Pleasant, Cooperative  Eyes: EOMI, no conjunctivitis  ENT: No nasal discharge present, neck supple  Cardiac: Regular rate and rhythm, no cyanosis  Resp: Respiratory rate within normal limits, no increased work of breathing, no audible wheezing or retractions noted  GI: No distention or ascites  MSK: Motor and sensation grossly intact in bilateral upper extremities  Neurologic: CN II-XII grossly intact  Psych: Appropriate mood and behavior.  Skin: Warm, dry  Procedures/Radiology     Skin Tag Removal    Date/Time: 4/22/2024 3:39 PM    Performed by: Elio Moore DO  Authorized by: Elio Moore DO  Preparation: Patient was prepped and draped in the usual sterile fashion.  Local anesthesia used: yes  Anesthesia: local infiltration    Anesthesia:  Local anesthesia used: yes  Local Anesthetic: lidocaine 1% without epinephrine  Anesthetic total: 1 mL    Sedation:  Patient sedated: no    Patient tolerance: patient tolerated the procedure well with no immediate complications  Comments: 2mm skin tag removed from midline anterior neck        No radiology results for the last 7 days     Assessment & Plan   Assessment / Plan      Assessment/Plan:   Problems Addressed This Visit  Diagnoses and all orders for this visit:    1. Type 2 diabetes mellitus with hyperglycemia, without long-term current use of insulin (Primary)    2. Acquired skin tag  Comments:  Anterior neck  Orders:  -     Skin Tag Removal      Problem List Items Addressed This Visit          Endocrine and Metabolic    Diabetes mellitus, type 2 - Primary    Overview     On CGM he is having significant hyperglycemia in the middle of the night with precipitous drops (320 --> 60-70 within about 30 minutes). These correlate with times when he is getting sick at night.          Other Visit Diagnoses       Acquired skin tag        Anterior neck    Relevant Orders    Skin Tag Removal          BP doing better on the 5mg  amlodipine    Sugars stable improving on 40U toujeo, 12U novolog with meals.    There are no Patient Instructions on file for this visit.    Follow Up:   Return in about 3 months (around 7/22/2024) for with A1c;.        DO BAILEY Jones RD  [facility]  [address]  [facility] [address]  Fax [phone]  Phone [phone]

## 2024-05-06 DIAGNOSIS — E11.65 TYPE 2 DIABETES MELLITUS WITH HYPERGLYCEMIA, WITHOUT LONG-TERM CURRENT USE OF INSULIN: ICD-10-CM

## 2024-05-09 DIAGNOSIS — E11.65 TYPE 2 DIABETES MELLITUS WITH HYPERGLYCEMIA, WITHOUT LONG-TERM CURRENT USE OF INSULIN: ICD-10-CM

## 2024-05-09 RX ORDER — LISINOPRIL 40 MG/1
40 TABLET ORAL 2 TIMES DAILY
Qty: 180 TABLET | Refills: 0 | Status: SHIPPED | OUTPATIENT
Start: 2024-05-09

## 2024-05-10 DIAGNOSIS — E11.65 TYPE 2 DIABETES MELLITUS WITH HYPERGLYCEMIA, WITHOUT LONG-TERM CURRENT USE OF INSULIN: ICD-10-CM

## 2024-05-10 DIAGNOSIS — R11.0 NAUSEA: ICD-10-CM

## 2024-05-10 RX ORDER — PANTOPRAZOLE SODIUM 40 MG/1
TABLET, DELAYED RELEASE ORAL
Qty: 180 TABLET | Refills: 0 | Status: SHIPPED | OUTPATIENT
Start: 2024-05-10

## 2024-05-10 RX ORDER — PEN NEEDLE, DIABETIC 31 G X1/4"
NEEDLE, DISPOSABLE MISCELLANEOUS
Qty: 100 EACH | Refills: 2 | Status: SHIPPED | OUTPATIENT
Start: 2024-05-10

## 2024-05-21 ENCOUNTER — OFFICE VISIT (OUTPATIENT)
Dept: GASTROENTEROLOGY | Facility: CLINIC | Age: 69
End: 2024-05-21
Payer: MEDICARE

## 2024-05-21 VITALS
HEIGHT: 67 IN | BODY MASS INDEX: 28.63 KG/M2 | WEIGHT: 182.4 LBS | HEART RATE: 84 BPM | DIASTOLIC BLOOD PRESSURE: 95 MMHG | SYSTOLIC BLOOD PRESSURE: 125 MMHG

## 2024-05-21 DIAGNOSIS — R10.31 RIGHT LOWER QUADRANT ABDOMINAL PAIN: Primary | ICD-10-CM

## 2024-05-21 DIAGNOSIS — R10.9 TRIGGER POINT OF ABDOMEN: ICD-10-CM

## 2024-05-21 DIAGNOSIS — Z98.890 STATUS POST BALLOON DILATATION OF ESOPHAGEAL STRICTURE: ICD-10-CM

## 2024-05-21 DIAGNOSIS — K31.84 GASTROPARESIS: ICD-10-CM

## 2024-05-21 DIAGNOSIS — Z86.010 HISTORY OF ADENOMATOUS POLYP OF COLON: ICD-10-CM

## 2024-05-21 PROCEDURE — 1159F MED LIST DOCD IN RCRD: CPT | Performed by: INTERNAL MEDICINE

## 2024-05-21 PROCEDURE — 3074F SYST BP LT 130 MM HG: CPT | Performed by: INTERNAL MEDICINE

## 2024-05-21 PROCEDURE — 99214 OFFICE O/P EST MOD 30 MIN: CPT | Performed by: INTERNAL MEDICINE

## 2024-05-21 PROCEDURE — 1160F RVW MEDS BY RX/DR IN RCRD: CPT | Performed by: INTERNAL MEDICINE

## 2024-05-21 PROCEDURE — 3080F DIAST BP >= 90 MM HG: CPT | Performed by: INTERNAL MEDICINE

## 2024-05-21 RX ORDER — AMITRIPTYLINE HYDROCHLORIDE 25 MG/1
50 TABLET, FILM COATED ORAL NIGHTLY
Qty: 60 TABLET | Refills: 11 | Status: SHIPPED | OUTPATIENT
Start: 2024-05-21

## 2024-05-21 NOTE — PROGRESS NOTES
GASTROENTEROLOGY OFFICE NOTE  Israel Reyna  [id number]  1955      Chief Complaint   Patient presents with    Left Lower Quadrant Abdominal pain    Nausea        HISTORY OF PRESENT ILLNESS:  First visit to me for this 69-year-old white male with history of right lower quadrant abdominal pain which has been present for weeks.  It is intermittent.  It is severe on occasion and is a nonradiating type pain.  He is status post colonoscopy in 2022 remarkable for tubular adenoma and upper endoscopy negative for Billings's esophagus.  These procedures were performed February 2022.  Surveillance colonoscopy is recommended for 2025.    He does have intermittent dysphagia to solids.  Has to chew carefully, eat slowly and drink water with his meals to avoid sensation of solid food getting stuck impacted esophagus.  He was noted to have an esophageal stricture on his February 2022 EGD.  At that time he was dilated to 20 mm with a through-the-scope balloon.  A small amount of food was noted in the stomach suggestive of gastroparesis    He has occasional bright red blood per rectum.  He notices little bit of blood on toilet paper but stools themselves are nonbloody.    He denies odynophagia, early satiety, unexplained weight loss, melena.    PAST MEDICAL HISTORY  Past Medical History:    Allergic    Anxiety    Bowel incontinence    w/ history of Stimulator Device     CAD (coronary artery disease)    Cataract    Chest pain    CHF (congestive heart failure)    Cluster headache    Colon polyp    Depression    Diabetes mellitus, type 2    Diabetic peripheral neuropathy    Dyslipidemia    Fatigue    GERD (gastroesophageal reflux disease)    HL (hearing loss)    Im wearing hearing aids. Dr. Graves (sp)    Hypertension    Kidney stone    Mental status change    Migraine    Complex     Migraine headache    Renal insufficiency    Shingles    SOB (shortness of breath)    Stroke    Was told tia go with hemiphlegic migraines     "Vision loss        PAST SURGICAL HISTORY  Past Surgical History:    CARDIAC CATHETERIZATION    CARDIAC CATHETERIZATION    Procedure: Left Heart Cath;  Surgeon: Santiago Garner MD;  Location: [facility] CATH INVASIVE LOC[facility];  Service:     COLONOSCOPY    Dr. Graves-recommended 3 year repeat    ENDOSCOPY    Dr. Graves    GASTRIC STIMULATOR IMPLANT SURGERY    SMALL INTESTINE SURGERY    unspecified, x3 as an infant     TESTICLE SURGERY    UPPER GASTROINTESTINAL ENDOSCOPY        MEDICATIONS:    Current Outpatient Medications:     amLODIPine (NORVASC) 5 MG tablet, Take 1 tablet by mouth Daily., Disp: 90 tablet, Rfl: 0    atorvastatin (LIPITOR) 20 MG tablet, Take 1 tablet by mouth Daily., Disp: 90 tablet, Rfl: 1    butalbital-acetaminophen-caffeine (FIORICET, ESGIC) -40 MG per tablet, TAKE 1 TABLET BY MOUTH ONCE DAILY AS NEEDED FOR HEADACHE, Disp: 30 tablet, Rfl: 0    Creon 13853-921033 units capsule delayed-release particles capsule, TAKE 1 CAPSULE BY MOUTH THREE TIMES DAILY WITH MEALS, Disp: 90 capsule, Rfl: 0    diphenoxylate-atropine (Lomotil) 2.5-0.025 MG per tablet, Take 1 tablet by mouth 4 (Four) Times a Day As Needed for Diarrhea., Disp: 120 tablet, Rfl: 2    Dulaglutide (Trulicity) 1.5 MG/0.5ML solution pen-injector, Inject 1.5 mg under the skin into the appropriate area as directed 1 (One) Time Per Week., Disp: 6 mL, Rfl: 2    glucose blood test strip, 1 each by Other route Daily. Use daily as directed, Disp: 100 each, Rfl: 2    glucose monitor monitoring kit, 1 each Daily., Disp: 1 each, Rfl: 0    Insulin Lispro (HumaLOG KwikPen) 200 UNIT/ML solution pen-injector, Inject 12 Units under the skin into the appropriate area as directed 2 (Two) Times a Day Before Meals., Disp: , Rfl:     Insulin Pen Needle (MM Pen Needles) 31G X 6 MM misc, USE 1  ONCE DAILY, Disp: 100 each, Rfl: 2    Insulin Syringe 31G X 5/16\" 0.3 ML misc, 1 each Every Night., Disp: 90 each, Rfl: 3    Lancets Thin misc, 1 each Daily. " Use daily as directed, Disp: 100 each, Rfl: 11    Lidocaine 4 % gel, Apply 1 application  topically 3 (Three) Times a Day As Needed (pain)., Disp: 113 g, Rfl: 11    lisinopril (PRINIVIL,ZESTRIL) 40 MG tablet, Take 1 tablet by mouth twice daily, Disp: 180 tablet, Rfl: 0    metFORMIN (GLUCOPHAGE) 1000 MG tablet, TAKE 1 TABLET BY MOUTH TWICE DAILY WITH MEALS, Disp: 180 tablet, Rfl: 0    nitroglycerin (NITROSTAT) 0.4 MG SL tablet, 1 under the tongue as needed for angina, may repeat q5mins for up three doses, Disp: 100 tablet, Rfl: 11    nortriptyline (Pamelor) 25 MG capsule, Take 1 capsule by mouth Every Night., Disp: 30 capsule, Rfl: 3    ondansetron (ZOFRAN) 8 MG tablet, TAKE 1 TABLET BY MOUTH EVERY 8 HOURS AS NEEDED FOR NAUSEA FOR VOMITING, Disp: 90 tablet, Rfl: 0    pantoprazole (PROTONIX) 40 MG EC tablet, TAKE 1 TABLET BY MOUTH TWICE DAILY. 30 MINUTES PRIOR TO FIRST MEAL OF THE DAY AND 30 MINUTES PRIOR TO LAST MEAL OF THE DAY, Disp: 180 tablet, Rfl: 0    propranolol LA (INDERAL LA) 60 MG 24 hr capsule, , Disp: , Rfl:     sildenafil (VIAGRA) 100 MG tablet, Take 1 tablet by mouth Daily As Needed for Erectile Dysfunction., Disp: 15 tablet, Rfl: 11    tamsulosin (FLOMAX) 0.4 MG capsule 24 hr capsule, Take 1 capsule by mouth once daily, Disp: 90 capsule, Rfl: 0    Toujeo Max SoloStar 300 UNIT/ML solution pen-injector injection, Inject 40 Units under the skin into the appropriate area as directed every night at bedtime., Disp: 12 mL, Rfl: 0    vitamin B-12 (CYANOCOBALAMIN) 100 MCG tablet, Take 0.5 tablets by mouth 2 (two) times a day., Disp: , Rfl:     vitamin C (ASCORBIC ACID) 500 MG tablet, Take 1 tablet by mouth 2 (two) times a day., Disp: , Rfl:     vitamin E 400 UNIT capsule, Take 1 capsule by mouth Daily., Disp: , Rfl:     Zinc 40 MG tablet, Take 1 tablet by mouth Daily., Disp: , Rfl:     gabapentin (NEURONTIN) 300 MG capsule, Take 2 capsules by mouth 3 (Three) Times a Day for 30 days., Disp: 180 capsule, Rfl:  "0    ALLERGIES  is allergic to actos [pioglitazone], avandia [rosiglitazone], darvon [propoxyphene], and talwin [pentazocine].    FAMILY HISTORY:  Cancer-related family history includes Breast cancer in his sister; Cancer in his brother, father, and sister; Liver cancer in his maternal grandfather; Prostate cancer in his father. There is no history of Colon cancer.  Colon Cancer-related family history is negative for Colon cancer.    SOCIAL HISTORY  He  reports that he has never smoked. He has never been exposed to tobacco smoke. He has never used smokeless tobacco. He reports that he does not drink alcohol and does not use drugs.   He is retired.  He used to do customer care.  He is also a .  He has 1 son age 36.  Non-smoker/nondrinker.        PHYSICAL EXAM   /95 (BP Location: Left arm, Patient Position: Sitting, Cuff Size: Adult)   Pulse 84   Ht 170.2 cm (67\")   Wt 82.7 kg (182 lb 6.4 oz)   BMI 28.57 kg/m²   General: Alert and oriented x 3. In no apparent or acute distress.  and No stigmata of chronic liver disease  HEENT: Anicteric sclerae. Normal oropharynx  Neck: Supple. Without lymphadenopathy  CV: Regular rate and rhythm, S1, S2  Lungs: Clear to ausculation. Without rales, rhonchi and wheezing  Abdomen: Right lower quadrant trigger point that reproduces his abdominal pain.  Abdomen is otherwise soft with normal bowel sounds no palpable masses hepatosplenomegaly  Extremeties: without clubbing, cyanosis or edema  Neurologic:  Alert and oriented x 3 without focal motor or sensory deficits  Rectal exam: deferred         ASSESSMENT  1.-Intermittent dysphagia to solids and patient with known peptic esophageal stricture.  Repeat EGD for esophageal dilation recommended  2.-Gastroparesis.  Particularly problematic at this time  3.-Right lower quadrant abdominal pain.  This is felt to be musculoskeletal in origin.  Trial of amitriptyline/Elavil recommended  4.-History of adenomatous colonic polyps due " for surveillance colonoscopy February 2025    PLAN  1.-Schedule EGD for esophageal dilation  2.-Trial of Elavil/amitriptyline 25 to 50 mg p.o. nightly titrated to response and tolerance  3.-Discontinue Pamelor  4.-Surveillance colonoscopy due in February 2025  5.-Patient will report back to us at time of his EGD in regards to his response to Elavil.  Consider referral to pain clinic for persistent myofascial abdominal pain unresponsive to Elavil      Nehemias Graves MD  5/21/2024   14:45 EDT

## 2024-05-24 PROBLEM — Z86.0101 HISTORY OF ADENOMATOUS POLYP OF COLON: Status: ACTIVE | Noted: 2024-05-24

## 2024-05-24 PROBLEM — R10.9 TRIGGER POINT OF ABDOMEN: Status: ACTIVE | Noted: 2024-05-24

## 2024-05-24 PROBLEM — Z86.010 HISTORY OF ADENOMATOUS POLYP OF COLON: Status: ACTIVE | Noted: 2024-05-24

## 2024-05-24 PROBLEM — Z98.890 STATUS POST BALLOON DILATATION OF ESOPHAGEAL STRICTURE: Status: ACTIVE | Noted: 2024-05-24

## 2024-05-24 PROBLEM — K31.84 GASTROPARESIS: Status: ACTIVE | Noted: 2024-05-24

## 2024-05-24 PROBLEM — R10.31 RIGHT LOWER QUADRANT ABDOMINAL PAIN: Status: ACTIVE | Noted: 2024-05-24

## 2024-06-03 RX ORDER — TAMSULOSIN HYDROCHLORIDE 0.4 MG/1
1 CAPSULE ORAL DAILY
Qty: 90 CAPSULE | Refills: 0 | Status: SHIPPED | OUTPATIENT
Start: 2024-06-03

## 2024-06-05 ENCOUNTER — TELEPHONE (OUTPATIENT)
Dept: GASTROENTEROLOGY | Facility: CLINIC | Age: 69
End: 2024-06-05
Payer: MEDICARE

## 2024-06-06 ENCOUNTER — OUTSIDE FACILITY SERVICE (OUTPATIENT)
Dept: GASTROENTEROLOGY | Facility: CLINIC | Age: 69
End: 2024-06-06
Payer: MEDICARE

## 2024-06-06 PROCEDURE — 43249 ESOPH EGD DILATION <30 MM: CPT | Performed by: INTERNAL MEDICINE

## 2024-06-06 PROCEDURE — 43239 EGD BIOPSY SINGLE/MULTIPLE: CPT | Performed by: INTERNAL MEDICINE

## 2024-06-06 PROCEDURE — 88305 TISSUE EXAM BY PATHOLOGIST: CPT | Performed by: INTERNAL MEDICINE

## 2024-06-07 ENCOUNTER — LAB REQUISITION (OUTPATIENT)
Dept: LAB | Facility: HOSPITAL | Age: 69
End: 2024-06-07
Payer: MEDICARE

## 2024-06-07 DIAGNOSIS — R13.10 DYSPHAGIA, UNSPECIFIED: ICD-10-CM

## 2024-06-07 DIAGNOSIS — K22.89 OTHER SPECIFIED DISEASE OF ESOPHAGUS: ICD-10-CM

## 2024-06-07 DIAGNOSIS — K44.9 DIAPHRAGMATIC HERNIA WITHOUT OBSTRUCTION OR GANGRENE: ICD-10-CM

## 2024-06-07 DIAGNOSIS — E78.5 DYSLIPIDEMIA: ICD-10-CM

## 2024-06-07 RX ORDER — ATORVASTATIN CALCIUM 20 MG/1
20 TABLET, FILM COATED ORAL DAILY
Qty: 90 TABLET | Refills: 1 | Status: SHIPPED | OUTPATIENT
Start: 2024-06-07

## 2024-06-07 NOTE — TELEPHONE ENCOUNTER
Rx Refill Note  Requested Prescriptions     Pending Prescriptions Disp Refills    atorvastatin (LIPITOR) 20 MG tablet [Pharmacy Med Name: Atorvastatin Calcium 20 MG Oral Tablet] 90 tablet 0     Sig: Take 1 tablet by mouth once daily      Last office visit with prescribing clinician: 4/22/2024   Last telemedicine visit with prescribing clinician: Visit date not found   Next office visit with prescribing clinician: 7/8/2024       Marcela Cassidy MA  06/07/24, 11:38 EDT

## 2024-06-10 LAB — REF LAB TEST METHOD: NORMAL

## 2024-06-26 DIAGNOSIS — E11.65 TYPE 2 DIABETES MELLITUS WITH HYPERGLYCEMIA, WITHOUT LONG-TERM CURRENT USE OF INSULIN: ICD-10-CM

## 2024-06-26 RX ORDER — INSULIN LISPRO 200 [IU]/ML
12 INJECTION, SOLUTION SUBCUTANEOUS
Qty: 3 ML | Refills: 2
Start: 2024-06-26 | End: 2024-06-28 | Stop reason: SDUPTHER

## 2024-06-26 NOTE — TELEPHONE ENCOUNTER
Caller: Israel Reyna    Relationship: Self    Best call back number: [phone]     Requested Prescriptions:   Requested Prescriptions     Pending Prescriptions Disp Refills    Insulin Lispro (HumaLOG KwikPen) 200 UNIT/ML solution pen-injector       Sig: Inject 12 Units under the skin into the appropriate area as directed 2 (Two) Times a Day Before Meals.      [facility], [address] [phone] [facility] [phone]      Last office visit with prescribing clinician: 4/22/2024   Last telemedicine visit with prescribing clinician: Visit date not found   Next office visit with prescribing clinician: 7/8/2024     Additional details provided by patient: PATIENT IS REQUESTING SAMPLES TO MAKE SURE HE DOES NOT RUN OUT. HE IS NOT SURE HOW MANY DAYS HE HAS LEFT.     Vicente Russell Rep   06/26/24 11:44 EDT

## 2024-06-28 ENCOUNTER — TELEPHONE (OUTPATIENT)
Dept: FAMILY MEDICINE CLINIC | Facility: CLINIC | Age: 69
End: 2024-06-28
Payer: MEDICARE

## 2024-06-28 DIAGNOSIS — E11.65 TYPE 2 DIABETES MELLITUS WITH HYPERGLYCEMIA, WITHOUT LONG-TERM CURRENT USE OF INSULIN: ICD-10-CM

## 2024-06-28 RX ORDER — INSULIN LISPRO 200 [IU]/ML
12 INJECTION, SOLUTION SUBCUTANEOUS
Start: 2024-06-28

## 2024-06-28 RX ORDER — INSULIN GLARGINE 300 U/ML
40 INJECTION, SOLUTION SUBCUTANEOUS
Qty: 12 ML | Refills: 0 | Status: SHIPPED | OUTPATIENT
Start: 2024-06-28

## 2024-06-28 NOTE — TELEPHONE ENCOUNTER
Caller: ROHITH GODINEZ    Relationship:SELF    Callback number: [phone]   Is it ok to leave a message: [x] Yes [] No    Requested medication for samples:     Insulin Lispro (HumaLOG KwikPen) 200 UNIT/ML solution pen-injector       How much medication does the patient currently have left: UNSURE...THINKS NOT MUCH    Who will be picking up the samples: PT    Do you need information about patient financial assistance for this medication: [x] Yes [] No    Additional details provided: PLEASE CALL AND LET HIM KNOW IF WE HAVE SAMPLES

## 2024-06-28 NOTE — TELEPHONE ENCOUNTER
Patient informed we do not have samples. [facility].

## 2024-07-08 ENCOUNTER — OFFICE VISIT (OUTPATIENT)
Dept: FAMILY MEDICINE CLINIC | Facility: CLINIC | Age: 69
End: 2024-07-08
Payer: MEDICARE

## 2024-07-08 VITALS
SYSTOLIC BLOOD PRESSURE: 130 MMHG | DIASTOLIC BLOOD PRESSURE: 70 MMHG | OXYGEN SATURATION: 96 % | HEART RATE: 77 BPM | RESPIRATION RATE: 16 BRPM

## 2024-07-08 DIAGNOSIS — E11.65 TYPE 2 DIABETES MELLITUS WITH HYPERGLYCEMIA, WITHOUT LONG-TERM CURRENT USE OF INSULIN: Primary | ICD-10-CM

## 2024-07-08 LAB
EXPIRATION DATE: ABNORMAL
HBA1C MFR BLD: 8.1 % (ref 4.5–5.7)
Lab: ABNORMAL

## 2024-07-08 PROCEDURE — 1126F AMNT PAIN NOTED NONE PRSNT: CPT | Performed by: FAMILY MEDICINE

## 2024-07-08 PROCEDURE — 3078F DIAST BP <80 MM HG: CPT | Performed by: FAMILY MEDICINE

## 2024-07-08 PROCEDURE — 3052F HG A1C>EQUAL 8.0%<EQUAL 9.0%: CPT | Performed by: FAMILY MEDICINE

## 2024-07-08 PROCEDURE — 99214 OFFICE O/P EST MOD 30 MIN: CPT | Performed by: FAMILY MEDICINE

## 2024-07-08 PROCEDURE — 3075F SYST BP GE 130 - 139MM HG: CPT | Performed by: FAMILY MEDICINE

## 2024-07-08 PROCEDURE — 83036 HEMOGLOBIN GLYCOSYLATED A1C: CPT | Performed by: FAMILY MEDICINE

## 2024-07-08 RX ORDER — INSULIN LISPRO 200 [IU]/ML
14 INJECTION, SOLUTION SUBCUTANEOUS
Qty: 9 ML | Refills: 1 | Status: SHIPPED | OUTPATIENT
Start: 2024-07-08

## 2024-07-08 RX ORDER — AMLODIPINE BESYLATE 2.5 MG/1
2.5 TABLET ORAL DAILY
COMMUNITY
Start: 2024-04-13

## 2024-07-08 RX ORDER — GLIPIZIDE 5 MG/1
5 TABLET, FILM COATED, EXTENDED RELEASE ORAL DAILY
Qty: 90 TABLET | Refills: 1 | Status: SHIPPED | OUTPATIENT
Start: 2024-07-08

## 2024-07-08 RX ORDER — PROPRANOLOL HYDROCHLORIDE 80 MG/1
1 CAPSULE, EXTENDED RELEASE ORAL DAILY
COMMUNITY
Start: 2024-05-28

## 2024-07-08 NOTE — PROGRESS NOTES
Established Patient Office Visit      Patient Name: Israel Reyna  : 1955   MRN: [id number]   Care Team: Patient Care Team:  Elio Moore DO as PCP - General (Family Medicine)    Chief Complaint:    Chief Complaint   Patient presents with    Diabetes     As well as a med another provider placed him on causing him to have RLS   Also need refills   And needs to talk on a med he was taken off of but had to have to keep BGL down.        History of Present Illness: Israel Reyna is a 69 y.o. male who is here today for chief complaint.    HPI    We had taken him off glipizide last time due to hypoglycemic episodes, but he noticed his sugar going over 300 so he put himself back on it about a week ago.    Is having trouble with timing, his wife doesn't usually get home until 8PM, so he usually doesn't eat until around 9PM. When he does this his sugars go very high.  Having hypoglycemic episodes during the day down to the 50s, blurry vision.    Started elavil for LLQ pain from GI, has not noted any benefit and it makes him very groggy the next day, also increased RLS symptoms.    This patient is accompanied by their self who contributes to the history of their care.    The following portions of the patient's history were reviewed and updated as appropriate: allergies, current medications, past family history, past medical history, past social history, past surgical history and problem list.    Subjective      Review of Systems:   Review of Systems - See HPI    Past Medical History:   Past Medical History:   Diagnosis Date    Allergic     Anxiety     Bowel incontinence     w/ history of Stimulator Device     CAD (coronary artery disease)     Cataract     Chest pain     CHF (congestive heart failure)     Cluster headache     Colon polyp     Depression     Diabetes mellitus, type 2     Diabetic peripheral neuropathy     Dyslipidemia     Fatigue     GERD (gastroesophageal reflux disease)      HL (hearing loss) July 2023    Im wearing hearing aids. Dr. Graves (sp)    Hypertension     Kidney stone     Mental status change     Migraine     Complex     Migraine headache     Renal insufficiency     Shingles     SOB (shortness of breath)     Stroke     Was told tia go with hemiphlegic migraines    Vision loss        Past Surgical History:   Past Surgical History:   Procedure Laterality Date    CARDIAC CATHETERIZATION      CARDIAC CATHETERIZATION N/A 10/14/2016    Procedure: Left Heart Cath;  Surgeon: Santiago Garner MD;  Location: [facility] CATH INVASIVE LOC[facility];  Service:     COLONOSCOPY  02/25/2022    Dr. Graves-recommended 3 year repeat    ENDOSCOPY  02/03/2022    Dr. Graves    GASTRIC STIMULATOR IMPLANT SURGERY      SMALL INTESTINE SURGERY      unspecified, x3 as an infant     TESTICLE SURGERY      UPPER GASTROINTESTINAL ENDOSCOPY         Family History:   Family History   Problem Relation Age of Onset    Heart failure Mother     Diabetes Mother     Alzheimer's disease Mother     Arthritis Mother     Heart disease Mother     Heart attack Father     Kidney disease Father     Cancer Father     Prostate cancer Father     Heart disease Father     Hypertension Father     Cancer Sister     Breast cancer Sister     Diabetes Sister     Heart disease Sister     Arrhythmia Sister     Stroke Sister     Diabetes Brother     Heart disease Brother     Cancer Brother     Liver disease Brother     Kidney disease Brother     Dementia Paternal Uncle     Alzheimer's disease Maternal Grandmother     Liver cancer Maternal Grandfather     Liver disease Maternal Grandfather     Other Paternal Grandmother         [place] flu    Other Paternal Grandfather         [place] flu    Stroke Sister     Colon cancer Neg Hx        Social History:   Social History     Socioeconomic History    Marital status:    Tobacco Use    Smoking status: Never     Passive exposure: Never    Smokeless tobacco: Never   Vaping Use    Vaping  "status: Never Used   Substance and Sexual Activity    Alcohol use: Never    Drug use: Never    Sexual activity: Yes     Partners: Female       Tobacco History:   Social History     Tobacco Use   Smoking Status Never    Passive exposure: Never   Smokeless Tobacco Never       Medications:     Current Outpatient Medications:     amLODIPine (NORVASC) 2.5 MG tablet, Take 1 tablet by mouth Daily., Disp: , Rfl:     amLODIPine (NORVASC) 5 MG tablet, Take 1 tablet by mouth Daily., Disp: 90 tablet, Rfl: 0    atorvastatin (LIPITOR) 20 MG tablet, Take 1 tablet by mouth once daily, Disp: 90 tablet, Rfl: 1    butalbital-acetaminophen-caffeine (FIORICET, ESGIC) -40 MG per tablet, TAKE 1 TABLET BY MOUTH ONCE DAILY AS NEEDED FOR HEADACHE, Disp: 30 tablet, Rfl: 0    Creon 91061-207606 units capsule delayed-release particles capsule, TAKE 1 CAPSULE BY MOUTH THREE TIMES DAILY WITH MEALS, Disp: 90 capsule, Rfl: 0    diphenoxylate-atropine (Lomotil) 2.5-0.025 MG per tablet, Take 1 tablet by mouth 4 (Four) Times a Day As Needed for Diarrhea., Disp: 120 tablet, Rfl: 2    Dulaglutide (Trulicity) 1.5 MG/0.5ML solution pen-injector, Inject 1.5 mg under the skin into the appropriate area as directed 1 (One) Time Per Week., Disp: 6 mL, Rfl: 2    glucose blood test strip, 1 each by Other route Daily. Use daily as directed, Disp: 100 each, Rfl: 2    glucose monitor monitoring kit, 1 each Daily., Disp: 1 each, Rfl: 0    Insulin Lispro (HumaLOG KwikPen) 200 UNIT/ML solution pen-injector, Inject 14 Units under the skin into the appropriate area as directed 2 (Two) Times a Day Before Meals., Disp: 9 mL, Rfl: 1    Insulin Pen Needle (MM Pen Needles) 31G X 6 MM misc, USE 1  ONCE DAILY, Disp: 100 each, Rfl: 2    Insulin Syringe 31G X 5/16\" 0.3 ML misc, 1 each Every Night., Disp: 90 each, Rfl: 3    Lancets Thin misc, 1 each Daily. Use daily as directed, Disp: 100 each, Rfl: 11    lisinopril (PRINIVIL,ZESTRIL) 40 MG tablet, Take 1 tablet by mouth " twice daily, Disp: 180 tablet, Rfl: 0    metFORMIN (GLUCOPHAGE) 1000 MG tablet, TAKE 1 TABLET BY MOUTH TWICE DAILY WITH MEALS, Disp: 180 tablet, Rfl: 0    nitroglycerin (NITROSTAT) 0.4 MG SL tablet, 1 under the tongue as needed for angina, may repeat q5mins for up three doses, Disp: 100 tablet, Rfl: 11    ondansetron (ZOFRAN) 8 MG tablet, TAKE 1 TABLET BY MOUTH EVERY 8 HOURS AS NEEDED FOR NAUSEA FOR VOMITING, Disp: 90 tablet, Rfl: 0    pantoprazole (PROTONIX) 40 MG EC tablet, TAKE 1 TABLET BY MOUTH TWICE DAILY. 30 MINUTES PRIOR TO FIRST MEAL OF THE DAY AND 30 MINUTES PRIOR TO LAST MEAL OF THE DAY, Disp: 180 tablet, Rfl: 0    propranolol LA (INDERAL LA) 60 MG 24 hr capsule, , Disp: , Rfl:     propranolol LA (INDERAL LA) 80 MG 24 hr capsule, Take 1 capsule by mouth Daily., Disp: , Rfl:     sildenafil (VIAGRA) 100 MG tablet, Take 1 tablet by mouth Daily As Needed for Erectile Dysfunction., Disp: 15 tablet, Rfl: 11    tamsulosin (FLOMAX) 0.4 MG capsule 24 hr capsule, Take 1 capsule by mouth once daily, Disp: 90 capsule, Rfl: 0    Toujeo Max SoloStar 300 UNIT/ML solution pen-injector injection, INJECT 40 UNITS UNDER THE SKIN INTO THE APPROPRIATE AREA AS DIRECTED EVERY NIGHT AT BEDTIME., Disp: 12 mL, Rfl: 0    vitamin B-12 (CYANOCOBALAMIN) 100 MCG tablet, Take 0.5 tablets by mouth 2 (two) times a day., Disp: , Rfl:     vitamin C (ASCORBIC ACID) 500 MG tablet, Take 1 tablet by mouth 2 (two) times a day., Disp: , Rfl:     vitamin E 400 UNIT capsule, Take 1 capsule by mouth Daily., Disp: , Rfl:     Zinc 40 MG tablet, Take 1 tablet by mouth Daily., Disp: , Rfl:     doxepin (SINEquan) 25 MG capsule, Take 2 capsules by mouth At Night As Needed for Sleep., Disp: 60 capsule, Rfl: 0    glipizide (GLUCOTROL XL) 5 MG ER tablet, Take 1 tablet by mouth Daily., Disp: 90 tablet, Rfl: 1    Allergies:   Allergies   Allergen Reactions    Actos [Pioglitazone] Other (See Comments)     congestive heart failure    Avandia [Rosiglitazone] Other  (See Comments)     congestive heart failure.    Darvon [Propoxyphene] Itching     Angioedema    itching    Talwin [Pentazocine] Rash     Angioedema    itching       Objective   Objective     Physical Exam:  Vital Signs:   Vitals:    07/08/24 1054   BP: 130/70   Pulse: 77   Resp: 16   SpO2: 96%     There is no height or weight on file to calculate BMI.     Physical Exam  Nursing note reviewed  Const: NAD, A&Ox4, Pleasant, Cooperative  Eyes: EOMI, no conjunctivitis  ENT: No nasal discharge present, neck supple  Cardiac: Regular rate and rhythm, no cyanosis  Procedures/Radiology     Procedures  No radiology results for the last 7 days     Assessment & Plan   Assessment / Plan      Assessment/Plan:   Problems Addressed This Visit  Diagnoses and all orders for this visit:    1. Type 2 diabetes mellitus with hyperglycemia, without long-term current use of insulin (Primary)  -     POC Glycosylated Hemoglobin (Hb A1C)  -     Insulin Lispro (HumaLOG KwikPen) 200 UNIT/ML solution pen-injector; Inject 14 Units under the skin into the appropriate area as directed 2 (Two) Times a Day Before Meals.  Dispense: 9 mL; Refill: 1  -     Ambulatory Referral to Endocrinology  -     glipizide (GLUCOTROL XL) 5 MG ER tablet; Take 1 tablet by mouth Daily.  Dispense: 90 tablet; Refill: 1      Problem List Items Addressed This Visit          Endocrine and Metabolic    Diabetes mellitus, type 2 - Primary    Overview     On CGM he is having significant hyperglycemia in the middle of the night with precipitous drops (320 --> 60-70 within about 30 minutes). These correlate with times when he is getting sick at night.         Relevant Medications    Insulin Lispro (HumaLOG KwikPen) 200 UNIT/ML solution pen-injector    glipizide (GLUCOTROL XL) 5 MG ER tablet    Other Relevant Orders    POC Glycosylated Hemoglobin (Hb A1C) (Completed)    Ambulatory Referral to Endocrinology     Niko from glipizide BID to XL at night  Continue insluin regimen  Ref  to endocrinology again    Patient Instructions   A referral has been placed for you today.  You will be contacted by our referral coordinator or the specialist's office to schedule your appointment over the next 2 to 3 business days.  Please make sure to check your voicemail, and ensure that we have the correct phone number on file for you.  Sometimes, based on insurance requirements, referrals may take longer to schedule. However if you have not heard from anyone within 7 days, please call the office to check on your referral status.     Follow Up:   Return in about 3 months (around 10/8/2024) for Medicare Wellness.        DO BAILEY Jones RD  [facility]  [address]  [facility] [phone]  Fax [phone]  Phone [phone]

## 2024-07-11 ENCOUNTER — TELEMEDICINE (OUTPATIENT)
Dept: FAMILY MEDICINE CLINIC | Facility: CLINIC | Age: 69
End: 2024-07-11
Payer: MEDICARE

## 2024-07-11 DIAGNOSIS — F41.9 ANXIETY: Primary | ICD-10-CM

## 2024-07-11 DIAGNOSIS — F43.0 STRESS REACTION: ICD-10-CM

## 2024-07-11 PROCEDURE — 1126F AMNT PAIN NOTED NONE PRSNT: CPT | Performed by: FAMILY MEDICINE

## 2024-07-11 PROCEDURE — 3052F HG A1C>EQUAL 8.0%<EQUAL 9.0%: CPT | Performed by: FAMILY MEDICINE

## 2024-07-11 PROCEDURE — 99214 OFFICE O/P EST MOD 30 MIN: CPT | Performed by: FAMILY MEDICINE

## 2024-07-11 RX ORDER — DOXEPIN HYDROCHLORIDE 25 MG/1
25 CAPSULE ORAL 2 TIMES DAILY PRN
Qty: 60 CAPSULE | Refills: 0 | Status: SHIPPED | OUTPATIENT
Start: 2024-07-11 | End: 2024-07-11 | Stop reason: SDUPTHER

## 2024-07-11 RX ORDER — DOXEPIN HYDROCHLORIDE 25 MG/1
50 CAPSULE ORAL NIGHTLY PRN
Qty: 60 CAPSULE | Refills: 0 | Status: SHIPPED | OUTPATIENT
Start: 2024-07-11

## 2024-07-11 NOTE — PROGRESS NOTES
Subjective   Israel Reyna is a 69 y.o. male.     Chief Complaint   Patient presents with    Anxiety       History of Present Illness     Israel Reyna presents today for   Chief Complaint   Patient presents with    Anxiety     Under a great deal of stress for personal issues this week. Son is adobted, in his 30s. Was supposed to be a closed adoption    You have chosen to receive care through a telehealth visit.  Do you consent to use a video/audio connection for your medical care today? Yes    Patient [facility] [address] [place] [address] [facility] [address]   [place] [facility] [place] [id number]    The following portions of the patient's history were reviewed and updated as appropriate: allergies, current medications, past family history, past medical history, past social history, past surgical history and problem list.    Active Ambulatory Problems     Diagnosis Date Noted    Bowel incontinence 09/26/2016    Classic migraine with aura 09/26/2016    Depression     Anxiety     Mental status change 09/26/2016    Disorientation 09/26/2016    Facial droop 09/26/2016    Difficulty walking 09/26/2016    Intractable migraine with aura without status migrainosus 10/06/2016    Chest pain 10/06/2016    CAD (coronary artery disease)     Hypertension     Dyslipidemia     Diabetes mellitus, type 2     GERD (gastroesophageal reflux disease)     CHF (congestive heart failure)     Chest pain     SOB (shortness of breath)     Fatigue     Chronic migraine 04/11/2017    Other cerebral infarction 07/18/2017    Transient cerebral ischemia 07/18/2017    Neck pain 10/17/2017    Spondylosis of cervical region without myelopathy or radiculopathy 10/17/2017    Facial pain 03/22/2018    Nausea 06/01/2022    Erectile disorder due to medical condition in male 06/01/2022    Nephrolithiasis 12/26/2022    Right lower quadrant abdominal pain 05/24/2024    Trigger point of abdomen 05/24/2024    Gastroparesis  05/24/2024    History of adenomatous polyp of colon 05/24/2024    Status post balloon dilatation of esophageal stricture 05/24/2024     Resolved Ambulatory Problems     Diagnosis Date Noted    Seizure disorder 09/26/2016    Migraine      Past Medical History:   Diagnosis Date    Allergic     Cataract     Cluster headache     Colon polyp     Diabetic peripheral neuropathy     HL (hearing loss) July 2023    Kidney stone     Mental status change     Migraine headache     Renal insufficiency     Shingles     Stroke     Vision loss      Past Surgical History:   Procedure Laterality Date    CARDIAC CATHETERIZATION      CARDIAC CATHETERIZATION N/A 10/14/2016    Procedure: Left Heart Cath;  Surgeon: Santiago Garner MD;  Location:  [facility] CATH INVASIVE LOCATION;  Service:     COLONOSCOPY  02/25/2022    Dr. Graves-recommended 3 year repeat    ENDOSCOPY  02/03/2022    Dr. Graves    GASTRIC STIMULATOR IMPLANT SURGERY      SMALL INTESTINE SURGERY      unspecified, x3 as an infant     TESTICLE SURGERY      UPPER GASTROINTESTINAL ENDOSCOPY       Family History   Problem Relation Age of Onset    Heart failure Mother     Diabetes Mother     Alzheimer's disease Mother     Arthritis Mother     Heart disease Mother     Heart attack Father     Kidney disease Father     Cancer Father     Prostate cancer Father     Heart disease Father     Hypertension Father     Cancer Sister     Breast cancer Sister     Diabetes Sister     Heart disease Sister     Arrhythmia Sister     Stroke Sister     Diabetes Brother     Heart disease Brother     Cancer Brother     Liver disease Brother     Kidney disease Brother     Dementia Paternal Uncle     Alzheimer's disease Maternal Grandmother     Liver cancer Maternal Grandfather     Liver disease Maternal Grandfather     Other Paternal Grandmother         [place] flu    Other Paternal Grandfather         [place] flu    Stroke Sister     Colon cancer Neg Hx      Social History     Socioeconomic  History    Marital status:    Tobacco Use    Smoking status: Never     Passive exposure: Never    Smokeless tobacco: Never   Vaping Use    Vaping status: Never Used   Substance and Sexual Activity    Alcohol use: Never    Drug use: Never    Sexual activity: Yes     Partners: Female       Review of Systems  Review of Systems -  General ROS: negative for - chills, fever or night sweats  Cardiovascular ROS: no chest pain or dyspnea on exertion  Gastrointestinal ROS: no abdominal pain, change in bowel habits, or black or bloody stools  Genito-Urinary ROS: no dysuria, trouble voiding, or hematuria    Objective   There were no vitals taken for this visit.  Vitals obtained from patient if available  Physical Exam  Const: Non-toxic appearing, NAD, A&Ox4, Pleasant, Cooperative  Eyes: EOMI, no conjunctivitis  ENT: No copious nasal drainage noted  Cardiac: Regular rate by pulse  Resp: Respiratory rate observed to be within normal limits, no increased work of breathing observed, no audible wheezing or cough noted  Psych: Appropriate mood and behavior.  Procedures  Assessment & Plan   Problem List Items Addressed This Visit          Mental Health    Anxiety - Primary    Relevant Medications    doxepin (SINEquan) 25 MG capsule     Other Visit Diagnoses       Stress reaction        Relevant Medications    doxepin (SINEquan) 25 MG capsule            See patient diagnoses and orders along with patient instructions for assessment, plan, and changes to care for patient.    This visit was conducted via telemedicine with live video and audio provided through [employer] at the point of care.    There are no Patient Instructions on file for this visit.    No follow-ups on file.    Ambulatory progress note signed and attested to by Elio Moore D.O.

## 2024-07-18 ENCOUNTER — TELEPHONE (OUTPATIENT)
Dept: NEUROLOGY | Facility: CLINIC | Age: 69
End: 2024-07-18
Payer: MEDICARE

## 2024-07-18 ENCOUNTER — PATIENT MESSAGE (OUTPATIENT)
Dept: NEUROLOGY | Facility: CLINIC | Age: 69
End: 2024-07-18
Payer: MEDICARE

## 2024-07-19 ENCOUNTER — TELEPHONE (OUTPATIENT)
Dept: FAMILY MEDICINE CLINIC | Facility: CLINIC | Age: 69
End: 2024-07-19
Payer: MEDICARE

## 2024-07-19 NOTE — TELEPHONE ENCOUNTER
rx that Oscar called in a few days ago after the video appointment needs a PA. can he send in a different RX, maybe generic? - please call pt back

## 2024-07-19 NOTE — TELEPHONE ENCOUNTER
PT STATES HE WAS LAST SEEN IN FEB SO HE IS NOT WILLING TO WAIT UNTIL 1-6-25 FOR A FOLLOW UP . I HAVE MADE THE APPOINTMENT TO PLACE HIM ON THE BOOKS AND ADDED TO WAIT LIST. PLEASE REVIEW AND ADVISE FOR A SOONER APPOINTMENT      
Spoke with Israel and offered 2 appt times next Friday and August 1st, he is unfortunately unable to make both dates. Will be on vacation 7/21/24 to 8/5/2024. Advised I will keep his name at my desk and look for a cancellation outside of those dates.     YOLANDA Akhtar     
negative

## 2024-07-22 DIAGNOSIS — R15.9 INCONTINENCE OF FECES, UNSPECIFIED FECAL INCONTINENCE TYPE: ICD-10-CM

## 2024-07-22 DIAGNOSIS — K52.9 CHRONIC DIARRHEA: ICD-10-CM

## 2024-07-22 NOTE — TELEPHONE ENCOUNTER
DELETE AFTER REVIEWING: Send the encounter HIGH priority, If patient has less than a 3 day supply. If the patient will run out of medication over the weekend add that information to the additional details line. Send to the appropriate pool. Check your call action grid or workflows.    Caller: Israel Reyna    Relationship: Self    Best call back number: [phone]    Requested Prescriptions:   Requested Prescriptions     Pending Prescriptions Disp Refills    diphenoxylate-atropine (Lomotil) 2.5-0.025 MG per tablet 120 tablet 2     Sig: Take 1 tablet by mouth 4 (Four) Times a Day As Needed for Diarrhea.        Pharmacy where request should be sent: [facility], [address] [phone] [facility] [phone] FX     Last office visit with prescribing clinician: 7/8/2024   Last telemedicine visit with prescribing clinician: 7/11/2024   Next office visit with prescribing clinician: 10/8/2024     Additional details provided by patient: PT IS OUT    Does the patient have less than a 3 day supply:  [x] Yes  [] No    Would you like a call back once the refill request has been completed: [x] Yes [] No    If the office needs to give you a call back, can they leave a voicemail: [x] Yes [] No    Vicente Galindo   07/22/24 16:25 EDT

## 2024-07-24 RX ORDER — DIPHENOXYLATE HYDROCHLORIDE AND ATROPINE SULFATE 2.5; .025 MG/1; MG/1
1 TABLET ORAL 4 TIMES DAILY PRN
Qty: 120 TABLET | Refills: 0 | Status: SHIPPED | OUTPATIENT
Start: 2024-07-24

## 2024-08-09 ENCOUNTER — OFFICE VISIT (OUTPATIENT)
Dept: NEUROLOGY | Facility: CLINIC | Age: 69
End: 2024-08-09
Payer: MEDICARE

## 2024-08-09 VITALS
BODY MASS INDEX: 28.56 KG/M2 | SYSTOLIC BLOOD PRESSURE: 142 MMHG | HEIGHT: 67 IN | OXYGEN SATURATION: 96 % | HEART RATE: 80 BPM | DIASTOLIC BLOOD PRESSURE: 78 MMHG

## 2024-08-09 DIAGNOSIS — G43.119 INTRACTABLE MIGRAINE WITH AURA WITHOUT STATUS MIGRAINOSUS: Primary | ICD-10-CM

## 2024-08-09 DIAGNOSIS — M54.2 NECK PAIN: ICD-10-CM

## 2024-08-09 DIAGNOSIS — G89.29 CHRONIC HEAD PAIN: ICD-10-CM

## 2024-08-09 DIAGNOSIS — R51.9 CHRONIC HEAD PAIN: ICD-10-CM

## 2024-08-09 RX ORDER — PREGABALIN 75 MG/1
75 CAPSULE ORAL 3 TIMES DAILY
Qty: 90 CAPSULE | Refills: 3 | Status: SHIPPED | OUTPATIENT
Start: 2024-08-09 | End: 2025-08-09

## 2024-08-09 RX ORDER — DULOXETIN HYDROCHLORIDE 30 MG/1
30 CAPSULE, DELAYED RELEASE ORAL DAILY
Qty: 30 CAPSULE | Refills: 6 | Status: SHIPPED | OUTPATIENT
Start: 2024-08-09 | End: 2025-08-09

## 2024-08-09 RX ORDER — PROPRANOLOL HYDROCHLORIDE 120 MG/1
120 CAPSULE, EXTENDED RELEASE ORAL DAILY
Qty: 30 CAPSULE | Refills: 6 | Status: SHIPPED | OUTPATIENT
Start: 2024-08-09 | End: 2024-09-08

## 2024-08-09 NOTE — PROGRESS NOTES
"Subjective:    CC: Israel Reyna is in clinic today for follow up for history of chronic headaches, chronic migraines, neck pain    HPI:  Initial visit/ Prior w/u:  This is a 67 yo male who presents for re-evaluation of worsening migraines. Intractable migraines since 2013 followed by Dr. Chaidez normally.   He went to have COVID vaccine March 2021 Pfizer. He had immediate worsening headaches, chills, feeling terrible, weak and in bed x 4 days. He had his second injection April 2021 Pfizer with the same exact symptoms. He feels that his symptoms have progressively worsened since the COVID vaccines. Has noticed every time he eats anything at all he notices he cannot walk, he cannot speak, his left face will \"draw up\", twitch and be painful with episodes lasting 15 minutes to 2 hours and have been present for 2-3 months and seems to be worsening since COVID vaccine. He was last seen in our clinic 3/2020. He tells me the pain in his head, holocranial, can be anywhere throughout the head, \"feels like someone is repeatedly hitting me with a ball bat or stick and at times feels like someone is driving a nail in the back of my head. The ones that scare me the most are the headaches in the temples that feel like sharp, someone shooting a nail gun in my temples.\" Also has associated dizziness. Pain can radiate across his head and within 10 minutes and he loses control of his bowels. This is not a change and has been present since [id number]. No imaging of head since 2017. He cannot have MRI due to anal sphincter stimulator. Has chronic daily headaches. \"Drawing of face\" is daily. CTA head/neck 2019 normal. He feels when he has his pain there is \"moisture running down his head\". Takes topamax 25mg BID and gabapentin 300mg TID. Experiences severe pain with episodes. Cannot afford Botox. He is agreeable to CT head today. He is requesting migraine cocktail as well. He uses cane at times. Has been using fioricet PRN. He " is scheduled for OP migraine cocktail today at 3pm at [facility]. Last migraine cocktail was [id number]. Tells me one med gives him hiccups for 2-3 days after but unsure which.  His headaches do also have severe pain, throbbing, photophobia, phonophobia, nausea and occasional vomiting and do her daily with some episodes being worse than others.     Follow up with Sabiha Hanna, APRN: 4/12/2023:  Patient in clinic for regular follow up. At his last visit he was prescribed Depakote 500 mg BID for migraine prevention and Ubrelvy 100 mg for abortive treatment for migraines. He is unsure if he has started taking Depakote since his last visit. He does not believe that he had started Ubrelvy d/t cost but also was unsure. He stated that his spouse helps him to manage his medications and that he would need to check with her regarding what he is currently taking. He states that he continues to have both headaches and the previously described episodes including left sided paresthesias, left facial twitching/drawing up. His headaches vary in location and are located at the top of his head, occiput, bilateral temples, and at times will start near right temple and radiate towards left. Patient has had these episodes for many years and has previously completed detailed neurologic workup including cEEG which was negative. He reports that some headaches are associated with light and sound sensitivity along with nausea. He has near daily headaches of some kind everyday varying in presentation and severity. He has also appreciated dizziness over approximately the last month, he reports that it is worse while walking and that he has been using his cane more recently for stabilization, no recent falls. He recently saw his PCP d/t concern for fever and body aches who performed lab work including Hemoglobin A1c was 8.60, blood culture did not show any growth at 5 days, HIV was nonreactive, rheumatoid factor was less than 10, TSH  2.440, , CK 42, CRP <0.30, CMP showed elevated glucose, sedimentation rate 38. It appears that at his last visit a CT head was discussed but had not yet been ordered.         Follow-up with Dr. Crain: 05/02/2023: Patient is in clinic for regular follow-up. Since the last visit, he has had a CT scan on 05/02/2023 to assess dizzy spells which did not reveal any acute intracranial abnormalities.  I reviewed the CT personally.  He could not afford to have Ubrelvy filled. He has been unable to fill his Aimovig due to cost as well. He is still experiencing dizzy spells and fatigue. He also notes intermittent shooting pain in different parts of the head he has completed his Depakote and he has not refilled this medication. He is not certain if it provided any relief. When he wakes up in the morning, he has difficulty with ambulation and speech, facial weakness, and headache. He feels worse if he sits down to relax. His pain occasionally wakes him in the middle of the night. He has been prescribed oxygen 15 L for his head pain in the past, which helped if he used the oxygen when he felt the pain coming on.  He is not sleeping well and he is constantly dreaming when he is asleep. He is not certain if he is snoring. He does not feel refreshed when he wakes up. He has been tested for sleep apnea and provided with a CPAP. He could not tolerate the mask and he was only using the machine for 2 to 3 hours.     He is taking Norvasc for blood pressure control. He had an episode of dizziness and headache last week, for which he saw Dr. Moore. He had a systolic blood pressure reading of 150 mmHg. He is taking amlodipine and lisinopril. He will see Dr. Moore on 05/09/2023.     He is feeling discouraged. He denies depression or the desire to harm himself. He is tired of seeing doctors, having testing performed, and feeling like he is not seeing any improvement. His short term memory is poor. He has a strong family history of  Alzheimer disease in his mother, his maternal grandmother, his maternal great-grandfather, and 2 of his maternal uncles.     He is having issues with his bowels. His sphincter never closes so he cannot hold a bowel movement when he needs to go. He has a stimulator, which he would like to have removed. He cannot have an MRI with this device in situ. He has been advised he may need to consider a colostomy bag. He has not followed up with gastroenterology. He has had an upper endoscopy.    7/8/2023: He is in clinic for regular follow-up. Since his last visit in 05/2023, he has not started the new medicine because he has not been able to finish the paperwork. He is taking the verapamil. He is in constant pain in all different parts of his head. Sometimes he will have pain shoot through his head, causing him to jerk. He has trouble thinking, cannot concentrate, and has memory trouble. He went to the urgent care because his blood pressure at home was registering 220/100 mmHg and they suggested he have a CT with contrast. He did that and was told there were some small arteries being blocked and more testing was needed as this could be the start of vascular dementia. One of the doctors told him he really needed to have an MRI, but he cannot have that because of the stimulator. He would like to have the stimulator removed but he cannot afford it. There is one type of pain that feels like it flies from one side of his head to the other. With this pain he feels like there is moisture running down and he loses control of his bowels. There was one doctor who gave him a medication that he had to stop because he could not leave the bathroom. He was told he might have to have a colostomy and he does not want that. He never knows how his bowels are going to react. He is on gabapentin 300 3 times daily and has no side effects from it. He is having trouble affording all the medications. His family is starting to notice his problems  with memory and concentration.     10/13/2023: He is in clinic for regular follow-up.  Since his last visit in July 2023, he reports that he has noticed some concerns with his memory where he cannot remember if he took his medications or not.  Sometimes he forgets names of people he is known for a long time.  He is concerned because there is a strong family history of Alzheimer's dementia.  He did increase the dose of gabapentin to 600 mg 3 times daily but it did not help with frequency or intensity of migraines.  He reports that he still gets almost daily headaches and some of them will convert into migraine.  He continues to take verapamil 40 mg twice daily.  In addition, he is also taking high dose of gabapentin 600 mg 3 times daily but it did not help with headache control.  He reports having head pain occurring almost on a daily basis involving different parts of the head.  He is also reporting episodes of palpitations since the last visit.    Follow-up: 2/12/2024: He is in clinic for regular follow-up.  Since his last visit in October 2023, he has been taking Toprol long-acting 60 mg daily but reports no decrease in headache intensity or frequency.  He is also taking gabapentin 600 mg 3 times daily.  He reports that he has a headache on a daily basis and some days, it increases in intensity where he has to take Fioricet which sometimes helps and sometimes it does not.    Follow-up: 8/9/2024: He is in clinic for regular follow-up.  Since his last visit 6 months ago, he reports that he continues to have daily headache and some of these headaches convert into severe migraine.  He also reports chronic generalized pain, increase neck pain, depressed mood, inability to sleep at night and also reports significantly increased amount of stress.  He was given prescription of nortriptyline but he stopped taking it as it was not really beneficial.  Currently is on Inderal long-acting 80 mg daily.  He also stopped  "gabapentin as well instruction after last visit and reports that symptoms seems to be worse since coming off of gabapentin.    The following portions of the patient's history were reviewed and updated as of 08/09/2024: allergies, social history, and problem list.       Current Outpatient Medications:     amLODIPine (NORVASC) 5 MG tablet, Take 1 tablet by mouth Daily., Disp: 90 tablet, Rfl: 0    atorvastatin (LIPITOR) 20 MG tablet, Take 1 tablet by mouth once daily, Disp: 90 tablet, Rfl: 1    butalbital-acetaminophen-caffeine (FIORICET, ESGIC) -40 MG per tablet, TAKE 1 TABLET BY MOUTH ONCE DAILY AS NEEDED FOR HEADACHE, Disp: 30 tablet, Rfl: 0    Creon [id number] units capsule delayed-release particles capsule, TAKE 1 CAPSULE BY MOUTH THREE TIMES DAILY WITH MEALS, Disp: 90 capsule, Rfl: 0    diphenoxylate-atropine (Lomotil) 2.5-0.025 MG per tablet, Take 1 tablet by mouth 4 (Four) Times a Day As Needed for Diarrhea., Disp: 120 tablet, Rfl: 0    doxepin (SINEquan) 25 MG capsule, Take 2 capsules by mouth At Night As Needed for Sleep., Disp: 60 capsule, Rfl: 0    Dulaglutide (Trulicity) 1.5 MG/0.5ML solution pen-injector, Inject 1.5 mg under the skin into the appropriate area as directed 1 (One) Time Per Week., Disp: 6 mL, Rfl: 2    glipizide (GLUCOTROL XL) 5 MG ER tablet, Take 1 tablet by mouth Daily., Disp: 90 tablet, Rfl: 1    glucose blood test strip, 1 each by Other route Daily. Use daily as directed, Disp: 100 each, Rfl: 2    glucose monitor monitoring kit, 1 each Daily., Disp: 1 each, Rfl: 0    Insulin Lispro (HumaLOG KwikPen) 200 UNIT/ML solution pen-injector, Inject 14 Units under the skin into the appropriate area as directed 2 (Two) Times a Day Before Meals., Disp: 9 mL, Rfl: 1    Insulin Pen Needle (MM Pen Needles) 31G X 6 MM misc, USE 1  ONCE DAILY, Disp: 100 each, Rfl: 2    Insulin Syringe 31G X 5/16\" 0.3 ML misc, 1 each Every Night., Disp: 90 each, Rfl: 3    Lancets Thin misc, 1 each Daily. Use " daily as directed, Disp: 100 each, Rfl: 11    lisinopril (PRINIVIL,ZESTRIL) 40 MG tablet, Take 1 tablet by mouth twice daily, Disp: 180 tablet, Rfl: 0    metFORMIN (GLUCOPHAGE) 1000 MG tablet, TAKE 1 TABLET BY MOUTH TWICE DAILY WITH MEALS, Disp: 180 tablet, Rfl: 0    nitroglycerin (NITROSTAT) 0.4 MG SL tablet, 1 under the tongue as needed for angina, may repeat q5mins for up three doses, Disp: 100 tablet, Rfl: 11    ondansetron (ZOFRAN) 8 MG tablet, TAKE 1 TABLET BY MOUTH EVERY 8 HOURS AS NEEDED FOR NAUSEA FOR VOMITING, Disp: 90 tablet, Rfl: 0    pantoprazole (PROTONIX) 40 MG EC tablet, TAKE 1 TABLET BY MOUTH TWICE DAILY. 30 MINUTES PRIOR TO FIRST MEAL OF THE DAY AND 30 MINUTES PRIOR TO LAST MEAL OF THE DAY, Disp: 180 tablet, Rfl: 0    propranolol LA (INDERAL LA) 120 MG 24 hr capsule, Take 1 capsule by mouth Daily for 30 days., Disp: 30 capsule, Rfl: 6    sildenafil (VIAGRA) 100 MG tablet, Take 1 tablet by mouth Daily As Needed for Erectile Dysfunction., Disp: 15 tablet, Rfl: 11    tamsulosin (FLOMAX) 0.4 MG capsule 24 hr capsule, Take 1 capsule by mouth once daily, Disp: 90 capsule, Rfl: 0    Toujeo Max SoloStar 300 UNIT/ML solution pen-injector injection, INJECT 40 UNITS UNDER THE SKIN INTO THE APPROPRIATE AREA AS DIRECTED EVERY NIGHT AT BEDTIME., Disp: 12 mL, Rfl: 0    vitamin B-12 (CYANOCOBALAMIN) 100 MCG tablet, Take 0.5 tablets by mouth 2 (two) times a day., Disp: , Rfl:     vitamin C (ASCORBIC ACID) 500 MG tablet, Take 1 tablet by mouth 2 (two) times a day., Disp: , Rfl:     vitamin E 400 UNIT capsule, Take 1 capsule by mouth Daily., Disp: , Rfl:     Zinc 40 MG tablet, Take 1 tablet by mouth Daily., Disp: , Rfl:     DULoxetine (Cymbalta) 30 MG capsule, Take 1 capsule by mouth Daily., Disp: 30 capsule, Rfl: 6    pregabalin (Lyrica) 75 MG capsule, Take 1 capsule by mouth 3 (Three) Times a Day., Disp: 90 capsule, Rfl: 3   Past Medical History:   Diagnosis Date    Allergic     Anxiety     Bowel incontinence      w/ history of Stimulator Device     CAD (coronary artery disease)     Cataract     Chest pain     CHF (congestive heart failure)     Cluster headache     Colon polyp     Depression     Diabetes mellitus, type 2     Diabetic peripheral neuropathy     Dyslipidemia     Fatigue     GERD (gastroesophageal reflux disease)     HL (hearing loss) July 2023    Im wearing hearing aids. Dr. 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 Graves-recommended 3 year repeat    ENDOSCOPY  02/03/2022    Dr. Graves    GASTRIC STIMULATOR IMPLANT SURGERY      SMALL INTESTINE SURGERY      unspecified, x3 as an infant     TESTICLE SURGERY      UPPER GASTROINTESTINAL ENDOSCOPY        Family History   Problem Relation Age of Onset    Heart failure Mother     Diabetes Mother     Alzheimer's disease Mother     Arthritis Mother     Heart disease Mother     Heart attack Father     Kidney disease Father     Cancer Father     Prostate cancer Father     Heart disease Father     Hypertension Father     Cancer Sister     Breast cancer Sister     Diabetes Sister     Heart disease Sister     Arrhythmia Sister     Stroke Sister     Diabetes Brother     Heart disease Brother     Cancer Brother     Liver disease Brother     Kidney disease Brother     Dementia Paternal Uncle     Alzheimer's disease Maternal Grandmother     Liver cancer Maternal Grandfather     Liver disease Maternal Grandfather     Other Paternal Grandmother         [place] flu    Other Paternal  "Grandfather         [place] flu    Stroke Sister     Colon cancer Neg Hx         Review of Systems  Objective:    /78   Pulse 80   Ht 170.2 cm (67.01\")   SpO2 96%   BMI 28.56 kg/m²     Neurology Exam:  General apperance: NAD.     Mental status: Alert, awake and oriented to time place and person.    Language and Speech: No aphasia or dysarthria.    CN II to XII: Intact.    Opthalmoscopic Exam: No papilledema.    Motor:  Right UE muscle strength 5/5. Normal tone.     Left UE muscle strength 5/5. Normal tone.      Right LE muscle strength 5/5. Normal tone.     Left LE muscle strength 5/5. Normal tone.      Sensory: Normal light touch, vibration and pinprick sensation bilaterally.    DTRs: 2+ bilaterally.    Babinski: Negative bilaterally.    Co-ordination: Normal finger-to-nose, heel to shin B/L.    Rhomberg: Negative.    Gait: Normal.    Cardiovascular: Regular rate and rhythm without murmur, gallop or rub.    Assessment and Plan:  1. Intractable migraine with aura without status migrainosus  2. Neck pain  3. Chronic head pain  Continues to have daily headaches and some of these headaches are converting to severe migraines.  He is reporting tremendously increased amount of stress, depressed mood, inability to sleep at night etc.  Since he does not have any side effects with propranolol use, I will increase it to propranolol long-acting 120 mg daily.  He also reports that he came off of gabapentin as per my instruction after his last visit but the symptoms are overall worse.  Since he has not tried Lyrica in the past, I will be starting him on Lyrica 75 mg 3 times daily.  I will also start him on Cymbalta 30 mg daily as he is reporting depressed mood.  I am hoping that with this combination, headache and migraines will improve.  I will plan to see him back in clinic in 6 weeks for follow-up.    I spent 30 minutes in patient care: Reviewing records prior to the visit, entering orders and documentation and " spent more than hurtado 50% of this time face-to-face in management, instructions and education regarding above mentioned diagnosis and also on counseling and discussing about taking medication regularly, possible side effects with medication use, importance of good sleep hygiene, good hydration and regular exercise.    Return in about 6 weeks (around 9/20/2024).       Note to patient: The 21st Century Cures Act makes medical notes like these available to patients in the interest of transparency. However, be advised this is a medical document. It is intended as peer to peer communication. It is written in medical language and may contain abbreviations or verbiage that are unfamiliar. It may appear blunt or direct. Medical documents are intended to carry relevant information, facts as evident, and the clinical opinion of the physician.

## 2024-08-13 ENCOUNTER — TELEPHONE (OUTPATIENT)
Dept: NEUROLOGY | Facility: CLINIC | Age: 69
End: 2024-08-13
Payer: MEDICARE

## 2024-08-13 DIAGNOSIS — G43.119 INTRACTABLE MIGRAINE WITH AURA WITHOUT STATUS MIGRAINOSUS: ICD-10-CM

## 2024-08-13 RX ORDER — BUTALBITAL, ACETAMINOPHEN AND CAFFEINE 50; 325; 40 MG/1; MG/1; MG/1
TABLET ORAL
Qty: 30 TABLET | Refills: 0 | Status: SHIPPED | OUTPATIENT
Start: 2024-08-13

## 2024-08-13 NOTE — TELEPHONE ENCOUNTER
"MEDICATION CONCERNS    Caller: Israel Reyna    Relationship: Self    Best call back number: [phone]    Preferred pharmacy: [address] [phone] [facility] [phone]      What medications are you currently taking:   Current Outpatient Medications on File Prior to Visit   Medication Sig Dispense Refill    amLODIPine (NORVASC) 5 MG tablet Take 1 tablet by mouth Daily. 90 tablet 0    atorvastatin (LIPITOR) 20 MG tablet Take 1 tablet by mouth once daily 90 tablet 1    butalbital-acetaminophen-caffeine (FIORICET, ESGIC) -40 MG per tablet TAKE 1 TABLET BY MOUTH ONCE DAILY AS NEEDED FOR HEADACHE 30 tablet 0    Creon [id number] units capsule delayed-release particles capsule TAKE 1 CAPSULE BY MOUTH THREE TIMES DAILY WITH MEALS 90 capsule 0    diphenoxylate-atropine (Lomotil) 2.5-0.025 MG per tablet Take 1 tablet by mouth 4 (Four) Times a Day As Needed for Diarrhea. 120 tablet 0    doxepin (SINEquan) 25 MG capsule Take 2 capsules by mouth At Night As Needed for Sleep. 60 capsule 0    Dulaglutide (Trulicity) 1.5 MG/0.5ML solution pen-injector Inject 1.5 mg under the skin into the appropriate area as directed 1 (One) Time Per Week. 6 mL 2    DULoxetine (Cymbalta) 30 MG capsule Take 1 capsule by mouth Daily. 30 capsule 6    glipizide (GLUCOTROL XL) 5 MG ER tablet Take 1 tablet by mouth Daily. 90 tablet 1    glucose blood test strip 1 each by Other route Daily. Use daily as directed 100 each 2    glucose monitor monitoring kit 1 each Daily. 1 each 0    Insulin Lispro (HumaLOG KwikPen) 200 UNIT/ML solution pen-injector Inject 14 Units under the skin into the appropriate area as directed 2 (Two) Times a Day Before Meals. 9 mL 1    Insulin Pen Needle (MM Pen Needles) 31G X 6 MM misc USE 1  ONCE DAILY 100 each 2    Insulin Syringe 31G X 5/16\" 0.3 ML misc 1 each Every Night. 90 each 3    Lancets Thin misc 1 each Daily. Use daily as directed 100 each " 11    lisinopril (PRINIVIL,ZESTRIL) 40 MG tablet Take 1 tablet by mouth twice daily 180 tablet 0    metFORMIN (GLUCOPHAGE) 1000 MG tablet TAKE 1 TABLET BY MOUTH TWICE DAILY WITH MEALS 180 tablet 0    nitroglycerin (NITROSTAT) 0.4 MG SL tablet 1 under the tongue as needed for angina, may repeat q5mins for up three doses 100 tablet 11    ondansetron (ZOFRAN) 8 MG tablet TAKE 1 TABLET BY MOUTH EVERY 8 HOURS AS NEEDED FOR NAUSEA FOR VOMITING 90 tablet 0    pantoprazole (PROTONIX) 40 MG EC tablet TAKE 1 TABLET BY MOUTH TWICE DAILY. 30 MINUTES PRIOR TO FIRST MEAL OF THE DAY AND 30 MINUTES PRIOR TO LAST MEAL OF THE  tablet 0    pregabalin (Lyrica) 75 MG capsule Take 1 capsule by mouth 3 (Three) Times a Day. 90 capsule 3    propranolol LA (INDERAL LA) 120 MG 24 hr capsule Take 1 capsule by mouth Daily for 30 days. 30 capsule 6    sildenafil (VIAGRA) 100 MG tablet Take 1 tablet by mouth Daily As Needed for Erectile Dysfunction. 15 tablet 11    tamsulosin (FLOMAX) 0.4 MG capsule 24 hr capsule Take 1 capsule by mouth once daily 90 capsule 0    Toujeo Max SoloStar 300 UNIT/ML solution pen-injector injection INJECT 40 UNITS UNDER THE SKIN INTO THE APPROPRIATE AREA AS DIRECTED EVERY NIGHT AT BEDTIME. 12 mL 0    vitamin B-12 (CYANOCOBALAMIN) 100 MCG tablet Take 0.5 tablets by mouth 2 (two) times a day.      vitamin C (ASCORBIC ACID) 500 MG tablet Take 1 tablet by mouth 2 (two) times a day.      vitamin E 400 UNIT capsule Take 1 capsule by mouth Daily.      Zinc 40 MG tablet Take 1 tablet by mouth Daily.       No current facility-administered medications on file prior to visit.     Which medication are you concerned about: DULoxetine (Cymbalta) 30 MG capsule    Who prescribed you this medication: DR. ARAIZA    When did you start taking these medications: 8/9/2024    What are your concerns: PT CALLED TO REPORT THAT SINCE STARTING THE DULOXETINE MEDICATION LAST FRIDAY, HE HAS BEEN EXPERIENCING SIDE EFFECTS INCLUDING DIARRHEA,  NAUSEA, AND SEVERE HEAD PAIN.    How long have you had these concerns: SINCE STARTING THE MEDICATION, 8/9/24.    PLEASE REVIEW AND ADVISE.

## 2024-08-19 DIAGNOSIS — K86.81 EXOCRINE PANCREATIC INSUFFICIENCY: ICD-10-CM

## 2024-08-19 DIAGNOSIS — K52.9 CHRONIC DIARRHEA: ICD-10-CM

## 2024-08-19 DIAGNOSIS — R11.0 NAUSEA: ICD-10-CM

## 2024-08-19 DIAGNOSIS — E11.65 TYPE 2 DIABETES MELLITUS WITH HYPERGLYCEMIA, WITHOUT LONG-TERM CURRENT USE OF INSULIN: ICD-10-CM

## 2024-08-19 RX ORDER — PANCRELIPASE 36000; 180000; 114000 [USP'U]/1; [USP'U]/1; [USP'U]/1
36000 CAPSULE, DELAYED RELEASE PELLETS ORAL
Qty: 90 CAPSULE | Refills: 0 | Status: SHIPPED | OUTPATIENT
Start: 2024-08-19

## 2024-08-19 RX ORDER — LISINOPRIL 40 MG/1
40 TABLET ORAL 2 TIMES DAILY
Qty: 180 TABLET | Refills: 0 | Status: SHIPPED | OUTPATIENT
Start: 2024-08-19

## 2024-08-19 NOTE — TELEPHONE ENCOUNTER
Rx Refill Note  Requested Prescriptions     Pending Prescriptions Disp Refills    metFORMIN (GLUCOPHAGE) 1000 MG tablet [Pharmacy Med Name: metFORMIN HCl 1000 MG Oral Tablet] 180 tablet 0     Sig: TAKE 1 TABLET BY MOUTH TWICE DAILY WITH MEALS    Creon [id number] units capsule delayed-release particles capsule [Pharmacy Med Name: Creon 54749 UNIT Oral Capsule Delayed Release Particles] 90 capsule 0     Sig: TAKE 1 CAPSULE BY MOUTH THREE TIMES DAILY WITH MEALS    lisinopril (PRINIVIL,ZESTRIL) 40 MG tablet [Pharmacy Med Name: Lisinopril 40 MG Oral Tablet] 180 tablet 0     Sig: Take 1 tablet by mouth twice daily      Last office visit with prescribing clinician: 7/8/2024   Last telemedicine visit with prescribing clinician: 7/11/2024   Next office visit with prescribing clinician: 10/8/2024                         Would you like a call back once the refill request has been completed: [] Yes [] No    If the office needs to give you a call back, can they leave a voicemail: [] Yes [] No    Grecia Martinez MA  08/19/24, 14:36 EDT

## 2024-08-20 RX ORDER — PANTOPRAZOLE SODIUM 40 MG/1
TABLET, DELAYED RELEASE ORAL
Qty: 180 TABLET | Refills: 3 | Status: SHIPPED | OUTPATIENT
Start: 2024-08-20

## 2024-08-27 ENCOUNTER — PATIENT OUTREACH (OUTPATIENT)
Dept: CASE MANAGEMENT | Facility: OTHER | Age: 69
End: 2024-08-27
Payer: MEDICARE

## 2024-08-27 NOTE — OUTREACH NOTE
AMBULATORY CASE MANAGEMENT NOTE    Names and Relationships of Patient/Support Persons: Contact: Israel Reyna; Relationship: Self -     Patient Outreach    RN-ACM outreach with patient regarding identified Proactive HRCM review. Called patient; explained role of RN-ACM.  Discussed patient's Diabetes management, including medications, FSBS checks, diet and exercise.  Patient voiced compliance with DM Medications daily; said he monitors his FSBS once a day. Patient reported today's BS was 157; said BS is usually running in the 200's. Patient said he watches his diet, and exercises sometime at the gym, and sometime walking.  Patient talked of wanting the CGM to monitor his BS.  Encouraged patient to contact his [employer] customer service and ask what is needed for coverage of CGM, and how much percentage of cost does insurance cover.  He voiced understanding.  Encouraged patient to discuss CGM with his Endocrinologist at his initial appt.  No other questions or concerns were voiced.     Adult Patient Profile  Questions/Answers      Flowsheet Row Most Recent Value   Symptoms/Conditions Managed at Home diabetes, type 2   Barriers to Managing Health none   Diabetes Management Strategies insulin therapy, medication therapy, blood glucose testing   Last A1C Result 8.1 (7-18-24)   Barriers to Taking Medication as Prescribed none   Source of Information patient, health record   Difficulty Eating/Swallowing no   Equipment Currently Used at Home glucometer   Primary Source of Support/Comfort spouse   Name of Support/Comfort Primary Source Sloedad Garcia   People in Home spouse   Name(s) of People in Home Soledad Garcia   Current Living Arrangements home   Resource/Environmental Concerns none        Social Work Assessment  Questions/Answers      Flowsheet Row Most Recent Value   People in Home spouse   Name(s) of People in Home Soledad Garcia   Current Living Arrangements home   Functional Status Comments Patient voiced  independence with mobility, ADL's, Medications, and Driving   Equipment Currently Used at Home glucometer        Send Education  Questions/Answers      Flowsheet Row Most Recent Value   Annual Wellness Visit:  Patient Has Completed  [AWV completed 9-22-23.  Next AWV is scheduled for 10-8-24.]        SDOH updated and reviewed with the patient during this program:  --     Transportation Needs: No Transportation Needs (8/27/2024)    PRAPARE - Transportation     Lack of Transportation (Medical): No     Lack of Transportation (Non-Medical): No       Education Documentation  Oral Medication, taught by Cyn Skinner, EDDA at 8/27/2024  1:49 PM.  Learner: Patient  Readiness: Acceptance  Method: Explanation  Response: Verbalizes Understanding    Insulin Therapy, taught by Cyn Skinner RN at 8/27/2024  1:49 PM.  Learner: Patient  Readiness: Acceptance  Method: Explanation  Response: Verbalizes Understanding    Importance of Glycemic Management, taught by Cyn Skinner, EDDA at 8/27/2024  1:49 PM.  Learner: Patient  Readiness: Acceptance  Method: Explanation  Response: Verbalizes Understanding    Blood Glucose Monitoring, taught by Cyn Skinner, EDDA at 8/27/2024  1:49 PM.  Learner: Patient  Readiness: Acceptance  Method: Explanation  Response: Verbalizes Understanding          Cyn MAGDALENO  Ambulatory Case Management    8/27/2024, 13:49 EDT

## 2024-09-11 DIAGNOSIS — E11.9 TYPE 2 DIABETES MELLITUS WITHOUT COMPLICATION, WITHOUT LONG-TERM CURRENT USE OF INSULIN: ICD-10-CM

## 2024-09-12 RX ORDER — BLOOD SUGAR DIAGNOSTIC
STRIP MISCELLANEOUS
Qty: 100 EACH | Refills: 0 | Status: SHIPPED | OUTPATIENT
Start: 2024-09-12

## 2024-09-24 ENCOUNTER — PATIENT OUTREACH (OUTPATIENT)
Dept: CASE MANAGEMENT | Facility: OTHER | Age: 69
End: 2024-09-24
Payer: MEDICARE

## 2024-09-27 ENCOUNTER — SPECIALTY PHARMACY (OUTPATIENT)
Dept: NEUROLOGY | Facility: CLINIC | Age: 69
End: 2024-09-27
Payer: MEDICARE

## 2024-09-27 ENCOUNTER — OFFICE VISIT (OUTPATIENT)
Dept: NEUROLOGY | Facility: CLINIC | Age: 69
End: 2024-09-27
Payer: MEDICARE

## 2024-09-27 VITALS
SYSTOLIC BLOOD PRESSURE: 148 MMHG | BODY MASS INDEX: 28.56 KG/M2 | OXYGEN SATURATION: 96 % | HEIGHT: 67 IN | HEART RATE: 89 BPM | DIASTOLIC BLOOD PRESSURE: 86 MMHG

## 2024-09-27 DIAGNOSIS — R51.9 CHRONIC HEAD PAIN: ICD-10-CM

## 2024-09-27 DIAGNOSIS — G43.119 INTRACTABLE MIGRAINE WITH AURA WITHOUT STATUS MIGRAINOSUS: Primary | ICD-10-CM

## 2024-09-27 DIAGNOSIS — M54.2 NECK PAIN: ICD-10-CM

## 2024-09-27 DIAGNOSIS — G89.29 CHRONIC HEAD PAIN: ICD-10-CM

## 2024-10-03 ENCOUNTER — OFFICE VISIT (OUTPATIENT)
Dept: ENDOCRINOLOGY | Facility: CLINIC | Age: 69
End: 2024-10-03
Payer: MEDICARE

## 2024-10-03 VITALS
OXYGEN SATURATION: 96 % | SYSTOLIC BLOOD PRESSURE: 126 MMHG | BODY MASS INDEX: 28.72 KG/M2 | HEART RATE: 73 BPM | HEIGHT: 67 IN | DIASTOLIC BLOOD PRESSURE: 77 MMHG | WEIGHT: 183 LBS

## 2024-10-03 DIAGNOSIS — E11.65 TYPE 2 DIABETES MELLITUS WITH HYPERGLYCEMIA, WITH LONG-TERM CURRENT USE OF INSULIN: Primary | ICD-10-CM

## 2024-10-03 DIAGNOSIS — I10 PRIMARY HYPERTENSION: ICD-10-CM

## 2024-10-03 DIAGNOSIS — I25.118 CORONARY ARTERY DISEASE OF NATIVE ARTERY OF NATIVE HEART WITH STABLE ANGINA PECTORIS: ICD-10-CM

## 2024-10-03 DIAGNOSIS — E78.5 DYSLIPIDEMIA: ICD-10-CM

## 2024-10-03 DIAGNOSIS — Z79.4 TYPE 2 DIABETES MELLITUS WITH HYPERGLYCEMIA, WITH LONG-TERM CURRENT USE OF INSULIN: Primary | ICD-10-CM

## 2024-10-03 LAB
EXPIRATION DATE: ABNORMAL
EXPIRATION DATE: ABNORMAL
GLUCOSE BLDC GLUCOMTR-MCNC: 235 MG/DL (ref 70–130)
HBA1C MFR BLD: 8.7 % (ref 4.5–5.7)
Lab: ABNORMAL
Lab: ABNORMAL

## 2024-10-03 PROCEDURE — 82570 ASSAY OF URINE CREATININE: CPT | Performed by: INTERNAL MEDICINE

## 2024-10-03 PROCEDURE — 82043 UR ALBUMIN QUANTITATIVE: CPT | Performed by: INTERNAL MEDICINE

## 2024-10-03 RX ORDER — DAPAGLIFLOZIN 10 MG/1
10 TABLET, FILM COATED ORAL DAILY
Start: 2024-10-03

## 2024-10-03 RX ORDER — KETOROLAC TROMETHAMINE 30 MG/ML
1 INJECTION, SOLUTION INTRAMUSCULAR; INTRAVENOUS SEE ADMIN INSTRUCTIONS
Qty: 1 EACH | Refills: 0 | Status: SHIPPED | OUTPATIENT
Start: 2024-10-03

## 2024-10-03 RX ORDER — BLOOD-GLUCOSE SENSOR
1 EACH MISCELLANEOUS
Qty: 6 EACH | Refills: 3 | Status: SHIPPED | OUTPATIENT
Start: 2024-10-03

## 2024-10-03 NOTE — PROGRESS NOTES
"     Office Note      Date: 10/03/2024  Patient Name: Israel Reyna  MRN: [id number]  : 1955    Chief Complaint   Patient presents with    Diabetes       History of Present Illness:   Israel Reyna is a 69 y.o. male who presents for Diabetes type 2. Diagnosed in: . Treated in past with oral agents. Current treatments: metformin, glipizide, trulicity and basal bolus. Number of insulin shots per day: 3. Checks blood sugar 1 times a day. Has low blood sugar: occasional. Aspirin use: Yes - intermittently. Statin use: Yes. ACE-I/ARB use: Yes.  Last eye exam: 2024.    Last A1c was 8.1%.  He has had DM education in the past.    Subjective      Diabetic Complications:  Eyes: Yes - retinopathy  Kidneys: No  Feet: No  Heart: Yes -      Diet and Exercise:  Meals per day: 2  Minutes of exercise per week: 0 mins.    Review of Systems:   Review of Systems   Constitutional: Negative.    HENT:  Positive for facial swelling, hearing loss and trouble swallowing.    Eyes:  Positive for pain and itching.   Respiratory:  Positive for shortness of breath.    Cardiovascular:  Positive for palpitations and leg swelling.   Gastrointestinal:  Positive for abdominal distention, abdominal pain, diarrhea and nausea.        Heartburn/Reflux   Endocrine: Positive for polyuria.   Genitourinary:  Positive for enuresis and urgency.   Musculoskeletal:  Positive for gait problem.   Skin: Negative.    Allergic/Immunologic: Negative.    Neurological:  Positive for dizziness, facial asymmetry and headaches.   Hematological: Negative.    Psychiatric/Behavioral: Negative.         The following portions of the patient's history were reviewed and updated as appropriate: allergies, current medications, past family history, past medical history, past social history, past surgical history, and problem list.    Objective     Visit Vitals  /77   Pulse 73   Ht 170.2 cm (67\")   Wt 83 kg (183 lb)   SpO2 96%   BMI 28.66 kg/m² "       Physical Exam:  Physical Exam  Constitutional:       Appearance: Normal appearance.   HENT:      Head: Normocephalic and atraumatic.   Eyes:      Extraocular Movements: Extraocular movements intact.      Conjunctiva/sclera: Conjunctivae normal.   Neck:      Thyroid: No thyroid mass, thyromegaly or thyroid tenderness.   Cardiovascular:      Rate and Rhythm: Normal rate and regular rhythm.      Pulses: Normal pulses.           Dorsalis pedis pulses are 2+ on the right side and 2+ on the left side.        Posterior tibial pulses are 2+ on the right side and 2+ on the left side.      Heart sounds: Normal heart sounds.   Pulmonary:      Effort: Pulmonary effort is normal.      Breath sounds: Normal breath sounds.   Abdominal:      General: Bowel sounds are normal.      Palpations: Abdomen is soft.   Musculoskeletal:         General: Normal range of motion.      Cervical back: Normal range of motion and neck supple.   Feet:      Right foot:      Protective Sensation: 5 sites tested.  5 sites sensed.      Skin integrity: Skin integrity normal.      Toenail Condition: Right toenails are abnormally thick. Fungal disease present.     Left foot:      Protective Sensation: 5 sites tested.  5 sites sensed.      Skin integrity: Skin integrity normal.      Toenail Condition: Left toenails are abnormally thick. Fungal disease present.  Lymphadenopathy:      Cervical: No cervical adenopathy.   Skin:     General: Skin is warm and dry.   Neurological:      General: No focal deficit present.      Mental Status: He is alert.   Psychiatric:         Mood and Affect: Mood normal.         Behavior: Behavior normal.         Thought Content: Thought content normal.         Judgment: Judgment normal.         Labs:    HbA1c  Hemoglobin A1C   Date Value Ref Range Status   10/03/2024 8.7 (A) 4.5 - 5.7 % Final   03/29/2023 8.60 (H) 4.80 - 5.60 % Final   .    CMP  Lab Results   Component Value Date    GLUCOSE 215 (H) 01/08/2024    BUN 14  "01/08/2024    CREATININE 1.10 01/08/2024    EGFRIFNONA 66 01/28/2022    BCR 12.6 01/08/2024    K 5.3 (H) 01/08/2024    CO2 26.1 01/08/2024    CALCIUM 9.7 01/08/2024    PROTENTOTREF 6.7 11/08/2022    LABIL2 1.2 11/08/2022    AST 21 01/08/2024    ALT 17 01/08/2024        Lipid Panel  Lab Results   Component Value Date    CHLPL 167 06/02/2015    HDL 42 01/08/2024     (H) 01/08/2024    TRIG 153 (H) 01/08/2024        TSH  Lab Results   Component Value Date    TSH 2.440 03/29/2023        Hemoglobin A1C  Lab Results   Component Value Date    HGBA1C 8.7 (A) 10/03/2024        Microalbumin/Creatinine  No results found for: \"MALBCRERATI\"        Assessment / Plan      Assessment & Plan:  Diagnoses and all orders for this visit:    1. Type 2 diabetes mellitus with hyperglycemia, with long-term current use of insulin (Primary)  Assessment & Plan:  Diabetes is worsening.   We discussed treatment options.  Stop glipizide.  Start SGLT-2 inhibitor.   Potential s/e discussed.  Diabetes will be reassessed in 6 months.    We discussed using CGM.  Will see if we can get this covered.    Cost of meds is an issue.  Will check into pt assistance.    Orders:  -     POC Glucose, Blood  -     POC Glycosylated Hemoglobin (Hb A1C)  -     Microalbumin / Creatinine Urine Ratio - Urine, Clean Catch; Future    2. Primary hypertension  Assessment & Plan:  Hypertension is stable and controlled  Continue current treatment regimen.  Blood pressure will be reassessed in 6 months.      3. Dyslipidemia  Assessment & Plan:  Continue statin.  Plan to check lipids next visit.      4. Coronary artery disease of native artery of native heart with stable angina pectoris  Assessment & Plan:  Continue ASA, statin and GLP-1 RA.  We discussed adding SGLT-2 inhibitor.      Other orders  -     dapagliflozin Propanediol (Farxiga) 10 MG tablet; Take 10 mg by mouth Daily.       Current Outpatient Medications   Medication Instructions    amLODIPine (NORVASC) 5 mg, " "Oral, Daily    atorvastatin (LIPITOR) 20 mg, Oral, Daily    butalbital-acetaminophen-caffeine (FIORICET, ESGIC) -40 MG per tablet TAKE 1 TABLET BY MOUTH ONCE DAILY AS NEEDED FOR HEADACHE    Creon 84760-361548 units capsule delayed-release particles capsule 36,000 units of lipase, Oral, 3 Times Daily With Meals    dapagliflozin Propanediol (FARXIGA) 10 mg, Oral, Daily    diphenoxylate-atropine (Lomotil) 2.5-0.025 MG per tablet 1 tablet, Oral, 4 Times Daily PRN    doxepin (SINEQUAN) 50 mg, Oral, Nightly PRN    Emgality 120 mg, Subcutaneous, Every 30 Days    glucose monitor monitoring kit 1 each, Does not apply, Daily    HumaLOG KwikPen 14 Units, Subcutaneous, 2 Times Daily Before Meals    Insulin Pen Needle (MM Pen Needles) 31G X 6 MM misc USE 1  ONCE DAILY    Insulin Syringe 31G X 5/16\" 0.3 ML misc 1 each, Does not apply, Nightly    Lancets Thin misc 1 each, Does not apply, Daily, Use daily as directed    lisinopril (PRINIVIL,ZESTRIL) 40 mg, Oral, 2 Times Daily    metFORMIN (GLUCOPHAGE) 1,000 mg, Oral, 2 Times Daily With Meals    nitroglycerin (NITROSTAT) 0.4 MG SL tablet 1 under the tongue as needed for angina, may repeat q5mins for up three doses    ondansetron (ZOFRAN) 8 MG tablet TAKE 1 TABLET BY MOUTH EVERY 8 HOURS AS NEEDED FOR NAUSEA FOR VOMITING    OneTouch Ultra test strip USE 1 STRIP TO CHECK GLUCOSE ONCE DAILY AS DIRECTED    pantoprazole (PROTONIX) 40 MG EC tablet TAKE 1 TABLET BY MOUTH TWICE DAILY 30 MINUTES BEFORE MEAL(S)    pregabalin (LYRICA) 75 mg, Oral, 3 Times Daily    propranolol LA (INDERAL LA) 120 mg, Oral, Daily    sildenafil (VIAGRA) 100 mg, Oral, Daily PRN    tamsulosin (FLOMAX) 0.4 mg, Oral, Daily    Toujeo Max SoloStar 40 Units, Subcutaneous, Every Night at Bedtime    Trulicity 1.5 mg, Subcutaneous, Weekly    vitamin B-12 (CYANOCOBALAMIN) 50 mcg, Oral, 2 times daily    vitamin C (ASCORBIC ACID) 500 mg, Oral, 2 times daily    vitamin E 400 Units, Oral, Daily    Zinc 40 MG tablet 1 " tablet, Oral, Daily      Return in about 6 months (around 4/3/2025) for Recheck with A1c, CMP, lipid, TSH, microalbumin, foot exam.    Electronically signed by: José Miguel Bautista MD  10/03/2024

## 2024-10-03 NOTE — ASSESSMENT & PLAN NOTE
Diabetes is worsening.   We discussed treatment options.  Stop glipizide.  Start SGLT-2 inhibitor.   Potential s/e discussed.  Diabetes will be reassessed in 6 months.    We discussed using CGM.  Will see if we can get this covered.    Cost of meds is an issue.  Will check into pt assistance.

## 2024-10-04 LAB
ALBUMIN UR-MCNC: 13.6 MG/DL
CREAT UR-MCNC: 133.9 MG/DL
MICROALBUMIN/CREAT UR: 101.6 MG/G (ref 0–29)

## 2024-10-07 DIAGNOSIS — I10 PRIMARY HYPERTENSION: ICD-10-CM

## 2024-10-07 DIAGNOSIS — G43.119 INTRACTABLE MIGRAINE WITH AURA WITHOUT STATUS MIGRAINOSUS: ICD-10-CM

## 2024-10-07 RX ORDER — BUTALBITAL, ACETAMINOPHEN AND CAFFEINE 50; 325; 40 MG/1; MG/1; MG/1
TABLET ORAL
Qty: 30 TABLET | Refills: 0 | Status: SHIPPED | OUTPATIENT
Start: 2024-10-07

## 2024-10-07 NOTE — TELEPHONE ENCOUNTER
Rx Refill Note  Requested Prescriptions     Pending Prescriptions Disp Refills    butalbital-acetaminophen-caffeine (FIORICET, ESGIC) -40 MG per tablet [Pharmacy Med Name: Butalbital-APAP-Caffeine -40 MG Oral Tablet] 30 tablet 0     Sig: TAKE 1 TABLET BY MOUTH ONCE DAILY AS NEEDED FOR HEADACHE      Last filled: 8/13/24 #30 with 0 refills    Last office visit with prescribing clinician: 9/27/2024      Next office visit with prescribing clinician: Visit date not found  --> added note to sig pt needs appt for further refills      Hermilo Rod MA  10/07/24, 11:05 EDT

## 2024-10-08 ENCOUNTER — OFFICE VISIT (OUTPATIENT)
Dept: FAMILY MEDICINE CLINIC | Facility: CLINIC | Age: 69
End: 2024-10-08
Payer: MEDICARE

## 2024-10-08 VITALS
WEIGHT: 182.4 LBS | DIASTOLIC BLOOD PRESSURE: 76 MMHG | OXYGEN SATURATION: 96 % | HEART RATE: 66 BPM | BODY MASS INDEX: 28.63 KG/M2 | HEIGHT: 67 IN | SYSTOLIC BLOOD PRESSURE: 138 MMHG

## 2024-10-08 DIAGNOSIS — F41.9 ANXIETY: ICD-10-CM

## 2024-10-08 DIAGNOSIS — Z00.00 MEDICARE ANNUAL WELLNESS VISIT, SUBSEQUENT: Primary | ICD-10-CM

## 2024-10-08 DIAGNOSIS — F43.0 STRESS REACTION: ICD-10-CM

## 2024-10-08 PROCEDURE — G0439 PPPS, SUBSEQ VISIT: HCPCS | Performed by: FAMILY MEDICINE

## 2024-10-08 PROCEDURE — 3078F DIAST BP <80 MM HG: CPT | Performed by: FAMILY MEDICINE

## 2024-10-08 PROCEDURE — 3052F HG A1C>EQUAL 8.0%<EQUAL 9.0%: CPT | Performed by: FAMILY MEDICINE

## 2024-10-08 PROCEDURE — 3075F SYST BP GE 130 - 139MM HG: CPT | Performed by: FAMILY MEDICINE

## 2024-10-08 PROCEDURE — 1125F AMNT PAIN NOTED PAIN PRSNT: CPT | Performed by: FAMILY MEDICINE

## 2024-10-08 RX ORDER — AMLODIPINE BESYLATE 5 MG/1
5 TABLET ORAL DAILY
Qty: 90 TABLET | Refills: 0 | Status: SHIPPED | OUTPATIENT
Start: 2024-10-08

## 2024-10-08 RX ORDER — DOXEPIN HYDROCHLORIDE 50 MG/1
50 CAPSULE ORAL NIGHTLY PRN
Qty: 30 CAPSULE | Refills: 2 | Status: SHIPPED | OUTPATIENT
Start: 2024-10-08

## 2024-10-08 NOTE — PATIENT INSTRUCTIONS
Advance Care Planning and Advance Directives     You make decisions on a daily basis - decisions about where you want to live, your career, your home, your life. Perhaps one of the most important decisions you face is your choice for future medical care. Take time to talk with your family and your healthcare team and start planning today.  Advance Care Planning is a process that can help you:  Understand possible future healthcare decisions in light of your own experiences  Reflect on those decision in light of your goals and values  Discuss your decisions with those closest to you and the healthcare professionals that care for you  Make a plan by creating a document that reflects your wishes    Surrogate Decision Maker  In the event of a medical emergency, which has left you unable to communicate or to make your own decisions, you would need someone to make decisions for you.  It is important to discuss your preferences for medical treatment with this person while you are in good health.     Qualities of a surrogate decision maker:  Willing to take on this role and responsibility  Knows what you want for future medical care  Willing to follow your wishes even if they don't agree with them  Able to make difficult medical decisions under stressful circumstances    Advance Directives  These are legal documents you can create that will guide your healthcare team and decision maker(s) when needed. These documents can be stored in the electronic medical record.    Living Will - a legal document to guide your care if you have a terminal condition or a serious illness and are unable to communicate. States vary by statute in document names/types, but most forms may include one or more of the following:        -  Directions regarding life-prolonging treatments        -  Directions regarding artificially provided nutrition/hydration        -  Choosing a healthcare decision maker        -  Direction regarding organ/tissue  donation    Durable Power of  for Healthcare - this document names an -in-fact to make medical decisions for you, but it may also allow this person to make personal and financial decisions for you. Please seek the advice of an  if you need this type of document.    **Advance Directives are not required and no one may discriminate against you if you do not sign one.    Medical Orders  Many states allow specific forms/orders signed by your physician to record your wishes for medical treatment in your current state of health. This form, signed in personal communication with your physician, addresses resuscitation and other medical interventions that you may or may not want.      For more information or to schedule a time with a [facility] Advance Care Planning Facilitator contact: [facility] or call [phone] and someone will contact you directly.  You are due for Shingrix vaccination series ( the newest shingles vaccine).  It is a two shot series spaced 2-6 months apart. Please get this vaccine series started at your earliest convenience at your local pharmacy to help avoid shingles outbreak. It is more effective than the old Zostavax vaccine and is recommended even if you have had the Zostavax vaccine in the past.  Once the Shingrix series is completed, it does not need to be repeated.   For more information, please look at the website below:  https://www.cdc.gov/vaccines/vpd/shingles/public/shingrix/index.html    You are due for a Covid 19 vaccination. (provides protection against Covid 19 Viral Infection) Please  talk to your pharmacist and get the immunization at your local pharmacy at your earliest convenience. Please click on the link for more information about this vaccine.   https://www.cdc.gov/coronavirus/2019-ncov/vaccines/stay-up-to-date.html        Medicare Wellness  Personal Prevention Plan of Service     Date of Office Visit:    Encounter Provider:  Elio Soria  DO Oscar  Place of Service:  [facility]  Patient Name: Israel Reyna  :  1955    As part of the Medicare Wellness portion of your visit today, we are providing you with this personalized preventive plan of services (PPPS). This plan is based upon recommendations of the [place] Preventive Services Task Force (USPSTF) and the Advisory Committee on Immunization Practices (ACIP).    This lists the preventive care services that should be considered, and provides dates of when you are due. Items listed as completed are up-to-date and do not require any further intervention.    Health Maintenance   Topic Date Due   • ZOSTER VACCINE (1 of 2) Never done   • HEPATITIS C SCREENING  Never done   • DIABETIC FOOT EXAM  Never done   • DIABETIC EYE EXAM  2024   • INFLUENZA VACCINE  2024   • COVID-19 Vaccine (3 - 2023- season) 2024   • ANNUAL WELLNESS VISIT  2024   • COLORECTAL CANCER SCREENING  2025   • Pneumococcal Vaccine 65+ (1 of 2 - PCV) 2025 (Originally 1961)   • LIPID PANEL  2025   • BMI FOLLOWUP  2025   • HEMOGLOBIN A1C  2025   • TDAP/TD VACCINES (2 - Td or Tdap) 2033   • URINE MICROALBUMIN  Discontinued       No orders of the defined types were placed in this encounter.      No follow-ups on file.

## 2024-10-08 NOTE — PROGRESS NOTES
Subjective   The ABCs of the Annual Wellness Visit  Medicare Wellness Visit      Israel Reyna is a 69 y.o. patient who presents for a Medicare Wellness Visit.    The following portions of the patient's history were reviewed and   updated as appropriate: allergies, current medications, past family history, past medical history, past social history, past surgical history, and problem list.    Compared to one year ago, the patient's physical   health is worse. He is a little unclear on his medications. Dr. Crain had prescribed the Emgality  Compared to one year ago, the patient's mental   health is the same.    Recent Hospitalizations:  He was not admitted to the hospital during the last year.     Current Medical Providers:  Patient Care Team:  Elio Moore DO as PCP - General (Family Medicine)  Cyn Skinner RN as Ambulatory  ([facility])  José Miguel Bautista MD as Consulting Physician (Endocrinology)    Outpatient Medications Prior to Visit   Medication Sig Dispense Refill    amLODIPine (NORVASC) 5 MG tablet Take 1 tablet by mouth once daily 90 tablet 0    atorvastatin (LIPITOR) 20 MG tablet Take 1 tablet by mouth once daily 90 tablet 1    butalbital-acetaminophen-caffeine (FIORICET, ESGIC) -40 MG per tablet TAKE 1 TABLET BY MOUTH ONCE DAILY AS NEEDED FOR HEADACHE **Must schedule appointment for additional refills** 30 tablet 0    Continuous Glucose  (FreeStyle Mary 3 [place]) device Use 1 each See Admin Instructions. Dx E11.65 1 each 0    Continuous Glucose Sensor (FreeStyle Mary 3 Sensor) [facility] Use 1 each Every 14 (Fourteen) Days. Dx E11.65 6 each 3    Creon 20403-117577 units capsule delayed-release particles capsule TAKE 1 CAPSULE BY MOUTH THREE TIMES DAILY WITH MEALS 90 capsule 0    dapagliflozin Propanediol (Farxiga) 10 MG tablet Take 10 mg by mouth Daily.      diphenoxylate-atropine (Lomotil) 2.5-0.025 MG per tablet Take 1 tablet by mouth 4 (Four) Times  "a Day As Needed for Diarrhea. 120 tablet 0    Dulaglutide (Trulicity) 1.5 MG/0.5ML solution pen-injector Inject 1.5 mg under the skin into the appropriate area as directed 1 (One) Time Per Week. 6 mL 2    galcanezumab-gnlm (Emgality) 120 MG/ML auto-injector pen Inject 1 mL under the skin into the appropriate area as directed Every 30 (Thirty) Days. Gets from Patient Assistance Program      glucose monitor monitoring kit 1 each Daily. 1 each 0    Insulin Lispro (HumaLOG KwikPen) 200 UNIT/ML solution pen-injector Inject 14 Units under the skin into the appropriate area as directed 2 (Two) Times a Day Before Meals. 9 mL 1    Insulin Pen Needle (MM Pen Needles) 31G X 6 MM misc USE 1  ONCE DAILY 100 each 2    Insulin Syringe 31G X 5/16\" 0.3 ML misc 1 each Every Night. 90 each 3    Lancets Thin misc 1 each Daily. Use daily as directed 100 each 11    lisinopril (PRINIVIL,ZESTRIL) 40 MG tablet Take 1 tablet by mouth twice daily 180 tablet 0    metFORMIN (GLUCOPHAGE) 1000 MG tablet TAKE 1 TABLET BY MOUTH TWICE DAILY WITH MEALS 180 tablet 0    nitroglycerin (NITROSTAT) 0.4 MG SL tablet 1 under the tongue as needed for angina, may repeat q5mins for up three doses 100 tablet 11    ondansetron (ZOFRAN) 8 MG tablet TAKE 1 TABLET BY MOUTH EVERY 8 HOURS AS NEEDED FOR NAUSEA FOR VOMITING 90 tablet 0    OneTouch Ultra test strip USE 1 STRIP TO CHECK GLUCOSE ONCE DAILY AS DIRECTED 100 each 0    pantoprazole (PROTONIX) 40 MG EC tablet TAKE 1 TABLET BY MOUTH TWICE DAILY 30 MINUTES BEFORE MEAL(S) 180 tablet 3    pregabalin (Lyrica) 75 MG capsule Take 1 capsule by mouth 3 (Three) Times a Day. (Patient taking differently: Take 1 capsule by mouth Daily. Once daily around 7pm) 90 capsule 3    sildenafil (VIAGRA) 100 MG tablet Take 1 tablet by mouth Daily As Needed for Erectile Dysfunction. 15 tablet 11    tamsulosin (FLOMAX) 0.4 MG capsule 24 hr capsule Take 1 capsule by mouth once daily 90 capsule 0    Toujeo Max SoloStar 300 UNIT/ML " solution pen-injector injection INJECT 40 UNITS UNDER THE SKIN INTO THE APPROPRIATE AREA AS DIRECTED EVERY NIGHT AT BEDTIME. 12 mL 0    vitamin B-12 (CYANOCOBALAMIN) 100 MCG tablet Take 0.5 tablets by mouth 2 (two) times a day.      vitamin C (ASCORBIC ACID) 500 MG tablet Take 1 tablet by mouth 2 (two) times a day.      vitamin E 400 UNIT capsule Take 1 capsule by mouth Daily.      Zinc 40 MG tablet Take 1 tablet by mouth Daily.      doxepin (SINEquan) 25 MG capsule Take 2 capsules by mouth At Night As Needed for Sleep. 60 capsule 0    propranolol LA (INDERAL LA) 120 MG 24 hr capsule Take 1 capsule by mouth Daily for 30 days. 30 capsule 6     No facility-administered medications prior to visit.     No opioid medication identified on active medication list. I have reviewed chart for other potential  high risk medication/s and harmful drug interactions in the elderly.      Aspirin is not on active medication list.  Aspirin use is not indicated based on review of current medical condition/s. Risk of harm outweighs potential benefits.  .    Patient Active Problem List   Diagnosis    Bowel incontinence    Classic migraine with aura    Depression    Anxiety    Mental status change    Disorientation    Facial droop    Difficulty walking    Intractable migraine with aura without status migrainosus    Chest pain    CAD (coronary artery disease)    Hypertension    Dyslipidemia    Type 2 diabetes mellitus with hyperglycemia, with long-term current use of insulin    GERD (gastroesophageal reflux disease)    CHF (congestive heart failure)    Chest pain    SOB (shortness of breath)    Fatigue    Chronic migraine    Other cerebral infarction    Transient cerebral ischemia    Neck pain    Spondylosis of cervical region without myelopathy or radiculopathy    Facial pain    Nausea    Erectile disorder due to medical condition in male    Nephrolithiasis    Right lower quadrant abdominal pain    Trigger point of abdomen     "Gastroparesis    History of adenomatous polyp of colon    Status post balloon dilatation of esophageal stricture     Advance Care Planning Advance Directive is not on file.  ACP discussion was held with the patient during this visit. Patient has an advance directive (not in EMR), copy requested.            Objective   Vitals:    10/08/24 1310   BP: 138/76   Pulse: 66   SpO2: 96%   Weight: 82.7 kg (182 lb 6.4 oz)   Height: 170.2 cm (67.01\")   PainSc:   6   PainLoc: Head       Estimated body mass index is 28.56 kg/m² as calculated from the following:    Height as of this encounter: 170.2 cm (67.01\").    Weight as of this encounter: 82.7 kg (182 lb 6.4 oz).            Does the patient have evidence of cognitive impairment? No  Lab Results   Component Value Date    HGBA1C 8.7 (A) 10/03/2024                                                                                                Health  Risk Assessment    Smoking Status:  Social History     Tobacco Use   Smoking Status Never    Passive exposure: Never   Smokeless Tobacco Never     Alcohol Consumption:  Social History     Substance and Sexual Activity   Alcohol Use Never       Fall Risk Screen  STEADI Fall Risk Assessment was completed, and patient is at LOW risk for falls.Assessment completed on:10/8/2024    Depression Screening:      10/8/2024     1:16 PM   PHQ-2/PHQ-9 Depression Screening   Retired Little Interest or Pleasure in Doing Things 0-->not at all   Retired Feeling Down, Depressed or Hopeless 0-->not at all   Retired PHQ-9: Brief Depression Severity Measure Score 0     Health Habits and Functional and Cognitive Screening:      10/8/2024     1:16 PM   Functional & Cognitive Status   Do you have difficulty preparing food and eating? No   Do you have difficulty bathing yourself, getting dressed or grooming yourself? No   Do you have difficulty using the toilet? No   Do you have difficulty moving around from place to place? No   Do you have trouble with " steps or getting out of a bed or a chair? No   Current Diet Well Balanced Diet   Dental Exam Up to date   Eye Exam Up to date   Exercise (times per week) 7 times per week   Current Exercises Include Walking   Do you need help using the phone?  No   Are you deaf or do you have serious difficulty hearing?  Yes   Do you need help to go to places out of walking distance? No   Do you need help shopping? No   Do you need help preparing meals?  No   Do you need help with housework?  No   Do you need help with laundry? No   Do you need help taking your medications? Yes   Do you need help managing money? No   Do you ever drive or ride in a car without wearing a seat belt? No   Have you felt unusual stress, anger or loneliness in the last month? Yes   Who do you live with? Spouse   If you need help, do you have trouble finding someone available to you? No   Have you been bothered in the last four weeks by sexual problems? No   Do you have difficulty concentrating, remembering or making decisions? Yes           Age-appropriate Screening Schedule:  Refer to the list below for future screening recommendations based on patient's age, sex and/or medical conditions. Orders for these recommended tests are listed in the plan section. The patient has been provided with a written plan.    Health Maintenance List  Health Maintenance   Topic Date Due    ZOSTER VACCINE (1 of 2) Never done    HEPATITIS C SCREENING  Never done    DIABETIC FOOT EXAM  Never done    DIABETIC EYE EXAM  01/25/2024    INFLUENZA VACCINE  08/01/2024    COVID-19 Vaccine (3 - 2023-24 season) 09/01/2024    ANNUAL WELLNESS VISIT  09/22/2024    COLORECTAL CANCER SCREENING  02/25/2025    Pneumococcal Vaccine 65+ (1 of 2 - PCV) 04/08/2025 (Originally 1/22/1961)    LIPID PANEL  01/08/2025    BMI FOLLOWUP  01/19/2025    HEMOGLOBIN A1C  04/03/2025    TDAP/TD VACCINES (2 - Td or Tdap) 07/30/2033    URINE MICROALBUMIN  Discontinued                                                "                                                                                                 CMS Preventative Services Quick Reference  Risk Factors Identified During Encounter  Hearing Problem:  Has hearing aids    The above risks/problems have been discussed with the patient.  Pertinent information has been shared with the patient in the After Visit Summary.  An After Visit Summary and PPPS were made available to the patient.    Follow Up:   Next Medicare Wellness visit to be scheduled in 1 year.         Additional E&M Note during same encounter follows:  Patient has additional, significant, and separately identifiable condition(s)/problem(s) that require work above and beyond the Medicare Wellness Visit     Chief Complaint  Medicare Wellness-subsequent    HPI                  Objective   Vital Signs:  /76   Pulse 66   Ht 170.2 cm (67.01\")   Wt 82.7 kg (182 lb 6.4 oz)   SpO2 96%   BMI 28.56 kg/m²   Physical Exam            Assessment and Plan       Problem List Items Addressed This Visit          Mental Health    Anxiety    Relevant Medications    doxepin (SINEquan) 50 MG capsule     Other Visit Diagnoses       Medicare annual wellness visit, subsequent    -  Primary    Stress reaction        Relevant Medications    doxepin (SINEquan) 50 MG capsule            The wellness exam has been reviewed in detail.  The patient has been fully counseled on preventative guidelines for vaccines, cancer screenings, and other health maintenance needs.  Functional testing has been performed to assess capacity for independent living and need for other medical interventions.   The patient was counseled on maintaining a lifestyle to promote good health and to minimize chronic diseases.  The patient has been assisted with scheduling healthcare procedures for the coming year and given a written document outlining these recommendations.    Return in about 6 months (around 4/8/2025) for HTN.        Medicare annual " wellness visit, subsequent    Anxiety    Stress reaction    No orders of the defined types were placed in this encounter.    New Medications Ordered This Visit   Medications    doxepin (SINEquan) 50 MG capsule     Sig: Take 1 capsule by mouth At Night As Needed for Sleep (anxiety).     Dispense:  30 capsule     Refill:  2     Will use goodrx          Follow Up   Return in about 6 months (around 4/8/2025) for HTN.  Patient was given instructions and counseling regarding his condition or for health maintenance advice. Please see specific information pulled into the AVS if appropriate.

## 2024-10-10 ENCOUNTER — TELEPHONE (OUTPATIENT)
Dept: FAMILY MEDICINE CLINIC | Facility: CLINIC | Age: 69
End: 2024-10-10
Payer: MEDICARE

## 2024-10-10 DIAGNOSIS — R10.9 FLANK PAIN: Primary | ICD-10-CM

## 2024-10-10 NOTE — TELEPHONE ENCOUNTER
Provider: DR. DANIELLE    Caller: ROHITH    Relationship to Patient: SELF    Reason for Call: THE PATIENT WANTED THE PROVIDER TO KNOW THAT THE [place] CRE[facility] MEDICATION IS OVER $1500.     PATIENT ALSO WANTED TO LET HIM KNOW THAT THE PATIENT IS HAVING PAIN IN THE KIDNEY'S    PLEASE CALL PATIENT TO DISCUSS THIS

## 2024-10-16 NOTE — TELEPHONE ENCOUNTER
Can we call our creon rep and see about working on patient assistance for him? He really needs this med    In terms of the kidney pain, patient can come by and leave urine sample

## 2024-10-21 NOTE — TELEPHONE ENCOUNTER
Rep called back and informed about the patient assistance program. Received the paperwork and have filled out, also called pt to informed about the program will come in tomorrow to sign the paperwork.

## 2024-10-22 ENCOUNTER — TELEPHONE (OUTPATIENT)
Dept: NEUROLOGY | Facility: CLINIC | Age: 69
End: 2024-10-22
Payer: MEDICARE

## 2024-10-22 NOTE — TELEPHONE ENCOUNTER
PATIENT STOPPED IN THE OFFICE TO SPEAK TO PHARMACY BUT [facility] WAS OUT TO LUNCH. SENT A MESSAGE TO [facility] THAT PATIENT WOULD LIKE A RETURN CALL [phone].

## 2024-10-22 NOTE — TELEPHONE ENCOUNTER
Caller: Israel Reyna    Relationship: Self    Best call back number: [phone]    What form or medical record are you requesting: [employer] NEEDS TO KNOW IF YOU WANT THE PEN OR THE SYRINGE FOR EMGALITY, ASST HAS BEEN APPROVED.    Who is requesting this form or medical record from you: [facility]    How would you like to receive the form or medical records (pick-up, mail, fax): FAX  If fax, what is the fax number: [phone]    Timeframe paperwork needed: ASAP    Additional notes: [facility] HAS APPROVED HIS REQUEST AND THEY NEED TO KNOW WHICH FORM OF THE MEDICATION FOR THIS PATIENT.

## 2024-10-24 ENCOUNTER — TELEPHONE (OUTPATIENT)
Dept: NEUROLOGY | Facility: CLINIC | Age: 69
End: 2024-10-24
Payer: MEDICARE

## 2024-10-24 NOTE — TELEPHONE ENCOUNTER
Returned call to Israel, confirmed he has already taken shot correctly (used two pens) and will repeat on 11/24/24.     Needed to get f/u appt scheduled for him anyway, he mentioned he is pretty concerned about memory loss and has a very strong family history of dementia on his mom's side. Scheduled for appt tomorrow in a  cancellation spot, no other openings in the near future at this time.     YOLANDA Akhtar

## 2024-10-24 NOTE — TELEPHONE ENCOUNTER
Caller: Israel Reyna    Relationship: Self    Best call back number: [phone]     What was the call regarding: PT IS CALLING DUE TO THE INSTRUCTIONS ON STARTING THE EMGALITY SHOTS. HE STATED THE INSTRUCTIONS SAY TWO PINS FOR A SHOT AND BELIEVES ITS A DOUBLE SHOT BUT NEEDS TO BE ADVISED. HE STATED HE WANTS TO GO AHEAD DO THE TWO SHOTS. ADVISED HIM TO TRY AND WAIT UNTIL THE END OF THE WORK DAY TO GET A CALL BACK AND V/U.     WE DID TRY TO CONNECT TO FRANC[facility] BUT IT WENT TO     PLEASE ADVISE  THANK YOU

## 2024-10-25 ENCOUNTER — OFFICE VISIT (OUTPATIENT)
Dept: NEUROLOGY | Facility: CLINIC | Age: 69
End: 2024-10-25
Payer: MEDICARE

## 2024-10-25 VITALS
SYSTOLIC BLOOD PRESSURE: 136 MMHG | HEART RATE: 70 BPM | BODY MASS INDEX: 28.56 KG/M2 | OXYGEN SATURATION: 96 % | HEIGHT: 67 IN | DIASTOLIC BLOOD PRESSURE: 80 MMHG

## 2024-10-25 DIAGNOSIS — G43.119 INTRACTABLE MIGRAINE WITH AURA WITHOUT STATUS MIGRAINOSUS: Primary | ICD-10-CM

## 2024-10-25 DIAGNOSIS — G31.84 MCI (MILD COGNITIVE IMPAIRMENT): ICD-10-CM

## 2024-10-25 NOTE — PROGRESS NOTES
"Subjective:    CC: Israel Reyna is in clinic today for follow up for      HPI:  Initial visit/ Prior w/u:  This is a 65 yo male who presents for re-evaluation of worsening migraines. Intractable migraines since 2013 followed by Dr. Chaidez normally.   He went to have COVID vaccine March 2021 Pfizer. He had immediate worsening headaches, chills, feeling terrible, weak and in bed x 4 days. He had his second injection April 2021 Pfizer with the same exact symptoms. He feels that his symptoms have progressively worsened since the COVID vaccines. Has noticed every time he eats anything at all he notices he cannot walk, he cannot speak, his left face will \"draw up\", twitch and be painful with episodes lasting 15 minutes to 2 hours and have been present for 2-3 months and seems to be worsening since COVID vaccine. He was last seen in our clinic 3/2020. He tells me the pain in his head, holocranial, can be anywhere throughout the head, \"feels like someone is repeatedly hitting me with a ball bat or stick and at times feels like someone is driving a nail in the back of my head. The ones that scare me the most are the headaches in the temples that feel like sharp, someone shooting a nail gun in my temples.\" Also has associated dizziness. Pain can radiate across his head and within 10 minutes and he loses control of his bowels. This is not a change and has been present since 2377-2540. No imaging of head since 2017. He cannot have MRI due to anal sphincter stimulator. Has chronic daily headaches. \"Drawing of face\" is daily. CTA head/neck 2019 normal. He feels when he has his pain there is \"moisture running down his head\". Takes topamax 25mg BID and gabapentin 300mg TID. Experiences severe pain with episodes. Cannot afford Botox. He is agreeable to CT head today. He is requesting migraine cocktail as well. He uses cane at times. Has been using fioricet PRN. He is scheduled for OP migraine cocktail today at 3pm at " [facility]. Last migraine cocktail was [id number]. Tells me one med gives him hiccups for 2-3 days after but unsure which.  His headaches do also have severe pain, throbbing, photophobia, phonophobia, nausea and occasional vomiting and do her daily with some episodes being worse than others.     Follow up with Sabiha Hanna, APRN: 4/12/2023:  Patient in clinic for regular follow up. At his last visit he was prescribed Depakote 500 mg BID for migraine prevention and Ubrelvy 100 mg for abortive treatment for migraines. He is unsure if he has started taking Depakote since his last visit. He does not believe that he had started Ubrelvy d/t cost but also was unsure. He stated that his spouse helps him to manage his medications and that he would need to check with her regarding what he is currently taking. He states that he continues to have both headaches and the previously described episodes including left sided paresthesias, left facial twitching/drawing up. His headaches vary in location and are located at the top of his head, occiput, bilateral temples, and at times will start near right temple and radiate towards left. Patient has had these episodes for many years and has previously completed detailed neurologic workup including cEEG which was negative. He reports that some headaches are associated with light and sound sensitivity along with nausea. He has near daily headaches of some kind everyday varying in presentation and severity. He has also appreciated dizziness over approximately the last month, he reports that it is worse while walking and that he has been using his cane more recently for stabilization, no recent falls. He recently saw his PCP d/t concern for fever and body aches who performed lab work including Hemoglobin A1c was 8.60, blood culture did not show any growth at 5 days, HIV was nonreactive, rheumatoid factor was less than 10, TSH 2.440, , CK 42, CRP <0.30, CMP showed elevated  glucose, sedimentation rate 38. It appears that at his last visit a CT head was discussed but had not yet been ordered.      Follow-up with Dr. Crain: 05/02/2023: Patient is in clinic for regular follow-up. Since the last visit, he has had a CT scan on 05/02/2023 to assess dizzy spells which did not reveal any acute intracranial abnormalities.  I reviewed the CTH personally.  He could not afford to have Ubrelvy filled. He has been unable to fill his Aimovig due to cost as well. He is still experiencing dizzy spells and fatigue. He also notes intermittent shooting pain in different parts of the head he has completed his Depakote and he has not refilled this medication. He is not certain if it provided any relief. When he wakes up in the morning, he has difficulty with ambulation and speech, facial weakness, and headache. He feels worse if he sits down to relax. His pain occasionally wakes him in the middle of the night. He has been prescribed oxygen 15 L for his head pain in the past, which helped if he used the oxygen when he felt the pain coming on.  He is not sleeping well and he is constantly dreaming when he is asleep. He is not certain if he is snoring. He does not feel refreshed when he wakes up. He has been tested for sleep apnea and provided with a CPAP. He could not tolerate the mask and he was only using the machine for 2 to 3 hours.     He is taking Norvasc for blood pressure control. He had an episode of dizziness and headache last week, for which he saw Dr. Moore. He had a systolic blood pressure reading of 150 mmHg. He is taking amlodipine and lisinopril. He will see Dr. Moore on 05/09/2023.     He is feeling discouraged. He denies depression or the desire to harm himself. He is tired of seeing doctors, having testing performed, and feeling like he is not seeing any improvement. His short term memory is poor. He has a strong family history of Alzheimer disease in his mother, his maternal grandmother,  his maternal great-grandfather, and 2 of his maternal uncles.     He is having issues with his bowels. His sphincter never closes so he cannot hold a bowel movement when he needs to go. He has a stimulator, which he would like to have removed. He cannot have an MRI with this device in situ. He has been advised he may need to consider a colostomy bag. He has not followed up with gastroenterology. He has had an upper endoscopy.    7/8/2023: He is in clinic for regular follow-up. Since his last visit in 05/2023, he has not started the new medicine because he has not been able to finish the paperwork. He is taking the verapamil. He is in constant pain in all different parts of his head. Sometimes he will have pain shoot through his head, causing him to jerk. He has trouble thinking, cannot concentrate, and has memory trouble. He went to the urgent care because his blood pressure at home was registering 220/100 mmHg and they suggested he have a CT with contrast. He did that and was told there were some small arteries being blocked and more testing was needed as this could be the start of vascular dementia. One of the doctors told him he really needed to have an MRI, but he cannot have that because of the stimulator. He would like to have the stimulator removed but he cannot afford it. There is one type of pain that feels like it flies from one side of his head to the other. With this pain he feels like there is moisture running down and he loses control of his bowels. There was one doctor who gave him a medication that he had to stop because he could not leave the bathroom. He was told he might have to have a colostomy and he does not want that. He never knows how his bowels are going to react. He is on gabapentin 300 3 times daily and has no side effects from it. He is having trouble affording all the medications. His family is starting to notice his problems with memory and concentration.     10/13/2023: He is in  clinic for regular follow-up.  Since his last visit in July 2023, he reports that he has noticed some concerns with his memory where he cannot remember if he took his medications or not.  Sometimes he forgets names of people he is known for a long time.  He is concerned because there is a strong family history of Alzheimer's dementia.  He did increase the dose of gabapentin to 600 mg 3 times daily but it did not help with frequency or intensity of migraines.  He reports that he still gets almost daily headaches and some of them will convert into migraine.  He continues to take verapamil 40 mg twice daily.  In addition, he is also taking high dose of gabapentin 600 mg 3 times daily but it did not help with headache control.  He reports having head pain occurring almost on a daily basis involving different parts of the head.  He is also reporting episodes of palpitations since the last visit.    Follow-up: 2/12/2024: He is in clinic for regular follow-up.  Since his last visit in October 2023, he has been taking Toprol long-acting 60 mg daily but reports no decrease in headache intensity or frequency.  He is also taking gabapentin 600 mg 3 times daily.  He reports that he has a headache on a daily basis and some days, it increases in intensity where he has to take Fioricet which sometimes helps and sometimes it does not.    Follow-up: 8/9/2024: He is in clinic for regular follow-up.  Since his last visit 6 months ago, he reports that he continues to have daily headache and some of these headaches convert into severe migraine.  He also reports chronic generalized pain, increase neck pain, depressed mood, inability to sleep at night and also reports significantly increased amount of stress.  He was given prescription of nortriptyline but he stopped taking it as it was not really beneficial.  Currently is on Inderal long-acting 80 mg daily.  He also stopped gabapentin as well instruction after last visit and reports that  symptoms seems to be worse since coming off of gabapentin.    Follow-up: 9/27/2024: He is in clinic for regular follow-up.  Since his last visit 6 weeks ago, he reports that he has been taking Lyrica 75 mg 3 times daily, has increased the dose of Inderal long-acting to 120 mg daily.  He did try Cymbalta 30 mg daily but caused to have significant side effects.    He reports that with Lyrica and higher dose of Inderal, he has not noticed much change as far as his headaches are concerned.  He denies any side effects with Lyrica use.  He continues to have almost daily headaches.  He also reports continued deterioration in his vision.  He follows up with ophthalmology.  It has becoming increasingly difficult for him to repeat.  He also had an episode of difficulty with cognition, difficulty with walking lasting for 15 to 20 hours couple of weeks ago.  It has been a while that he has had this type of episode.    Follow-up: 10/25/2024: He is in clinic for sooner than scheduled appointment.  He reports that he has been experiencing more cognitive issues, difficulty remembering names of people that he has met several times, episodes of confusion when he is out and about and sometimes feels confused as to what location he is at.  He does report a chronic daily headaches.  On the last visit, he was started on Emgality.  He has done first loading dose of Ajovy yesterday only.  He also reports anxiety and depressed mood related to the bowel issues that he has had for many years now.  He also reports very poor sleep quality.  He has no control of anal sphincter and that causes him to have sudden unexpected bowel movements.  This is affected his quality of life significantly.  He is concerned about dementia causing the symptoms as he has a strong family history of dementia in her mother, grandmother and grandfather.        The following portions of the patient's history were reviewed and updated as of 10/25/2024: allergies, social  history, and problem list.       Current Outpatient Medications:     amLODIPine (NORVASC) 5 MG tablet, Take 1 tablet by mouth once daily, Disp: 90 tablet, Rfl: 0    atorvastatin (LIPITOR) 20 MG tablet, Take 1 tablet by mouth once daily, Disp: 90 tablet, Rfl: 1    butalbital-acetaminophen-caffeine (FIORICET, ESGIC) -40 MG per tablet, TAKE 1 TABLET BY MOUTH ONCE DAILY AS NEEDED FOR HEADACHE **Must schedule appointment for additional refills**, Disp: 30 tablet, Rfl: 0    Continuous Glucose  (FreeStyle Mary 3 [place]) device, Use 1 each See Admin Instructions. Dx E11.65, Disp: 1 each, Rfl: 0    Continuous Glucose Sensor (FreeStyle Mary 3 Sensor) misc, Use 1 each Every 14 (Fourteen) Days. Dx E11.65, Disp: 6 each, Rfl: 3    Creon [id number] units capsule delayed-release particles capsule, TAKE 1 CAPSULE BY MOUTH THREE TIMES DAILY WITH MEALS, Disp: 90 capsule, Rfl: 0    dapagliflozin Propanediol (Farxiga) 10 MG tablet, Take 10 mg by mouth Daily., Disp: , Rfl:     diphenoxylate-atropine (Lomotil) 2.5-0.025 MG per tablet, Take 1 tablet by mouth 4 (Four) Times a Day As Needed for Diarrhea., Disp: 120 tablet, Rfl: 0    doxepin (SINEquan) 50 MG capsule, Take 1 capsule by mouth At Night As Needed for Sleep (anxiety)., Disp: 30 capsule, Rfl: 2    Dulaglutide (Trulicity) 1.5 MG/0.5ML solution pen-injector, Inject 1.5 mg under the skin into the appropriate area as directed 1 (One) Time Per Week., Disp: 6 mL, Rfl: 2    galcanezumab-gnlm (Emgality) 120 MG/ML auto-injector pen, Inject 1 mL under the skin into the appropriate area as directed Every 30 (Thirty) Days. Gets from Patient Assistance Program, Disp: , Rfl:     glucose monitor monitoring kit, 1 each Daily., Disp: 1 each, Rfl: 0    Insulin Lispro (HumaLOG KwikPen) 200 UNIT/ML solution pen-injector, Inject 14 Units under the skin into the appropriate area as directed 2 (Two) Times a Day Before Meals., Disp: 9 mL, Rfl: 1    Insulin Pen Needle (MM Pen Needles) 31G  "X 6 MM misc, USE 1  ONCE DAILY, Disp: 100 each, Rfl: 2    Insulin Syringe 31G X 5/16\" 0.3 ML misc, 1 each Every Night., Disp: 90 each, Rfl: 3    Lancets Thin misc, 1 each Daily. Use daily as directed, Disp: 100 each, Rfl: 11    lisinopril (PRINIVIL,ZESTRIL) 40 MG tablet, Take 1 tablet by mouth twice daily, Disp: 180 tablet, Rfl: 0    metFORMIN (GLUCOPHAGE) 1000 MG tablet, TAKE 1 TABLET BY MOUTH TWICE DAILY WITH MEALS, Disp: 180 tablet, Rfl: 0    nitroglycerin (NITROSTAT) 0.4 MG SL tablet, 1 under the tongue as needed for angina, may repeat q5mins for up three doses, Disp: 100 tablet, Rfl: 11    ondansetron (ZOFRAN) 8 MG tablet, TAKE 1 TABLET BY MOUTH EVERY 8 HOURS AS NEEDED FOR NAUSEA FOR VOMITING, Disp: 90 tablet, Rfl: 0    OneTouch Ultra test strip, USE 1 STRIP TO CHECK GLUCOSE ONCE DAILY AS DIRECTED, Disp: 100 each, Rfl: 0    pantoprazole (PROTONIX) 40 MG EC tablet, TAKE 1 TABLET BY MOUTH TWICE DAILY 30 MINUTES BEFORE MEAL(S), Disp: 180 tablet, Rfl: 3    pregabalin (Lyrica) 75 MG capsule, Take 1 capsule by mouth 3 (Three) Times a Day. (Patient taking differently: Take 1 capsule by mouth Daily. Once daily around 7pm), Disp: 90 capsule, Rfl: 3    sildenafil (VIAGRA) 100 MG tablet, Take 1 tablet by mouth Daily As Needed for Erectile Dysfunction., Disp: 15 tablet, Rfl: 11    tamsulosin (FLOMAX) 0.4 MG capsule 24 hr capsule, Take 1 capsule by mouth once daily, Disp: 90 capsule, Rfl: 0    Toujeo Max SoloStar 300 UNIT/ML solution pen-injector injection, INJECT 40 UNITS UNDER THE SKIN INTO THE APPROPRIATE AREA AS DIRECTED EVERY NIGHT AT BEDTIME., Disp: 12 mL, Rfl: 0    vitamin B-12 (CYANOCOBALAMIN) 100 MCG tablet, Take 0.5 tablets by mouth 2 (two) times a day., Disp: , Rfl:     vitamin C (ASCORBIC ACID) 500 MG tablet, Take 1 tablet by mouth 2 (two) times a day., Disp: , Rfl:     vitamin E 400 UNIT capsule, Take 1 capsule by mouth Daily., Disp: , Rfl:     Zinc 40 MG tablet, Take 1 tablet by mouth Daily., Disp: , Rfl:     " propranolol LA (INDERAL LA) 120 MG 24 hr capsule, Take 1 capsule by mouth Daily for 30 days., Disp: 30 capsule, Rfl: 6   Past Medical History:   Diagnosis Date    Allergic     Anxiety     Bowel incontinence     w/ history of Stimulator Device     CAD (coronary artery disease)     Carotid artery occlusion     Cataract     Chest pain     CHF (congestive heart failure)     Cluster headache     Colon polyp     Dementia     Depression     Diabetes mellitus, type 2     Diabetic peripheral neuropathy     Dyslipidemia     Fatigue     GERD (gastroesophageal reflux disease)     Headache, tension-type     HL (hearing loss) July 2023    Im wearing hearing aids. Dr. Graves (sp)    Hypertension     Kidney stone     Memory loss     Mental status change     Migraine     Complex     Migraine headache     Renal insufficiency     Shingles     Sleep apnea     SOB (shortness of breath)     Stroke     Was told tia go with hemiphlegic migraines    Vision loss       Past Surgical History:   Procedure Laterality Date    CARDIAC CATHETERIZATION      CARDIAC CATHETERIZATION N/A 10/14/2016    Procedure: Left Heart Cath;  Surgeon: Santiago Garner MD;  Location: [facility] CATH INVASIVE LOCATION;  Service:     COLONOSCOPY  02/25/2022    Dr. Graves-recommended 3 year repeat    ENDOSCOPY  02/03/2022    Dr. Graves    GASTRIC STIMULATOR IMPLANT SURGERY      SMALL INTESTINE SURGERY      unspecified, x3 as an infant     TESTICLE SURGERY      UPPER GASTROINTESTINAL ENDOSCOPY        Family History   Problem Relation Age of Onset    Heart failure Mother     Diabetes Mother     Alzheimer's disease Mother     Arthritis Mother     Heart disease Mother     Heart attack Father     Kidney disease Father     Cancer Father     Prostate cancer Father     Heart disease Father     Hypertension Father     Cancer Sister     Breast cancer Sister     Diabetes Sister     Heart disease Sister     Arrhythmia Sister     Stroke Sister     Diabetes Brother     Heart  "disease Brother     Cancer Brother     Liver disease Brother     Kidney disease Brother     Dementia Paternal Uncle     Alzheimer's disease Maternal Grandmother     Liver cancer Maternal Grandfather     Liver disease Maternal Grandfather     Other Paternal Grandmother         [place] flu    Other Paternal Grandfather         [place] flu    Stroke Sister     Colon cancer Neg Hx         Review of Systems  Objective:    /80   Pulse 70   Ht 170.2 cm (67.01\")   SpO2 96%   BMI 28.56 kg/m²     Neurology Exam:  General apperance: NAD.     Mental status: Alert, awake and oriented to time place and person.    Language and Speech: No aphasia or dysarthria.    CN II to XII: Intact.    Opthalmoscopic Exam: No papilledema.    Motor:  Right UE muscle strength 5/5. Normal tone.     Left UE muscle strength 5/5. Normal tone.      Right LE muscle strength 5/5. Normal tone.     Left LE muscle strength 5/5. Normal tone.      Sensory: Normal light touch, vibration and pinprick sensation bilaterally.    DTRs: 2+ bilaterally.    Babinski: Negative bilaterally.    Co-ordination: Normal finger-to-nose, heel to shin B/L.    Rhomberg: Negative.    Gait: Normal.    Cardiovascular: Regular rate and rhythm without murmur, gallop or rub.    MMSE: 29/30 (delayed recall: 3/3)    Assessment and Plan:  1. Intractable migraine with aura without status migrainosus  2. MCI (mild cognitive impairment)    -He continues to have daily headaches and frequent migraines.  I am hoping that with restarting of Emgality, migraine and headache frequency will improve.  He has had only 1 loading dose of Emgality.  In addition, he is concerned about cognitive issues that he has experienced in last 1 year or so.  Is reporting word finding difficulties, episodes of confusion and difficulty in remembering names of people.  I think the symptoms that he is experiencing are multifactorial and caused by chronic headaches, extremely poor quality sleep, underlying " stress, anxiety caused by bowel incontinence that he has had for many years now.  He did very well on MMSE and scored 29 out of 30.  I will schedule him for detailed neuropsych testing for further evaluation.  CT head was performed in May 2023 did not reveal any changes concerning for dementia.  He cannot get MRI due to presence of metallic implant.  Otherwise, I will see him back in clinic in 2 to 3 months for follow-up.         I spent 45 minutes in patient care: Reviewing records prior to the visit, entering orders and documentation and spent more than hurtado 50% of this time face-to-face in management, instructions and education regarding above mentioned diagnosis and also on counseling and discussing about taking medication regularly, possible side effects with medication use, importance of good sleep hygiene, good hydration and regular exercise.    No follow-ups on file.       Note to patient: The 21st Century Cures Act makes medical notes like these available to patients in the interest of transparency. However, be advised this is a medical document. It is intended as peer to peer communication. It is written in medical language and may contain abbreviations or verbiage that are unfamiliar. It may appear blunt or direct. Medical documents are intended to carry relevant information, facts as evident, and the clinical opinion of the physician.

## 2024-11-06 ENCOUNTER — TELEPHONE (OUTPATIENT)
Dept: FAMILY MEDICINE CLINIC | Facility: CLINIC | Age: 69
End: 2024-11-06
Payer: MEDICARE

## 2024-11-06 NOTE — TELEPHONE ENCOUNTER
Caller: Israel Reyna    Relationship: Self    Best call back number: [phone]    What is the best time to reach you: ANYTIME    Who are you requesting to speak with (clinical staff, provider,  specific staff member): CLINICAL STAFF    What was the call regarding: PATIENT STATES HE TOLD DR DANIELLE ABOUT THE FACT THAT THE COST OF THE CREON MEDICATION HAS GONE UP EXPONENTIALLY AT FIRST IT WAS $100, THEN $300 AND NOW IT'S OVER $1,000. HE THOUGHT SOMEONE WAS WORKING ON GETTING HIM APPROVED THROUGH SOME PROGRAM TO GET THE MEDICATION. IT'S BEEN A FEW WEEKS AND HE HAS NOT HEARD FROM ANYONE

## 2024-11-07 NOTE — TELEPHONE ENCOUNTER
Patient assistance forms were faxed on 10/22/24 still awaiting response will contact rep to see if they can look into this.

## 2024-11-11 ENCOUNTER — TELEPHONE (OUTPATIENT)
Dept: NEUROLOGY | Facility: CLINIC | Age: 69
End: 2024-11-11
Payer: MEDICARE

## 2024-11-11 DIAGNOSIS — E11.65 TYPE 2 DIABETES MELLITUS WITH HYPERGLYCEMIA, WITHOUT LONG-TERM CURRENT USE OF INSULIN: ICD-10-CM

## 2024-11-11 RX ORDER — LISINOPRIL 40 MG/1
40 TABLET ORAL 2 TIMES DAILY
Qty: 180 TABLET | Refills: 0 | Status: SHIPPED | OUTPATIENT
Start: 2024-11-11

## 2024-11-11 NOTE — TELEPHONE ENCOUNTER
"Left detailed VM:  \"Your neuropsych testing is done with Iesha Stone at [facility].  As far as your insurance covering it, you will have to call your insurance company to find out if it covered, if there is a co-pay and how much.  Unfortunately we do not have any way of knowing those answers.  We do know that it was authorized by your insurance to have the testing done but as far as the coverage you will have to contact your insurance company.\"  "

## 2024-11-11 NOTE — TELEPHONE ENCOUNTER
Rx Refill Note  Requested Prescriptions     Pending Prescriptions Disp Refills    lisinopril (PRINIVIL,ZESTRIL) 40 MG tablet [Pharmacy Med Name: Lisinopril 40 MG Oral Tablet] 180 tablet 0     Sig: Take 1 tablet by mouth twice daily    metFORMIN (GLUCOPHAGE) 1000 MG tablet [Pharmacy Med Name: metFORMIN HCl 1000 MG Oral Tablet] 180 tablet 0     Sig: TAKE 1 TABLET BY MOUTH TWICE DAILY WITH MEALS      Last office visit with prescribing clinician: 10/8/2024   Last telemedicine visit with prescribing clinician: 7/11/2024   Next office visit with prescribing clinician: 4/10/2025                         Would you like a call back once the refill request has been completed: [] Yes [] No    If the office needs to give you a call back, can they leave a voicemail: [] Yes [] No    Kyara Godinez MA  11/11/24, 16:07 EST

## 2024-11-11 NOTE — TELEPHONE ENCOUNTER
TIME GILBERTO    Caller: ROHITH    Louis call back number: [phone] IF YOU GET V.M LEAVE DETAILED MESS ALONG WITH INSURANCE APPROVAL CODE     What was the call regarding: PT SAID CALLED TO FIND OUT THE PLACE THAT DOES NEURO PSY TESTING IF THEY TAKE PT INS? AND IS THERE A CO-PAY.? NEEDS TO KNOW BY TOMORROW AS HE CALLED  [facility]  AND THEY COULD NOT ANSWER HIS QUESTIONS.      Is it okay if the provider responds through MyChart: CALL PT BACK ASAP WITH INFORMATION.     PLEASE ADVISE

## 2024-11-20 ENCOUNTER — TELEPHONE (OUTPATIENT)
Dept: NEUROLOGY | Facility: CLINIC | Age: 69
End: 2024-11-20
Payer: MEDICARE

## 2024-11-20 NOTE — TELEPHONE ENCOUNTER
Caller: Israel Reyna    Relationship: Self    Best call back number: [phone]      Caller requesting test results: PATIENT    What test was performed: PT WAS SEEN BY NEUROPSYCH FOR EVAL AT [facility]     When was the test performed: 11/13/24    Where was the test performed: [facility]    Additional notes: PT STATES HE WOULD LIKE RESULTS FROM HIS NEUROPSYCH EVAL/COGNITIVE TESTING DONE ON 11/13.     CONSULTATION NOTES ARE IN MEDIA.    PLEASE REVIEW AND ADVISE

## 2024-11-21 ENCOUNTER — PATIENT OUTREACH (OUTPATIENT)
Dept: CASE MANAGEMENT | Facility: OTHER | Age: 69
End: 2024-11-21
Payer: MEDICARE

## 2024-11-21 NOTE — OUTREACH NOTE
AMBULATORY CASE MANAGEMENT NOTE    Names and Relationships of Patient/Support Persons: Contact: Israel Reyna; Relationship: Self -     Patient Outreach    RN-ACM called patient to follow up regarding HRCM needs.  Patient stated he is doing okay.  He said he is now wearing the Continual Glucose Monitor (CGM) and likes it.  Patient said he could not remember the last BS reading. He said he is compliant daily with diabetes medications as directed.  Patient denied needs or concerns for RN-ACM to address further.  Conversation was brief, per patient.     Cyn MAGDALENO  Ambulatory Case Management    11/21/2024, 11:57 EST

## 2024-11-22 NOTE — TELEPHONE ENCOUNTER
Called and spoke with Israel. No evidence to show dementia or any other neurodegenerative process, which is the most important.     I explained that a lot of times when we have patients come in with memory loss and similar complaints, it can be attributed to similar findings as his. Usually a result of multiple factors. Most times I see mention of underlying depression, anxiety, etc and things like poor sleep, untreated sleep apnea. The mention of underlying psychosocial stressors and sleep dysregulation are consistent with what he has reported to Dr. Crain as well. Having chronic headaches/migraines certainly does not help. He does experience anxiety as a result of a long-term health condition.     He follows with Dr. Crain who can continue to monitor for any worsening. I confirmed MCI does not always lead to dementia. It can be caused by a number of things.     No further questions at this time but will call back if so.     YOLANDA Akhtar

## 2024-11-25 DIAGNOSIS — E11.65 TYPE 2 DIABETES MELLITUS WITH HYPERGLYCEMIA, WITHOUT LONG-TERM CURRENT USE OF INSULIN: ICD-10-CM

## 2024-11-25 RX ORDER — INSULIN GLARGINE 300 U/ML
INJECTION, SOLUTION SUBCUTANEOUS
Qty: 12 ML | Refills: 0 | Status: SHIPPED | OUTPATIENT
Start: 2024-11-25

## 2024-11-25 RX ORDER — INSULIN LISPRO 200 [IU]/ML
INJECTION, SOLUTION SUBCUTANEOUS
Qty: 12 ML | Refills: 1 | Status: SHIPPED | OUTPATIENT
Start: 2024-11-25

## 2024-11-25 NOTE — TELEPHONE ENCOUNTER
Rx Refill Note  Requested Prescriptions     Pending Prescriptions Disp Refills    Storm Eduardo SoloStar 300 UNIT/ML solution pen-injector injection [Pharmacy Med Name: TOUJEO MAX SOLOSTAR 300UNIT/ML] 12 mL 0     Sig: INJECT 40 UNITS UNDER THE SKIN EVERY NIGHT AT BEDTIME INTO THE APPROPRIATE AREA    HumaLOG KwikPen 200 UNIT/ML solution pen-injector [Pharmacy Med Name: HUMALOG KWIKPEN 200UN/ML 2 X 3ML] 12 mL 1     Sig: INJECT 14 UNITS UNDER THE SKIN TWO TIMES A DAY BEFORE MEALS      Last office visit with prescribing clinician: 10/8/2024   Last telemedicine visit with prescribing clinician: 7/11/2024   Next office visit with prescribing clinician: 12/2/2024   -    Jenny Brink MA  11/25/24, 11:02 EST

## 2024-12-02 ENCOUNTER — OFFICE VISIT (OUTPATIENT)
Dept: FAMILY MEDICINE CLINIC | Facility: CLINIC | Age: 69
End: 2024-12-02
Payer: MEDICARE

## 2024-12-02 ENCOUNTER — LAB (OUTPATIENT)
Dept: LAB | Facility: HOSPITAL | Age: 69
End: 2024-12-02
Payer: MEDICARE

## 2024-12-02 VITALS
SYSTOLIC BLOOD PRESSURE: 158 MMHG | BODY MASS INDEX: 29.35 KG/M2 | HEIGHT: 67 IN | OXYGEN SATURATION: 96 % | RESPIRATION RATE: 16 BRPM | DIASTOLIC BLOOD PRESSURE: 74 MMHG | WEIGHT: 187 LBS | TEMPERATURE: 97.8 F | HEART RATE: 76 BPM

## 2024-12-02 DIAGNOSIS — K86.81 EXOCRINE PANCREATIC INSUFFICIENCY: Primary | ICD-10-CM

## 2024-12-02 DIAGNOSIS — R10.9 FLANK PAIN: ICD-10-CM

## 2024-12-02 DIAGNOSIS — G58.8 ENTRAPMENT SYNDROME OF CUTANEOUS NERVE OF ABDOMEN: ICD-10-CM

## 2024-12-02 DIAGNOSIS — A49.1 GROUP B STREPTOCOCCAL INFECTION: ICD-10-CM

## 2024-12-02 DIAGNOSIS — K52.9 CHRONIC DIARRHEA: ICD-10-CM

## 2024-12-02 LAB
BACTERIA UR QL AUTO: ABNORMAL /HPF
BILIRUB UR QL STRIP: NEGATIVE
CLARITY UR: ABNORMAL
COLOR UR: YELLOW
GLUCOSE UR STRIP-MCNC: ABNORMAL MG/DL
HGB UR QL STRIP.AUTO: NEGATIVE
HYALINE CASTS UR QL AUTO: ABNORMAL /LPF
KETONES UR QL STRIP: NEGATIVE
LEUKOCYTE ESTERASE UR QL STRIP.AUTO: ABNORMAL
NITRITE UR QL STRIP: NEGATIVE
PH UR STRIP.AUTO: 7.5 [PH] (ref 5–8)
PROT UR QL STRIP: ABNORMAL
RBC # UR STRIP: ABNORMAL /HPF
REF LAB TEST METHOD: ABNORMAL
SP GR UR STRIP: 1.02 (ref 1–1.03)
SQUAMOUS #/AREA URNS HPF: ABNORMAL /HPF
UROBILINOGEN UR QL STRIP: ABNORMAL
WBC # UR STRIP: ABNORMAL /HPF

## 2024-12-02 PROCEDURE — 1125F AMNT PAIN NOTED PAIN PRSNT: CPT | Performed by: FAMILY MEDICINE

## 2024-12-02 PROCEDURE — 81001 URINALYSIS AUTO W/SCOPE: CPT

## 2024-12-02 PROCEDURE — 87086 URINE CULTURE/COLONY COUNT: CPT

## 2024-12-02 PROCEDURE — 3077F SYST BP >= 140 MM HG: CPT | Performed by: FAMILY MEDICINE

## 2024-12-02 PROCEDURE — 3052F HG A1C>EQUAL 8.0%<EQUAL 9.0%: CPT | Performed by: FAMILY MEDICINE

## 2024-12-02 PROCEDURE — 3078F DIAST BP <80 MM HG: CPT | Performed by: FAMILY MEDICINE

## 2024-12-02 PROCEDURE — 99214 OFFICE O/P EST MOD 30 MIN: CPT | Performed by: FAMILY MEDICINE

## 2024-12-02 PROCEDURE — 87147 CULTURE TYPE IMMUNOLOGIC: CPT

## 2024-12-02 RX ORDER — PANCRELIPASE 36000; 180000; 114000 [USP'U]/1; [USP'U]/1; [USP'U]/1
36000 CAPSULE, DELAYED RELEASE PELLETS ORAL
Qty: 270 CAPSULE | Refills: 0 | Status: SHIPPED | OUTPATIENT
Start: 2024-12-02

## 2024-12-02 NOTE — PROGRESS NOTES
Established Patient Office Visit      Patient Name: Israel Reyna  : 1955   MRN: [id number]   Care Team: Patient Care Team:  Elio Moore DO as PCP - General (Family Medicine)  Cyn Skinner RN as Ambulatory  (Population Health)  José Miguel Bautista MD as Consulting Physician (Endocrinology)    Chief Complaint:    Chief Complaint   Patient presents with    Abdominal Pain     LLQ pain, only thing that seems to help is icing. Does not seem to be triggered by activity or eating certain foods    Dental Pain     Has dentist appt upcoming. Pain progressed from one tooth, to multiple, to gums. Dentist's office told to see PCP first to check for infection. Has tried mouthwash, saltwater gargling    Medication Problem     Creon too expensive, has not had definite answer re: patient assistance       History of Present Illness: Israel Reyna is a 69 y.o. male who is here today for chief complaint.    Abdominal Pain  This is a recurrent problem. The current episode started more than 1 month ago. The onset quality is gradual. The problem occurs 2 to 4 times per day. The problem has been worsening. The pain is located in the LLQ. The pain is at a severity of 8/10. The quality of the pain is sharp. The abdominal pain radiates to the LLQ. Associated symptoms include anorexia and nausea. Pertinent negatives include no arthralgias, belching, constipation, diarrhea, dysuria, fever, flatus, frequency, hematochezia, hematuria, melena, myalgias, vomiting or weight loss. Nothing aggravates the pain. The pain is relieved by Nothing.     LLQ pain 2-3x per week, lasts a couple days. Ice/cold pack is the only thing that helps, but does resolve it temporarily.    This patient is accompanied by their self who contributes to the history of their care.    The following portions of the patient's history were reviewed and updated as appropriate: allergies, current medications, past family  history, past medical history, past social history, past surgical history and problem list.    Subjective      Review of Systems:   Review of Systems   Constitutional:  Negative for fever and unexpected weight loss.   Gastrointestinal:  Positive for abdominal pain, anorexia and nausea. Negative for constipation, diarrhea, flatus, hematochezia, melena and vomiting.   Genitourinary:  Negative for dysuria, frequency and hematuria.   Musculoskeletal:  Negative for arthralgias and myalgias.    - See HPI    Past Medical History:   Past Medical History:   Diagnosis Date    Allergic     Anxiety     Bowel incontinence     w/ history of Stimulator Device     CAD (coronary artery disease)     Carotid artery occlusion     Cataract     Chest pain     CHF (congestive heart failure)     Cluster headache     Colon polyp     Dementia     Depression     Diabetes mellitus, type 2     Diabetic peripheral neuropathy     Dyslipidemia     Erectile dysfunction     Fatigue     GERD (gastroesophageal reflux disease)     Headache, tension-type     HL (hearing loss) July 2023    Im wearing hearing aids. Dr. 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 Graves-recommended 3 year repeat    ENDOSCOPY  02/03/2022    Dr. Graves    EYE SURGERY  Retina    Dr. Askew    GASTRIC STIMULATOR IMPLANT SURGERY      SMALL INTESTINE SURGERY      unspecified, x3 as an  infant     TESTICLE SURGERY      UPPER GASTROINTESTINAL ENDOSCOPY         Family History:   Family History   Problem Relation Age of Onset    Heart failure Mother     Diabetes Mother     Alzheimer's disease Mother     Arthritis Mother     Heart disease Mother     Heart attack Father     Kidney disease Father     Cancer Father     Prostate cancer Father     Heart disease Father     Hypertension Father     Cancer Sister     Breast cancer Sister     Diabetes Sister     Heart disease Sister     Arrhythmia Sister     Stroke Sister     Diabetes Brother     Heart disease Brother     Cancer Brother     Liver disease Brother     Kidney disease Brother     Dementia Paternal Uncle     Alzheimer's disease Maternal Grandmother     Liver cancer Maternal Grandfather     Liver disease Maternal Grandfather     Other Paternal Grandmother         [place] flu    Other Paternal Grandfather         [place] flu    Stroke Sister     Colon cancer Neg Hx        Social History:   Social History     Socioeconomic History    Marital status:    Tobacco Use    Smoking status: Never     Passive exposure: Never    Smokeless tobacco: Never   Vaping Use    Vaping status: Never Used   Substance and Sexual Activity    Alcohol use: Never    Drug use: Never    Sexual activity: Yes     Partners: Female       Tobacco History:   Social History     Tobacco Use   Smoking Status Never    Passive exposure: Never   Smokeless Tobacco Never       Medications:   Outpatient Medications Prior to Visit   Medication Sig Dispense Refill    amLODIPine (NORVASC) 5 MG tablet Take 1 tablet by mouth once daily 90 tablet 0    atorvastatin (LIPITOR) 20 MG tablet Take 1 tablet by mouth once daily 90 tablet 1    butalbital-acetaminophen-caffeine (FIORICET, ESGIC) -40 MG per tablet TAKE 1 TABLET BY MOUTH ONCE DAILY AS NEEDED FOR HEADACHE **Must schedule appointment for additional refills** 30 tablet 0    Continuous Glucose  (FreeStyle Mary 3 [place]) device  "Use 1 each See Admin Instructions. Dx E11.65 1 each 0    Continuous Glucose Sensor (FreeStyle Mary 3 Sensor) misc Use 1 each Every 14 (Fourteen) Days. Dx E11.65 6 each 3    dapagliflozin Propanediol (Farxiga) 10 MG tablet Take 10 mg by mouth Daily.      diphenoxylate-atropine (Lomotil) 2.5-0.025 MG per tablet Take 1 tablet by mouth 4 (Four) Times a Day As Needed for Diarrhea. 120 tablet 0    doxepin (SINEquan) 50 MG capsule Take 1 capsule by mouth At Night As Needed for Sleep (anxiety). 30 capsule 2    Dulaglutide (Trulicity) 1.5 MG/0.5ML solution pen-injector Inject 1.5 mg under the skin into the appropriate area as directed 1 (One) Time Per Week. 6 mL 2    galcanezumab-gnlm (Emgality) 120 MG/ML auto-injector pen Inject 1 mL under the skin into the appropriate area as directed Every 30 (Thirty) Days. Gets from Patient Assistance Program      glucose monitor monitoring kit 1 each Daily. 1 each 0    HumaLOG KwikPen 200 UNIT/ML solution pen-injector INJECT 14 UNITS UNDER THE SKIN TWO TIMES A DAY BEFORE MEALS 12 mL 1    Insulin Pen Needle (MM Pen Needles) 31G X 6 MM misc USE 1  ONCE DAILY 100 each 2    Insulin Syringe 31G X 5/16\" 0.3 ML misc 1 each Every Night. 90 each 3    Lancets Thin misc 1 each Daily. Use daily as directed 100 each 11    lisinopril (PRINIVIL,ZESTRIL) 40 MG tablet Take 1 tablet by mouth twice daily 180 tablet 0    metFORMIN (GLUCOPHAGE) 1000 MG tablet TAKE 1 TABLET BY MOUTH TWICE DAILY WITH MEALS 180 tablet 0    nitroglycerin (NITROSTAT) 0.4 MG SL tablet 1 under the tongue as needed for angina, may repeat q5mins for up three doses 100 tablet 11    ondansetron (ZOFRAN) 8 MG tablet TAKE 1 TABLET BY MOUTH EVERY 8 HOURS AS NEEDED FOR NAUSEA FOR VOMITING 90 tablet 0    OneTouch Ultra test strip USE 1 STRIP TO CHECK GLUCOSE ONCE DAILY AS DIRECTED 100 each 0    pantoprazole (PROTONIX) 40 MG EC tablet TAKE 1 TABLET BY MOUTH TWICE DAILY 30 MINUTES BEFORE MEAL(S) 180 tablet 3    pregabalin (Lyrica) 75 MG " "capsule Take 1 capsule by mouth 3 (Three) Times a Day. (Patient taking differently: Take 1 capsule by mouth Daily. Once daily around 7pm) 90 capsule 3    sildenafil (VIAGRA) 100 MG tablet Take 1 tablet by mouth Daily As Needed for Erectile Dysfunction. 15 tablet 11    tamsulosin (FLOMAX) 0.4 MG capsule 24 hr capsule Take 1 capsule by mouth once daily 90 capsule 0    Toujeo Max SoloStar 300 UNIT/ML solution pen-injector injection INJECT 40 UNITS UNDER THE SKIN EVERY NIGHT AT BEDTIME INTO THE APPROPRIATE AREA 12 mL 0    vitamin B-12 (CYANOCOBALAMIN) 100 MCG tablet Take 0.5 tablets by mouth 2 (two) times a day.      vitamin C (ASCORBIC ACID) 500 MG tablet Take 1 tablet by mouth 2 (two) times a day.      vitamin E 400 UNIT capsule Take 1 capsule by mouth Daily.      Zinc 40 MG tablet Take 1 tablet by mouth Daily.      Creon 02058-466183 units capsule delayed-release particles capsule TAKE 1 CAPSULE BY MOUTH THREE TIMES DAILY WITH MEALS 90 capsule 0    propranolol LA (INDERAL LA) 120 MG 24 hr capsule Take 1 capsule by mouth Daily for 30 days. 30 capsule 6     No facility-administered medications prior to visit.        Allergies:   Allergies   Allergen Reactions    Actos [Pioglitazone] Other (See Comments)     congestive heart failure    Avandia [Rosiglitazone] Other (See Comments)     congestive heart failure.    Darvon [Propoxyphene] Itching     Angioedema    itching    Talwin [Pentazocine] Rash     Angioedema    itching       Objective   Objective     Physical Exam:  Vital Signs:   Vitals:    12/02/24 1101   BP: 158/74   BP Location: Left arm   Patient Position: Sitting   Cuff Size: Adult   Pulse: 76   Resp: 16   Temp: 97.8 °F (36.6 °C)   TempSrc: Infrared   SpO2: 96%   Weight: 84.8 kg (187 lb)   Height: 170.2 cm (67.01\")   PainSc: 0-No pain  Comment: LLQ pain can get up to 9/10   PainLoc: Generalized     Body mass index is 29.28 kg/m².     Physical Exam  Abdominal:          Comments: Read: Significant scar tissue with " skin contraction and fibrosis  Blue: Injection sites       Nursing note reviewed  Const: NAD, A&Ox4, Pleasant, Cooperative  Procedures/Radiology     Inject Trigger Point, 1 or 2    Date/Time: 12/10/2024 10:22 AM    Performed by: Elio Moore DO  Authorized by: Elio Moore DO  Preparation: Patient was prepped and draped in the usual sterile fashion.  Local anesthesia used: yes  Anesthesia: local infiltration    Anesthesia:  Local anesthesia used: yes  Local Anesthetic: lidocaine 1% without epinephrine  Anesthetic total: 3 mL    Sedation:  Patient sedated: no    Patient tolerance: patient tolerated the procedure well with no immediate complications        No radiology results for the last 7 days     Assessment & Plan   Assessment / Plan      Assessment/Plan:   Problems Addressed This Visit  Diagnoses and all orders for this visit:    1. Exocrine pancreatic insufficiency (Primary)  -     Creon 46151-635338 units capsule delayed-release particles capsule; Take 1 capsule by mouth 3 (Three) Times a Day With Meals.  Dispense: 270 capsule; Refill: 0    2. Chronic diarrhea  -     Creon 70241-657744 units capsule delayed-release particles capsule; Take 1 capsule by mouth 3 (Three) Times a Day With Meals.  Dispense: 270 capsule; Refill: 0    3. 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  If these resolve his pain, even temporarily, that will help diagnostically and therapeutically going forward.  -7 subcutaneous perineural injections with a total of 3 mL lidocaine 1% were completed today.  We will follow-up next week to assess his response    Orders:  -     Inject Trigger Point, 1 or 2  -     lidocaine (XYLOCAINE) 1 % injection 3 mL    4. Group B streptococcal infection  -     Discontinue: amoxicillin-clavulanate (AUGMENTIN) 875-125 MG per tablet; Take 1 tablet by mouth 2  (Two) Times a Day for 7 days. For UTI.  Dispense: 14 tablet; Refill: 0  -     amoxicillin-clavulanate (AUGMENTIN) 875-125 MG per tablet; Take 1 tablet by mouth 2 (Two) Times a Day for 7 days. For UTI.  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  If these resolve his pain, even temporarily, that will help diagnostically and therapeutically going forward.  -7 subcutaneous perineural injections with a total of 3 mL lidocaine 1% were completed today.  We will follow-up next week to assess his response         Relevant Medications    lidocaine (XYLOCAINE) 1 % injection 3 mL (Start on 12/10/2024 10:28 AM)    Other Relevant Orders    Inject Trigger Point, 1 or 2     Other Visit Diagnoses       Exocrine pancreatic insufficiency    -  Primary    Relevant Medications    Creon 25215-503731 units capsule delayed-release particles capsule    Chronic diarrhea        Relevant Medications    Creon 00045-438861 units capsule delayed-release particles capsule    Group B streptococcal infection        Relevant Medications    amoxicillin-clavulanate (AUGMENTIN) 875-125 MG per tablet          Orders Placed This Encounter   Procedures    Inject Trigger Point, 1 or 2     This order was created via procedure documentation     Order Specific Question:   Release to patient     Answer:   Routine Release [[id number]]         Patient Instructions   Continue saltwater gargles  No sign of infection  I will send in new Creon prescription    Follow Up:   No follow-ups on file.        DO BAILEY Jones RD  [facility]  [address]  [facility] [address]  Fax [phone]  Phone [phone]    Disclaimer to patients: The 21st [employer]  Cares Act makes medical notes like these available to patients in the interest of transparency. However, please be advised that this is still a medical document. It is intended as [id number] communication. Many sections may include medical language or jargon, abbreviations, and additional verbiage that are unfamiliar or confusing. In some ways it may come across as blunt, direct, or may be summarized in order to clearly and concisely communicate the most crucial information to medical professionals. It may also include mentions of conditions that are unlikely but considered as part of the differential diagnosis, including serious disorders. These are not always discussed at length at the time of appointment because their likelihood is so low, but may be included in a medical note to make it clear what has been considered and/or ruled out as part of a work-up. Medical documents are intended to carry relevant information, facts as evident, and the personal clinical opinion of the physician. If you have any questions regarding this medical document, please bring them to the attention of the physician at your next scheduled appointment.

## 2024-12-03 LAB — BACTERIA SPEC AEROBE CULT: ABNORMAL

## 2024-12-06 ENCOUNTER — OFFICE VISIT (OUTPATIENT)
Dept: FAMILY MEDICINE CLINIC | Facility: CLINIC | Age: 69
End: 2024-12-06
Payer: MEDICARE

## 2024-12-06 VITALS
RESPIRATION RATE: 14 BRPM | SYSTOLIC BLOOD PRESSURE: 130 MMHG | BODY MASS INDEX: 29.82 KG/M2 | OXYGEN SATURATION: 98 % | WEIGHT: 190 LBS | DIASTOLIC BLOOD PRESSURE: 78 MMHG | HEIGHT: 67 IN | TEMPERATURE: 98.6 F | HEART RATE: 78 BPM

## 2024-12-06 DIAGNOSIS — G58.8 ENTRAPMENT SYNDROME OF CUTANEOUS NERVE OF ABDOMEN: Primary | ICD-10-CM

## 2024-12-06 PROCEDURE — 99213 OFFICE O/P EST LOW 20 MIN: CPT | Performed by: FAMILY MEDICINE

## 2024-12-06 PROCEDURE — 3078F DIAST BP <80 MM HG: CPT | Performed by: FAMILY MEDICINE

## 2024-12-06 PROCEDURE — 3052F HG A1C>EQUAL 8.0%<EQUAL 9.0%: CPT | Performed by: FAMILY MEDICINE

## 2024-12-06 PROCEDURE — 1125F AMNT PAIN NOTED PAIN PRSNT: CPT | Performed by: FAMILY MEDICINE

## 2024-12-06 PROCEDURE — 3075F SYST BP GE 130 - 139MM HG: CPT | Performed by: FAMILY MEDICINE

## 2024-12-10 ENCOUNTER — TELEPHONE (OUTPATIENT)
Dept: ENDOCRINOLOGY | Facility: CLINIC | Age: 69
End: 2024-12-10

## 2024-12-10 PROBLEM — G58.8 ENTRAPMENT SYNDROME OF CUTANEOUS NERVE OF ABDOMEN: Status: ACTIVE | Noted: 2024-12-10

## 2024-12-10 PROCEDURE — 20552 NJX 1/MLT TRIGGER POINT 1/2: CPT | Performed by: FAMILY MEDICINE

## 2024-12-10 PROCEDURE — 20553 NJX 1/MLT TRIGGER POINTS 3/>: CPT | Performed by: FAMILY MEDICINE

## 2024-12-10 RX ORDER — LIDOCAINE HYDROCHLORIDE 10 MG/ML
3 INJECTION, SOLUTION INFILTRATION; PERINEURAL ONCE
Status: SHIPPED | OUTPATIENT
Start: 2024-12-10

## 2024-12-10 NOTE — TELEPHONE ENCOUNTER
PT CALLED STATING JC HAIRSTON NEEDS ANOTHER APPLICATION FOR HIS TRULICITY TO BE SENT IN FOR 2025.

## 2024-12-10 NOTE — ASSESSMENT & PLAN NOTE
Other workup has been negative for his abdominal pain, will try perineural injections with lidocaine today.  If these resolve his pain, even temporarily, that will help diagnostically and therapeutically going forward.  -7 subcutaneous perineural injections with a total of 3 mL lidocaine 1% were completed today.  We will follow-up next week to assess his response

## 2024-12-10 NOTE — PROGRESS NOTES
Established Patient Office Visit      Patient Name: Israel Reyna  : 1955   MRN: [id number]   Care Team: Patient Care Team:  Elio Moore DO as PCP - General (Family Medicine)  Cyn Skinner RN as Ambulatory  ([facility])  José Miguel Bautista MD as Consulting Physician (Endocrinology)    Chief Complaint:    Chief Complaint   Patient presents with    Follow-up     Pain LLQ       History of Present Illness: Israel Reyna is a 69 y.o. male who is here today for chief complaint.    HPI  He had perineural injections around the scar tissue on his left lower quadrant of his abdomen for anterior cutaneous nerve entrapment syndrome.  He has had workup for other causes of abdominal pain and while he does have reasons for chronic pain in the area, the more acute issue was little bit more elusive.  Given the tenderness to palpation on exam and superficial nature, we went forward with the perineural injections on Monday.  He presents today for same-day visit, reports that the injection helped remarkably, he did not have any episodes of pain until it started to wear off yesterday.  No side effects of the medication.    This patient is accompanied by their self who contributes to the history of their care.    The following portions of the patient's history were reviewed and updated as appropriate: allergies, current medications, past family history, past medical history, past social history, past surgical history and problem list.    Subjective      Review of Systems:   Review of Systems - See HPI    Past Medical History:   Past Medical History:   Diagnosis Date    Allergic     Anxiety     Bowel incontinence     w/ history of Stimulator Device     CAD (coronary artery disease)     Carotid artery occlusion     Cataract     Chest pain     CHF (congestive heart failure)     Cluster headache     Colon polyp     Dementia     Depression     Diabetes mellitus, type 2      Diabetic peripheral neuropathy     Dyslipidemia     Erectile dysfunction     Fatigue     GERD (gastroesophageal reflux disease)     Headache, tension-type     HL (hearing loss) July 2023    Im wearing hearing aids. Dr. 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 Graves-recommended 3 year repeat    ENDOSCOPY  02/03/2022    Dr. Graves    EYE SURGERY  Retina    Dr. Askew    GASTRIC STIMULATOR IMPLANT SURGERY      SMALL INTESTINE SURGERY      unspecified, x3 as an infant     TESTICLE SURGERY      UPPER GASTROINTESTINAL ENDOSCOPY         Family History:   Family History   Problem Relation Age of Onset    Heart failure Mother     Diabetes Mother     Alzheimer's disease Mother     Arthritis Mother     Heart disease Mother     Heart attack Father     Kidney disease Father     Cancer Father     Prostate cancer Father     Heart disease Father     Hypertension Father     Cancer Sister     Breast cancer Sister     Diabetes Sister     Heart disease Sister     Arrhythmia Sister     Stroke Sister     Diabetes Brother     Heart disease Brother     Cancer Brother     Liver disease Brother     Kidney disease Brother     Dementia Paternal Uncle     Alzheimer's disease Maternal Grandmother     Liver cancer Maternal Grandfather     Liver disease Maternal Grandfather     Other Paternal Grandmother         Italian flu    Other Paternal Grandfather          Ukrainian flu    Stroke Sister     Colon cancer Neg Hx        Social History:   Social History     Socioeconomic History    Marital status:    Tobacco Use    Smoking status: Never     Passive exposure: Never    Smokeless tobacco: Never   Vaping Use    Vaping status: Never Used   Substance and Sexual Activity    Alcohol use: Never    Drug use: Never    Sexual activity: Yes     Partners: Female       Tobacco History:   Social History     Tobacco Use   Smoking Status Never    Passive exposure: Never   Smokeless Tobacco Never       Medications:   Outpatient Medications Prior to Visit   Medication Sig Dispense Refill    amLODIPine (NORVASC) 5 MG tablet Take 1 tablet by mouth once daily 90 tablet 0    atorvastatin (LIPITOR) 20 MG tablet Take 1 tablet by mouth once daily 90 tablet 1    butalbital-acetaminophen-caffeine (FIORICET, ESGIC) -40 MG per tablet TAKE 1 TABLET BY MOUTH ONCE DAILY AS NEEDED FOR HEADACHE **Must schedule appointment for additional refills** 30 tablet 0    Continuous Glucose  (FreeStyle Mary 3 [place]) device Use 1 each See Admin Instructions. Dx E11.65 1 each 0    Continuous Glucose Sensor (FreeStyle Mary 3 Sensor) misc Use 1 each Every 14 (Fourteen) Days. Dx E11.65 6 each 3    Creon 41816-434691 units capsule delayed-release particles capsule Take 1 capsule by mouth 3 (Three) Times a Day With Meals. 270 capsule 0    dapagliflozin Propanediol (Farxiga) 10 MG tablet Take 10 mg by mouth Daily.      diphenoxylate-atropine (Lomotil) 2.5-0.025 MG per tablet Take 1 tablet by mouth 4 (Four) Times a Day As Needed for Diarrhea. 120 tablet 0    doxepin (SINEquan) 50 MG capsule Take 1 capsule by mouth At Night As Needed for Sleep (anxiety). 30 capsule 2    Dulaglutide (Trulicity) 1.5 MG/0.5ML solution pen-injector Inject 1.5 mg under the skin into the appropriate area as directed 1 (One) Time Per Week. 6 mL 2    galcanezumab-gnlm (Emgality) 120 MG/ML auto-injector pen Inject 1 mL  "under the skin into the appropriate area as directed Every 30 (Thirty) Days. Gets from Patient Assistance Program      glucose monitor monitoring kit 1 each Daily. 1 each 0    HumaLOG KwikPen 200 UNIT/ML solution pen-injector INJECT 14 UNITS UNDER THE SKIN TWO TIMES A DAY BEFORE MEALS 12 mL 1    Insulin Pen Needle (MM Pen Needles) 31G X 6 MM misc USE 1  ONCE DAILY 100 each 2    Insulin Syringe 31G X 5/16\" 0.3 ML misc 1 each Every Night. 90 each 3    Lancets Thin misc 1 each Daily. Use daily as directed 100 each 11    lisinopril (PRINIVIL,ZESTRIL) 40 MG tablet Take 1 tablet by mouth twice daily 180 tablet 0    metFORMIN (GLUCOPHAGE) 1000 MG tablet TAKE 1 TABLET BY MOUTH TWICE DAILY WITH MEALS 180 tablet 0    nitroglycerin (NITROSTAT) 0.4 MG SL tablet 1 under the tongue as needed for angina, may repeat q5mins for up three doses 100 tablet 11    ondansetron (ZOFRAN) 8 MG tablet TAKE 1 TABLET BY MOUTH EVERY 8 HOURS AS NEEDED FOR NAUSEA FOR VOMITING 90 tablet 0    OneTouch Ultra test strip USE 1 STRIP TO CHECK GLUCOSE ONCE DAILY AS DIRECTED 100 each 0    pantoprazole (PROTONIX) 40 MG EC tablet TAKE 1 TABLET BY MOUTH TWICE DAILY 30 MINUTES BEFORE MEAL(S) 180 tablet 3    pregabalin (Lyrica) 75 MG capsule Take 1 capsule by mouth 3 (Three) Times a Day. (Patient taking differently: Take 1 capsule by mouth Daily. Once daily around 7pm) 90 capsule 3    sildenafil (VIAGRA) 100 MG tablet Take 1 tablet by mouth Daily As Needed for Erectile Dysfunction. 15 tablet 11    tamsulosin (FLOMAX) 0.4 MG capsule 24 hr capsule Take 1 capsule by mouth once daily 90 capsule 0    Toujeo Max SoloStar 300 UNIT/ML solution pen-injector injection INJECT 40 UNITS UNDER THE SKIN EVERY NIGHT AT BEDTIME INTO THE APPROPRIATE AREA 12 mL 0    vitamin B-12 (CYANOCOBALAMIN) 100 MCG tablet Take 0.5 tablets by mouth 2 (two) times a day.      vitamin C (ASCORBIC ACID) 500 MG tablet Take 1 tablet by mouth 2 (two) times a day.      vitamin E 400 UNIT capsule " "Take 1 capsule by mouth Daily.      Zinc 40 MG tablet Take 1 tablet by mouth Daily.      propranolol LA (INDERAL LA) 120 MG 24 hr capsule Take 1 capsule by mouth Daily for 30 days. 30 capsule 6     No facility-administered medications prior to visit.        Allergies:   Allergies   Allergen Reactions    Actos [Pioglitazone] Other (See Comments)     congestive heart failure    Avandia [Rosiglitazone] Other (See Comments)     congestive heart failure.    Darvon [Propoxyphene] Itching     Angioedema    itching    Talwin [Pentazocine] Rash     Angioedema    itching       Objective   Objective     Physical Exam:  Vital Signs:   Vitals:    12/06/24 0816   BP: 130/78   BP Location: Left arm   Patient Position: Sitting   Cuff Size: Adult   Pulse: 78   Resp: 14   Temp: 98.6 °F (37 °C)   TempSrc: Oral   SpO2: 98%   Weight: 86.2 kg (190 lb)   Height: 170.2 cm (67.01\")   PainSc: 4  Comment: intermittent     Body mass index is 29.75 kg/m².     Physical Exam  Abdominal:          Comments: Read: Significant scar tissue with skin contraction and fibrosis  Blue: Injection sites       Nursing note reviewed  Const: NAD, A&Ox4, Pleasant, Cooperative  Procedures/Radiology     Inject Trigger Points, > 3    Date/Time: 12/10/2024 2:05 PM    Performed by: Elio Moore DO  Authorized by: Elio Moore DO  Preparation: Patient was prepped and draped in the usual sterile fashion.  Local anesthesia used: no    Anesthesia:  Local anesthesia used: no  Patient tolerance: patient tolerated the procedure well with no immediate complications  Comments: 7 trigger points injected with 3mL        No radiology results for the last 7 days     Assessment & Plan   Assessment / Plan      Assessment/Plan:   Problems Addressed This Visit  Diagnoses and all orders for this visit:    1. 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 "  Repeat series of 7 perineural injections were given today with a total of 3 mL of lidocaine 1%.  We will plan to do these series weekly, he can use ice and a TENS unit would likely also be beneficial.    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  Repeat series of 7 perineural injections were given today with a total of 3 mL of lidocaine 1%.  We will plan to do these series weekly, he can use ice and a TENS unit would likely also be beneficial.         Relevant Orders    Inject Trigger Points, > 3     Orders Placed This Encounter   Procedures    Inject Trigger Points, > 3     This order was created via procedure documentation     Order Specific Question:   Release to patient     Answer:   Routine Release [[id number]]         Patient Instructions   Ice  [id number]    Follow Up:   Return in about 1 week (around 12/13/2024) for rosaura-neural injections.        DO BAILEY Jones RD  [facility]  [address]  [facility] [address]  Fax [phone]  Phone [phone]    Disclaimer to patients: The 21st Century Cares Act makes medical notes like these available to patients in the interest of transparency. However, please be advised that this is still a medical document. It is intended as tgvi-uo-pwlo communication. Many sections may include medical language or jargon, abbreviations, and additional verbiage that are unfamiliar or confusing. In some ways it may come across as blunt, direct, or may be summarized in order to clearly and concisely communicate the most crucial information to medical professionals. It may also include mentions of conditions that are unlikely but considered as part of the differential  diagnosis, including serious disorders. These are not always discussed at length at the time of appointment because their likelihood is so low, but may be included in a medical note to make it clear what has been considered and/or ruled out as part of a work-up. Medical documents are intended to carry relevant information, facts as evident, and the personal clinical opinion of the physician. If you have any questions regarding this medical document, please bring them to the attention of the physician at your next scheduled appointment.

## 2024-12-10 NOTE — ASSESSMENT & PLAN NOTE
He did have substantial relief temporarily from the perineal lidocaine injections.  Repeat series of 7 perineural injections were given today with a total of 3 mL of lidocaine 1%.  We will plan to do these series weekly, he can use ice and a TENS unit would likely also be beneficial.

## 2024-12-13 ENCOUNTER — OFFICE VISIT (OUTPATIENT)
Dept: FAMILY MEDICINE CLINIC | Facility: CLINIC | Age: 69
End: 2024-12-13
Payer: MEDICARE

## 2024-12-13 VITALS
WEIGHT: 188.2 LBS | OXYGEN SATURATION: 96 % | DIASTOLIC BLOOD PRESSURE: 80 MMHG | BODY MASS INDEX: 29.54 KG/M2 | HEART RATE: 72 BPM | HEIGHT: 67 IN | SYSTOLIC BLOOD PRESSURE: 136 MMHG

## 2024-12-13 DIAGNOSIS — G58.8 ENTRAPMENT SYNDROME OF CUTANEOUS NERVE OF ABDOMEN: Primary | ICD-10-CM

## 2024-12-13 PROCEDURE — 99213 OFFICE O/P EST LOW 20 MIN: CPT | Performed by: FAMILY MEDICINE

## 2024-12-13 PROCEDURE — 3052F HG A1C>EQUAL 8.0%<EQUAL 9.0%: CPT | Performed by: FAMILY MEDICINE

## 2024-12-13 PROCEDURE — 3079F DIAST BP 80-89 MM HG: CPT | Performed by: FAMILY MEDICINE

## 2024-12-13 PROCEDURE — 3075F SYST BP GE 130 - 139MM HG: CPT | Performed by: FAMILY MEDICINE

## 2024-12-13 PROCEDURE — 1125F AMNT PAIN NOTED PAIN PRSNT: CPT | Performed by: FAMILY MEDICINE

## 2024-12-13 RX ORDER — PREDNISOLONE ACETATE 10 MG/ML
SUSPENSION/ DROPS OPHTHALMIC
COMMUNITY
Start: 2024-12-11

## 2024-12-13 NOTE — PROGRESS NOTES
Established Patient Office Visit      Patient Name: Israel Reyna  : 1955   MRN: [id number]   Care Team: Patient Care Team:  Elio Moore DO as PCP - General (Family Medicine)  Cyn Skinner RN as Ambulatory  (Population Health)  José Miguel Bautista MD as Consulting Physician (Endocrinology)    Chief Complaint:    Chief Complaint   Patient presents with    Abdominal Pain     Pt is here for an injection     Med Management     Pt wants to discuss his Humalog        History of Present Illness: Israel Reyna is a 69 y.o. male who is here today for chief complaint.    Abdominal Pain      He had perineural injections around the scar tissue on his left lower quadrant of his abdomen for anterior cutaneous nerve entrapment syndrome.  He has had workup for other causes of abdominal pain and while he does have reasons for chronic pain in the area, the more acute issue was little bit more elusive.  Given the tenderness to palpation on exam and superficial nature, we went forward with the perineural injections on Monday.  He presents today for same-day visit, reports that the injection helped remarkably, he did not have any episodes of pain until it started to wear off yesterday.  No side effects of the medication.    This patient is accompanied by their self who contributes to the history of their care.    The following portions of the patient's history were reviewed and updated as appropriate: allergies, current medications, past family history, past medical history, past social history, past surgical history and problem list.    Subjective      Review of Systems:   Review of Systems   Gastrointestinal:  Positive for abdominal pain.    - See HPI    Past Medical History:   Past Medical History:   Diagnosis Date    Allergic     Anxiety     Bowel incontinence     w/ history of Stimulator Device     CAD (coronary artery disease)     Carotid artery occlusion     Cataract      Chest pain     CHF (congestive heart failure)     Cluster headache     Colon polyp     Dementia     Depression     Diabetes mellitus, type 2     Diabetic peripheral neuropathy     Dyslipidemia     Erectile dysfunction     Fatigue     GERD (gastroesophageal reflux disease)     Headache, tension-type     HL (hearing loss) July 2023    Im wearing hearing aids. Dr. 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 Graves-recommended 3 year repeat    ENDOSCOPY  02/03/2022    Dr. Graves    EYE SURGERY  Retina    Dr. Askew    GASTRIC STIMULATOR IMPLANT SURGERY      SMALL INTESTINE SURGERY      unspecified, x3 as an infant     TESTICLE SURGERY      UPPER GASTROINTESTINAL ENDOSCOPY         Family History:   Family History   Problem Relation Age of Onset    Heart failure Mother     Diabetes Mother     Alzheimer's disease Mother     Arthritis Mother     Heart disease Mother     Heart attack Father     Kidney disease Father     Cancer Father     Prostate cancer Father     Heart disease Father     Hypertension Father     Cancer Sister     Breast cancer Sister     Diabetes Sister     Heart disease Sister     Arrhythmia Sister     Stroke Sister     Diabetes Brother     Heart disease Brother     Cancer Brother     Liver disease Brother     Kidney disease Brother     Dementia Paternal Uncle     Alzheimer's disease Maternal Grandmother     Liver  cancer Maternal Grandfather     Liver disease Maternal Grandfather     Stroke Sister     Colon cancer Neg Hx        Social History:   Social History     Socioeconomic History    Marital status:    Tobacco Use    Smoking status: Never     Passive exposure: Never    Smokeless tobacco: Never   Vaping Use    Vaping status: Never Used   Substance and Sexual Activity    Alcohol use: Never    Drug use: Never    Sexual activity: Yes     Partners: Female       Tobacco History:   Social History     Tobacco Use   Smoking Status Never    Passive exposure: Never   Smokeless Tobacco Never       Medications:   Outpatient Medications Prior to Visit   Medication Sig Dispense Refill    amLODIPine (NORVASC) 5 MG tablet Take 1 tablet by mouth once daily 90 tablet 0    amoxicillin-clavulanate (AUGMENTIN) 875-125 MG per tablet Take 1 tablet by mouth 2 (Two) Times a Day for 7 days. For UTI. 14 tablet 0    atorvastatin (LIPITOR) 20 MG tablet Take 1 tablet by mouth once daily 90 tablet 1    butalbital-acetaminophen-caffeine (FIORICET, ESGIC) -40 MG per tablet TAKE 1 TABLET BY MOUTH ONCE DAILY AS NEEDED FOR HEADACHE **Must schedule appointment for additional refills** 30 tablet 0    Continuous Glucose  (FreeStyle Mary 3 [place]) device Use 1 each See Admin Instructions. Dx E11.65 1 each 0    Continuous Glucose Sensor (FreeStyle Mary 3 Sensor) [facility] Use 1 each Every 14 (Fourteen) Days. Dx E11.65 6 each 3    Creon 07834-758719 units capsule delayed-release particles capsule Take 1 capsule by mouth 3 (Three) Times a Day With Meals. 270 capsule 0    dapagliflozin Propanediol (Farxiga) 10 MG tablet Take 10 mg by mouth Daily.      diphenoxylate-atropine (Lomotil) 2.5-0.025 MG per tablet Take 1 tablet by mouth 4 (Four) Times a Day As Needed for Diarrhea. 120 tablet 0    doxepin (SINEquan) 50 MG capsule Take 1 capsule by mouth At Night As Needed for Sleep (anxiety). 30 capsule 2    Dulaglutide (Trulicity) 1.5 MG/0.5ML solution  "pen-injector Inject 1.5 mg under the skin into the appropriate area as directed 1 (One) Time Per Week. 6 mL 2    galcanezumab-gnlm (Emgality) 120 MG/ML auto-injector pen Inject 1 mL under the skin into the appropriate area as directed Every 30 (Thirty) Days. Gets from Patient Assistance Program      glucose monitor monitoring kit 1 each Daily. 1 each 0    HumaLOG KwikPen 200 UNIT/ML solution pen-injector INJECT 14 UNITS UNDER THE SKIN TWO TIMES A DAY BEFORE MEALS 12 mL 1    Insulin Pen Needle (MM Pen Needles) 31G X 6 MM misc USE 1  ONCE DAILY 100 each 2    Insulin Syringe 31G X 5/16\" 0.3 ML misc 1 each Every Night. 90 each 3    Lancets Thin misc 1 each Daily. Use daily as directed 100 each 11    lisinopril (PRINIVIL,ZESTRIL) 40 MG tablet Take 1 tablet by mouth twice daily 180 tablet 0    metFORMIN (GLUCOPHAGE) 1000 MG tablet TAKE 1 TABLET BY MOUTH TWICE DAILY WITH MEALS 180 tablet 0    nitroglycerin (NITROSTAT) 0.4 MG SL tablet 1 under the tongue as needed for angina, may repeat q5mins for up three doses 100 tablet 11    ondansetron (ZOFRAN) 8 MG tablet TAKE 1 TABLET BY MOUTH EVERY 8 HOURS AS NEEDED FOR NAUSEA FOR VOMITING 90 tablet 0    OneTouch Ultra test strip USE 1 STRIP TO CHECK GLUCOSE ONCE DAILY AS DIRECTED 100 each 0    pantoprazole (PROTONIX) 40 MG EC tablet TAKE 1 TABLET BY MOUTH TWICE DAILY 30 MINUTES BEFORE MEAL(S) 180 tablet 3    prednisoLONE acetate (PRED FORTE) 1 % ophthalmic suspension INSTILL 1 DROP INTO RIGHT EYE 4 TIMES DAILY      pregabalin (Lyrica) 75 MG capsule Take 1 capsule by mouth 3 (Three) Times a Day. (Patient taking differently: Take 1 capsule by mouth Daily. Once daily around 7pm) 90 capsule 3    sildenafil (VIAGRA) 100 MG tablet Take 1 tablet by mouth Daily As Needed for Erectile Dysfunction. 15 tablet 11    tamsulosin (FLOMAX) 0.4 MG capsule 24 hr capsule Take 1 capsule by mouth once daily 90 capsule 0    Toujeo Max SoloStar 300 UNIT/ML solution pen-injector injection INJECT 40 UNITS " "UNDER THE SKIN EVERY NIGHT AT BEDTIME INTO THE APPROPRIATE AREA 12 mL 0    vitamin B-12 (CYANOCOBALAMIN) 100 MCG tablet Take 0.5 tablets by mouth 2 (two) times a day.      vitamin C (ASCORBIC ACID) 500 MG tablet Take 1 tablet by mouth 2 (two) times a day.      vitamin E 400 UNIT capsule Take 1 capsule by mouth Daily.      Zinc 40 MG tablet Take 1 tablet by mouth Daily.      propranolol LA (INDERAL LA) 120 MG 24 hr capsule Take 1 capsule by mouth Daily for 30 days. 30 capsule 6     Facility-Administered Medications Prior to Visit   Medication Dose Route Frequency Provider Last Rate Last Admin    lidocaine (XYLOCAINE) 1 % injection 3 mL  3 mL Subcutaneous Once Elio Moore DO            Allergies:   Allergies   Allergen Reactions    Actos [Pioglitazone] Other (See Comments)     congestive heart failure    Avandia [Rosiglitazone] Other (See Comments)     congestive heart failure.    Darvon [Propoxyphene] Itching     Angioedema    itching    Talwin [Pentazocine] Rash     Angioedema    itching       Objective   Objective     Physical Exam:  Vital Signs:   Vitals:    12/13/24 1331   BP: 136/80   Pulse: 72   SpO2: 96%   Weight: 85.4 kg (188 lb 3.2 oz)   Height: 170.2 cm (67.01\")     Body mass index is 29.47 kg/m².     Physical Exam  Abdominal:          Comments: Read: Significant scar tissue with skin contraction and fibrosis  Blue: Injection sites       Nursing note reviewed  Const: NAD, A&Ox4, Pleasant, Cooperative  Procedures/Radiology     Procedures  No radiology results for the last 7 days     Assessment & Plan   Assessment / Plan      Assessment/Plan:   Problems Addressed This Visit  Diagnoses and all orders for this visit:    1. 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        Patient Instructions "   Ice  [id number]    Follow Up:   Return in about 1 week (around 12/20/2024) for procedure visit (injection).        DO BAILEY Jones RD  [facility]  [address]  [facility] [address]  Fax [phone]  Phone [phone]    Disclaimer to patients: The 21st Century Cares Act makes medical notes like these available to patients in the interest of transparency. However, please be advised that this is still a medical document. It is intended as teqw-ha-peps communication. Many sections may include medical language or jargon, abbreviations, and additional verbiage that are unfamiliar or confusing. In some ways it may come across as blunt, direct, or may be summarized in order to clearly and concisely communicate the most crucial information to medical professionals. It may also include mentions of conditions that are unlikely but considered as part of the differential diagnosis, including serious disorders. These are not always discussed at length at the time of appointment because their likelihood is so low, but may be included in a medical note to make it clear what has been considered and/or ruled out as part of a work-up. Medical documents are intended to carry relevant information, facts as evident, and the personal clinical opinion of the physician. If you have any questions regarding this medical document, please bring them to the attention of the physician at your next scheduled appointment.

## 2024-12-18 NOTE — TELEPHONE ENCOUNTER
Provider: TL     Caller: Israel Reyna    Relationship to Patient: Self    Reason for Call: PATIENT STATES THE FORMS FOR HIM TO FILL OUT TO HELP WITH [employer] GOT WET. HE NEEDS NEW FORMS AND WOULD LIKE TO KNOW IF HE COULD GET HELP FILLING THEM OUT. PLEASE CALL AND ADVISE

## 2024-12-20 ENCOUNTER — PROCEDURE VISIT (OUTPATIENT)
Dept: FAMILY MEDICINE CLINIC | Facility: CLINIC | Age: 69
End: 2024-12-20
Payer: MEDICARE

## 2024-12-20 VITALS
BODY MASS INDEX: 28.56 KG/M2 | HEIGHT: 67 IN | SYSTOLIC BLOOD PRESSURE: 138 MMHG | HEART RATE: 64 BPM | OXYGEN SATURATION: 97 % | DIASTOLIC BLOOD PRESSURE: 84 MMHG | WEIGHT: 182 LBS

## 2024-12-20 DIAGNOSIS — G58.8 ENTRAPMENT SYNDROME OF CUTANEOUS NERVE OF ABDOMEN: Primary | ICD-10-CM

## 2024-12-20 NOTE — Clinical Note
Could we contact the creon rep 1) about his patient assistance and 2) to get more samples before next week? Thank you!

## 2024-12-26 ENCOUNTER — HOSPITAL ENCOUNTER (OUTPATIENT)
Dept: CT IMAGING | Facility: HOSPITAL | Age: 69
Discharge: HOME | End: 2024-12-26
Admitting: FAMILY MEDICINE
Payer: MEDICARE

## 2024-12-26 ENCOUNTER — PROCEDURE VISIT (OUTPATIENT)
Dept: FAMILY MEDICINE CLINIC | Facility: CLINIC | Age: 69
End: 2024-12-26
Payer: MEDICARE

## 2024-12-26 VITALS
HEIGHT: 67 IN | BODY MASS INDEX: 29.85 KG/M2 | HEART RATE: 74 BPM | DIASTOLIC BLOOD PRESSURE: 80 MMHG | OXYGEN SATURATION: 96 % | WEIGHT: 190.2 LBS | SYSTOLIC BLOOD PRESSURE: 132 MMHG

## 2024-12-26 DIAGNOSIS — G58.8 ENTRAPMENT SYNDROME OF CUTANEOUS NERVE OF ABDOMEN: Primary | ICD-10-CM

## 2024-12-26 DIAGNOSIS — R10.13 EPIGASTRIC PAIN: ICD-10-CM

## 2024-12-26 PROCEDURE — 74178 CT ABD&PLV WO CNTR FLWD CNTR: CPT

## 2024-12-26 PROCEDURE — 25510000001 IOPAMIDOL 61 % SOLUTION: Performed by: FAMILY MEDICINE

## 2024-12-26 PROCEDURE — 99214 OFFICE O/P EST MOD 30 MIN: CPT | Performed by: FAMILY MEDICINE

## 2024-12-26 RX ORDER — IOPAMIDOL 612 MG/ML
95 INJECTION, SOLUTION INTRAVASCULAR
Status: COMPLETED | OUTPATIENT
Start: 2024-12-26 | End: 2024-12-26

## 2024-12-26 RX ADMIN — IOPAMIDOL 95 ML: 612 INJECTION, SOLUTION INTRAVENOUS at 11:22

## 2024-12-26 NOTE — PROGRESS NOTES
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 male who is here today for chief complaint.    HPI  He had perineural injections around the scar tissue on his left lower quadrant of his abdomen for anterior cutaneous nerve entrapment syndrome.  He has had workup for other causes of abdominal pain and while he does have reasons for chronic pain in the area, the more acute issue was little bit more elusive.  He has received injections on 2 occasions, reports that the most recent set he received last week have helped significantly.    This patient is accompanied by their self who contributes to the history of their care.    The following portions of the patient's history were reviewed and updated as appropriate: allergies, current medications, past family history, past medical history, past social history, past surgical history and problem list.    Subjective      Review of Systems:   Review of Systems - See HPI    Past Medical History:   Past Medical History:   Diagnosis Date    Allergic     Anxiety     Bowel incontinence     w/ history of Stimulator Device     CAD (coronary artery disease)     Carotid artery occlusion     Cataract     Chest pain     CHF (congestive heart failure)     Cluster headache     Colon polyp     Dementia     Depression     Diabetes mellitus, type 2     Diabetic peripheral neuropathy     Dyslipidemia     Erectile dysfunction     Fatigue     GERD (gastroesophageal reflux disease)     Headache, tension-type     HL  (hearing loss) July 2023    Im wearing hearing aids. Dr. Graves (sp)    Hypertension     Kidney stone     Memory loss     Mental status change     Migraine     Complex     Migraine headache     Myocardial infarction Several years ago    Renal insufficiency     Shingles     Sleep apnea     SOB (shortness of breath)     Stroke     Was told tia go with hemiphlegic migraines    Vision loss        Past Surgical History:   Past Surgical History:   Procedure Laterality Date    CARDIAC CATHETERIZATION      CARDIAC CATHETERIZATION N/A 10/14/2016    Procedure: Left Heart Cath;  Surgeon: Santiago Garner MD;  Location: [facility] CATH INVASIVE LOC[facility];  Service:     COLONOSCOPY  02/25/2022    Dr. Graves-recommended 3 year repeat    ENDOSCOPY  02/03/2022    Dr. Graves    EYE SURGERY  Retina    Dr. Askew    GASTRIC STIMULATOR IMPLANT SURGERY      SMALL INTESTINE SURGERY      unspecified, x3 as an infant     TESTICLE SURGERY      UPPER GASTROINTESTINAL ENDOSCOPY         Family History:   Family History   Problem Relation Age of Onset    Heart failure Mother     Diabetes Mother     Alzheimer's disease Mother     Arthritis Mother     Heart disease Mother     Heart attack Father     Kidney disease Father     Cancer Father     Prostate cancer Father     Heart disease Father     Hypertension Father     Cancer Sister     Breast cancer Sister     Diabetes Sister     Heart disease Sister     Arrhythmia Sister     Stroke Sister     Diabetes Brother     Heart disease Brother     Cancer Brother     Liver disease Brother     Kidney disease Brother     Dementia Paternal Uncle     Alzheimer's disease Maternal Grandmother     Liver cancer Maternal Grandfather     Liver disease Maternal Grandfather     Stroke Sister     Colon cancer Neg Hx        Social History:   Social History     Socioeconomic History    Marital status:    Tobacco Use    Smoking status: Never     Passive exposure: Never    Smokeless tobacco: Never   Vaping Use     Vaping status: Never Used   Substance and Sexual Activity    Alcohol use: Never    Drug use: Never    Sexual activity: Yes     Partners: Female       Tobacco History:   Social History     Tobacco Use   Smoking Status Never    Passive exposure: Never   Smokeless Tobacco Never       Medications:   Outpatient Medications Prior to Visit   Medication Sig Dispense Refill    amLODIPine (NORVASC) 5 MG tablet Take 1 tablet by mouth once daily 90 tablet 0    atorvastatin (LIPITOR) 20 MG tablet Take 1 tablet by mouth once daily 90 tablet 1    butalbital-acetaminophen-caffeine (FIORICET, ESGIC) -40 MG per tablet TAKE 1 TABLET BY MOUTH ONCE DAILY AS NEEDED FOR HEADACHE **Must schedule appointment for additional refills** 30 tablet 0    Continuous Glucose  (FreeStyle Mary 3 [place]) device Use 1 each See Admin Instructions. Dx E11.65 1 each 0    Continuous Glucose Sensor (FreeStyle Mary 3 Sensor) misc Use 1 each Every 14 (Fourteen) Days. Dx E11.65 6 each 3    Creon 62969-178345 units capsule delayed-release particles capsule Take 1 capsule by mouth 3 (Three) Times a Day With Meals. 270 capsule 0    dapagliflozin Propanediol (Farxiga) 10 MG tablet Take 10 mg by mouth Daily.      diphenoxylate-atropine (Lomotil) 2.5-0.025 MG per tablet Take 1 tablet by mouth 4 (Four) Times a Day As Needed for Diarrhea. 120 tablet 0    doxepin (SINEquan) 50 MG capsule Take 1 capsule by mouth At Night As Needed for Sleep (anxiety). 30 capsule 2    Dulaglutide (Trulicity) 1.5 MG/0.5ML solution pen-injector Inject 1.5 mg under the skin into the appropriate area as directed 1 (One) Time Per Week. 6 mL 2    galcanezumab-gnlm (Emgality) 120 MG/ML auto-injector pen Inject 1 mL under the skin into the appropriate area as directed Every 30 (Thirty) Days. Gets from Patient Assistance Program      glucose monitor monitoring kit 1 each Daily. 1 each 0    HumaLOG KwikPen 200 UNIT/ML solution pen-injector INJECT 14 UNITS UNDER THE SKIN TWO TIMES  "A DAY BEFORE MEALS 12 mL 1    Insulin Pen Needle (MM Pen Needles) 31G X 6 MM misc USE 1  ONCE DAILY 100 each 2    Insulin Syringe 31G X 5/16\" 0.3 ML misc 1 each Every Night. 90 each 3    Lancets Thin misc 1 each Daily. Use daily as directed 100 each 11    lisinopril (PRINIVIL,ZESTRIL) 40 MG tablet Take 1 tablet by mouth twice daily 180 tablet 0    metFORMIN (GLUCOPHAGE) 1000 MG tablet TAKE 1 TABLET BY MOUTH TWICE DAILY WITH MEALS 180 tablet 0    nitroglycerin (NITROSTAT) 0.4 MG SL tablet 1 under the tongue as needed for angina, may repeat q5mins for up three doses 100 tablet 11    ondansetron (ZOFRAN) 8 MG tablet TAKE 1 TABLET BY MOUTH EVERY 8 HOURS AS NEEDED FOR NAUSEA FOR VOMITING 90 tablet 0    OneTouch Ultra test strip USE 1 STRIP TO CHECK GLUCOSE ONCE DAILY AS DIRECTED 100 each 0    pantoprazole (PROTONIX) 40 MG EC tablet TAKE 1 TABLET BY MOUTH TWICE DAILY 30 MINUTES BEFORE MEAL(S) 180 tablet 3    prednisoLONE acetate (PRED FORTE) 1 % ophthalmic suspension INSTILL 1 DROP INTO RIGHT EYE 4 TIMES DAILY      pregabalin (Lyrica) 75 MG capsule Take 1 capsule by mouth 3 (Three) Times a Day. (Patient taking differently: Take 1 capsule by mouth Daily. Once daily around 7pm) 90 capsule 3    sildenafil (VIAGRA) 100 MG tablet Take 1 tablet by mouth Daily As Needed for Erectile Dysfunction. 15 tablet 11    tamsulosin (FLOMAX) 0.4 MG capsule 24 hr capsule Take 1 capsule by mouth once daily 90 capsule 0    Toujeo Max SoloStar 300 UNIT/ML solution pen-injector injection INJECT 40 UNITS UNDER THE SKIN EVERY NIGHT AT BEDTIME INTO THE APPROPRIATE AREA 12 mL 0    vitamin B-12 (CYANOCOBALAMIN) 100 MCG tablet Take 0.5 tablets by mouth 2 (two) times a day.      vitamin C (ASCORBIC ACID) 500 MG tablet Take 1 tablet by mouth 2 (two) times a day.      vitamin E 400 UNIT capsule Take 1 capsule by mouth Daily.      Zinc 40 MG tablet Take 1 tablet by mouth Daily.      propranolol LA (INDERAL LA) 120 MG 24 hr capsule Take 1 capsule by mouth " "Daily for 30 days. 30 capsule 6     Facility-Administered Medications Prior to Visit   Medication Dose Route Frequency Provider Last Rate Last Admin    lidocaine (XYLOCAINE) 1 % injection 3 mL  3 mL Subcutaneous Once Elio Moore DO            Allergies:   Allergies   Allergen Reactions    Actos [Pioglitazone] Other (See Comments)     congestive heart failure    Avandia [Rosiglitazone] Other (See Comments)     congestive heart failure.    Darvon [Propoxyphene] Itching     Angioedema    itching    Talwin [Pentazocine] Rash     Angioedema    itching       Objective   Objective     Physical Exam:  Vital Signs:   Vitals:    12/20/24 1139   BP: 138/84   Pulse: 64   SpO2: 97%   Weight: 82.6 kg (182 lb)   Height: 170.2 cm (67.01\")     Body mass index is 28.5 kg/m².     Physical Exam  Abdominal:          Comments: Read: Significant scar tissue with skin contraction and fibrosis  Blue: Injection sites       Nursing note reviewed  Const: NAD, A&Ox4, Pleasant, Cooperative  Procedures/Radiology     Inject Trigger Points, > 3    Date/Time: 12/26/2024 1:01 PM    Performed by: Elio Moore DO  Authorized by: Elio Moore DO  Local anesthesia used: no    Anesthesia:  Local anesthesia used: no    Sedation:  Patient sedated: no    Patient tolerance: patient tolerated the procedure well with no immediate complications  Comments: Trigger point injections given x 7, 5 mL lidocaine total given        CT Abdomen Pelvis With & Without Contrast    Result Date: 12/26/2024  Impression: 1.No acute findings in the abdomen or pelvis. 2.Stable chronic findings as described above. Electronically Signed: Bautista Kunz MD  12/26/2024 12:17 PM EST  Workstation ID: [id number]      Assessment & Plan   Assessment / Plan      Assessment/Plan:   Problems Addressed This Visit  Diagnoses and all orders for this visit:    1. 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        Patient Instructions   Ice  [id number]    Follow Up:   Return in about 6 days (around 12/26/2024) for procedure (30 minute).        DO BAILEY Jones PC [address]  [facility]  [address]  [facility] [address]  Fax [phone]  Phone [phone]    Disclaimer to patients: The 21st Century Cares Act makes medical notes like these available to patients in the interest of transparency. However, please be advised that this is still a medical document. It is intended as xvkc-wz-gzdn communication. Many sections may include medical language or jargon, abbreviations, and additional verbiage that are unfamiliar or confusing. In some ways it may come across as blunt, direct, or may be summarized in order to clearly and concisely communicate the most crucial information to medical professionals. It may also include mentions of conditions that are unlikely but considered as part of the differential diagnosis, including serious disorders. These are not always discussed at length at the time of appointment because their likelihood is so low, but may be included in a medical note to make it clear what has been considered and/or ruled out as part of a work-up. Medical documents are intended to carry relevant information, facts as evident, and the personal clinical opinion of the physician. If you have any questions regarding this medical document, please bring them to the attention of the physician at your next scheduled appointment.

## 2024-12-26 NOTE — PROGRESS NOTES
Established Patient Office Visit      Patient Name: Israel Reyna  : 1955   MRN: [id number]   Care Team: Patient Care Team:  Elio Moore DO as PCP - General (Family Medicine)  Cyn Skinner RN as Ambulatory  ([facility])  José Miguel Bautista MD as Consulting Physician (Endocrinology)    Chief Complaint:    Chief Complaint   Patient presents with    Injections       History of Present Illness: Israel Reyna is a 69 y.o. male who is here today for chief complaint.    HPI  He had perineural injections around the scar tissue on his left lower quadrant of his abdomen for anterior cutaneous nerve entrapment syndrome.  He has had workup for other causes of abdominal pain and while he does have reasons for chronic pain in the area, the more acute issue was little bit more elusive.  Given the tenderness to palpation on exam and superficial nature, we went forward with the perineural injections and he has received these 3x, most recently last Friday.    He had pain the day after injections last week for 1 day in the lower abdomen, but this resolved the next day and has not returned. However he also had new pain in upper abdomen starting Saturday that was come in waves, has hung around since then. Has not worsened, but has continued. 10/10 at its worst.    This patient is accompanied by their self who contributes to the history of their care.    The following portions of the patient's history were reviewed and updated as appropriate: allergies, current medications, past family history, past medical history, past social history, past surgical history and problem list.    Subjective      Review of Systems:   Review of Systems - See HPI    Past Medical History:   Past Medical History:   Diagnosis Date    Allergic     Anxiety     Bowel incontinence     w/ history of Stimulator Device     CAD (coronary artery disease)     Carotid artery occlusion     Cataract     Chest  pain     CHF (congestive heart failure)     Cluster headache     Colon polyp     Dementia     Depression     Diabetes mellitus, type 2     Diabetic peripheral neuropathy     Dyslipidemia     Erectile dysfunction     Fatigue     GERD (gastroesophageal reflux disease)     Headache, tension-type     HL (hearing loss) July 2023    Im wearing hearing aids. Dr. 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 Graves-recommended 3 year repeat    ENDOSCOPY  02/03/2022    Dr. Graves    EYE SURGERY  Retina    Dr. Askew    GASTRIC STIMULATOR IMPLANT SURGERY      SMALL INTESTINE SURGERY      unspecified, x3 as an infant     TESTICLE SURGERY      UPPER GASTROINTESTINAL ENDOSCOPY         Family History:   Family History   Problem Relation Age of Onset    Heart failure Mother     Diabetes Mother     Alzheimer's disease Mother     Arthritis Mother     Heart disease Mother     Heart attack Father     Kidney disease Father     Cancer Father     Prostate cancer Father     Heart disease Father     Hypertension Father     Cancer Sister     Breast cancer Sister     Diabetes Sister     Heart disease Sister     Arrhythmia Sister     Stroke Sister     Diabetes Brother     Heart disease Brother     Cancer Brother     Liver disease Brother     Kidney disease Brother     Dementia Paternal Uncle     Alzheimer's disease Maternal Grandmother     Liver cancer  Maternal Grandfather     Liver disease Maternal Grandfather     Stroke Sister     Colon cancer Neg Hx        Social History:   Social History     Socioeconomic History    Marital status:    Tobacco Use    Smoking status: Never     Passive exposure: Never    Smokeless tobacco: Never   Vaping Use    Vaping status: Never Used   Substance and Sexual Activity    Alcohol use: Never    Drug use: Never    Sexual activity: Yes     Partners: Female       Tobacco History:   Social History     Tobacco Use   Smoking Status Never    Passive exposure: Never   Smokeless Tobacco Never       Medications:   Outpatient Medications Prior to Visit   Medication Sig Dispense Refill    amLODIPine (NORVASC) 5 MG tablet Take 1 tablet by mouth once daily 90 tablet 0    atorvastatin (LIPITOR) 20 MG tablet Take 1 tablet by mouth once daily 90 tablet 1    butalbital-acetaminophen-caffeine (FIORICET, ESGIC) -40 MG per tablet TAKE 1 TABLET BY MOUTH ONCE DAILY AS NEEDED FOR HEADACHE **Must schedule appointment for additional refills** 30 tablet 0    Continuous Glucose  (FreeStyle Mary 3 [place]) device Use 1 each See Admin Instructions. Dx E11.65 1 each 0    Continuous Glucose Sensor (FreeStyle Mary 3 Sensor) misc Use 1 each Every 14 (Fourteen) Days. Dx E11.65 6 each 3    Creon 52220-382398 units capsule delayed-release particles capsule Take 1 capsule by mouth 3 (Three) Times a Day With Meals. 270 capsule 0    dapagliflozin Propanediol (Farxiga) 10 MG tablet Take 10 mg by mouth Daily.      diphenoxylate-atropine (Lomotil) 2.5-0.025 MG per tablet Take 1 tablet by mouth 4 (Four) Times a Day As Needed for Diarrhea. 120 tablet 0    doxepin (SINEquan) 50 MG capsule Take 1 capsule by mouth At Night As Needed for Sleep (anxiety). 30 capsule 2    Dulaglutide (Trulicity) 1.5 MG/0.5ML solution pen-injector Inject 1.5 mg under the skin into the appropriate area as directed 1 (One) Time Per Week. 6 mL 2    galcanezumab-gnlm (Emgality) 120  "MG/ML auto-injector pen Inject 1 mL under the skin into the appropriate area as directed Every 30 (Thirty) Days. Gets from Patient Assistance Program      glucose monitor monitoring kit 1 each Daily. 1 each 0    HumaLOG KwikPen 200 UNIT/ML solution pen-injector INJECT 14 UNITS UNDER THE SKIN TWO TIMES A DAY BEFORE MEALS 12 mL 1    Insulin Pen Needle (MM Pen Needles) 31G X 6 MM misc USE 1  ONCE DAILY 100 each 2    Insulin Syringe 31G X 5/16\" 0.3 ML misc 1 each Every Night. 90 each 3    Lancets Thin misc 1 each Daily. Use daily as directed 100 each 11    lisinopril (PRINIVIL,ZESTRIL) 40 MG tablet Take 1 tablet by mouth twice daily 180 tablet 0    metFORMIN (GLUCOPHAGE) 1000 MG tablet TAKE 1 TABLET BY MOUTH TWICE DAILY WITH MEALS 180 tablet 0    nitroglycerin (NITROSTAT) 0.4 MG SL tablet 1 under the tongue as needed for angina, may repeat q5mins for up three doses 100 tablet 11    ondansetron (ZOFRAN) 8 MG tablet TAKE 1 TABLET BY MOUTH EVERY 8 HOURS AS NEEDED FOR NAUSEA FOR VOMITING 90 tablet 0    OneTouch Ultra test strip USE 1 STRIP TO CHECK GLUCOSE ONCE DAILY AS DIRECTED 100 each 0    pantoprazole (PROTONIX) 40 MG EC tablet TAKE 1 TABLET BY MOUTH TWICE DAILY 30 MINUTES BEFORE MEAL(S) 180 tablet 3    prednisoLONE acetate (PRED FORTE) 1 % ophthalmic suspension INSTILL 1 DROP INTO RIGHT EYE 4 TIMES DAILY      pregabalin (Lyrica) 75 MG capsule Take 1 capsule by mouth 3 (Three) Times a Day. (Patient taking differently: Take 1 capsule by mouth Daily. Once daily around 7pm) 90 capsule 3    sildenafil (VIAGRA) 100 MG tablet Take 1 tablet by mouth Daily As Needed for Erectile Dysfunction. 15 tablet 11    tamsulosin (FLOMAX) 0.4 MG capsule 24 hr capsule Take 1 capsule by mouth once daily 90 capsule 0    Toujeo Max SoloStar 300 UNIT/ML solution pen-injector injection INJECT 40 UNITS UNDER THE SKIN EVERY NIGHT AT BEDTIME INTO THE APPROPRIATE AREA 12 mL 0    vitamin B-12 (CYANOCOBALAMIN) 100 MCG tablet Take 0.5 tablets by mouth " "2 (two) times a day.      vitamin C (ASCORBIC ACID) 500 MG tablet Take 1 tablet by mouth 2 (two) times a day.      vitamin E 400 UNIT capsule Take 1 capsule by mouth Daily.      Zinc 40 MG tablet Take 1 tablet by mouth Daily.      propranolol LA (INDERAL LA) 120 MG 24 hr capsule Take 1 capsule by mouth Daily for 30 days. 30 capsule 6     Facility-Administered Medications Prior to Visit   Medication Dose Route Frequency Provider Last Rate Last Admin    lidocaine (XYLOCAINE) 1 % injection 3 mL  3 mL Subcutaneous Once Elio Moore DO            Allergies:   Allergies   Allergen Reactions    Actos [Pioglitazone] Other (See Comments)     congestive heart failure    Avandia [Rosiglitazone] Other (See Comments)     congestive heart failure.    Darvon [Propoxyphene] Itching     Angioedema    itching    Talwin [Pentazocine] Rash     Angioedema    itching       Objective   Objective     Physical Exam:  Vital Signs:   Vitals:    12/26/24 0845   BP: 132/80   Pulse: 74   SpO2: 96%   Weight: 86.3 kg (190 lb 3.2 oz)   Height: 170.2 cm (67.01\")     Body mass index is 29.78 kg/m².     Physical Exam  Abdominal:      General: Abdomen is protuberant. Bowel sounds are decreased. There is distension.      Palpations: There is no shifting dullness, splenomegaly or mass.      Tenderness: There is abdominal tenderness.          Comments: Protuberant diastasis recti         Nursing note reviewed  Const: NAD, A&Ox4, Pleasant, Cooperative  Procedures/Radiology     Procedures  No radiology results for the last 7 days     Assessment & Plan   Assessment / Plan      Assessment/Plan:   Problems Addressed This Visit  Diagnoses and all orders for this visit:    1. 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 Epigastric pain  Comments:  New-onset, certainly distended on exam today  Orders:  -     CT Abdomen Pelvis With & Without Contrast; Future      Problem List Items Addressed This Visit          Other    Entrapment " syndrome of cutaneous nerve of abdomen - Primary     Other Visit Diagnoses       Epigastric pain        New-onset, certainly distended on exam today    Relevant Orders    CT Abdomen Pelvis With & Without Contrast          Orders Placed This Encounter   Procedures    CT Abdomen Pelvis With & Without Contrast     Standing Status:   Future     Standing Expiration Date:   12/26/2025     Order Specific Question:   Will Oral Contrast be needed for this procedure?     Answer:   No     Order Specific Question:   Release to patient     Answer:   Routine Release [[id number]]     Order Specific Question:   Reason for Exam:     Answer:   abdominal pain         Patient Instructions   You will get a call to schedule CT abdomen to check on new pain in upper abdomen    Follow Up:   Return in about 1 week (around 1/2/2025) for abdominal pain.        DO BAILEY Jones RD  [facility]  [address]  [facility] [address]  Fax [phone]  Phone [phone]    Disclaimer to patients: The 21st Century Cares Act makes medical notes like these available to patients in the interest of transparency. However, please be advised that this is still a medical document. It is intended as audq-yr-ltip communication. Many sections may include medical language or jargon, abbreviations, and additional verbiage that are unfamiliar or confusing. In some ways it may come across as blunt, direct, or may be summarized in order to clearly and concisely communicate the most crucial information to medical professionals. It may also include mentions of conditions that are unlikely but considered as part of the differential diagnosis, including serious disorders. These are not always discussed at length at the time of appointment because their likelihood is so low, but may be included in a medical note to make it clear what has been considered and/or ruled out as part of a work-up.  Medical documents are intended to carry relevant information, facts as evident, and the personal clinical opinion of the physician. If you have any questions regarding this medical document, please bring them to the attention of the physician at your next scheduled appointment.

## 2024-12-27 RX ORDER — KETOROLAC TROMETHAMINE 30 MG/ML
INJECTION, SOLUTION INTRAMUSCULAR; INTRAVENOUS
Qty: 1 EACH | Refills: 0 | Status: SHIPPED | OUTPATIENT
Start: 2024-12-27

## 2024-12-27 NOTE — TELEPHONE ENCOUNTER
Rx Refill Note  Requested Prescriptions     Pending Prescriptions Disp Refills    Continuous Glucose  (FreeStyle Mary 3 [place]) device [Pharmacy Med Name: FREESTYLE MARY 3 READER MIS] 1 each 0     Sig: USE DAILY TO CHECK BLOOD GLUCOSE      Last office visit with prescribing clinician: 10/3/2024      Next office visit with prescribing clinician: 4/23/2025       Melinda Mills MA  12/27/24, 09:48 EST

## 2024-12-29 DIAGNOSIS — E11.65 TYPE 2 DIABETES MELLITUS WITH HYPERGLYCEMIA, WITHOUT LONG-TERM CURRENT USE OF INSULIN: ICD-10-CM

## 2024-12-31 RX ORDER — PEN NEEDLE, DIABETIC 31 G X1/4"
NEEDLE, DISPOSABLE MISCELLANEOUS
Qty: 100 EACH | Refills: 1 | Status: SHIPPED | OUTPATIENT
Start: 2024-12-31

## 2025-01-03 ENCOUNTER — OFFICE VISIT (OUTPATIENT)
Dept: FAMILY MEDICINE CLINIC | Facility: CLINIC | Age: 70
End: 2025-01-03
Payer: MEDICARE

## 2025-01-03 VITALS
BODY MASS INDEX: 30.39 KG/M2 | SYSTOLIC BLOOD PRESSURE: 130 MMHG | WEIGHT: 193.6 LBS | OXYGEN SATURATION: 95 % | HEART RATE: 78 BPM | DIASTOLIC BLOOD PRESSURE: 82 MMHG | HEIGHT: 67 IN

## 2025-01-03 DIAGNOSIS — G58.8 ENTRAPMENT SYNDROME OF CUTANEOUS NERVE OF ABDOMEN: Primary | ICD-10-CM

## 2025-01-03 NOTE — PROGRESS NOTES
Established Patient Office Visit      Patient Name: Israel Reyna  : 1955   MRN: [id number]   Care Team: Patient Care Team:  Elio Moore DO as PCP - General (Family Medicine)  Cyn Skinner RN as Ambulatory  ([facility])  José Miguel Bautista MD as Consulting Physician (Endocrinology)    Chief Complaint:    Chief Complaint   Patient presents with    Follow up       History of Present Illness: Israel Reyna is a 69 y.o. male who is here today for chief complaint.    HPI  He had perineural injections around the scar tissue on his left lower quadrant of his abdomen for anterior cutaneous nerve entrapment syndrome.  He has had workup for other causes of abdominal pain and while he does have reasons for chronic pain in the area, the more acute issue was little bit more elusive.  He has received injections on 2 occasions, reports that the most recent set he received last week have helped significantly.    This patient is accompanied by their self who contributes to the history of their care.    The following portions of the patient's history were reviewed and updated as appropriate: allergies, current medications, past family history, past medical history, past social history, past surgical history and problem list.    Subjective      Review of Systems:   Review of Systems - See HPI    Past Medical History:   Past Medical History:   Diagnosis Date    Allergic     Anxiety     Bowel incontinence     w/ history of Stimulator Device     CAD (coronary artery disease)     Carotid artery occlusion     Cataract     Chest pain     CHF (congestive heart failure)     Cluster headache     Colon polyp     Dementia     Depression     Diabetes mellitus, type 2     Diabetic peripheral neuropathy     Dyslipidemia     Erectile dysfunction     Fatigue     GERD (gastroesophageal reflux disease)     Headache, tension-type     HL (hearing loss) 2023    Im wearing hearing aids.  Dr. Graves (sp)    Hypertension     Kidney stone     Memory loss     Mental status change     Migraine     Complex     Migraine headache     Myocardial infarction Several years ago    Renal insufficiency     Shingles     Sleep apnea     SOB (shortness of breath)     Stroke     Was told tia go with hemiphlegic migraines    Vision loss        Past Surgical History:   Past Surgical History:   Procedure Laterality Date    CARDIAC CATHETERIZATION      CARDIAC CATHETERIZATION N/A 10/14/2016    Procedure: Left Heart Cath;  Surgeon: Santiago Garner MD;  Location: [facility] CATH INVASIVE LOC[facility];  Service:     COLONOSCOPY  02/25/2022    Dr. Graves-recommended 3 year repeat    ENDOSCOPY  02/03/2022    Dr. Graves    EYE SURGERY  Retina    Dr. Askew    GASTRIC STIMULATOR IMPLANT SURGERY      SMALL INTESTINE SURGERY      unspecified, x3 as an infant     TESTICLE SURGERY      UPPER GASTROINTESTINAL ENDOSCOPY         Family History:   Family History   Problem Relation Age of Onset    Heart failure Mother     Diabetes Mother     Alzheimer's disease Mother     Arthritis Mother     Heart disease Mother     Heart attack Father     Kidney disease Father     Cancer Father     Prostate cancer Father     Heart disease Father     Hypertension Father     Cancer Sister     Breast cancer Sister     Diabetes Sister     Heart disease Sister     Arrhythmia Sister     Stroke Sister     Diabetes Brother     Heart disease Brother     Cancer Brother     Liver disease Brother     Kidney disease Brother     Dementia Paternal Uncle     Alzheimer's disease Maternal Grandmother     Liver cancer Maternal Grandfather     Liver disease Maternal Grandfather     Stroke Sister     Colon cancer Neg Hx        Social History:   Social History     Socioeconomic History    Marital status:    Tobacco Use    Smoking status: Never     Passive exposure: Never    Smokeless tobacco: Never   Vaping Use    Vaping status: Never Used   Substance and Sexual  "Activity    Alcohol use: Never    Drug use: Never    Sexual activity: Yes     Partners: Female       Tobacco History:   Social History     Tobacco Use   Smoking Status Never    Passive exposure: Never   Smokeless Tobacco Never       Medications:   Outpatient Medications Prior to Visit   Medication Sig Dispense Refill    atorvastatin (LIPITOR) 20 MG tablet Take 1 tablet by mouth once daily 90 tablet 1    butalbital-acetaminophen-caffeine (FIORICET, ESGIC) -40 MG per tablet TAKE 1 TABLET BY MOUTH ONCE DAILY AS NEEDED FOR HEADACHE **Must schedule appointment for additional refills** 30 tablet 0    Continuous Glucose  (FreeStyle Mary 3 [place]) device USE DAILY TO CHECK BLOOD GLUCOSE 1 each 0    Continuous Glucose Sensor (FreeStyle Mary 3 Sensor) misc Use 1 each Every 14 (Fourteen) Days. Dx E11.65 6 each 3    Creon 56053-888932 units capsule delayed-release particles capsule Take 1 capsule by mouth 3 (Three) Times a Day With Meals. 270 capsule 0    dapagliflozin Propanediol (Farxiga) 10 MG tablet Take 10 mg by mouth Daily.      diphenoxylate-atropine (Lomotil) 2.5-0.025 MG per tablet Take 1 tablet by mouth 4 (Four) Times a Day As Needed for Diarrhea. 120 tablet 0    doxepin (SINEquan) 50 MG capsule Take 1 capsule by mouth At Night As Needed for Sleep (anxiety). 30 capsule 2    Dulaglutide (Trulicity) 1.5 MG/0.5ML solution pen-injector Inject 1.5 mg under the skin into the appropriate area as directed 1 (One) Time Per Week. 6 mL 2    glucose monitor monitoring kit 1 each Daily. 1 each 0    HumaLOG KwikPen 200 UNIT/ML solution pen-injector INJECT 14 UNITS UNDER THE SKIN TWO TIMES A DAY BEFORE MEALS 12 mL 1    Insulin Pen Needle (MM Pen Needles) 31G X 6 MM misc USE 1  ONCE DAILY 100 each 1    Insulin Syringe 31G X 5/16\" 0.3 ML misc 1 each Every Night. 90 each 3    Lancets Thin misc 1 each Daily. Use daily as directed 100 each 11    lisinopril (PRINIVIL,ZESTRIL) 40 MG tablet Take 1 tablet by mouth twice daily " 180 tablet 0    metFORMIN (GLUCOPHAGE) 1000 MG tablet TAKE 1 TABLET BY MOUTH TWICE DAILY WITH MEALS 180 tablet 0    nitroglycerin (NITROSTAT) 0.4 MG SL tablet 1 under the tongue as needed for angina, may repeat q5mins for up three doses 100 tablet 11    ondansetron (ZOFRAN) 8 MG tablet TAKE 1 TABLET BY MOUTH EVERY 8 HOURS AS NEEDED FOR NAUSEA FOR VOMITING 90 tablet 0    OneTouch Ultra test strip USE 1 STRIP TO CHECK GLUCOSE ONCE DAILY AS DIRECTED 100 each 0    pantoprazole (PROTONIX) 40 MG EC tablet TAKE 1 TABLET BY MOUTH TWICE DAILY 30 MINUTES BEFORE MEAL(S) 180 tablet 3    prednisoLONE acetate (PRED FORTE) 1 % ophthalmic suspension INSTILL 1 DROP INTO RIGHT EYE 4 TIMES DAILY      pregabalin (Lyrica) 75 MG capsule Take 1 capsule by mouth 3 (Three) Times a Day. (Patient taking differently: Take 1 capsule by mouth Daily. Once daily around 7pm) 90 capsule 3    sildenafil (VIAGRA) 100 MG tablet Take 1 tablet by mouth Daily As Needed for Erectile Dysfunction. 15 tablet 11    tamsulosin (FLOMAX) 0.4 MG capsule 24 hr capsule Take 1 capsule by mouth once daily 90 capsule 0    Toujeo Max SoloStar 300 UNIT/ML solution pen-injector injection INJECT 40 UNITS UNDER THE SKIN EVERY NIGHT AT BEDTIME INTO THE APPROPRIATE AREA 12 mL 0    vitamin B-12 (CYANOCOBALAMIN) 100 MCG tablet Take 0.5 tablets by mouth 2 (two) times a day.      vitamin C (ASCORBIC ACID) 500 MG tablet Take 1 tablet by mouth 2 (two) times a day.      vitamin E 400 UNIT capsule Take 1 capsule by mouth Daily.      Zinc 40 MG tablet Take 1 tablet by mouth Daily.      amLODIPine (NORVASC) 5 MG tablet Take 1 tablet by mouth once daily 90 tablet 0    galcanezumab-gnlm (Emgality) 120 MG/ML auto-injector pen Inject 1 mL under the skin into the appropriate area as directed Every 30 (Thirty) Days. Gets from Patient Assistance Program      propranolol LA (INDERAL LA) 120 MG 24 hr capsule Take 1 capsule by mouth Daily for 30 days. 30 capsule 6     Facility-Administered  "Medications Prior to Visit   Medication Dose Route Frequency Provider Last Rate Last Admin    lidocaine (XYLOCAINE) 1 % injection 3 mL  3 mL Subcutaneous Once Elio Moore DO            Allergies:   Allergies   Allergen Reactions    Actos [Pioglitazone] Other (See Comments)     congestive heart failure    Avandia [Rosiglitazone] Other (See Comments)     congestive heart failure.    Darvon [Propoxyphene] Itching     Angioedema    itching    Talwin [Pentazocine] Rash     Angioedema    itching       Objective   Objective     Physical Exam:  Vital Signs:   Vitals:    01/03/25 1110   BP: 130/82   Pulse: 78   SpO2: 95%   Weight: 87.8 kg (193 lb 9.6 oz)   Height: 170.2 cm (67.01\")     Body mass index is 30.31 kg/m².     Physical Exam  Abdominal:          Comments: Read: Significant scar tissue with skin contraction and fibrosis  Blue: Injection sites       Nursing note reviewed  Const: NAD, A&Ox4, Pleasant, Cooperative  Procedures/Radiology     Procedures  CT Abdomen Pelvis With Contrast    Result Date: 1/14/2025  Impression: Circumferential bladder wall thickening. Recommend correlation with urinalysis to assess for cystitis. Pyelonephritis is a clinical diagnosis and cannot be excluded by imaging. Electronically Signed: Sharan Sahni  1/14/2025 9:30 PM EST  Workstation ID: [id number]      Assessment & Plan   Assessment / Plan      Assessment/Plan:   Problems Addressed This Visit  Diagnoses and all orders for this visit:    1. 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        Patient Instructions   Ice  [id number]  Get the topical from the compounding pharmacy    Follow Up:   Return in about 1 week (around 1/10/2025) for procedure visit abdominal injections.        KENDRA Moore DO  [facility] " YOMI CARBALLO  [facility]  [address]  [facility] [address]  Fax [phone]  Phone [phone]    Disclaimer to patients: The 21st Century Cares Act makes medical notes like these available to patients in the interest of transparency. However, please be advised that this is still a medical document. It is intended as [id number] communication. Many sections may include medical language or jargon, abbreviations, and additional verbiage that are unfamiliar or confusing. In some ways it may come across as blunt, direct, or may be summarized in order to clearly and concisely communicate the most crucial information to medical professionals. It may also include mentions of conditions that are unlikely but considered as part of the differential diagnosis, including serious disorders. These are not always discussed at length at the time of appointment because their likelihood is so low, but may be included in a medical note to make it clear what has been considered and/or ruled out as part of a work-up. Medical documents are intended to carry relevant information, facts as evident, and the personal clinical opinion of the physician. If you have any questions regarding this medical document, please bring them to the attention of the physician at your next scheduled appointment.

## 2025-01-04 LAB — CREAT BLDA-MCNC: 1.1 MG/DL (ref 0.6–1.3)

## 2025-01-06 ENCOUNTER — SPECIALTY PHARMACY (OUTPATIENT)
Dept: NEUROLOGY | Facility: CLINIC | Age: 70
End: 2025-01-06
Payer: MEDICARE

## 2025-01-06 RX ORDER — GALCANEZUMAB 120 MG/ML
120 INJECTION, SOLUTION SUBCUTANEOUS
Qty: 1.12 ML | Refills: 11 | Status: SHIPPED | OUTPATIENT
Start: 2025-01-06

## 2025-01-07 ENCOUNTER — TELEPHONE (OUTPATIENT)
Dept: FAMILY MEDICINE CLINIC | Facility: CLINIC | Age: 70
End: 2025-01-07
Payer: MEDICARE

## 2025-01-07 NOTE — TELEPHONE ENCOUNTER
----- Message from Elio Moore sent at 12/20/2024 12:20 PM EST -----  Could we contact the creon rep 1) about his patient assistance and 2) to get more samples before next week? Thank you!      Called [facility] was on hold for 30 mins, resent application again. Also sent a email to the rep to have him check, as well as possible samples.   Rep:melchor farfan  Ph:[phone]  Cell:[phone]  Email [email or web address]

## 2025-01-09 NOTE — TELEPHONE ENCOUNTER
Response from [employer], they have received the application for creon assistance. They are requiring a PA for the medication. PA started today.     ALTERNATIVES:  Pancreaze  Pancrelipase  Pertzye  Ultresa  Zenpep       (Key: [id number])  Creon [id number] dr capsules  status: Question Response - N/A  Created: January 9th, 2025    Additional Information Required  Available without authorization.    FAXED FOR AND PROOF OF PA COMPLETED BACK TO [employer]

## 2025-01-10 DIAGNOSIS — I10 PRIMARY HYPERTENSION: ICD-10-CM

## 2025-01-10 RX ORDER — AMLODIPINE BESYLATE 5 MG/1
5 TABLET ORAL DAILY
Qty: 90 TABLET | Refills: 0 | Status: SHIPPED | OUTPATIENT
Start: 2025-01-10

## 2025-01-13 ENCOUNTER — LAB (OUTPATIENT)
Dept: LAB | Facility: HOSPITAL | Age: 70
End: 2025-01-13
Payer: MEDICARE

## 2025-01-13 ENCOUNTER — OFFICE VISIT (OUTPATIENT)
Dept: FAMILY MEDICINE CLINIC | Facility: CLINIC | Age: 70
End: 2025-01-13
Payer: MEDICARE

## 2025-01-13 VITALS
OXYGEN SATURATION: 96 % | SYSTOLIC BLOOD PRESSURE: 124 MMHG | WEIGHT: 191.4 LBS | HEART RATE: 76 BPM | HEIGHT: 67 IN | DIASTOLIC BLOOD PRESSURE: 76 MMHG | BODY MASS INDEX: 30.04 KG/M2

## 2025-01-13 DIAGNOSIS — E11.65 TYPE 2 DIABETES MELLITUS WITH HYPERGLYCEMIA, WITHOUT LONG-TERM CURRENT USE OF INSULIN: ICD-10-CM

## 2025-01-13 DIAGNOSIS — K92.1: ICD-10-CM

## 2025-01-13 DIAGNOSIS — G58.8 ENTRAPMENT SYNDROME OF CUTANEOUS NERVE OF ABDOMEN: Primary | ICD-10-CM

## 2025-01-13 DIAGNOSIS — R35.0 URINARY FREQUENCY: ICD-10-CM

## 2025-01-13 DIAGNOSIS — D64.9 ANEMIA, UNSPECIFIED TYPE: ICD-10-CM

## 2025-01-13 DIAGNOSIS — R53.83 LETHARGY: ICD-10-CM

## 2025-01-13 DIAGNOSIS — K86.81 EXOCRINE PANCREATIC INSUFFICIENCY: ICD-10-CM

## 2025-01-13 LAB
BACTERIA UR QL AUTO: ABNORMAL /HPF
BILIRUB UR QL STRIP: NEGATIVE
CLARITY UR: ABNORMAL
COLOR UR: YELLOW
DEPRECATED RDW RBC AUTO: 38.5 FL (ref 37–54)
ERYTHROCYTE [DISTWIDTH] IN BLOOD BY AUTOMATED COUNT: 12.8 % (ref 12.3–15.4)
GLUCOSE UR STRIP-MCNC: NEGATIVE MG/DL
HBA1C MFR BLD: 7.6 % (ref 4.8–5.6)
HCT VFR BLD AUTO: 39.7 % (ref 37.5–51)
HGB BLD-MCNC: 13.3 G/DL (ref 13–17.7)
HGB UR QL STRIP.AUTO: NEGATIVE
HYALINE CASTS UR QL AUTO: ABNORMAL /LPF
KETONES UR QL STRIP: NEGATIVE
LEUKOCYTE ESTERASE UR QL STRIP.AUTO: ABNORMAL
MCH RBC QN AUTO: 28.4 PG (ref 26.6–33)
MCHC RBC AUTO-ENTMCNC: 33.5 G/DL (ref 31.5–35.7)
MCV RBC AUTO: 84.8 FL (ref 79–97)
NITRITE UR QL STRIP: NEGATIVE
PH UR STRIP.AUTO: 7.5 [PH] (ref 5–8)
PLATELET # BLD AUTO: 353 10*3/MM3 (ref 140–450)
PMV BLD AUTO: 10.2 FL (ref 6–12)
PROT UR QL STRIP: ABNORMAL
RBC # BLD AUTO: 4.68 10*6/MM3 (ref 4.14–5.8)
RBC # UR STRIP: ABNORMAL /HPF
REF LAB TEST METHOD: ABNORMAL
RETICS # AUTO: 0.08 10*6/MM3 (ref 0.02–0.13)
RETICS/RBC NFR AUTO: 1.81 % (ref 0.7–1.9)
SP GR UR STRIP: 1.02 (ref 1–1.03)
SQUAMOUS #/AREA URNS HPF: ABNORMAL /HPF
UROBILINOGEN UR QL STRIP: ABNORMAL
WBC # UR STRIP: ABNORMAL /HPF
WBC NRBC COR # BLD AUTO: 10.07 10*3/MM3 (ref 3.4–10.8)

## 2025-01-13 PROCEDURE — 84550 ASSAY OF BLOOD/URIC ACID: CPT

## 2025-01-13 PROCEDURE — 83036 HEMOGLOBIN GLYCOSYLATED A1C: CPT

## 2025-01-13 PROCEDURE — 81001 URINALYSIS AUTO W/SCOPE: CPT

## 2025-01-13 PROCEDURE — 84443 ASSAY THYROID STIM HORMONE: CPT

## 2025-01-13 PROCEDURE — 84425 ASSAY OF VITAMIN B-1: CPT

## 2025-01-13 PROCEDURE — 84591 ASSAY OF NOS VITAMIN: CPT

## 2025-01-13 PROCEDURE — 84153 ASSAY OF PSA TOTAL: CPT

## 2025-01-13 PROCEDURE — 82728 ASSAY OF FERRITIN: CPT

## 2025-01-13 PROCEDURE — 84403 ASSAY OF TOTAL TESTOSTERONE: CPT

## 2025-01-13 PROCEDURE — 84207 ASSAY OF VITAMIN B-6: CPT

## 2025-01-13 PROCEDURE — 82746 ASSAY OF FOLIC ACID SERUM: CPT

## 2025-01-13 PROCEDURE — 84466 ASSAY OF TRANSFERRIN: CPT

## 2025-01-13 PROCEDURE — 85045 AUTOMATED RETICULOCYTE COUNT: CPT

## 2025-01-13 PROCEDURE — 83540 ASSAY OF IRON: CPT

## 2025-01-13 PROCEDURE — 85027 COMPLETE CBC AUTOMATED: CPT

## 2025-01-13 PROCEDURE — 82607 VITAMIN B-12: CPT

## 2025-01-13 PROCEDURE — 80053 COMPREHEN METABOLIC PANEL: CPT

## 2025-01-13 NOTE — PROGRESS NOTES
Established Patient Office Visit      Patient Name: Israel Reyna  : 1955   MRN: [id number]   Care Team: Patient Care Team:  Elio Moore DO as PCP - General (Family Medicine)  Cyn Skinner RN as Ambulatory  ([facility])  José Miguel Bautista MD as Consulting Physician (Endocrinology)    Chief Complaint:    Chief Complaint   Patient presents with    Injections     1 WK       History of Present Illness: Israel Reyna is a 70 y.o. male who is here today for chief complaint.    HPI    Injections have actually done well, last time he had a small flare after injection but otherwise doingbetter. Would like to hold off    Blood glucose fluctuating . Feels bad, run down, urinating 6x per night and many times through the day. Feels cold all the time.    Taking the creon 1 cap per day just to save meds, on sample waiting for savings program to go through.    This patient is accompanied by their self who contributes to the history of their care.    The following portions of the patient's history were reviewed and updated as appropriate: allergies, current medications, past family history, past medical history, past social history, past surgical history and problem list.    Subjective      Review of Systems:   Review of Systems - See HPI    Past Medical History:   Past Medical History:   Diagnosis Date    Allergic     Anxiety     Bowel incontinence     w/ history of Stimulator Device     CAD (coronary artery disease)     Carotid artery occlusion     Cataract     Chest pain     CHF (congestive heart failure)     Cluster headache     Colon polyp     Dementia     Depression     Diabetes mellitus, type 2     Diabetic peripheral neuropathy     Dyslipidemia     Erectile dysfunction     Fatigue     GERD (gastroesophageal reflux disease)     Headache, tension-type     HL (hearing loss) 2023    Im wearing hearing aids. Dr. Graves (sp)    Hypertension     Kidney  stone     Memory loss     Mental status change     Migraine     Complex     Migraine headache     Myocardial infarction Several years ago    Renal insufficiency     Shingles     Sleep apnea     SOB (shortness of breath)     Stroke     Was told tia go with hemiphlegic migraines    Vision loss        Past Surgical History:   Past Surgical History:   Procedure Laterality Date    CARDIAC CATHETERIZATION      CARDIAC CATHETERIZATION N/A 10/14/2016    Procedure: Left Heart Cath;  Surgeon: Santiago Garner MD;  Location:  [facility];  Service:     COLONOSCOPY  02/25/2022    Dr. Graves-recommended 3 year repeat    ENDOSCOPY  02/03/2022    Dr. Graves    EYE SURGERY  Retina    Dr. Askew    GASTRIC STIMULATOR IMPLANT SURGERY      SMALL INTESTINE SURGERY      unspecified, x3 as an infant     TESTICLE SURGERY      UPPER GASTROINTESTINAL ENDOSCOPY         Family History:   Family History   Problem Relation Age of Onset    Heart failure Mother     Diabetes Mother     Alzheimer's disease Mother     Arthritis Mother     Heart disease Mother     Heart attack Father     Kidney disease Father     Cancer Father     Prostate cancer Father     Heart disease Father     Hypertension Father     Cancer Sister     Breast cancer Sister     Diabetes Sister     Heart disease Sister     Arrhythmia Sister     Stroke Sister     Diabetes Brother     Heart disease Brother     Cancer Brother     Liver disease Brother     Kidney disease Brother     Dementia Paternal Uncle     Alzheimer's disease Maternal Grandmother     Liver cancer Maternal Grandfather     Liver disease Maternal Grandfather     Stroke Sister     Colon cancer Neg Hx        Social History:   Social History     Socioeconomic History    Marital status:    Tobacco Use    Smoking status: Never     Passive exposure: Never    Smokeless tobacco: Never   Vaping Use    Vaping status: Never Used   Substance and Sexual Activity    Alcohol use: Never    Drug use:  "Never    Sexual activity: Yes     Partners: Female       Tobacco History:   Social History     Tobacco Use   Smoking Status Never    Passive exposure: Never   Smokeless Tobacco Never       Medications:   Outpatient Medications Prior to Visit   Medication Sig Dispense Refill    amLODIPine (NORVASC) 5 MG tablet Take 1 tablet by mouth once daily 90 tablet 0    atorvastatin (LIPITOR) 20 MG tablet Take 1 tablet by mouth once daily 90 tablet 1    Continuous Glucose  (FreeStyle Mary 3 Fidelity) device USE DAILY TO CHECK BLOOD GLUCOSE 1 each 0    Continuous Glucose Sensor (FreeStyle Mary 3 Sensor) misc Use 1 each Every 14 (Fourteen) Days. Dx E11.65 6 each 3    dapagliflozin Propanediol (Farxiga) 10 MG tablet Take 10 mg by mouth Daily.      diphenoxylate-atropine (Lomotil) 2.5-0.025 MG per tablet Take 1 tablet by mouth 4 (Four) Times a Day As Needed for Diarrhea. 120 tablet 0    doxepin (SINEquan) 50 MG capsule Take 1 capsule by mouth At Night As Needed for Sleep (anxiety). 30 capsule 2    Dulaglutide (Trulicity) 1.5 MG/0.5ML solution pen-injector Inject 1.5 mg under the skin into the appropriate area as directed 1 (One) Time Per Week. 6 mL 2    galcanezumab-gnlm (Emgality) 120 MG/ML auto-injector pen Inject 1 mL under the skin into the appropriate area as directed Every 30 (Thirty) Days. Gets from Patient Assistance Program 1.12 mL 11    glucose monitor monitoring kit 1 each Daily. 1 each 0    HumaLOG KwikPen 200 UNIT/ML solution pen-injector INJECT 14 UNITS UNDER THE SKIN TWO TIMES A DAY BEFORE MEALS 12 mL 1    Insulin Pen Needle (MM Pen Needles) 31G X 6 MM misc USE 1  ONCE DAILY 100 each 1    Insulin Syringe 31G X 5/16\" 0.3 ML misc 1 each Every Night. 90 each 3    Lancets Thin misc 1 each Daily. Use daily as directed 100 each 11    lisinopril (PRINIVIL,ZESTRIL) 40 MG tablet Take 1 tablet by mouth twice daily 180 tablet 0    metFORMIN (GLUCOPHAGE) 1000 MG tablet TAKE 1 TABLET BY MOUTH TWICE DAILY WITH MEALS 180 " tablet 0    nitroglycerin (NITROSTAT) 0.4 MG SL tablet 1 under the tongue as needed for angina, may repeat q5mins for up three doses 100 tablet 11    OneTouch Ultra test strip USE 1 STRIP TO CHECK GLUCOSE ONCE DAILY AS DIRECTED 100 each 0    pantoprazole (PROTONIX) 40 MG EC tablet TAKE 1 TABLET BY MOUTH TWICE DAILY 30 MINUTES BEFORE MEAL(S) 180 tablet 3    prednisoLONE acetate (PRED FORTE) 1 % ophthalmic suspension INSTILL 1 DROP INTO RIGHT EYE 4 TIMES DAILY      pregabalin (Lyrica) 75 MG capsule Take 1 capsule by mouth 3 (Three) Times a Day. (Patient taking differently: Take 1 capsule by mouth Daily. Once daily around 7pm) 90 capsule 3    sildenafil (VIAGRA) 100 MG tablet Take 1 tablet by mouth Daily As Needed for Erectile Dysfunction. 15 tablet 11    tamsulosin (FLOMAX) 0.4 MG capsule 24 hr capsule Take 1 capsule by mouth once daily 90 capsule 0    vitamin B-12 (CYANOCOBALAMIN) 100 MCG tablet Take 0.5 tablets by mouth 2 (two) times a day.      vitamin C (ASCORBIC ACID) 500 MG tablet Take 1 tablet by mouth 2 (two) times a day.      vitamin E 400 UNIT capsule Take 1 capsule by mouth Daily.      Zinc 40 MG tablet Take 1 tablet by mouth Daily.      butalbital-acetaminophen-caffeine (FIORICET, ESGIC) -40 MG per tablet TAKE 1 TABLET BY MOUTH ONCE DAILY AS NEEDED FOR HEADACHE **Must schedule appointment for additional refills** 30 tablet 0    Creon 25787-319595 units capsule delayed-release particles capsule Take 1 capsule by mouth 3 (Three) Times a Day With Meals. 270 capsule 0    ondansetron (ZOFRAN) 8 MG tablet TAKE 1 TABLET BY MOUTH EVERY 8 HOURS AS NEEDED FOR NAUSEA FOR VOMITING 90 tablet 0    Toujeo Max SoloStar 300 UNIT/ML solution pen-injector injection INJECT 40 UNITS UNDER THE SKIN EVERY NIGHT AT BEDTIME INTO THE APPROPRIATE AREA 12 mL 0    propranolol LA (INDERAL LA) 120 MG 24 hr capsule Take 1 capsule by mouth Daily for 30 days. 30 capsule 6     Facility-Administered Medications Prior to Visit  "  Medication Dose Route Frequency Provider Last Rate Last Admin    lidocaine (XYLOCAINE) 1 % injection 3 mL  3 mL Subcutaneous Once Elio Moore,             Allergies:   Allergies   Allergen Reactions    Actos [Pioglitazone] Other (See Comments)     congestive heart failure    Avandia [Rosiglitazone] Other (See Comments)     congestive heart failure.    Darvon [Propoxyphene] Itching     Angioedema    itching    Talwin [Pentazocine] Rash     Angioedema    itching       Objective   Objective     Physical Exam:  Vital Signs:   Vitals:    01/13/25 1104   BP: 124/76   Pulse: 76   SpO2: 96%   Weight: 86.8 kg (191 lb 6.4 oz)   Height: 170.2 cm (67.01\")     Body mass index is 29.97 kg/m².     Physical Exam  Nursing note reviewed  Const: NAD, A&Ox4, Pleasant, Cooperative  Eyes: EOMI, no conjunctivitis  ENT: No nasal discharge present, neck supple  Cardiac: Regular rate and rhythm, no cyanosis  Resp: Respiratory rate within normal limits, no increased work of breathing, no audible wheezing or retractions noted  GI: No distention or ascites  MSK: Motor and sensation grossly intact in bilateral upper extremities  Neurologic: CN II-XII grossly intact  Psych: Appropriate mood and behavior.  Skin: Warm, dry  Procedures/Radiology     Procedures  No radiology results for the last 7 days     Assessment & Plan   Assessment / Plan      Assessment/Plan:   Problems Addressed This Visit  Diagnoses and all orders for this visit:    1. 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 Type 2 diabetes mellitus with hyperglycemia, without long-term current use of insulin  -     Hemoglobin A1c; Future    3. Exocrine pancreatic insufficiency    4. Blood in stool, alejandrina  -     Testosterone; Future  -     TSH Rfx On Abnormal To Free T4; Future  -     Urinalysis With Microscopic If Indicated (No Culture) - Urine, Clean Catch; Future  -     Uric Acid; Future  -     Hemoglobin A1c; Future  -     Peripheral Blood Smear; " Future  -     Reticulocytes; Future  -     CBC (No Diff); Future  -     Vitamin B12; Future  -     Folate; Future  -     Ferritin; Future  -     Iron Profile; Future  -     Comprehensive Metabolic Panel; Future    5. Anemia, unspecified type  -     Testosterone; Future  -     TSH Rfx On Abnormal To Free T4; Future  -     Urinalysis With Microscopic If Indicated (No Culture) - Urine, Clean Catch; Future  -     Uric Acid; Future  -     Hemoglobin A1c; Future  -     Peripheral Blood Smear; Future  -     Reticulocytes; Future  -     CBC (No Diff); Future  -     Vitamin B12; Future  -     Folate; Future  -     Ferritin; Future  -     Iron Profile; Future  -     Comprehensive Metabolic Panel; Future  -     Niacin (Vitamin B3); Future  -     Vitamin B6; Future  -     Vitamin B1, Whole Blood; Future    6. Lethargy  -     Testosterone; Future  -     TSH Rfx On Abnormal To Free T4; Future  -     Urinalysis With Microscopic If Indicated (No Culture) - Urine, Clean Catch; Future  -     Uric Acid; Future  -     Hemoglobin A1c; Future  -     Peripheral Blood Smear; Future  -     Reticulocytes; Future  -     CBC (No Diff); Future  -     Vitamin B12; Future  -     Folate; Future  -     Ferritin; Future  -     Iron Profile; Future  -     Comprehensive Metabolic Panel; Future  -     Niacin (Vitamin B3); Future  -     Vitamin B6; Future  -     Vitamin B1, Whole Blood; Future    7. 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 - Primary     Other Visit Diagnoses       Type 2 diabetes mellitus with hyperglycemia, without long-term current use of insulin        Relevant Orders    Hemoglobin A1c (Completed)    Exocrine pancreatic insufficiency        Blood in stool, alejandrina        Relevant Orders    Testosterone (Completed)    TSH Rfx On Abnormal To Free T4 (Completed)    Urinalysis With Microscopic If Indicated (No Culture) - Urine, Clean Catch (Completed)    Uric Acid (Completed)    Hemoglobin A1c (Completed)    Peripheral Blood Smear (Completed)    Reticulocytes (Completed)    CBC (No Diff) (Completed)    Vitamin B12 (Completed)    Folate (Completed)    Ferritin (Completed)    Iron Profile (Completed)    Comprehensive Metabolic Panel (Completed)    Anemia, unspecified type        Relevant Orders    Testosterone (Completed)    TSH Rfx On Abnormal To Free T4 (Completed)    Urinalysis With Microscopic If Indicated (No Culture) - Urine, Clean Catch (Completed)    Uric Acid (Completed)    Hemoglobin A1c (Completed)    Peripheral Blood Smear (Completed)    Reticulocytes (Completed)    CBC (No Diff) (Completed)    Vitamin B12 (Completed)    Folate (Completed)    Ferritin (Completed)    Iron Profile (Completed)    Comprehensive Metabolic Panel (Completed)    Niacin (Vitamin B3) (Completed)    Vitamin B6 (Completed)    Vitamin B1, Whole Blood (Completed)    Lethargy        Relevant Orders    Testosterone (Completed)    TSH Rfx On Abnormal To Free T4 (Completed)    Urinalysis With Microscopic If Indicated (No Culture) - Urine, Clean Catch (Completed)    Uric Acid (Completed)    Hemoglobin A1c (Completed)    Peripheral Blood Smear (Completed)    Reticulocytes (Completed)    CBC (No Diff) (Completed)    Vitamin B12 (Completed)    Folate (Completed)    Ferritin (Completed)    Iron Profile (Completed)    Comprehensive Metabolic Panel (Completed)    Niacin (Vitamin B3) (Completed)    Vitamin B6  (Completed)    Vitamin B1, Whole Blood (Completed)    Urinary frequency        Relevant Orders    Testosterone (Completed)    TSH Rfx On Abnormal To Free T4 (Completed)    Urinalysis With Microscopic If Indicated (No Culture) - Urine, Clean Catch (Completed)    Uric Acid (Completed)    Hemoglobin A1c (Completed)    Peripheral Blood Smear (Completed)    Reticulocytes (Completed)    CBC (No Diff) (Completed)    Vitamin B12 (Completed)    Folate (Completed)    Ferritin (Completed)    Iron Profile (Completed)    Comprehensive Metabolic Panel (Completed)    PSA DIAGNOSTIC (Completed)            There are no Patient Instructions on file for this visit.    Follow Up:   Return in about 1 week (around 1/20/2025).        DO BAILEY Jones RD  [facility]  [address]  [facility] [address]  Fax [phone]  Phone [phone]    Disclaimer to patients: The [employer]st [employer] C[employer] Act makes medical notes like these available to patients in the interest of transparency. However, please be advised that this is still a medical document. It is intended as [id number] communication. Many sections may include medical language or jargon, abbreviations, and additional verbiage that are unfamiliar or confusing. In some ways it may come across as blunt, direct, or may be summarized in order to clearly and concisely communicate the most crucial information to medical professionals. It may also include mentions of conditions that are unlikely but considered as part of the differential diagnosis, including serious disorders. These are not always discussed at length at the time of appointment because their likelihood is so low, but may be included in a medical note to make it clear what has been considered and/or ruled out as part of a work-up. Medical documents are intended to carry relevant information, facts as evident, and the personal clinical opinion of the  physician. If you have any questions regarding this medical document, please bring them to the attention of the physician at your next scheduled appointment.

## 2025-01-14 ENCOUNTER — APPOINTMENT (OUTPATIENT)
Dept: CT IMAGING | Facility: HOSPITAL | Age: 70
End: 2025-01-14
Payer: MEDICARE

## 2025-01-14 ENCOUNTER — HOSPITAL ENCOUNTER (EMERGENCY)
Facility: HOSPITAL | Age: 70
Discharge: HOME | End: 2025-01-15
Attending: EMERGENCY MEDICINE
Payer: MEDICARE

## 2025-01-14 DIAGNOSIS — R10.33 PERIUMBILICAL ABDOMINAL PAIN: ICD-10-CM

## 2025-01-14 DIAGNOSIS — K52.9 CHRONIC DIARRHEA: ICD-10-CM

## 2025-01-14 DIAGNOSIS — E83.42 HYPOMAGNESEMIA: ICD-10-CM

## 2025-01-14 DIAGNOSIS — Z86.59 HISTORY OF ANXIETY: ICD-10-CM

## 2025-01-14 DIAGNOSIS — N39.0 URINARY TRACT INFECTION DUE TO STREPTOCOCCUS AGALACTIAE: ICD-10-CM

## 2025-01-14 DIAGNOSIS — R11.0 NAUSEA: ICD-10-CM

## 2025-01-14 DIAGNOSIS — Z79.4 TYPE 2 DIABETES MELLITUS WITH HYPERGLYCEMIA, WITH LONG-TERM CURRENT USE OF INSULIN: ICD-10-CM

## 2025-01-14 DIAGNOSIS — E11.65 TYPE 2 DIABETES MELLITUS WITH HYPERGLYCEMIA, WITH LONG-TERM CURRENT USE OF INSULIN: ICD-10-CM

## 2025-01-14 DIAGNOSIS — N39.0 RECURRENT UTI: Primary | ICD-10-CM

## 2025-01-14 DIAGNOSIS — K86.81 EXOCRINE PANCREATIC INSUFFICIENCY: ICD-10-CM

## 2025-01-14 DIAGNOSIS — R10.9 BILATERAL FLANK PAIN: ICD-10-CM

## 2025-01-14 DIAGNOSIS — B95.1 URINARY TRACT INFECTION DUE TO STREPTOCOCCUS AGALACTIAE: ICD-10-CM

## 2025-01-14 LAB
ALBUMIN SERPL-MCNC: 4 G/DL (ref 3.5–5.2)
ALBUMIN SERPL-MCNC: 4.3 G/DL (ref 3.5–5.2)
ALBUMIN/GLOB SERPL: 1.2 G/DL
ALBUMIN/GLOB SERPL: 1.3 G/DL
ALP SERPL-CCNC: 73 U/L (ref 39–117)
ALP SERPL-CCNC: 78 U/L (ref 39–117)
ALT SERPL W P-5'-P-CCNC: 17 U/L (ref 1–41)
ALT SERPL W P-5'-P-CCNC: 17 U/L (ref 1–41)
ANION GAP SERPL CALCULATED.3IONS-SCNC: 10.5 MMOL/L (ref 5–15)
ANION GAP SERPL CALCULATED.3IONS-SCNC: 12 MMOL/L (ref 5–15)
AST SERPL-CCNC: 16 U/L (ref 1–40)
AST SERPL-CCNC: 27 U/L (ref 1–40)
BACTERIA UR QL AUTO: ABNORMAL /HPF
BASOPHILS # BLD AUTO: 0.06 10*3/MM3 (ref 0–0.2)
BASOPHILS NFR BLD AUTO: 0.6 % (ref 0–1.5)
BILIRUB SERPL-MCNC: 0.3 MG/DL (ref 0–1.2)
BILIRUB SERPL-MCNC: 0.3 MG/DL (ref 0–1.2)
BILIRUB UR QL STRIP: NEGATIVE
BUN SERPL-MCNC: 15 MG/DL (ref 8–23)
BUN SERPL-MCNC: 16 MG/DL (ref 8–23)
BUN/CREAT SERPL: 11.3 (ref 7–25)
BUN/CREAT SERPL: 14.5 (ref 7–25)
CALCIUM SPEC-SCNC: 9.5 MG/DL (ref 8.6–10.5)
CALCIUM SPEC-SCNC: 9.6 MG/DL (ref 8.6–10.5)
CHLORIDE SERPL-SCNC: 103 MMOL/L (ref 98–107)
CHLORIDE SERPL-SCNC: 103 MMOL/L (ref 98–107)
CLARITY UR: ABNORMAL
CO2 SERPL-SCNC: 26.5 MMOL/L (ref 22–29)
CO2 SERPL-SCNC: 27 MMOL/L (ref 22–29)
COLOR UR: YELLOW
CREAT SERPL-MCNC: 1.1 MG/DL (ref 0.76–1.27)
CREAT SERPL-MCNC: 1.33 MG/DL (ref 0.76–1.27)
D-LACTATE SERPL-SCNC: 1.7 MMOL/L (ref 0.5–2)
DEPRECATED RDW RBC AUTO: 43 FL (ref 37–54)
EGFRCR SERPLBLD CKD-EPI 2021: 58.2 ML/MIN/1.73
EGFRCR SERPLBLD CKD-EPI 2021: 73.1 ML/MIN/1.73
EOSINOPHIL # BLD AUTO: 0.19 10*3/MM3 (ref 0–0.4)
EOSINOPHIL NFR BLD AUTO: 1.9 % (ref 0.3–6.2)
ERYTHROCYTE [DISTWIDTH] IN BLOOD BY AUTOMATED COUNT: 13.5 % (ref 12.3–15.4)
FERRITIN SERPL-MCNC: 50.4 NG/ML (ref 30–400)
FOLATE SERPL-MCNC: 9.22 NG/ML (ref 4.78–24.2)
GEN 5 1HR TROPONIN T REFLEX: 11 NG/L
GLOBULIN UR ELPH-MCNC: 3 GM/DL
GLOBULIN UR ELPH-MCNC: 3.5 GM/DL
GLUCOSE SERPL-MCNC: 162 MG/DL (ref 65–99)
GLUCOSE SERPL-MCNC: 253 MG/DL (ref 65–99)
GLUCOSE UR STRIP-MCNC: ABNORMAL MG/DL
HCT VFR BLD AUTO: 39.1 % (ref 37.5–51)
HGB BLD-MCNC: 12.4 G/DL (ref 13–17.7)
HGB UR QL STRIP.AUTO: ABNORMAL
HOLD SPECIMEN: NORMAL
IMM GRANULOCYTES # BLD AUTO: 0.05 10*3/MM3 (ref 0–0.05)
IMM GRANULOCYTES NFR BLD AUTO: 0.5 % (ref 0–0.5)
IRON 24H UR-MRATE: 89 MCG/DL (ref 59–158)
IRON SATN MFR SERPL: 20 % (ref 20–50)
KETONES UR QL STRIP: NEGATIVE
LAB AP CASE REPORT: NORMAL
LEUKOCYTE ESTERASE UR QL STRIP.AUTO: ABNORMAL
LIPASE SERPL-CCNC: 49 U/L (ref 13–60)
LYMPHOCYTES # BLD AUTO: 1.81 10*3/MM3 (ref 0.7–3.1)
LYMPHOCYTES NFR BLD AUTO: 18.5 % (ref 19.6–45.3)
MAGNESIUM SERPL-MCNC: 1.5 MG/DL (ref 1.6–2.4)
MCH RBC QN AUTO: 27.6 PG (ref 26.6–33)
MCHC RBC AUTO-ENTMCNC: 31.7 G/DL (ref 31.5–35.7)
MCV RBC AUTO: 87.1 FL (ref 79–97)
MONOCYTES # BLD AUTO: 0.75 10*3/MM3 (ref 0.1–0.9)
MONOCYTES NFR BLD AUTO: 7.6 % (ref 5–12)
NEUTROPHILS NFR BLD AUTO: 6.95 10*3/MM3 (ref 1.7–7)
NEUTROPHILS NFR BLD AUTO: 70.9 % (ref 42.7–76)
NITRITE UR QL STRIP: NEGATIVE
NRBC BLD AUTO-RTO: 0 /100 WBC (ref 0–0.2)
PATH REPORT.FINAL DX SPEC: NORMAL
PATHOLOGY REVIEW: YES
PH UR STRIP.AUTO: 7 [PH] (ref 5–8)
PLATELET # BLD AUTO: 338 10*3/MM3 (ref 140–450)
PMV BLD AUTO: 9.8 FL (ref 6–12)
POTASSIUM SERPL-SCNC: 4.5 MMOL/L (ref 3.5–5.2)
POTASSIUM SERPL-SCNC: 4.6 MMOL/L (ref 3.5–5.2)
PROT SERPL-MCNC: 7 G/DL (ref 6–8.5)
PROT SERPL-MCNC: 7.8 G/DL (ref 6–8.5)
PROT UR QL STRIP: ABNORMAL
PSA SERPL-MCNC: 2.81 NG/ML (ref 0–4)
RBC # BLD AUTO: 4.49 10*6/MM3 (ref 4.14–5.8)
RBC # UR STRIP: ABNORMAL /HPF
REF LAB TEST METHOD: ABNORMAL
SODIUM SERPL-SCNC: 140 MMOL/L (ref 136–145)
SODIUM SERPL-SCNC: 142 MMOL/L (ref 136–145)
SP GR UR STRIP: 1.02 (ref 1–1.03)
SQUAMOUS #/AREA URNS HPF: ABNORMAL /HPF
TESTOST SERPL-MCNC: 262 NG/DL (ref 193–740)
TIBC SERPL-MCNC: 444 MCG/DL (ref 298–536)
TRANSFERRIN SERPL-MCNC: 298 MG/DL (ref 200–360)
TROPONIN T NUMERIC DELTA: 2 NG/L
TROPONIN T SERPL HS-MCNC: 9 NG/L
TSH SERPL DL<=0.05 MIU/L-ACNC: 1.45 UIU/ML (ref 0.27–4.2)
URATE SERPL-MCNC: 5.2 MG/DL (ref 3.4–7)
UROBILINOGEN UR QL STRIP: ABNORMAL
VIT B12 BLD-MCNC: 1003 PG/ML (ref 211–946)
WBC # UR STRIP: ABNORMAL /HPF
WBC NRBC COR # BLD AUTO: 9.81 10*3/MM3 (ref 3.4–10.8)
WHOLE BLOOD HOLD COAG: NORMAL
WHOLE BLOOD HOLD SPECIMEN: NORMAL

## 2025-01-14 PROCEDURE — 80053 COMPREHEN METABOLIC PANEL: CPT | Performed by: EMERGENCY MEDICINE

## 2025-01-14 PROCEDURE — 25510000001 IOPAMIDOL 61 % SOLUTION: Performed by: EMERGENCY MEDICINE

## 2025-01-14 PROCEDURE — 99285 EMERGENCY DEPT VISIT HI MDM: CPT

## 2025-01-14 PROCEDURE — 85025 COMPLETE CBC W/AUTO DIFF WBC: CPT | Performed by: EMERGENCY MEDICINE

## 2025-01-14 PROCEDURE — 74177 CT ABD & PELVIS W/CONTRAST: CPT

## 2025-01-14 PROCEDURE — 87077 CULTURE AEROBIC IDENTIFY: CPT | Performed by: PHYSICIAN ASSISTANT

## 2025-01-14 PROCEDURE — 83690 ASSAY OF LIPASE: CPT | Performed by: EMERGENCY MEDICINE

## 2025-01-14 PROCEDURE — 96365 THER/PROPH/DIAG IV INF INIT: CPT

## 2025-01-14 PROCEDURE — 25810000003 SODIUM CHLORIDE 0.9 % SOLUTION: Performed by: PHYSICIAN ASSISTANT

## 2025-01-14 PROCEDURE — 81001 URINALYSIS AUTO W/SCOPE: CPT | Performed by: EMERGENCY MEDICINE

## 2025-01-14 PROCEDURE — 36415 COLL VENOUS BLD VENIPUNCTURE: CPT

## 2025-01-14 PROCEDURE — 96375 TX/PRO/DX INJ NEW DRUG ADDON: CPT

## 2025-01-14 PROCEDURE — 83735 ASSAY OF MAGNESIUM: CPT | Performed by: PHYSICIAN ASSISTANT

## 2025-01-14 PROCEDURE — 25010000002 MORPHINE PER 10 MG: Performed by: EMERGENCY MEDICINE

## 2025-01-14 PROCEDURE — 87086 URINE CULTURE/COLONY COUNT: CPT | Performed by: PHYSICIAN ASSISTANT

## 2025-01-14 PROCEDURE — 83605 ASSAY OF LACTIC ACID: CPT | Performed by: EMERGENCY MEDICINE

## 2025-01-14 PROCEDURE — 93005 ELECTROCARDIOGRAM TRACING: CPT | Performed by: PHYSICIAN ASSISTANT

## 2025-01-14 PROCEDURE — 96366 THER/PROPH/DIAG IV INF ADDON: CPT

## 2025-01-14 PROCEDURE — 25010000002 CEFTRIAXONE PER 250 MG: Performed by: PHYSICIAN ASSISTANT

## 2025-01-14 PROCEDURE — 87040 BLOOD CULTURE FOR BACTERIA: CPT | Performed by: PHYSICIAN ASSISTANT

## 2025-01-14 PROCEDURE — 25010000002 MAGNESIUM SULFATE 2 GM/50ML SOLUTION: Performed by: PHYSICIAN ASSISTANT

## 2025-01-14 PROCEDURE — 84484 ASSAY OF TROPONIN QUANT: CPT | Performed by: PHYSICIAN ASSISTANT

## 2025-01-14 RX ORDER — CEFUROXIME AXETIL 500 MG/1
500 TABLET ORAL 2 TIMES DAILY
Qty: 14 TABLET | Refills: 0 | Status: SHIPPED | OUTPATIENT
Start: 2025-01-14 | End: 2025-01-15

## 2025-01-14 RX ORDER — MORPHINE SULFATE 2 MG/ML
2 INJECTION, SOLUTION INTRAMUSCULAR; INTRAVENOUS ONCE
Status: COMPLETED | OUTPATIENT
Start: 2025-01-14 | End: 2025-01-14

## 2025-01-14 RX ORDER — SODIUM CHLORIDE 9 MG/ML
10 INJECTION, SOLUTION INTRAMUSCULAR; INTRAVENOUS; SUBCUTANEOUS AS NEEDED
Status: DISCONTINUED | OUTPATIENT
Start: 2025-01-14 | End: 2025-01-15 | Stop reason: HOSPADM

## 2025-01-14 RX ORDER — ONDANSETRON 4 MG/1
4 TABLET, ORALLY DISINTEGRATING ORAL EVERY 4 HOURS
Qty: 12 TABLET | Refills: 0 | Status: SHIPPED | OUTPATIENT
Start: 2025-01-14 | End: 2025-01-15

## 2025-01-14 RX ORDER — HYDROCODONE BITARTRATE AND ACETAMINOPHEN 5; 325 MG/1; MG/1
1 TABLET ORAL EVERY 4 HOURS PRN
Qty: 10 TABLET | Refills: 0 | Status: SHIPPED | OUTPATIENT
Start: 2025-01-14 | End: 2025-01-15

## 2025-01-14 RX ORDER — MAGNESIUM SULFATE HEPTAHYDRATE 40 MG/ML
2 INJECTION, SOLUTION INTRAVENOUS ONCE
Status: COMPLETED | OUTPATIENT
Start: 2025-01-14 | End: 2025-01-15

## 2025-01-14 RX ORDER — SODIUM CHLORIDE 0.9 % (FLUSH) 0.9 %
10 SYRINGE (ML) INJECTION AS NEEDED
Status: DISCONTINUED | OUTPATIENT
Start: 2025-01-14 | End: 2025-01-15 | Stop reason: HOSPADM

## 2025-01-14 RX ORDER — IOPAMIDOL 612 MG/ML
89 INJECTION, SOLUTION INTRAVASCULAR
Status: COMPLETED | OUTPATIENT
Start: 2025-01-14 | End: 2025-01-14

## 2025-01-14 RX ADMIN — MAGNESIUM SULFATE HEPTAHYDRATE 2 G: 2 INJECTION, SOLUTION INTRAVENOUS at 23:15

## 2025-01-14 RX ADMIN — SODIUM CHLORIDE 1000 MG: 900 INJECTION INTRAVENOUS at 23:15

## 2025-01-14 RX ADMIN — MORPHINE SULFATE 2 MG: 2 INJECTION, SOLUTION INTRAMUSCULAR; INTRAVENOUS at 22:41

## 2025-01-14 RX ADMIN — SODIUM CHLORIDE 1000 ML: 9 INJECTION, SOLUTION INTRAVENOUS at 20:38

## 2025-01-14 RX ADMIN — IOPAMIDOL 89 ML: 612 INJECTION, SOLUTION INTRAVENOUS at 21:10

## 2025-01-15 ENCOUNTER — SPECIALTY PHARMACY (OUTPATIENT)
Dept: NEUROLOGY | Facility: CLINIC | Age: 70
End: 2025-01-15
Payer: MEDICARE

## 2025-01-15 VITALS
SYSTOLIC BLOOD PRESSURE: 173 MMHG | BODY MASS INDEX: 29.73 KG/M2 | OXYGEN SATURATION: 94 % | RESPIRATION RATE: 16 BRPM | HEART RATE: 68 BPM | HEIGHT: 66 IN | WEIGHT: 185 LBS | DIASTOLIC BLOOD PRESSURE: 90 MMHG | TEMPERATURE: 98 F

## 2025-01-15 LAB
QT INTERVAL: 420 MS
QTC INTERVAL: 450 MS

## 2025-01-15 RX ORDER — ONDANSETRON 4 MG/1
4 TABLET, ORALLY DISINTEGRATING ORAL EVERY 4 HOURS
Qty: 12 TABLET | Refills: 0 | Status: SHIPPED | OUTPATIENT
Start: 2025-01-15 | End: 2025-01-22

## 2025-01-15 RX ORDER — HYDROCODONE BITARTRATE AND ACETAMINOPHEN 7.5; 325 MG/1; MG/1
1 TABLET ORAL ONCE
Status: COMPLETED | OUTPATIENT
Start: 2025-01-15 | End: 2025-01-15

## 2025-01-15 RX ORDER — CEFUROXIME AXETIL 500 MG/1
500 TABLET ORAL 2 TIMES DAILY
Qty: 14 TABLET | Refills: 0 | Status: SHIPPED | OUTPATIENT
Start: 2025-01-15 | End: 2025-01-22 | Stop reason: SINTOL

## 2025-01-15 RX ORDER — HYDROCODONE BITARTRATE AND ACETAMINOPHEN 5; 325 MG/1; MG/1
1 TABLET ORAL EVERY 4 HOURS PRN
Qty: 10 TABLET | Refills: 0 | Status: SHIPPED | OUTPATIENT
Start: 2025-01-15

## 2025-01-15 RX ADMIN — HYDROCODONE BITARTRATE AND ACETAMINOPHEN 1 TABLET: 7.5; 325 TABLET ORAL at 00:38

## 2025-01-15 NOTE — PROGRESS NOTES
Specialty Pharmacy Patient Management Program  One-Time Clinical Outreach     Israel Reyna is a 69 y.o. male seen by a Neurology provider for migraines and enrolled in the Neurology Patient Management program offered by [facility].      [facility] approved renewal of Em[employer]. Patient is enrolled until the end of the 2025 calendar year.         Silvia Carr PharmD, NAY  Clinic Specialty Pharmacist, Neurology  1/15/2025  09:48 EST

## 2025-01-15 NOTE — ED PROVIDER NOTES
Subjective   History of Present Illness  This is a 69-year-old male that presents to the ER with 3-week history of waxing and waning periumbilical abdominal pain.  Patient was seen by PCP, Elio Moore, earlier in December, on 2024 and diagnosed with UTI.  Urine culture grew out Streptococcus agalactia.  Patient was prescribed Augmentin 875 mg twice daily, but he could not tolerate due to chronic bowel issues.  Patient tells me that he had 3 bowel surgeries in the first 5 weeks of his life as an .  He has chronic bowel incontinence and no sphincter control.  He actually has a stimulator on his hip to tell him when he has to use the bathroom because his surgeries did not work.  Patient reports some intermittent loose stools and he had some loose stools yesterday but no bowel movement today.  He does report some anorexia and nausea today but denies any vomiting.  This afternoon, patient developed some bilateral flank and CVA pain, left greater than right.  He also has had some urinary frequency and urgency.  He is concerned because he was unable to take the Augmentin in December as he was prescribed.  Patient denies any rhinorrhea, nasal congestion, or cough.  He says that periumbilical pain comes and goes and it is sharp and stabbing in nature.  Previous abdominal surgeries include the 3 major bowel surgeries during the first 5 weeks of his life as well as stimulator to help notify him when he needs to have a bowel movement.  Past medical history is also significant for type 2 diabetes mellitus, anxiety/depression, hyperlipidemia, chronic bowel incontinence,  exocrine pancreatic insufficiency, history of migraine headaches, GERD, coronary artery disease, dementia, and erectile dysfunction, as well as CHF.  Patient denies any daily aspirin use or frequent NSAID use or other chronic anticoagulation.    History provided by:  Patient  Abdominal Pain  Pain location:  Periumbilical  Pain quality: sharp and  "stabbing    Pain radiates to:  Does not radiate  Duration:  3 weeks  Chronicity:  New  Context: previous surgery (Pt had 3 major bowel surgeries during the first 5 weeks of life. Pt has a stimulator on hip to tell him when he has to use the bathroom because surgeries didn't work.)    Context comment:  3 wk h/o periumbilical abd pain; sharp, stabbing, and intermittent. Urinary frequency, urgency, and \"kidney pain\". Anorexia and nausea. Decreased sphincter tone since childhood. Stools were loose yesterday but no BM today.  Relieved by:  Nothing  Worsened by:  Nothing  Ineffective treatments:  None tried  Associated symptoms: anorexia, diarrhea and nausea    Associated symptoms: no belching, no chest pain, no chills, no constipation, no cough, no dysuria, no fatigue, no fever, no flatus, no hematemesis, no hematochezia, no hematuria, no melena, no shortness of breath, no sore throat and no vomiting    Associated symptoms comment:  Bilateral CVA pain  Risk factors comment:  History of UTI on 12/2/24, which culture grew out Strep Agalactia. PCP prescribed Augmentin, but pt couldn't finish due to worsening diarrhea. History of chronic diarrhea due to exocrine pancreatic insufficiency.      Review of Systems   Constitutional:  Positive for appetite change. Negative for chills, fatigue and fever.   HENT: Negative.  Negative for congestion, ear pain, postnasal drip, rhinorrhea, sinus pressure, sinus pain, sneezing and sore throat.    Respiratory: Negative.  Negative for cough and shortness of breath.    Cardiovascular: Negative.  Negative for chest pain.   Gastrointestinal:  Positive for abdominal pain (Periumbilical abdominal pain, sharp and stabbing and intermittent), anorexia, diarrhea and nausea. Negative for constipation, flatus, hematemesis, hematochezia, melena and vomiting.        History of chronic bowel incontinence and 3 major bowel surgeries during patient's first 5 weeks of life.  He has a stimulator that helps " tell him when he has to have a bowel movement.  He had loose stool yesterday but no BM today and no hematochezia or melena.   Genitourinary:  Positive for flank pain (Bilateral flank pain), frequency and urgency. Negative for dysuria and hematuria.        Diagnosed with UTI on 12/2/2024 per PCP and urine culture grew out Streptococcus agalactia.  Patient prescribed Augmentin, but he could not tolerate due to increased loose stools with chronic history of bowel incontinence.  Patient has been having bilateral flank and CVA pain waxing and waning over the last 3 weeks as well as urinary urgency and frequency.   Musculoskeletal:  Positive for back pain (Bilateral CVA pain, left greater than right).   Neurological: Negative.  Negative for dizziness, syncope, weakness and headaches.   Hematological: Negative.  Does not bruise/bleed easily.   All other systems reviewed and are negative.      Past Medical History:   Diagnosis Date    Allergic     Anxiety     Bowel incontinence     w/ history of Stimulator Device     CAD (coronary artery disease)     Carotid artery occlusion     Cataract     Chest pain     CHF (congestive heart failure)     Cluster headache     Colon polyp     Dementia     Depression     Diabetes mellitus, type 2     Diabetic peripheral neuropathy     Dyslipidemia     Erectile dysfunction     Fatigue     GERD (gastroesophageal reflux disease)     Headache, tension-type     HL (hearing loss) July 2023    Im wearing hearing aids. Dr. 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    Darvon [Propoxyphene] Itching     Angioedema    itching    Talwin [Pentazocine] Rash     Angioedema    itching       Past Surgical History:   Procedure Laterality Date    CARDIAC CATHETERIZATION      CARDIAC CATHETERIZATION N/A 10/14/2016    Procedure: Left Heart Cath;  Surgeon: Santiago Garner MD;  Location:  [facility];  Service:     COLONOSCOPY  02/25/2022    Dr. Graves-recommended 3 year repeat    ENDOSCOPY  02/03/2022    Dr. Graves    EYE SURGERY  Retina    Dr. Askew    GASTRIC STIMULATOR IMPLANT SURGERY      SMALL INTESTINE SURGERY      unspecified, x3 as an infant     TESTICLE SURGERY      UPPER GASTROINTESTINAL ENDOSCOPY         Family History   Problem Relation Age of Onset    Heart failure Mother     Diabetes Mother     Alzheimer's disease Mother     Arthritis Mother     Heart disease Mother     Heart attack Father     Kidney disease Father     Cancer Father     Prostate cancer Father     Heart disease Father     Hypertension Father     Cancer Sister     Breast cancer Sister     Diabetes Sister     Heart disease Sister     Arrhythmia Sister     Stroke Sister     Diabetes Brother     Heart disease Brother     Cancer Brother     Liver disease Brother     Kidney disease Brother     Dementia Paternal Uncle     Alzheimer's disease Maternal Grandmother     Liver cancer Maternal Grandfather     Liver disease Maternal Grandfather     Stroke Sister     Colon cancer Neg Hx        Social History     Socioeconomic History    Marital status:    Tobacco Use    Smoking status: Never     Passive exposure: Never    Smokeless tobacco: Never   Vaping Use    Vaping status: Never Used   Substance and Sexual Activity    Alcohol use: Never    Drug use: Never    Sexual activity: Yes     Partners: Female           Objective   Physical Exam  Vitals and nursing note reviewed.   Constitutional:       General: He is not in acute distress.     Appearance:  Normal appearance. He is not ill-appearing, toxic-appearing or diaphoretic.      Comments: No acute sign of pain or distress.  Nontoxic   HENT:      Head: Normocephalic and atraumatic.      Ears:      Comments: Oral mucous membranes are still moist     Nose: Nose normal.      Mouth/Throat:      Mouth: Mucous membranes are moist.      Pharynx: Oropharynx is clear.   Eyes:      Extraocular Movements: Extraocular movements intact.      Conjunctiva/sclera: Conjunctivae normal.      Pupils: Pupils are equal, round, and reactive to light.   Cardiovascular:      Rate and Rhythm: Normal rate and regular rhythm. No extrasystoles are present.     Pulses: Normal pulses.      Heart sounds: Normal heart sounds.      Comments: Regular rate and rhythm.  No ectopy  Pulmonary:      Effort: Pulmonary effort is normal.      Breath sounds: Normal breath sounds.      Comments: Lungs are clear to auscultation bilaterally  Abdominal:      General: Bowel sounds are normal. There is no distension.      Palpations: Abdomen is soft.      Tenderness: There is abdominal tenderness in the periumbilical area. There is right CVA tenderness and left CVA tenderness. There is no guarding or rebound. Negative signs include Sanders's sign, Rovsing's sign and McBurney's sign.      Hernia: No hernia is present. There is no hernia in the umbilical area or ventral area.      Comments: Abdomen soft with mild distention.  Active bowel sounds in all 4 quadrants.  Moderate tenderness to periumbilical region as well as bilateral flank and bilateral CVA region, left greater than right.  No particular lower abdominal tenderness.  Negative McBurney sign.  No umbilical or ventral hernia.  Abdominal exam is nonsurgical.   Musculoskeletal:         General: Normal range of motion.      Cervical back: Normal range of motion and neck supple.      Right lower leg: No edema.      Left lower leg: No edema.   Skin:     General: Skin is warm and dry.   Neurological:       General: No focal deficit present.      Mental Status: He is alert and oriented to person, place, and time.      Cranial Nerves: Cranial nerves 2-12 are intact.      Sensory: Sensation is intact.      Motor: Motor function is intact.      Coordination: Coordination is intact.      Gait: Gait is intact.      Comments: Alert and oriented x 3.  Neuro intact and nonfocal   Psychiatric:         Mood and Affect: Mood and affect normal.         Speech: Speech normal.         Behavior: Behavior normal. Behavior is cooperative.         Thought Content: Thought content normal.         Cognition and Memory: Cognition and memory normal.         Judgment: Judgment normal.      Comments: Normal mood and affect         Procedures           ED Course  ED Course as of 01/15/25 0027   Tue Jan 14, 2025   2227  I personally interpreted EKG which shows sinus rhythm.  No evidence of ectopy, arrhythmia, or ischemia.  CBC shows normal white blood cell count at 9000.  Chemistries reveal serum glucose 253, the BUN and creatinine were normal at 16 and 1.10.  LFTs were normal and lactic acid is 1.7.  High-sensitivity troponin was 9.  Magnesium was slightly decreased at 1.5.  Urinalysis reveals glucosuria, moderate leukocytes, too numerous to count white blood cells and 4+ bacteria.  Lipase is 49.  CT of the abdomen/pelvis with contrast reveals left renal cortical atrophy which appears similar to prior and no hydronephrosis.  Symmetric appearing renal enhancement and no stranding at this time.  Impression did show circumferential bladder wall thickening.  Pyelonephritis is a clinical diagnosis and cannot be excluded by this imaging.  Patient has had 2 previous urine cultures that grew out Streptococcus agalactia, the most recent was 12/2/2024.  Patient was unable to take his antibiotic and has been having increased flank and CVA pain, left greater than right as well as nausea and urinary urgency and frequency.  Discussed previous urine culture  results with hospital pharmacist.  We will give a dose of Rocephin 1 g IV as well as magnesium 2 g IV and discharge patient with Ceftin 500 mg by mouth twice daily x 7 days.  Recommend close follow-up with Dr. Elio Moore for recheck.  Encouraged fluids and avoid caffeine.  Abdominal exam is benign. [FC]   2319 Upon reassessment, updated patient on all results and need for close outpatient follow-up with PCP.  He requested something for pain on discharge for bilateral flank and abdominal pain.  Discussed the case with Dr. Dalal,  ER attending physician, and we will prescribe short course of Norco 5 mg by mouth every 4-6 hours as needed for moderate pain dispense 10 no refills as well as Rx for Ceftin 500 mg by mouth twice daily x 7 days and Rx for Zofran 4 mg ODT every 4-6 hours as needed for nausea.  Increase water intake and avoid caffeine and follow-up closely with Dr. Elio Moore, PCP in the next 48 hours for recheck. [FC]      ED Course User Index  [FC] Helen Richardson, ADE                                           Recent Results (from the past 24 hours)   Comprehensive Metabolic Panel    Collection Time: 01/14/25  7:57 PM    Specimen: Blood   Result Value Ref Range    Glucose 253 (H) 65 - 99 mg/dL    BUN 16 8 - 23 mg/dL    Creatinine 1.10 0.76 - 1.27 mg/dL    Sodium 142 136 - 145 mmol/L    Potassium 4.5 3.5 - 5.2 mmol/L    Chloride 103 98 - 107 mmol/L    CO2 27.0 22.0 - 29.0 mmol/L    Calcium 9.6 8.6 - 10.5 mg/dL    Total Protein 7.0 6.0 - 8.5 g/dL    Albumin 4.0 3.5 - 5.2 g/dL    ALT (SGPT) 17 1 - 41 U/L    AST (SGOT) 16 1 - 40 U/L    Alkaline Phosphatase 73 39 - 117 U/L    Total Bilirubin 0.3 0.0 - 1.2 mg/dL    Globulin 3.0 gm/dL    A/G Ratio 1.3 g/dL    BUN/Creatinine Ratio 14.5 7.0 - 25.0    Anion Gap 12.0 5.0 - 15.0 mmol/L    eGFR 73.1 >60.0 mL/min/1.73   Lipase    Collection Time: 01/14/25  7:57 PM    Specimen: Blood   Result Value Ref Range    Lipase 49 13 - 60 U/L   Urinalysis With Microscopic If  Indicated (No Culture) - Urine, Clean Catch    Collection Time: 01/14/25  7:57 PM    Specimen: Urine, Clean Catch   Result Value Ref Range    Color, UA Yellow Yellow, Straw    Appearance, UA Turbid (A) Clear    pH, UA 7.0 5.0 - 8.0    Specific Gravity, UA 1.019 1.001 - 1.030    Glucose,  mg/dL (2+) (A) Negative    Ketones, UA Negative Negative    Bilirubin, UA Negative Negative    Blood, UA Trace (A) Negative    Protein,  mg/dL (2+) (A) Negative    Leuk Esterase, UA Moderate (2+) (A) Negative    Nitrite, UA Negative Negative    Urobilinogen, UA 1.0 E.U./dL 0.2 - 1.0 E.U./dL   Lactic Acid, Plasma    Collection Time: 01/14/25  7:57 PM    Specimen: Blood   Result Value Ref Range    Lactate 1.7 0.5 - 2.0 mmol/L   Green Top (Gel)    Collection Time: 01/14/25  7:57 PM   Result Value Ref Range    Extra Tube Hold for add-ons.    Lavender Top    Collection Time: 01/14/25  7:57 PM   Result Value Ref Range    Extra Tube hold for add-on    Gold Top - SST    Collection Time: 01/14/25  7:57 PM   Result Value Ref Range    Extra Tube Hold for add-ons.    Gray Top    Collection Time: 01/14/25  7:57 PM   Result Value Ref Range    Extra Tube Hold for add-ons.    Light Blue Top    Collection Time: 01/14/25  7:57 PM   Result Value Ref Range    Extra Tube Hold for add-ons.    CBC Auto Differential    Collection Time: 01/14/25  7:57 PM    Specimen: Blood   Result Value Ref Range    WBC 9.81 3.40 - 10.80 10*3/mm3    RBC 4.49 4.14 - 5.80 10*6/mm3    Hemoglobin 12.4 (L) 13.0 - 17.7 g/dL    Hematocrit 39.1 37.5 - 51.0 %    MCV 87.1 79.0 - 97.0 fL    MCH 27.6 26.6 - 33.0 pg    MCHC 31.7 31.5 - 35.7 g/dL    RDW 13.5 12.3 - 15.4 %    RDW-SD 43.0 37.0 - 54.0 fl    MPV 9.8 6.0 - 12.0 fL    Platelets 338 140 - 450 10*3/mm3    Neutrophil % 70.9 42.7 - 76.0 %    Lymphocyte % 18.5 (L) 19.6 - 45.3 %    Monocyte % 7.6 5.0 - 12.0 %    Eosinophil % 1.9 0.3 - 6.2 %    Basophil % 0.6 0.0 - 1.5 %    Immature Grans % 0.5 0.0 - 0.5 %     Neutrophils, Absolute 6.95 1.70 - 7.00 10*3/mm3    Lymphocytes, Absolute 1.81 0.70 - 3.10 10*3/mm3    Monocytes, Absolute 0.75 0.10 - 0.90 10*3/mm3    Eosinophils, Absolute 0.19 0.00 - 0.40 10*3/mm3    Basophils, Absolute 0.06 0.00 - 0.20 10*3/mm3    Immature Grans, Absolute 0.05 0.00 - 0.05 10*3/mm3    nRBC 0.0 0.0 - 0.2 /100 WBC   High Sensitivity Troponin T    Collection Time: 01/14/25  7:57 PM    Specimen: Blood   Result Value Ref Range    HS Troponin T 9 <22 ng/L   Magnesium    Collection Time: 01/14/25  7:57 PM    Specimen: Blood   Result Value Ref Range    Magnesium 1.5 (L) 1.6 - 2.4 mg/dL   Urinalysis, Microscopic Only - Urine, Clean Catch    Collection Time: 01/14/25  7:57 PM    Specimen: Urine, Clean Catch   Result Value Ref Range    RBC, UA 3-5 (A) None Seen, 0-2 /HPF    WBC, UA Too Numerous to Count (A) None Seen, 0-2 /HPF    Bacteria, UA 4+ (A) None Seen, Trace /HPF    Squamous Epithelial Cells, UA 3-6 (A) None Seen, 0-2 /HPF    Methodology Manual Light Microscopy    ECG 12 Lead Other; weakness    Collection Time: 01/14/25  8:45 PM   Result Value Ref Range    QT Interval 420 ms    QTC Interval 450 ms   High Sensitivity Troponin T 1Hr    Collection Time: 01/14/25 11:07 PM    Specimen: Blood   Result Value Ref Range    HS Troponin T 11 <22 ng/L    Troponin T Numeric Delta 2 Abnormal if >/=3 ng/L     Note: In addition to lab results from this visit, the labs listed above may include labs taken at another facility or during a different encounter within the last 24 hours. Please correlate lab times with ED admission and discharge times for further clarification of the services performed during this visit.    CT Abdomen Pelvis With Contrast   Final Result   Impression:   Circumferential bladder wall thickening. Recommend correlation with urinalysis to assess for cystitis. Pyelonephritis is a clinical diagnosis and cannot be excluded by imaging.      Electronically Signed: Sharan Sahni     1/14/2025 9:30 PM  EST     Workstation ID: [id number]        Vitals:    01/14/25 1935 01/14/25 2017 01/14/25 2032 01/14/25 2128   BP:  168/80 157/75 172/80   Pulse:   71 70   Resp:       Temp: 98 °F (36.7 °C)      TempSrc: Oral      SpO2:   95% 96%   Weight:       Height:         Medications   Sodium Chloride (PF) 0.9 % 10 mL (has no administration in time range)   sodium chloride 0.9 % flush 10 mL (has no administration in time range)   HYDROcodone-acetaminophen (NORCO) 7.5-325 MG per tablet 1 tablet (has no administration in time range)   sodium chloride 0.9 % bolus 1,000 mL (0 mL Intravenous Stopped 1/14/25 2307)   iopamidol (ISOVUE-300) 61 % injection 89 mL (89 mL Intravenous Given 1/14/25 2110)   morphine injection 2 mg (2 mg Intravenous Given 1/14/25 2241)   magnesium sulfate 2g/50 mL (PREMIX) infusion (2 g Intravenous New Bag 1/14/25 2315)   cefTRIAXone (ROCEPHIN) 1,000 mg in sodium chloride 0.9 % 100 mL MBP (1,000 mg Intravenous New Bag 1/14/25 2315)     ECG/EMG Results (last 24 hours)       Procedure Component Value Units Date/Time    ECG 12 Lead Other; weakness [[id number]] Collected: 01/14/25 2045     Updated: 01/14/25 2044     QT Interval 420 ms      QTC Interval 450 ms     Narrative:      Test Reason : Other~  Blood Pressure :   */*   mmHG  Vent. Rate :  69 BPM     Atrial Rate :  69 BPM     P-R Int : 154 ms          QRS Dur : 104 ms      QT Int : 420 ms       P-R-T Axes :  -4  15  18 degrees    QTcB Int : 450 ms    Normal sinus rhythm  Normal ECG  When compared with ECG of 14-Oct-2016 12:38,  No significant change was found    Referred By: EDDOC           Confirmed By:           ECG 12 Lead Other; weakness   Preliminary Result   Test Reason : Other~   Blood Pressure :   */*   mmHG   Vent. Rate :  69 BPM     Atrial Rate :  69 BPM      P-R Int : 154 ms          QRS Dur : 104 ms       QT Int : 420 ms       P-R-T Axes :  -4  15  18 degrees     QTcB Int : 450 ms      Normal sinus rhythm   Normal ECG   When compared with ECG of  14-Oct-2016 12:38,   No significant change was found      Referred By: EDDOC           Confirmed By:               CARLOS ALBERTO query complete. Treatment plan to include limited course of prescribed  controlled substance. Risks including addiction, benefits, and alternatives presented to patient.     CARLOS ALBERTO reviewed by Wesley Dalal DO       Medical Decision Making      Final diagnoses:   Recurrent UTI   Bilateral flank pain   Nausea   Chronic diarrhea   Periumbilical abdominal pain   Exocrine pancreatic insufficiency   Urinary tract infection due to Streptococcus agalactiae   Type 2 diabetes mellitus with hyperglycemia, with long-term current use of insulin   Hypomagnesemia   History of anxiety       ED Disposition  ED Disposition       ED Disposition   Discharge    Condition   Stable    Comment   --               Elio Moore DO  [address] [facility] [id number] [phone]    Schedule an appointment as soon as possible for a visit in 2 days  Close PCP follow-up for recheck    [facility] EMERGENCY DEPARTMENT  [address] [facility] [address] [phone]    If symptoms worsen         Medication List        New Prescriptions      cefuroxime 500 MG tablet  Commonly known as: CEFTIN  Take 1 tablet by mouth 2 (Two) Times a Day.     HYDROcodone-acetaminophen 5-325 MG per tablet  Commonly known as: NORCO  Take 1 tablet by mouth Every 4 (Four) Hours As Needed for Moderate Pain.     ondansetron ODT 4 MG disintegrating tablet  Commonly known as: ZOFRAN-ODT  Place 1 tablet on the tongue Every 4 (Four) Hours.            Changed      pregabalin 75 MG capsule  Commonly known as: Lyrica  Take 1 capsule by mouth 3 (Three) Times a Day.  What changed:   when to take this  additional instructions               Where to Get Your Medications        These medications were sent to [facility] [address] [phone] [facility] [phone]  [place]  [address] [facility] [address]      Phone: [phone]   cefuroxime 500 MG tablet  HYDROcodone-acetaminophen 5-325 MG per tablet  ondansetron ODT 4 MG disintegrating tablet            Helen Richardson PA-C  01/14/25 2337       Helen Richardson PA-C  01/15/25 4165

## 2025-01-15 NOTE — DISCHARGE INSTRUCTIONS
ER evaluation reveals persistent urinary tract infection with too numerous to count white blood cells, moderate leukocytes, and 4+ bacteria.  Urine cultures in the past have grown out Streptococcus agalactia, and repeat urine culture is now in process.  We gave patient IV Rocephin and will prescribe Ceftin 500 mg by mouth twice daily x 7 days, which should not cause any diarrhea.  Encourage water intake and avoid caffeine.  CT scan revealed no pyelonephritis or stranding or infectious findings on either kidney and no other acute infectious or inflammatory process.  CBC showed normal white blood cell count and kidney function was normal and lactic acid, inflammatory marker in the blood was normal.  Patient did have hyperglycemia with serum glucose of 253.  Recommend tight control on sugars over the next few days.  We will prescribe short course of Norco 5 mg by mouth every 4-6 hours as needed for moderate pain dispense 10 no refills and Zofran 4 mg ODT every 4-6 hours as needed for nausea.  Magnesium was slightly decreased at 1.5 with normal level being 1.6 and we gave IV magnesium during the ER course.  Recommend close PCP follow-up with Dr. Elio Moore in the next 48 hours.  Return to the ER if worsening symptoms.

## 2025-01-16 ENCOUNTER — TELEPHONE (OUTPATIENT)
Dept: FAMILY MEDICINE CLINIC | Facility: CLINIC | Age: 70
End: 2025-01-16
Payer: MEDICARE

## 2025-01-16 DIAGNOSIS — N39.0 URINARY TRACT INFECTION WITHOUT HEMATURIA, SITE UNSPECIFIED: Primary | ICD-10-CM

## 2025-01-16 LAB — BACTERIA SPEC AEROBE CULT: NORMAL

## 2025-01-16 RX ORDER — LEVOFLOXACIN 750 MG/1
750 TABLET, FILM COATED ORAL DAILY
Qty: 5 TABLET | Refills: 0 | Status: SHIPPED | OUTPATIENT
Start: 2025-01-16 | End: 2025-01-21

## 2025-01-16 NOTE — TELEPHONE ENCOUNTER
Caller: Israel Reyna     Relationship: PATIENT    Best call back number: [phone]    What is your medical concern? ANTIBIOTIC,     cefuroxime (CEFTIN) 500 MG tablet   TWICE PER DAY   HAS CAUSED SEVERE DIARRHEA SO HE STOPPED TAKING IT TODAY  PATIENT WAS DIAGNOSED WITH A SEVERE UTI AT [facility] ED ON 1/14/25.  HAS ONLY TAKEN 2 PILLS.    How long has this issue been going on? 1/15/25    Is your provider already aware of this issue? NO    Have you been treated for this issue? NO.    PATIENT WOULD LIKE YOU TO CALL HIM TO DISCUSS DIFFERENT PLAN OF TREATMENT. HE DOESN'T WANT TO GET SEPSIS. [facility] PROVIDER ADVISED MAY BE.    HE WAS SEEN AT [facility] ON 1/14/25 PRIOR TO ER. THEY SENT HIM TO ER.    PLEASE REVIEW LABS IN CHART.

## 2025-01-16 NOTE — TELEPHONE ENCOUNTER
Unable to reach Israel Reyna by phone or leave message.    Hub may relay message and document.    Elio Moore, DO34 minutes ago (11:53 AM)       Sent alternative antibiotic. Stop ceftin.

## 2025-01-17 LAB — PYRIDOXAL PHOS SERPL-MCNC: 7.8 UG/L (ref 3.4–65.2)

## 2025-01-17 NOTE — TELEPHONE ENCOUNTER
Name: Israel Reyna      Relationship: Self      Best Callback Number: [phone]      HUB PROVIDED THE RELAY MESSAGE FROM THE OFFICE      PATIENT: VOICED UNDERSTANDING AND HAS NO FURTHER QUESTIONS AT THIS TIME    ADDITIONAL INFORMATION:

## 2025-01-19 LAB
BACTERIA SPEC AEROBE CULT: NORMAL
BACTERIA SPEC AEROBE CULT: NORMAL

## 2025-01-20 LAB — VIT B1 BLD-SCNC: 112.5 NMOL/L (ref 66.5–200)

## 2025-01-21 LAB
NIACIN SERPL-MCNC: <5 NG/ML (ref 0–5)
NICOTINAMIDE SERPL-MCNC: 13.5 NG/ML (ref 5.2–72.1)

## 2025-01-22 ENCOUNTER — OFFICE VISIT (OUTPATIENT)
Dept: FAMILY MEDICINE CLINIC | Facility: CLINIC | Age: 70
End: 2025-01-22
Payer: MEDICARE

## 2025-01-22 VITALS
HEIGHT: 66 IN | WEIGHT: 194.6 LBS | HEART RATE: 82 BPM | SYSTOLIC BLOOD PRESSURE: 142 MMHG | BODY MASS INDEX: 31.27 KG/M2 | OXYGEN SATURATION: 98 % | DIASTOLIC BLOOD PRESSURE: 78 MMHG

## 2025-01-22 DIAGNOSIS — K52.9 CHRONIC DIARRHEA: ICD-10-CM

## 2025-01-22 DIAGNOSIS — N39.0 URINARY TRACT INFECTION WITHOUT HEMATURIA, SITE UNSPECIFIED: Primary | ICD-10-CM

## 2025-01-22 DIAGNOSIS — K86.81 EXOCRINE PANCREATIC INSUFFICIENCY: ICD-10-CM

## 2025-01-22 LAB
BILIRUB BLD-MCNC: NEGATIVE MG/DL
CLARITY, POC: CLEAR
COLOR UR: YELLOW
EXPIRATION DATE: ABNORMAL
GLUCOSE UR STRIP-MCNC: NEGATIVE MG/DL
KETONES UR QL: NEGATIVE
LEUKOCYTE EST, POC: NEGATIVE
Lab: ABNORMAL
NITRITE UR-MCNC: NEGATIVE MG/ML
PH UR: 6 [PH] (ref 5–8)
PROT UR STRIP-MCNC: ABNORMAL MG/DL
RBC # UR STRIP: NEGATIVE /UL
SP GR UR: 1.02 (ref 1–1.03)
UROBILINOGEN UR QL: ABNORMAL

## 2025-01-22 PROCEDURE — 87086 URINE CULTURE/COLONY COUNT: CPT | Performed by: FAMILY MEDICINE

## 2025-01-22 RX ORDER — ONDANSETRON 8 MG/1
8 TABLET, ORALLY DISINTEGRATING ORAL EVERY 8 HOURS PRN
Qty: 30 TABLET | Refills: 2 | Status: SHIPPED | OUTPATIENT
Start: 2025-01-22

## 2025-01-22 RX ORDER — PANCRELIPASE 36000; 180000; 114000 [USP'U]/1; [USP'U]/1; [USP'U]/1
36000 CAPSULE, DELAYED RELEASE PELLETS ORAL
Qty: 270 CAPSULE | Refills: 0 | Status: SHIPPED | OUTPATIENT
Start: 2025-01-22

## 2025-01-22 RX ORDER — PANCRELIPASE 36000; 180000; 114000 [USP'U]/1; [USP'U]/1; [USP'U]/1
36000 CAPSULE, DELAYED RELEASE PELLETS ORAL
Qty: 270 CAPSULE | Refills: 0 | Status: SHIPPED | OUTPATIENT
Start: 2025-01-22 | End: 2025-01-22 | Stop reason: SDUPTHER

## 2025-01-22 RX ORDER — CEFTRIAXONE 500 MG/1
500 INJECTION, POWDER, FOR SOLUTION INTRAMUSCULAR; INTRAVENOUS ONCE
Status: COMPLETED | OUTPATIENT
Start: 2025-01-22 | End: 2025-01-22

## 2025-01-22 RX ADMIN — CEFTRIAXONE 500 MG: 500 INJECTION, POWDER, FOR SOLUTION INTRAMUSCULAR; INTRAVENOUS at 10:58

## 2025-01-22 NOTE — PROGRESS NOTES
Established Patient Office Visit      Patient Name: Israel Reyna  : 1955   MRN: [id number]   Care Team: Patient Care Team:  Elio Moore DO as PCP - General (Family Medicine)  Cyn Skinner RN as Ambulatory  (Population Health)  José Miguel Bautista MD as Consulting Physician (Endocrinology)    Chief Complaint:    Chief Complaint   Patient presents with    Hospital Follow Up Visit     Bad abdominal pain. Went to  and they sent him immediately to ER. Had infection in kidneys and stomach. Had 2 IV's of antibiotics. States yesterday his stomach began hurting again. He is nauseas.        History of Present Illness: Israel Reyna is a 70 y.o. male who is here today for chief complaint.    HPI    Still has been having left kidney pain. Urine culture in ER was normal. He received 2 doses of IV antibiotics.    Changed from ceftin to levofloxacin after he didn't tolerate.     This patient is accompanied by their self who contributes to the history of their care.    The following portions of the patient's history were reviewed and updated as appropriate: allergies, current medications, past family history, past medical history, past social history, past surgical history and problem list.    Subjective      Review of Systems:   Review of Systems - See HPI    Past Medical History:   Past Medical History:   Diagnosis Date    Allergic     Anxiety     Bowel incontinence     w/ history of Stimulator Device     CAD (coronary artery disease)     Carotid artery occlusion     Cataract     Chest pain     CHF (congestive heart failure)     Cluster headache     Colon polyp     Dementia     Depression     Diabetes mellitus, type 2     Diabetic peripheral neuropathy     Dyslipidemia     Erectile dysfunction     Fatigue     GERD (gastroesophageal reflux disease)     Headache, tension-type     HL (hearing loss) 2023    Im wearing hearing aids. Dr. Graves (sp)    Hypertension      Kidney stone     Memory loss     Mental status change     Migraine     Complex     Migraine headache     Myocardial infarction Several years ago    Renal insufficiency     Shingles     Sleep apnea     SOB (shortness of breath)     Stroke     Was told tia go with hemiphlegic migraines    Vision loss        Past Surgical History:   Past Surgical History:   Procedure Laterality Date    CARDIAC CATHETERIZATION      CARDIAC CATHETERIZATION N/A 10/14/2016    Procedure: Left Heart Cath;  Surgeon: Santiago Garner MD;  Location: [facility] CATH INVASIVE LOCATION;  Service:     COLONOSCOPY  02/25/2022    Dr. Graves-recommended 3 year repeat    ENDOSCOPY  02/03/2022    Dr. Graves    EYE SURGERY  Retina    Dr. Askew    GASTRIC STIMULATOR IMPLANT SURGERY      SMALL INTESTINE SURGERY      unspecified, x3 as an infant     TESTICLE SURGERY      UPPER GASTROINTESTINAL ENDOSCOPY         Family History:   Family History   Problem Relation Age of Onset    Heart failure Mother     Diabetes Mother     Alzheimer's disease Mother     Arthritis Mother     Heart disease Mother     Heart attack Father     Kidney disease Father     Cancer Father     Prostate cancer Father     Heart disease Father     Hypertension Father     Cancer Sister     Breast cancer Sister     Diabetes Sister     Heart disease Sister     Arrhythmia Sister     Stroke Sister     Diabetes Brother     Heart disease Brother     Cancer Brother     Liver disease Brother     Kidney disease Brother     Dementia Paternal Uncle     Alzheimer's disease Maternal Grandmother     Liver cancer Maternal Grandfather     Liver disease Maternal Grandfather     Stroke Sister     Colon cancer Neg Hx        Social History:   Social History     Socioeconomic History    Marital status:    Tobacco Use    Smoking status: Never     Passive exposure: Never    Smokeless tobacco: Never   Vaping Use    Vaping status: Never Used   Substance and Sexual Activity    Alcohol use: Never     "Drug use: Never    Sexual activity: Yes     Partners: Female       Tobacco History:   Social History     Tobacco Use   Smoking Status Never    Passive exposure: Never   Smokeless Tobacco Never       Medications:   Outpatient Medications Prior to Visit   Medication Sig Dispense Refill    amLODIPine (NORVASC) 5 MG tablet Take 1 tablet by mouth once daily 90 tablet 0    atorvastatin (LIPITOR) 20 MG tablet Take 1 tablet by mouth once daily 90 tablet 1    Continuous Glucose  (FreeStyle Mary 3 [employer]) device USE DAILY TO CHECK BLOOD GLUCOSE 1 each 0    Continuous Glucose Sensor (FreeStyle Mary 3 Sensor) misc Use 1 each Every 14 (Fourteen) Days. Dx E11.65 6 each 3    dapagliflozin Propanediol (Farxiga) 10 MG tablet Take 10 mg by mouth Daily.      diphenoxylate-atropine (Lomotil) 2.5-0.025 MG per tablet Take 1 tablet by mouth 4 (Four) Times a Day As Needed for Diarrhea. 120 tablet 0    doxepin (SINEquan) 50 MG capsule Take 1 capsule by mouth At Night As Needed for Sleep (anxiety). 30 capsule 2    Dulaglutide (Trulicity) 1.5 MG/0.5ML solution pen-injector Inject 1.5 mg under the skin into the appropriate area as directed 1 (One) Time Per Week. 6 mL 2    galcanezumab-gnlm (Emgality) 120 MG/ML auto-injector pen Inject 1 mL under the skin into the appropriate area as directed Every 30 (Thirty) Days. Gets from Patient Assistance Program 1.12 mL 11    glucose monitor monitoring kit 1 each Daily. 1 each 0    HYDROcodone-acetaminophen (NORCO) 5-325 MG per tablet Take 1 tablet by mouth Every 4 (Four) Hours As Needed for Moderate Pain. 10 tablet 0    Insulin Pen Needle (MM Pen Needles) 31G X 6 MM misc USE 1  ONCE DAILY 100 each 1    Insulin Syringe 31G X 5/16\" 0.3 ML misc 1 each Every Night. 90 each 3    Lancets Thin misc 1 each Daily. Use daily as directed 100 each 11    lisinopril (PRINIVIL,ZESTRIL) 40 MG tablet Take 1 tablet by mouth twice daily 180 tablet 0    metFORMIN (GLUCOPHAGE) 1000 MG tablet TAKE 1 TABLET BY MOUTH " TWICE DAILY WITH MEALS 180 tablet 0    naloxone (NARCAN) 4 MG/0.1ML nasal spray Call 911. Don't prime. [place] in 1 nostril for overdose. Repeat in 2-3 minutes in other nostril if no or minimal breathing/responsiveness. 2 each 0    nitroglycerin (NITROSTAT) 0.4 MG SL tablet 1 under the tongue as needed for angina, may repeat q5mins for up three doses 100 tablet 11    OneTouch Ultra test strip USE 1 STRIP TO CHECK GLUCOSE ONCE DAILY AS DIRECTED 100 each 0    pantoprazole (PROTONIX) 40 MG EC tablet TAKE 1 TABLET BY MOUTH TWICE DAILY 30 MINUTES BEFORE MEAL(S) 180 tablet 3    prednisoLONE acetate (PRED FORTE) 1 % ophthalmic suspension INSTILL 1 DROP INTO RIGHT EYE 4 TIMES DAILY      pregabalin (Lyrica) 75 MG capsule Take 1 capsule by mouth 3 (Three) Times a Day. (Patient taking differently: Take 1 capsule by mouth Daily. Once daily around 7pm) 90 capsule 3    sildenafil (VIAGRA) 100 MG tablet Take 1 tablet by mouth Daily As Needed for Erectile Dysfunction. 15 tablet 11    tamsulosin (FLOMAX) 0.4 MG capsule 24 hr capsule Take 1 capsule by mouth once daily 90 capsule 0    vitamin B-12 (CYANOCOBALAMIN) 100 MCG tablet Take 0.5 tablets by mouth 2 (two) times a day.      vitamin C (ASCORBIC ACID) 500 MG tablet Take 1 tablet by mouth 2 (two) times a day.      vitamin E 400 UNIT capsule Take 1 capsule by mouth Daily.      Zinc 40 MG tablet Take 1 tablet by mouth Daily.      butalbital-acetaminophen-caffeine (FIORICET, ESGIC) -40 MG per tablet TAKE 1 TABLET BY MOUTH ONCE DAILY AS NEEDED FOR HEADACHE **Must schedule appointment for additional refills** 30 tablet 0    Creon 46303-405391 units capsule delayed-release particles capsule Take 1 capsule by mouth 3 (Three) Times a Day With Meals. 270 capsule 0    HumaLOG KwikPen 200 UNIT/ML solution pen-injector INJECT 14 UNITS UNDER THE SKIN TWO TIMES A DAY BEFORE MEALS 12 mL 1    ondansetron (ZOFRAN) 8 MG tablet TAKE 1 TABLET BY MOUTH EVERY 8 HOURS AS NEEDED FOR NAUSEA FOR  "VOMITING 90 tablet 0    ondansetron ODT (ZOFRAN-ODT) 4 MG disintegrating tablet Place 1 tablet on the tongue Every 4 (Four) Hours. 12 tablet 0    Toujeo Max SoloStar 300 UNIT/ML solution pen-injector injection INJECT 40 UNITS UNDER THE SKIN EVERY NIGHT AT BEDTIME INTO THE APPROPRIATE AREA 12 mL 0    propranolol LA (INDERAL LA) 120 MG 24 hr capsule Take 1 capsule by mouth Daily for 30 days. 30 capsule 6    cefuroxime (CEFTIN) 500 MG tablet Take 1 tablet by mouth 2 (Two) Times a Day. (Patient not taking: Reported on 1/22/2025) 14 tablet 0     Facility-Administered Medications Prior to Visit   Medication Dose Route Frequency Provider Last Rate Last Admin    lidocaine (XYLOCAINE) 1 % injection 3 mL  3 mL Subcutaneous Once Elio Moore DO            Allergies:   Allergies   Allergen Reactions    Actos [Pioglitazone] Other (See Comments)     congestive heart failure    Avandia [Rosiglitazone] Other (See Comments)     congestive heart failure.    Darvon [Propoxyphene] Itching     Angioedema    itching    Talwin [Pentazocine] Rash     Angioedema    itching       Objective   Objective     Physical Exam:  Vital Signs:   Vitals:    01/22/25 0942   BP: 142/78   Pulse: 82   SpO2: 98%   Weight: 88.3 kg (194 lb 9.6 oz)   Height: 167.6 cm (65.98\")     Body mass index is 31.42 kg/m².     Physical Exam  Nursing note reviewed  Const: NAD, A&Ox4, Pleasant, Cooperative  Eyes: EOMI, no conjunctivitis  ENT: No nasal discharge present, neck supple  Cardiac: Regular rate and rhythm, no cyanosis  Resp: Respiratory rate within normal limits, no increased work of breathing, no audible wheezing or retractions noted  GI: No distention or ascites  MSK: Motor and sensation grossly intact in bilateral upper extremities  Neurologic: CN II-XII grossly intact  Psych: Appropriate mood and behavior.  Skin: Warm, dry  Procedures/Radiology     Procedures  No radiology results for the last 7 days     Assessment & Plan   Assessment / Plan  "     Assessment/Plan:   Problems Addressed This Visit  Diagnoses and all orders for this visit:    1. Urinary tract infection without hematuria, site unspecified (Primary)  -     POC Urinalysis Dipstick, Automated  -     Urine Culture - Urine, Urine, Clean Catch  -     cefTRIAXone (ROCEPHIN) injection 500 mg    2. Exocrine pancreatic insufficiency  -     Discontinue: Creon 15801-077466 units capsule delayed-release particles capsule; Take 1 capsule by mouth 3 (Three) Times a Day With Meals.  Dispense: 270 capsule; Refill: 0  -     Creon 11619-795042 units capsule delayed-release particles capsule; Take 1 capsule by mouth 3 (Three) Times a Day With Meals.  Dispense: 270 capsule; Refill: 0    3. Chronic diarrhea  -     Discontinue: Creon 99005-084745 units capsule delayed-release particles capsule; Take 1 capsule by mouth 3 (Three) Times a Day With Meals.  Dispense: 270 capsule; Refill: 0  -     Creon 61257-455847 units capsule delayed-release particles capsule; Take 1 capsule by mouth 3 (Three) Times a Day With Meals.  Dispense: 270 capsule; Refill: 0    Other orders  -     ondansetron ODT (ZOFRAN-ODT) 8 MG disintegrating tablet; Place 1 tablet on the tongue Every 8 (Eight) Hours As Needed for Nausea or Vomiting.  Dispense: 30 tablet; Refill: 2      Problem List Items Addressed This Visit    None  Visit Diagnoses       Urinary tract infection without hematuria, site unspecified    -  Primary    Relevant Medications    cefTRIAXone (ROCEPHIN) injection 500 mg (Completed)    Other Relevant Orders    POC Urinalysis Dipstick, Automated (Completed)    Urine Culture - Urine, Urine, Clean Catch (Completed)    Exocrine pancreatic insufficiency        Relevant Medications    Creon 94039-136314 units capsule delayed-release particles capsule    Chronic diarrhea        Relevant Medications    Creon 60714-381751 units capsule delayed-release particles capsule            There are no Patient Instructions on file for this  visit.    Follow Up:   No follow-ups on file.        DO BAILEY Jones PC [address]  [facility]  [address]  [facility] [phone]  Fax [phone]  Phone [phone]    Disclaimer to patients: The 21st Century Cares Act makes medical notes like these available to patients in the interest of transparency. However, please be advised that this is still a medical document. It is intended as jyhp-kj-kwin communication. Many sections may include medical language or jargon, abbreviations, and additional verbiage that are unfamiliar or confusing. In some ways it may come across as blunt, direct, or may be summarized in order to clearly and concisely communicate the most crucial information to medical professionals. It may also include mentions of conditions that are unlikely but considered as part of the differential diagnosis, including serious disorders. These are not always discussed at length at the time of appointment because their likelihood is so low, but may be included in a medical note to make it clear what has been considered and/or ruled out as part of a work-up. Medical documents are intended to carry relevant information, facts as evident, and the personal clinical opinion of the physician. If you have any questions regarding this medical document, please bring them to the attention of the physician at your next scheduled appointment.

## 2025-01-23 ENCOUNTER — TELEPHONE (OUTPATIENT)
Dept: FAMILY MEDICINE CLINIC | Facility: CLINIC | Age: 70
End: 2025-01-23
Payer: MEDICARE

## 2025-01-23 DIAGNOSIS — K86.81 EXOCRINE PANCREATIC INSUFFICIENCY: ICD-10-CM

## 2025-01-23 DIAGNOSIS — K52.9 CHRONIC DIARRHEA: ICD-10-CM

## 2025-01-23 DIAGNOSIS — G43.119 INTRACTABLE MIGRAINE WITH AURA WITHOUT STATUS MIGRAINOSUS: ICD-10-CM

## 2025-01-23 DIAGNOSIS — E11.65 TYPE 2 DIABETES MELLITUS WITH HYPERGLYCEMIA, WITHOUT LONG-TERM CURRENT USE OF INSULIN: ICD-10-CM

## 2025-01-23 RX ORDER — PREGABALIN 75 MG/1
75 CAPSULE ORAL 3 TIMES DAILY
Qty: 42 CAPSULE | Refills: 0 | Status: SHIPPED | OUTPATIENT
Start: 2025-01-23 | End: 2026-01-23

## 2025-01-23 RX ORDER — PANCRELIPASE 36000; 180000; 114000 [USP'U]/1; [USP'U]/1; [USP'U]/1
36000 CAPSULE, DELAYED RELEASE PELLETS ORAL
Qty: 270 CAPSULE | Refills: 0 | OUTPATIENT
Start: 2025-01-23

## 2025-01-23 RX ORDER — PREGABALIN 75 MG/1
75 CAPSULE ORAL 3 TIMES DAILY
Qty: 90 CAPSULE | Refills: 3 | Status: CANCELLED | OUTPATIENT
Start: 2025-01-23 | End: 2026-01-23

## 2025-01-23 NOTE — TELEPHONE ENCOUNTER
Called [facility]ary and was held on hold for 30 mins will try again later.   Attempting to check the status of patient assistance for creon

## 2025-01-23 NOTE — TELEPHONE ENCOUNTER
Caller: Rohith Reyna    Relationship: Self    Best call back number: [phone]     Requested Prescriptions:   Requested Prescriptions     Pending Prescriptions Disp Refills    Creon 42206-290034 units capsule delayed-release particles capsule 270 capsule 0     Sig: Take 1 capsule by mouth 3 (Three) Times a Day With Meals.        Pharmacy where request should be sent: [facility], [address] [phone] [place] [phone]      Last office visit with prescribing clinician: 1/22/2025   Last telemedicine visit with prescribing clinician: Visit date not found   Next office visit with prescribing clinician: 2/13/2025     Additional details provided by patient: ROHITH SAID THE CREON WAS $800, SO HE CANCELLED IT.      ALSO YOU GAVE HIM AN ANTI BIOTIC WHICH MAKES HIM SICK.      PLEASE CALL ROHITH FOR CLARIFICATION.    Does the patient have less than a 3 day supply:  [x] Yes  [] No    Would you like a call back once the refill request has been completed: [] Yes [] No    If the office needs to give you a call back, can they leave a voicemail: [] Yes [] No    Vicente Harden Rep   01/23/25 12:01 EST

## 2025-01-23 NOTE — TELEPHONE ENCOUNTER
Caller: Israel Reyna    Relationship: Self    Best call back number: [phone]     Requested Prescriptions:   Requested Prescriptions     Pending Prescriptions Disp Refills    pregabalin (Lyrica) 75 MG capsule 90 capsule 3     Sig: Take 1 capsule by mouth 3 (Three) Times a Day.        Pharmacy where request should be sent: [facility] [address] [place] [address] [phone] [facility] [phone]      Last office visit with prescribing clinician: 10/25/2024   Last telemedicine visit with prescribing clinician: Visit date not found   Next office visit with prescribing clinician: 2/11/2025     Additional details provided by patient: COMPLETELY OUT; PATIENT MEDICATION FROM HIS MAIL ORDER WILL NOT BE HERE IN TIME.    Does the patient have less than a 3 day supply:  [x] Yes  [] No    Would you like a call back once the refill request has been completed: [] Yes [x] No    If the office needs to give you a call back, can they leave a voicemail: [] Yes [x] No    Vicente Roque Rep   01/23/25 11:55 EST

## 2025-01-24 LAB — BACTERIA SPEC AEROBE CULT: NO GROWTH

## 2025-01-24 RX ORDER — BUTALBITAL, ACETAMINOPHEN AND CAFFEINE 50; 325; 40 MG/1; MG/1; MG/1
TABLET ORAL
Qty: 30 TABLET | Refills: 0 | Status: SHIPPED | OUTPATIENT
Start: 2025-01-24

## 2025-01-24 RX ORDER — INSULIN GLARGINE 300 U/ML
INJECTION, SOLUTION SUBCUTANEOUS
Qty: 12 ML | Refills: 0 | Status: SHIPPED | OUTPATIENT
Start: 2025-01-24

## 2025-01-24 NOTE — TELEPHONE ENCOUNTER
Rx Refill Note  Requested Prescriptions     Pending Prescriptions Disp Refills    butalbital-acetaminophen-caffeine (FIORICET, ESGIC) -40 MG per tablet [Pharmacy Med Name: Butalbital-APAP-Caffeine -40 MG Oral Tablet] 30 tablet 0     Sig: TAKE 1 TABLET BY MOUTH ONCE DAILY AS NEEDED FOR HEADACHE      Last filled: 10/7/24 #30 with 0 refill    Last office visit with prescribing clinician: 10/25/2024      Next office visit with prescribing clinician: 2/11/2025     Hermilo Rod MA  01/24/25, 08:38 EST

## 2025-01-30 ENCOUNTER — OFFICE VISIT (OUTPATIENT)
Dept: FAMILY MEDICINE CLINIC | Facility: CLINIC | Age: 70
End: 2025-01-30
Payer: MEDICARE

## 2025-01-30 ENCOUNTER — TELEPHONE (OUTPATIENT)
Dept: FAMILY MEDICINE CLINIC | Facility: CLINIC | Age: 70
End: 2025-01-30

## 2025-01-30 VITALS
DIASTOLIC BLOOD PRESSURE: 82 MMHG | SYSTOLIC BLOOD PRESSURE: 130 MMHG | BODY MASS INDEX: 30.95 KG/M2 | OXYGEN SATURATION: 98 % | WEIGHT: 192.6 LBS | HEIGHT: 66 IN | HEART RATE: 70 BPM

## 2025-01-30 DIAGNOSIS — K86.81 EXOCRINE PANCREATIC INSUFFICIENCY: Primary | ICD-10-CM

## 2025-01-30 DIAGNOSIS — K52.9 CHRONIC DIARRHEA: ICD-10-CM

## 2025-01-30 PROCEDURE — 99213 OFFICE O/P EST LOW 20 MIN: CPT | Performed by: FAMILY MEDICINE

## 2025-01-30 PROCEDURE — 3075F SYST BP GE 130 - 139MM HG: CPT | Performed by: FAMILY MEDICINE

## 2025-01-30 PROCEDURE — 1125F AMNT PAIN NOTED PAIN PRSNT: CPT | Performed by: FAMILY MEDICINE

## 2025-01-30 PROCEDURE — 3051F HG A1C>EQUAL 7.0%<8.0%: CPT | Performed by: FAMILY MEDICINE

## 2025-01-30 PROCEDURE — 3079F DIAST BP 80-89 MM HG: CPT | Performed by: FAMILY MEDICINE

## 2025-01-30 RX ORDER — DIFLUPREDNATE OPHTHALMIC 0.5 MG/ML
EMULSION OPHTHALMIC
COMMUNITY
Start: 2025-01-29

## 2025-02-04 DIAGNOSIS — E11.65 TYPE 2 DIABETES MELLITUS WITH HYPERGLYCEMIA, WITHOUT LONG-TERM CURRENT USE OF INSULIN: ICD-10-CM

## 2025-02-04 RX ORDER — INSULIN GLARGINE 300 U/ML
INJECTION, SOLUTION SUBCUTANEOUS
Qty: 12 ML | Refills: 0 | Status: CANCELLED | OUTPATIENT
Start: 2025-02-04

## 2025-02-04 NOTE — TELEPHONE ENCOUNTER
Caller: Israel Reyna    Relationship: Self    Best call back number: [phone]     Requested Prescriptions:   Requested Prescriptions     Pending Prescriptions Disp Refills    Insulin Lispro (HumaLOG KwikPen) 200 UNIT/ML solution pen-injector 12 mL 1    Toujeo Max SoloStar 300 UNIT/ML solution pen-injector injection 12 mL 0        Pharmacy where request should be sent: [facility] [address] [place] [address] [phone] [facility] [phone]      Last office visit with prescribing clinician: 1/30/2025   Last telemedicine visit with prescribing clinician: Visit date not found   Next office visit with prescribing clinician: 2/24/2025     Additional details provided by patient: 2 DAYS OF MEDICATION ON HAND.    PATIENT  REQUESTING A 90 DAY SUPPLY SENT TO THE PHARMACY      Does the patient have less than a 3 day supply:  [x] Yes  [] No    Would you like a call back once the refill request has been completed: [] Yes [] No    If the office needs to give you a call back, can they leave a voicemail: [] Yes [] No    Vicente Ojeda Rep   02/04/25 09:11 EST

## 2025-02-07 DIAGNOSIS — E11.65 TYPE 2 DIABETES MELLITUS WITH HYPERGLYCEMIA, WITHOUT LONG-TERM CURRENT USE OF INSULIN: ICD-10-CM

## 2025-02-07 RX ORDER — INSULIN LISPRO 200 [IU]/ML
16 INJECTION, SOLUTION SUBCUTANEOUS
Qty: 24 ML | Refills: 0 | Status: SHIPPED | OUTPATIENT
Start: 2025-02-07 | End: 2025-02-07 | Stop reason: SDUPTHER

## 2025-02-07 RX ORDER — INSULIN LISPRO 200 [IU]/ML
16 INJECTION, SOLUTION SUBCUTANEOUS
Qty: 9 ML | Refills: 0 | Status: SHIPPED | OUTPATIENT
Start: 2025-02-07 | End: 2025-02-07 | Stop reason: SDUPTHER

## 2025-02-07 RX ORDER — INSULIN LISPRO 200 [IU]/ML
16 INJECTION, SOLUTION SUBCUTANEOUS
Qty: 24 ML | Refills: 0 | Status: SHIPPED | OUTPATIENT
Start: 2025-02-07 | End: 2025-05-08

## 2025-02-07 NOTE — TELEPHONE ENCOUNTER
Spoke with patient.  He is on 16 units of humalog three times a day at mealtime.  Does not need refill of Toujeo.  Sent to [facility] at patient's request.  He will call if he needs anything else.   Detail Level: Zone Detail Level: Detailed

## 2025-02-07 NOTE — TELEPHONE ENCOUNTER
PATIENT CALLING IN REGARDS TO REFILL REQUEST. STATES HE HAS BEEN OUT FOR 3 DAYS NOW AND SUGAR IS HIGH.(CLOSE )    PLEASE ADVISE

## 2025-02-10 DIAGNOSIS — E11.65 TYPE 2 DIABETES MELLITUS WITH HYPERGLYCEMIA, WITHOUT LONG-TERM CURRENT USE OF INSULIN: ICD-10-CM

## 2025-02-10 RX ORDER — LISINOPRIL 40 MG/1
40 TABLET ORAL 2 TIMES DAILY
Qty: 180 TABLET | Refills: 0 | Status: SHIPPED | OUTPATIENT
Start: 2025-02-10

## 2025-02-11 ENCOUNTER — TELEMEDICINE (OUTPATIENT)
Dept: NEUROLOGY | Facility: CLINIC | Age: 70
End: 2025-02-11
Payer: MEDICARE

## 2025-02-11 DIAGNOSIS — G47.00 INSOMNIA, UNSPECIFIED TYPE: Primary | ICD-10-CM

## 2025-02-11 DIAGNOSIS — G43.119 INTRACTABLE MIGRAINE WITH AURA WITHOUT STATUS MIGRAINOSUS: ICD-10-CM

## 2025-02-11 PROCEDURE — 1160F RVW MEDS BY RX/DR IN RCRD: CPT | Performed by: PSYCHIATRY & NEUROLOGY

## 2025-02-11 PROCEDURE — 99214 OFFICE O/P EST MOD 30 MIN: CPT | Performed by: PSYCHIATRY & NEUROLOGY

## 2025-02-11 PROCEDURE — 1159F MED LIST DOCD IN RCRD: CPT | Performed by: PSYCHIATRY & NEUROLOGY

## 2025-02-11 RX ORDER — TRAZODONE HYDROCHLORIDE 50 MG/1
100 TABLET, FILM COATED ORAL NIGHTLY
Qty: 60 TABLET | Refills: 3 | Status: SHIPPED | OUTPATIENT
Start: 2025-02-11 | End: 2025-03-13

## 2025-02-11 RX ORDER — BUTALBITAL, ACETAMINOPHEN AND CAFFEINE 50; 325; 40 MG/1; MG/1; MG/1
TABLET ORAL
Qty: 30 TABLET | Refills: 0 | Status: SHIPPED | OUTPATIENT
Start: 2025-02-11

## 2025-02-11 NOTE — PROGRESS NOTES
"Subjective:    CC: Israel Reyna is in clinic today for follow up for history of chronic headaches, insomnia.    HPI:  Initial visit/ Prior w/u:  This is a 65 yo male who presents for re-evaluation of worsening migraines. Intractable migraines since 2013 followed by Dr. Chaidez normally.  He went to have COVID vaccine March 2021 Pfizer. He had immediate worsening headaches, chills, feeling terrible, weak and in bed x 4 days. He had his second injection April 2021 Pfizer with the same exact symptoms. He feels that his symptoms have progressively worsened since the COVID vaccines. Has noticed every time he eats anything at all he notices he cannot walk, he cannot speak, his left face will \"draw up\", twitch and be painful with episodes lasting 15 minutes to 2 hours and have been present for 2-3 months and seems to be worsening since COVID vaccine. He was last seen in our clinic 3/2020. He tells me the pain in his head, holocranial, can be anywhere throughout the head, \"feels like someone is repeatedly hitting me with a ball bat or stick and at times feels like someone is driving a nail in the back of my head. The ones that scare me the most are the headaches in the temples that feel like sharp, someone shooting a nail gun in my temples.\" Also has associated dizziness. Pain can radiate across his head and within 10 minutes and he loses control of his bowels. This is not a change and has been present since [id number]. No imaging of head since 2017. He cannot have MRI due to anal sphincter stimulator. Has chronic daily headaches. \"Drawing of face\" is daily. CTA head/neck 2019 normal. He feels when he has his pain there is \"moisture running down his head\". Takes topamax 25mg BID and gabapentin 300mg TID. Experiences severe pain with episodes. Cannot afford Botox. He is agreeable to CT head today. He is requesting migraine cocktail as well. He uses cane at times. Has been using fioricet PRN. He is scheduled for OP " migraine cocktail today at 3pm at [facility]. Last migraine cocktail was [id number]. Tells me one med gives him hiccups for 2-3 days after but unsure which.  His headaches do also have severe pain, throbbing, photophobia, phonophobia, nausea and occasional vomiting and do her daily with some episodes being worse than others.     Follow up with Sabiha Hanna, APRN: 4/12/2023:  Patient in clinic for regular follow up. At his last visit he was prescribed Depakote 500 mg BID for migraine prevention and Ubrelvy 100 mg for abortive treatment for migraines. He is unsure if he has started taking Depakote since his last visit. He does not believe that he had started Ubrelvy d/t cost but also was unsure. He stated that his spouse helps him to manage his medications and that he would need to check with her regarding what he is currently taking. He states that he continues to have both headaches and the previously described episodes including left sided paresthesias, left facial twitching/drawing up. His headaches vary in location and are located at the top of his head, occiput, bilateral temples, and at times will start near right temple and radiate towards left. Patient has had these episodes for many years and has previously completed detailed neurologic workup including cEEG which was negative. He reports that some headaches are associated with light and sound sensitivity along with nausea. He has near daily headaches of some kind everyday varying in presentation and severity. He has also appreciated dizziness over approximately the last month, he reports that it is worse while walking and that he has been using his cane more recently for stabilization, no recent falls. He recently saw his PCP d/t concern for fever and body aches who performed lab work including Hemoglobin A1c was 8.60, blood culture did not show any growth at 5 days, HIV was nonreactive, rheumatoid factor was less than 10, TSH 2.440, , CK 42,  CRP <0.30, CMP showed elevated glucose, sedimentation rate 38. It appears that at his last visit a CT head was discussed but had not yet been ordered.      Follow-up with Dr. Crain: 05/02/2023: Patient is in clinic for regular follow-up. Since the last visit, he has had a CT scan on 05/02/2023 to assess dizzy spells which did not reveal any acute intracranial abnormalities.  I reviewed the CTH personally.  He could not afford to have Ubrelvy filled. He has been unable to fill his Aimovig due to cost as well. He is still experiencing dizzy spells and fatigue. He also notes intermittent shooting pain in different parts of the head he has completed his Depakote and he has not refilled this medication. He is not certain if it provided any relief. When he wakes up in the morning, he has difficulty with ambulation and speech, facial weakness, and headache. He feels worse if he sits down to relax. His pain occasionally wakes him in the middle of the night. He has been prescribed oxygen 15 L for his head pain in the past, which helped if he used the oxygen when he felt the pain coming on.  He is not sleeping well and he is constantly dreaming when he is asleep. He is not certain if he is snoring. He does not feel refreshed when he wakes up. He has been tested for sleep apnea and provided with a CPAP. He could not tolerate the mask and he was only using the machine for 2 to 3 hours.     He is taking Norvasc for blood pressure control. He had an episode of dizziness and headache last week, for which he saw Dr. Moore. He had a systolic blood pressure reading of 150 mmHg. He is taking amlodipine and lisinopril. He will see Dr. Moore on 05/09/2023.     He is feeling discouraged. He denies depression or the desire to harm himself. He is tired of seeing doctors, having testing performed, and feeling like he is not seeing any improvement. His short term memory is poor. He has a strong family history of Alzheimer disease in his  mother, his maternal grandmother, his maternal great-grandfather, and 2 of his maternal uncles.     He is having issues with his bowels. His sphincter never closes so he cannot hold a bowel movement when he needs to go. He has a stimulator, which he would like to have removed. He cannot have an MRI with this device in situ. He has been advised he may need to consider a colostomy bag. He has not followed up with gastroenterology. He has had an upper endoscopy.    7/8/2023: He is in clinic for regular follow-up. Since his last visit in 05/2023, he has not started the new medicine because he has not been able to finish the paperwork. He is taking the verapamil. He is in constant pain in all different parts of his head. Sometimes he will have pain shoot through his head, causing him to jerk. He has trouble thinking, cannot concentrate, and has memory trouble. He went to the urgent care because his blood pressure at home was registering 220/100 mmHg and they suggested he have a CT with contrast. He did that and was told there were some small arteries being blocked and more testing was needed as this could be the start of vascular dementia. One of the doctors told him he really needed to have an MRI, but he cannot have that because of the stimulator. He would like to have the stimulator removed but he cannot afford it. There is one type of pain that feels like it flies from one side of his head to the other. With this pain he feels like there is moisture running down and he loses control of his bowels. There was one doctor who gave him a medication that he had to stop because he could not leave the bathroom. He was told he might have to have a colostomy and he does not want that. He never knows how his bowels are going to react. He is on gabapentin 300 3 times daily and has no side effects from it. He is having trouble affording all the medications. His family is starting to notice his problems with memory and  concentration.     10/13/2023: He is in clinic for regular follow-up.  Since his last visit in July 2023, he reports that he has noticed some concerns with his memory where he cannot remember if he took his medications or not.  Sometimes he forgets names of people he is known for a long time.  He is concerned because there is a strong family history of Alzheimer's dementia.  He did increase the dose of gabapentin to 600 mg 3 times daily but it did not help with frequency or intensity of migraines.  He reports that he still gets almost daily headaches and some of them will convert into migraine.  He continues to take verapamil 40 mg twice daily.  In addition, he is also taking high dose of gabapentin 600 mg 3 times daily but it did not help with headache control.  He reports having head pain occurring almost on a daily basis involving different parts of the head.  He is also reporting episodes of palpitations since the last visit.    Follow-up: 2/12/2024: He is in clinic for regular follow-up.  Since his last visit in October 2023, he has been taking Toprol long-acting 60 mg daily but reports no decrease in headache intensity or frequency.  He is also taking gabapentin 600 mg 3 times daily.  He reports that he has a headache on a daily basis and some days, it increases in intensity where he has to take Fioricet which sometimes helps and sometimes it does not.    Follow-up: 8/9/2024: He is in clinic for regular follow-up.  Since his last visit 6 months ago, he reports that he continues to have daily headache and some of these headaches convert into severe migraine.  He also reports chronic generalized pain, increase neck pain, depressed mood, inability to sleep at night and also reports significantly increased amount of stress.  He was given prescription of nortriptyline but he stopped taking it as it was not really beneficial.  Currently is on Inderal long-acting 80 mg daily.  He also stopped gabapentin as well  instruction after last visit and reports that symptoms seems to be worse since coming off of gabapentin.    Follow-up: 9/27/2024: He is in clinic for regular follow-up.  Since his last visit 6 weeks ago, he reports that he has been taking Lyrica 75 mg 3 times daily, has increased the dose of Inderal long-acting to 120 mg daily.  He did try Cymbalta 30 mg daily but caused to have significant side effects.    He reports that with Lyrica and higher dose of Inderal, he has not noticed much change as far as his headaches are concerned.  He denies any side effects with Lyrica use.  He continues to have almost daily headaches.  He also reports continued deterioration in his vision.  He follows up with ophthalmology.  It has becoming increasingly difficult for him to repeat.  He also had an episode of difficulty with cognition, difficulty with walking lasting for 15 to 20 hours couple of weeks ago.  It has been a while that he has had this type of episode.    Follow-up: 10/25/2024: He is in clinic for sooner than scheduled appointment.  He reports that he has been experiencing more cognitive issues, difficulty remembering names of people that he has met several times, episodes of confusion when he is out and about and sometimes feels confused as to what location he is at.  He does report a chronic daily headaches.  On the last visit, he was started on Emgality.  He has done first loading dose of Ajovy yesterday only.  He also reports anxiety and depressed mood related to the bowel issues that he has had for many years now.  He also reports very poor sleep quality.  He has no control of anal sphincter and that causes him to have sudden unexpected bowel movements.  This is affected his quality of life significantly.  He is concerned about dementia causing the symptoms as he has a strong family history of dementia in her mother, grandmother and grandfather.    Follow-up: 2/11/2025: This is a follow-up done through video.   Since his last visit 6 months ago, he reports that with continued Emgality injection every month, he has noticed about 50 to 60% reduction in headache frequency and intensity.  He is overall happy with the response.  He will take Fioricet as needed as an abortive treatment and it helps.  He continues to have problems with sleep and hardly gets 1 to 2 hours per night.    The following portions of the patient's history were reviewed and updated as of 02/11/2025: allergies, social history, and problem list.       Current Outpatient Medications:     amLODIPine (NORVASC) 5 MG tablet, Take 1 tablet by mouth once daily, Disp: 90 tablet, Rfl: 0    atorvastatin (LIPITOR) 20 MG tablet, Take 1 tablet by mouth once daily, Disp: 90 tablet, Rfl: 1    butalbital-acetaminophen-caffeine (FIORICET, ESGIC) -40 MG per tablet, TAKE 1 TABLET BY MOUTH ONCE DAILY AS NEEDED FOR HEADACHE, Disp: 30 tablet, Rfl: 0    Continuous Glucose  (FreeStyle Mary 3 [place]) device, USE DAILY TO CHECK BLOOD GLUCOSE, Disp: 1 each, Rfl: 0    Continuous Glucose Sensor (FreeStyle Mary 3 Sensor) misc, Use 1 each Every 14 (Fourteen) Days. Dx E11.65, Disp: 6 each, Rfl: 3    Creon 86744-590674 units capsule delayed-release particles capsule, Take 1 capsule by mouth 3 (Three) Times a Day With Meals., Disp: 270 capsule, Rfl: 0    dapagliflozin Propanediol (Farxiga) 10 MG tablet, Take 10 mg by mouth Daily., Disp: , Rfl:     difluprednate (DUREZOL) 0.05 % ophthalmic emulsion, , Disp: , Rfl:     diphenoxylate-atropine (Lomotil) 2.5-0.025 MG per tablet, Take 1 tablet by mouth 4 (Four) Times a Day As Needed for Diarrhea., Disp: 120 tablet, Rfl: 0    doxepin (SINEquan) 50 MG capsule, Take 1 capsule by mouth At Night As Needed for Sleep (anxiety)., Disp: 30 capsule, Rfl: 2    Dulaglutide (Trulicity) 1.5 MG/0.5ML solution pen-injector, Inject 1.5 mg under the skin into the appropriate area as directed 1 (One) Time Per Week., Disp: 6 mL, Rfl: 2     "galcanezumab-gnlm (Emgality) 120 MG/ML auto-injector pen, Inject 1 mL under the skin into the appropriate area as directed Every 30 (Thirty) Days. Gets from Patient Assistance Program, Disp: 1.12 mL, Rfl: 11    glucose monitor monitoring kit, 1 each Daily., Disp: 1 each, Rfl: 0    HYDROcodone-acetaminophen (NORCO) 5-325 MG per tablet, Take 1 tablet by mouth Every 4 (Four) Hours As Needed for Moderate Pain., Disp: 10 tablet, Rfl: 0    Insulin Lispro (HumaLOG KwikPen) 200 UNIT/ML solution pen-injector, Inject 16 Units under the skin into the appropriate area as directed 3 (Three) Times a Day Before Meals for 90 days., Disp: 24 mL, Rfl: 0    Insulin Pen Needle (MM Pen Needles) 31G X 6 MM misc, USE 1  ONCE DAILY, Disp: 100 each, Rfl: 1    Insulin Syringe 31G X 5/16\" 0.3 ML misc, 1 each Every Night., Disp: 90 each, Rfl: 3    Lancets Thin misc, 1 each Daily. Use daily as directed, Disp: 100 each, Rfl: 11    lisinopril (PRINIVIL,ZESTRIL) 40 MG tablet, Take 1 tablet by mouth twice daily, Disp: 180 tablet, Rfl: 0    metFORMIN (GLUCOPHAGE) 1000 MG tablet, TAKE 1 TABLET BY MOUTH TWICE DAILY WITH MEALS, Disp: 180 tablet, Rfl: 0    naloxone (NARCAN) 4 MG/0.1ML nasal spray, Call 911. Don't prime. [place] in 1 nostril for overdose. Repeat in 2-3 minutes in other nostril if no or minimal breathing/responsiveness., Disp: 2 each, Rfl: 0    nitroglycerin (NITROSTAT) 0.4 MG SL tablet, 1 under the tongue as needed for angina, may repeat q5mins for up three doses, Disp: 100 tablet, Rfl: 11    ondansetron ODT (ZOFRAN-ODT) 8 MG disintegrating tablet, Place 1 tablet on the tongue Every 8 (Eight) Hours As Needed for Nausea or Vomiting., Disp: 30 tablet, Rfl: 2    OneTouch Ultra test strip, USE 1 STRIP TO CHECK GLUCOSE ONCE DAILY AS DIRECTED, Disp: 100 each, Rfl: 0    pantoprazole (PROTONIX) 40 MG EC tablet, TAKE 1 TABLET BY MOUTH TWICE DAILY 30 MINUTES BEFORE MEAL(S), Disp: 180 tablet, Rfl: 3    prednisoLONE acetate (PRED FORTE) 1 % ophthalmic " suspension, INSTILL 1 DROP INTO RIGHT EYE 4 TIMES DAILY, Disp: , Rfl:     pregabalin (Lyrica) 75 MG capsule, Take 1 capsule by mouth 3 (Three) Times a Day. (Patient taking differently: Take 1 capsule by mouth Daily. Once daily around 7pm), Disp: 90 capsule, Rfl: 3    pregabalin (Lyrica) 75 MG capsule, Take 1 capsule by mouth 3 (Three) Times a Day., Disp: 42 capsule, Rfl: 0    propranolol LA (INDERAL LA) 120 MG 24 hr capsule, Take 1 capsule by mouth Daily for 30 days., Disp: 30 capsule, Rfl: 6    sildenafil (VIAGRA) 100 MG tablet, Take 1 tablet by mouth Daily As Needed for Erectile Dysfunction., Disp: 15 tablet, Rfl: 11    tamsulosin (FLOMAX) 0.4 MG capsule 24 hr capsule, Take 1 capsule by mouth once daily, Disp: 90 capsule, Rfl: 0    Toujeo Max SoloStar 300 UNIT/ML solution pen-injector injection, INJECT 40 UNITS UNDER THE SKIN EVERY NIGHT AT BEDTIME INTO THE APPROPRIATE AREA, Disp: 12 mL, Rfl: 0    vitamin B-12 (CYANOCOBALAMIN) 100 MCG tablet, Take 0.5 tablets by mouth 2 (two) times a day., Disp: , Rfl:     vitamin C (ASCORBIC ACID) 500 MG tablet, Take 1 tablet by mouth 2 (two) times a day., Disp: , Rfl:     vitamin E 400 UNIT capsule, Take 1 capsule by mouth Daily., Disp: , Rfl:     Zinc 40 MG tablet, Take 1 tablet by mouth Daily., Disp: , Rfl:     Current Facility-Administered Medications:     lidocaine (XYLOCAINE) 1 % injection 3 mL, 3 mL, Subcutaneous, Once, Elio Moore DO   Past Medical History:   Diagnosis Date    Allergic     Anxiety     Bowel incontinence     w/ history of Stimulator Device     CAD (coronary artery disease)     Carotid artery occlusion     Cataract     Chest pain     CHF (congestive heart failure)     Cluster headache     Colon polyp     Dementia     Depression     Diabetes mellitus, type 2     Diabetic peripheral neuropathy     Dyslipidemia     Erectile dysfunction     Fatigue     GERD (gastroesophageal reflux disease)     Headache, tension-type     HL (hearing loss) July 2023     Im wearing hearing aids. Dr. Graves (sp)    Hypertension     Kidney stone     Memory loss     Mental status change     Migraine     Complex     Migraine headache     Myocardial infarction Several years ago    Renal insufficiency     Shingles     Sleep apnea     SOB (shortness of breath)     Stroke     Was told tia go with hemiphlegic migraines    Vision loss       Past Surgical History:   Procedure Laterality Date    CARDIAC CATHETERIZATION      CARDIAC CATHETERIZATION N/A 10/14/2016    Procedure: Left Heart Cath;  Surgeon: Santiago Garner MD;  Location: [facility] CATH INVASIVE LOC[facility];  Service:     COLONOSCOPY  02/25/2022    Dr. Graves-recommended 3 year repeat    ENDOSCOPY  02/03/2022    Dr. Graves    EYE SURGERY  Retina    Dr. Askew    GASTRIC STIMULATOR IMPLANT SURGERY      SMALL INTESTINE SURGERY      unspecified, x3 as an infant     TESTICLE SURGERY      UPPER GASTROINTESTINAL ENDOSCOPY        Family History   Problem Relation Age of Onset    Heart failure Mother     Diabetes Mother     Alzheimer's disease Mother     Arthritis Mother     Heart disease Mother     Heart attack Father     Kidney disease Father     Cancer Father     Prostate cancer Father     Heart disease Father     Hypertension Father     Cancer Sister     Breast cancer Sister     Diabetes Sister     Heart disease Sister     Arrhythmia Sister     Stroke Sister     Diabetes Brother     Heart disease Brother     Cancer Brother     Liver disease Brother     Kidney disease Brother     Dementia Paternal Uncle     Alzheimer's disease Maternal Grandmother     Liver cancer Maternal Grandfather     Liver disease Maternal Grandfather     Stroke Sister     Colon cancer Neg Hx         Review of Systems  Objective:    There were no vitals taken for this visit.    Neurology Exam:  General apperance: NAD.     Mental status: Alert, awake and oriented to time place and person.    Language and Speech: No aphasia or dysarthria.    CN II to XII:  Intact.    Opthalmoscopic Exam: No papilledema.    Motor:  Right UE muscle strength 5/5. Normal tone.     Left UE muscle strength 5/5. Normal tone.      Right LE muscle strength 5/5. Normal tone.     Left LE muscle strength 5/5. Normal tone.      Sensory: Normal light touch, vibration and pinprick sensation bilaterally.    DTRs: 2+ bilaterally.    Babinski: Negative bilaterally.    Co-ordination: Normal finger-to-nose, heel to shin B/L.    Rhomberg: Negative.    Gait: Normal.    Cardiovascular: Regular rate and rhythm without murmur, gallop or rub.    Assessment and Plan:  1. Intractable migraine with aura without status migrainosus  2. Insomnia, unspecified type  He has had good response to Emgality and reports 50 to 60% reduction in migraine intensity and frequency which is quite reassuring.  Continue with Emgality as it is.  He is not sleeping well and gets only 1 to 2 hours per night which is also contributing to his cognitive impairment and headaches.  I will prescribe him trazodone 50 mg to be taken at bedtime which can increase to 100 mg if need be after 2 weeks.  Otherwise I will see him back in clinic in 3 to 4 months for follow-up.       I spent 30 minutes in patient care: Reviewing records prior to the visit, entering orders and documentation and spent more than hurtado 50% of this time face-to-face in management, instructions and education regarding above mentioned diagnosis and also on counseling and discussing about taking medication regularly, possible side effects with medication use, importance of good sleep hygiene, good hydration and regular exercise.    Return in about 4 months (around 6/11/2025).       Note to patient: The 21st Century Cures Act makes medical notes like these available to patients in the interest of transparency. However, be advised this is a medical document. It is intended as peer to peer communication. It is written in medical language and may contain abbreviations or verbiage  that are unfamiliar. It may appear blunt or direct. Medical documents are intended to carry relevant information, facts as evident, and the clinical opinion of the physician.

## 2025-02-13 NOTE — PROGRESS NOTES
Established Patient Office Visit      Patient Name: Israel Reyna  : 1955   MRN: [id number]   Care Team: Patient Care Team:  Elio Moore DO as PCP - General (Family Medicine)  Cyn Skinner RN as Ambulatory  (Population Health)  José Miguel Bautista MD as Consulting Physician (Endocrinology)    Chief Complaint:    Chief Complaint   Patient presents with    Abdominal Pain     Received some type of antibiotic through the mail, unsure. Needs Creon, almost out.       History of Present Illness: Israel Reyna is a 70 y.o. male who is here today for chief complaint.    HPI    Needs a new prescription for his Creon and ideally samples.    This patient is accompanied by their self who contributes to the history of their care.    The following portions of the patient's history were reviewed and updated as appropriate: allergies, current medications, past family history, past medical history, past social history, past surgical history and problem list.    Subjective      Review of Systems:   Review of Systems - See HPI    Past Medical History:   Past Medical History:   Diagnosis Date    Allergic     Anxiety     Bowel incontinence     w/ history of Stimulator Device     CAD (coronary artery disease)     Carotid artery occlusion     Cataract     Chest pain     CHF (congestive heart failure)     Cluster headache     Colon polyp     Dementia     Depression     Diabetes mellitus, type 2     Diabetic peripheral neuropathy     Dyslipidemia     Erectile dysfunction     Fatigue     GERD (gastroesophageal reflux disease)     Headache, tension-type     HL (hearing loss) 2023    Im wearing hearing aids. Dr. Graves (sp)    Hypertension     Kidney stone     Memory loss     Mental status change     Migraine     Complex     Migraine headache     Myocardial infarction Several years ago    Renal insufficiency     Shingles     Sleep apnea     SOB (shortness of breath)     Stroke     Was  told tia go with hemiphlegic migraines    Vision loss        Past Surgical History:   Past Surgical History:   Procedure Laterality Date    CARDIAC CATHETERIZATION      CARDIAC CATHETERIZATION N/A 10/14/2016    Procedure: Left Heart Cath;  Surgeon: Santiago Garner MD;  Location: [facility] CATH INVASIVE LOC[facility];  Service:     COLONOSCOPY  02/25/2022    Dr. Graves-recommended 3 year repeat    ENDOSCOPY  02/03/2022    Dr. Graves    EYE SURGERY  Retina    Dr. Askew    GASTRIC STIMULATOR IMPLANT SURGERY      SMALL INTESTINE SURGERY      unspecified, x3 as an infant     TESTICLE SURGERY      UPPER GASTROINTESTINAL ENDOSCOPY         Family History:   Family History   Problem Relation Age of Onset    Heart failure Mother     Diabetes Mother     Alzheimer's disease Mother     Arthritis Mother     Heart disease Mother     Heart attack Father     Kidney disease Father     Cancer Father     Prostate cancer Father     Heart disease Father     Hypertension Father     Cancer Sister     Breast cancer Sister     Diabetes Sister     Heart disease Sister     Arrhythmia Sister     Stroke Sister     Diabetes Brother     Heart disease Brother     Cancer Brother     Liver disease Brother     Kidney disease Brother     Dementia Paternal Uncle     Alzheimer's disease Maternal Grandmother     Liver cancer Maternal Grandfather     Liver disease Maternal Grandfather     Stroke Sister     Colon cancer Neg Hx        Social History:   Social History     Socioeconomic History    Marital status:    Tobacco Use    Smoking status: Never     Passive exposure: Never    Smokeless tobacco: Never   Vaping Use    Vaping status: Never Used   Substance and Sexual Activity    Alcohol use: Never    Drug use: Never    Sexual activity: Yes     Partners: Female       Tobacco History:   Social History     Tobacco Use   Smoking Status Never    Passive exposure: Never   Smokeless Tobacco Never       Medications:   Outpatient Medications Prior to  "Visit   Medication Sig Dispense Refill    amLODIPine (NORVASC) 5 MG tablet Take 1 tablet by mouth once daily 90 tablet 0    atorvastatin (LIPITOR) 20 MG tablet Take 1 tablet by mouth once daily 90 tablet 1    Continuous Glucose  (FreeStyle Mary 3 Eva) device USE DAILY TO CHECK BLOOD GLUCOSE 1 each 0    Continuous Glucose Sensor (FreeStyle Mary 3 Sensor) misc Use 1 each Every 14 (Fourteen) Days. Dx E11.65 6 each 3    Creon 53926-908123 units capsule delayed-release particles capsule Take 1 capsule by mouth 3 (Three) Times a Day With Meals. 270 capsule 0    dapagliflozin Propanediol (Farxiga) 10 MG tablet Take 10 mg by mouth Daily.      difluprednate (DUREZOL) 0.05 % ophthalmic emulsion       diphenoxylate-atropine (Lomotil) 2.5-0.025 MG per tablet Take 1 tablet by mouth 4 (Four) Times a Day As Needed for Diarrhea. 120 tablet 0    doxepin (SINEquan) 50 MG capsule Take 1 capsule by mouth At Night As Needed for Sleep (anxiety). 30 capsule 2    Dulaglutide (Trulicity) 1.5 MG/0.5ML solution pen-injector Inject 1.5 mg under the skin into the appropriate area as directed 1 (One) Time Per Week. 6 mL 2    galcanezumab-gnlm (Emgality) 120 MG/ML auto-injector pen Inject 1 mL under the skin into the appropriate area as directed Every 30 (Thirty) Days. Gets from Patient Assistance Program 1.12 mL 11    glucose monitor monitoring kit 1 each Daily. 1 each 0    HYDROcodone-acetaminophen (NORCO) 5-325 MG per tablet Take 1 tablet by mouth Every 4 (Four) Hours As Needed for Moderate Pain. 10 tablet 0    Insulin Pen Needle (MM Pen Needles) 31G X 6 MM misc USE 1  ONCE DAILY 100 each 1    Insulin Syringe 31G X 5/16\" 0.3 ML misc 1 each Every Night. 90 each 3    Lancets Thin misc 1 each Daily. Use daily as directed 100 each 11    naloxone (NARCAN) 4 MG/0.1ML nasal spray Call 911. Don't prime. [place] in 1 nostril for overdose. Repeat in 2-3 minutes in other nostril if no or minimal breathing/responsiveness. 2 each 0    " nitroglycerin (NITROSTAT) 0.4 MG SL tablet 1 under the tongue as needed for angina, may repeat q5mins for up three doses 100 tablet 11    ondansetron ODT (ZOFRAN-ODT) 8 MG disintegrating tablet Place 1 tablet on the tongue Every 8 (Eight) Hours As Needed for Nausea or Vomiting. 30 tablet 2    OneTouch Ultra test strip USE 1 STRIP TO CHECK GLUCOSE ONCE DAILY AS DIRECTED 100 each 0    pantoprazole (PROTONIX) 40 MG EC tablet TAKE 1 TABLET BY MOUTH TWICE DAILY 30 MINUTES BEFORE MEAL(S) 180 tablet 3    prednisoLONE acetate (PRED FORTE) 1 % ophthalmic suspension INSTILL 1 DROP INTO RIGHT EYE 4 TIMES DAILY      pregabalin (Lyrica) 75 MG capsule Take 1 capsule by mouth 3 (Three) Times a Day. (Patient taking differently: Take 1 capsule by mouth Daily. Once daily around 7pm) 90 capsule 3    pregabalin (Lyrica) 75 MG capsule Take 1 capsule by mouth 3 (Three) Times a Day. 42 capsule 0    sildenafil (VIAGRA) 100 MG tablet Take 1 tablet by mouth Daily As Needed for Erectile Dysfunction. 15 tablet 11    tamsulosin (FLOMAX) 0.4 MG capsule 24 hr capsule Take 1 capsule by mouth once daily 90 capsule 0    Toujeo Max SoloStar 300 UNIT/ML solution pen-injector injection INJECT 40 UNITS UNDER THE SKIN EVERY NIGHT AT BEDTIME INTO THE APPROPRIATE AREA 12 mL 0    vitamin B-12 (CYANOCOBALAMIN) 100 MCG tablet Take 0.5 tablets by mouth 2 (two) times a day.      vitamin C (ASCORBIC ACID) 500 MG tablet Take 1 tablet by mouth 2 (two) times a day.      vitamin E 400 UNIT capsule Take 1 capsule by mouth Daily.      Zinc 40 MG tablet Take 1 tablet by mouth Daily.      butalbital-acetaminophen-caffeine (FIORICET, ESGIC) -40 MG per tablet TAKE 1 TABLET BY MOUTH ONCE DAILY AS NEEDED FOR HEADACHE 30 tablet 0    HumaLOG KwikPen 200 UNIT/ML solution pen-injector INJECT 14 UNITS UNDER THE SKIN TWO TIMES A DAY BEFORE MEALS 12 mL 1    lisinopril (PRINIVIL,ZESTRIL) 40 MG tablet Take 1 tablet by mouth twice daily 180 tablet 0    metFORMIN (GLUCOPHAGE)  "1000 MG tablet TAKE 1 TABLET BY MOUTH TWICE DAILY WITH MEALS 180 tablet 0    propranolol LA (INDERAL LA) 120 MG 24 hr capsule Take 1 capsule by mouth Daily for 30 days. 30 capsule 6     Facility-Administered Medications Prior to Visit   Medication Dose Route Frequency Provider Last Rate Last Admin    lidocaine (XYLOCAINE) 1 % injection 3 mL  3 mL Subcutaneous Once Elio Moore,             Allergies:   Allergies   Allergen Reactions    Actos [Pioglitazone] Other (See Comments)     congestive heart failure    Avandia [Rosiglitazone] Other (See Comments)     congestive heart failure.    Darvon [Propoxyphene] Itching     Angioedema    itching    Talwin [Pentazocine] Rash     Angioedema    itching       Objective   Objective     Physical Exam:  Vital Signs:   Vitals:    01/30/25 1039   BP: 130/82   Pulse: 70   SpO2: 98%   Weight: 87.4 kg (192 lb 9.6 oz)   Height: 167.6 cm (65.98\")     Body mass index is 31.1 kg/m².     Physical Exam  Nursing note reviewed  Const: NAD, A&Ox4, Pleasant, Cooperative  Eyes: EOMI, no conjunctivitis  ENT: No nasal discharge present, neck supple  Cardiac: Regular rate and rhythm, no cyanosis  Resp: Respiratory rate within normal limits, no increased work of breathing, no audible wheezing or retractions noted  GI: No distention or ascites  MSK: Motor and sensation grossly intact in bilateral upper extremities  Neurologic: CN II-XII grossly intact  Psych: Appropriate mood and behavior.  Skin: Warm, dry  Procedures/Radiology     Procedures  No radiology results for the last 7 days     Assessment & Plan   Assessment / Plan      Assessment/Plan:   Problems Addressed This Visit  Diagnoses and all orders for this visit:    1. Exocrine pancreatic insufficiency (Primary)    2. Chronic diarrhea      Problem List Items Addressed This Visit    None  Visit Diagnoses       Exocrine pancreatic insufficiency    -  Primary    Chronic diarrhea                There are no Patient Instructions on file " for this visit.    Follow Up:   No follow-ups on file.      DO BAILEY Jones PC [address]  [facility]  [address]  [facility] [address]  Fax [phone]  Phone [phone]    Disclaimer to patients: The 21[employer] Act makes medical notes like these available to patients in the interest of transparency. However, please be advised that this is still a medical document. It is intended as [id number]-le-fqin communication. Many sections may include medical language or jargon, abbreviations, and additional verbiage that are unfamiliar or confusing. In some ways it may come across as blunt, direct, or may be summarized in order to clearly and concisely communicate the most crucial information to medical professionals. It may also include mentions of conditions that are unlikely but considered as part of the differential diagnosis, including serious disorders. These are not always discussed at length at the time of appointment because their likelihood is so low, but may be included in a medical note to make it clear what has been considered and/or ruled out as part of a work-up. Medical documents are intended to carry relevant information, facts as evident, and the personal clinical opinion of the physician. If you have any questions regarding this medical document, please bring them to the attention of the physician at your next scheduled appointment.

## 2025-02-14 ENCOUNTER — OFFICE VISIT (OUTPATIENT)
Dept: FAMILY MEDICINE CLINIC | Facility: CLINIC | Age: 70
End: 2025-02-14
Payer: MEDICARE

## 2025-02-14 ENCOUNTER — HOSPITAL ENCOUNTER (OUTPATIENT)
Dept: CARDIOLOGY | Facility: HOSPITAL | Age: 70
Discharge: HOME | End: 2025-02-14
Payer: MEDICARE

## 2025-02-14 ENCOUNTER — TELEPHONE (OUTPATIENT)
Dept: FAMILY MEDICINE CLINIC | Facility: CLINIC | Age: 70
End: 2025-02-14

## 2025-02-14 VITALS
WEIGHT: 199.4 LBS | BODY MASS INDEX: 32.05 KG/M2 | DIASTOLIC BLOOD PRESSURE: 82 MMHG | SYSTOLIC BLOOD PRESSURE: 128 MMHG | HEIGHT: 66 IN | OXYGEN SATURATION: 96 % | HEART RATE: 86 BPM

## 2025-02-14 VITALS — BODY MASS INDEX: 32.03 KG/M2 | WEIGHT: 199.3 LBS | HEIGHT: 66 IN

## 2025-02-14 DIAGNOSIS — M79.89 LEFT LEG SWELLING: ICD-10-CM

## 2025-02-14 DIAGNOSIS — M79.89 SWELLING OF LEFT FOOT: ICD-10-CM

## 2025-02-14 DIAGNOSIS — R06.02 SHORTNESS OF BREATH: ICD-10-CM

## 2025-02-14 DIAGNOSIS — E11.65 TYPE 2 DIABETES MELLITUS WITH HYPERGLYCEMIA, WITHOUT LONG-TERM CURRENT USE OF INSULIN: ICD-10-CM

## 2025-02-14 DIAGNOSIS — N52.1 ERECTILE DISORDER DUE TO MEDICAL CONDITION IN MALE: ICD-10-CM

## 2025-02-14 DIAGNOSIS — K86.81 EXOCRINE PANCREATIC INSUFFICIENCY: Primary | ICD-10-CM

## 2025-02-14 DIAGNOSIS — R10.13 EPIGASTRIC PAIN: ICD-10-CM

## 2025-02-14 LAB
BH CV LOWER VASCULAR LEFT COMMON FEMORAL AUGMENT: NORMAL
BH CV LOWER VASCULAR LEFT COMMON FEMORAL COMPRESS: NORMAL
BH CV LOWER VASCULAR LEFT COMMON FEMORAL PHASIC: NORMAL
BH CV LOWER VASCULAR LEFT COMMON FEMORAL SPONT: NORMAL
BH CV LOWER VASCULAR LEFT DISTAL FEMORAL AUGMENT: NORMAL
BH CV LOWER VASCULAR LEFT DISTAL FEMORAL COMPRESS: NORMAL
BH CV LOWER VASCULAR LEFT DISTAL FEMORAL PHASIC: NORMAL
BH CV LOWER VASCULAR LEFT DISTAL FEMORAL SPONT: NORMAL
BH CV LOWER VASCULAR LEFT GASTRONEMIUS COMPRESS: NORMAL
BH CV LOWER VASCULAR LEFT GREATER SAPH AK COMPRESS: NORMAL
BH CV LOWER VASCULAR LEFT GREATER SAPH BK COMPRESS: NORMAL
BH CV LOWER VASCULAR LEFT LESSER SAPH COMPRESS: NORMAL
BH CV LOWER VASCULAR LEFT MID FEMORAL AUGMENT: NORMAL
BH CV LOWER VASCULAR LEFT MID FEMORAL COMPRESS: NORMAL
BH CV LOWER VASCULAR LEFT MID FEMORAL PHASIC: NORMAL
BH CV LOWER VASCULAR LEFT MID FEMORAL SPONT: NORMAL
BH CV LOWER VASCULAR LEFT PERONEAL COMPRESS: NORMAL
BH CV LOWER VASCULAR LEFT POPLITEAL AUGMENT: NORMAL
BH CV LOWER VASCULAR LEFT POPLITEAL COMPRESS: NORMAL
BH CV LOWER VASCULAR LEFT POPLITEAL PHASIC: NORMAL
BH CV LOWER VASCULAR LEFT POPLITEAL SPONT: NORMAL
BH CV LOWER VASCULAR LEFT POSTERIOR TIBIAL COMPRESS: NORMAL
BH CV LOWER VASCULAR LEFT PROFUNDA FEMORAL PHASIC: NORMAL
BH CV LOWER VASCULAR LEFT PROFUNDA FEMORAL SPONT: NORMAL
BH CV LOWER VASCULAR LEFT PROXIMAL FEMORAL AUGMENT: NORMAL
BH CV LOWER VASCULAR LEFT PROXIMAL FEMORAL COMPRESS: NORMAL
BH CV LOWER VASCULAR LEFT PROXIMAL FEMORAL PHASIC: NORMAL
BH CV LOWER VASCULAR LEFT PROXIMAL FEMORAL SPONT: NORMAL
BH CV LOWER VASCULAR LEFT SAPHENOFEMORAL JUNCTION AUGMENT: NORMAL
BH CV LOWER VASCULAR LEFT SAPHENOFEMORAL JUNCTION COMPRESS: NORMAL
BH CV LOWER VASCULAR LEFT SAPHENOFEMORAL JUNCTION PHASIC: NORMAL
BH CV LOWER VASCULAR LEFT SAPHENOFEMORAL JUNCTION SPONT: NORMAL
BH CV LOWER VASCULAR RIGHT COMMON FEMORAL AUGMENT: NORMAL
BH CV LOWER VASCULAR RIGHT COMMON FEMORAL PHASIC: NORMAL
BH CV LOWER VASCULAR RIGHT COMMON FEMORAL SPONT: NORMAL
BH CV VAS PRELIMINARY FINDINGS SCRIPTING: 1

## 2025-02-14 PROCEDURE — 93971 EXTREMITY STUDY: CPT

## 2025-02-14 RX ORDER — TADALAFIL 5 MG/1
5 TABLET ORAL DAILY PRN
Qty: 15 TABLET | Refills: 11 | Status: SHIPPED | OUTPATIENT
Start: 2025-02-14

## 2025-02-14 RX ORDER — INSULIN LISPRO 200 [IU]/ML
16 INJECTION, SOLUTION SUBCUTANEOUS
Qty: 24 ML | Refills: 0 | Status: SHIPPED | OUTPATIENT
Start: 2025-02-14

## 2025-02-14 NOTE — PROGRESS NOTES
Established Patient Office Visit      Patient Name: Israel Reyna  : 1955   MRN: [id number]   Care Team: Patient Care Team:  Elio Moore DO as PCP - General (Family Medicine)  José Miguel Bautista MD as Consulting Physician (Endocrinology)    Chief Complaint:    Chief Complaint   Patient presents with    Stomcach Pain     Follow up on stomach pain, and left foot is swollen. Humalog**    Shortness of Breath     Ongoing for about a week       History of Present Illness: Israel Reyna is a 70 y.o. male who is here today for chief complaint.    Shortness of Breath  This is a recurrent problem. The current episode started in the past 7 days. The problem occurs daily. The problem has been coming and going. Associated symptoms include chest pain, headaches, leg pain, leg swelling, orthopnea and PND. Pertinent negatives include no fever, hemoptysis, sore throat, sputum production, swollen glands, syncope, vomiting or wheezing. The symptoms are aggravated by any activity. There is no history of asthma or COPD. There has been no fever. Most recent home oxygen level percentage is No  Additional comments: Ankles swelling on left foot      Ran out of humalog last week for 4 days. Eventually was able to get sent in to [facility], who said it would be a week before they could get any in stock, same with Fernando in [employer], eventually found 2 pens at a [facility].    This patient is accompanied by their self who contributes to the history of their care.    The following portions of the patient's history were reviewed and updated as appropriate: allergies, current medications, past family history, past medical history, past social history, past surgical history and problem list.    Subjective      Review of Systems:   Review of Systems   Constitutional:  Negative for fever.   HENT:  Negative for sore throat and swollen glands.    Respiratory:  Positive for shortness of breath.  Negative for hemoptysis, sputum production and wheezing.    Cardiovascular:  Positive for chest pain, orthopnea, leg swelling and PND. Negative for syncope.   Gastrointestinal:  Negative for vomiting.    - See HPI    Past Medical History:   Past Medical History:   Diagnosis Date    Allergic     Anxiety     Bowel incontinence     w/ history of Stimulator Device     CAD (coronary artery disease)     Carotid artery occlusion     Cataract     Chest pain     CHF (congestive heart failure)     Cluster headache     Colon polyp     Dementia     Depression     Diabetes mellitus, type 2     Diabetic peripheral neuropathy     Dyslipidemia     Erectile dysfunction     Fatigue     GERD (gastroesophageal reflux disease)     Headache, tension-type     HL (hearing loss) July 2023    Im wearing hearing aids. Dr. Graves (sp)    Hypertension     Kidney stone     Memory loss     Mental status change     Migraine     Complex     Migraine headache     Myocardial infarction Several years ago    Renal insufficiency     Shingles     Sleep apnea     SOB (shortness of breath)     Stroke     Was told tia go with hemiphlegic migraines    Vision loss        Past Surgical History:   Past Surgical History:   Procedure Laterality Date    CARDIAC CATHETERIZATION      CARDIAC CATHETERIZATION N/A 10/14/2016    Procedure: Left Heart Cath;  Surgeon: Santiago Garner MD;  Location: [facility] CATH INVASIVE LOCATION;  Service:     COLONOSCOPY  02/25/2022    Dr. Graves-recommended 3 year repeat    ENDOSCOPY  02/03/2022    Dr. Graves    EYE SURGERY  Retina    Dr. Askew    GASTRIC STIMULATOR IMPLANT SURGERY      SMALL INTESTINE SURGERY      unspecified, x3 as an infant     TESTICLE SURGERY      UPPER GASTROINTESTINAL ENDOSCOPY         Family History:   Family History   Problem Relation Age of Onset    Heart failure Mother     Diabetes Mother     Alzheimer's disease Mother     Arthritis Mother     Heart disease Mother     Heart attack Father      Kidney disease Father     Cancer Father     Prostate cancer Father     Heart disease Father     Hypertension Father     Cancer Sister     Breast cancer Sister     Diabetes Sister     Heart disease Sister     Arrhythmia Sister     Stroke Sister     Diabetes Brother     Heart disease Brother     Cancer Brother     Liver disease Brother     Kidney disease Brother     Dementia Paternal Uncle     Alzheimer's disease Maternal Grandmother     Liver cancer Maternal Grandfather     Liver disease Maternal Grandfather     Stroke Sister     Colon cancer Neg Hx        Social History:   Social History     Socioeconomic History    Marital status:    Tobacco Use    Smoking status: Never     Passive exposure: Never    Smokeless tobacco: Never   Vaping Use    Vaping status: Never Used   Substance and Sexual Activity    Alcohol use: Never    Drug use: Never    Sexual activity: Yes     Partners: Female       Tobacco History:   Social History     Tobacco Use   Smoking Status Never    Passive exposure: Never   Smokeless Tobacco Never       Medications:   Outpatient Medications Prior to Visit   Medication Sig Dispense Refill    amLODIPine (NORVASC) 5 MG tablet Take 1 tablet by mouth once daily 90 tablet 0    butalbital-acetaminophen-caffeine (FIORICET, ESGIC) -40 MG per tablet TAKE 1 TABLET BY MOUTH ONCE DAILY AS NEEDED FOR HEADACHE 30 tablet 0    Continuous Glucose  (FreeStyle Mary 3 Gurley) device USE DAILY TO CHECK BLOOD GLUCOSE 1 each 0    Continuous Glucose Sensor (FreeStyle Mary 3 Sensor) misc Use 1 each Every 14 (Fourteen) Days. Dx E11.65 6 each 3    Creon 51591-628037 units capsule delayed-release particles capsule Take 1 capsule by mouth 3 (Three) Times a Day With Meals. 270 capsule 0    dapagliflozin Propanediol (Farxiga) 10 MG tablet Take 10 mg by mouth Daily.      difluprednate (DUREZOL) 0.05 % ophthalmic emulsion       diphenoxylate-atropine (Lomotil) 2.5-0.025 MG per tablet Take 1 tablet by mouth 4  "(Four) Times a Day As Needed for Diarrhea. 120 tablet 0    doxepin (SINEquan) 50 MG capsule Take 1 capsule by mouth At Night As Needed for Sleep (anxiety). 30 capsule 2    Dulaglutide (Trulicity) 1.5 MG/0.5ML solution pen-injector Inject 1.5 mg under the skin into the appropriate area as directed 1 (One) Time Per Week. 6 mL 2    galcanezumab-gnlm (Emgality) 120 MG/ML auto-injector pen Inject 1 mL under the skin into the appropriate area as directed Every 30 (Thirty) Days. Gets from Patient Assistance Program 1.12 mL 11    glucose monitor monitoring kit 1 each Daily. 1 each 0    HYDROcodone-acetaminophen (NORCO) 5-325 MG per tablet Take 1 tablet by mouth Every 4 (Four) Hours As Needed for Moderate Pain. 10 tablet 0    Insulin Pen Needle (MM Pen Needles) 31G X 6 MM misc USE 1  ONCE DAILY 100 each 1    Insulin Syringe 31G X 5/16\" 0.3 ML misc 1 each Every Night. 90 each 3    Lancets Thin misc 1 each Daily. Use daily as directed 100 each 11    lisinopril (PRINIVIL,ZESTRIL) 40 MG tablet Take 1 tablet by mouth twice daily 180 tablet 0    metFORMIN (GLUCOPHAGE) 1000 MG tablet TAKE 1 TABLET BY MOUTH TWICE DAILY WITH MEALS 180 tablet 0    naloxone (NARCAN) 4 MG/0.1ML nasal spray Call 911. Don't prime. [place] in 1 nostril for overdose. Repeat in 2-3 minutes in other nostril if no or minimal breathing/responsiveness. 2 each 0    nitroglycerin (NITROSTAT) 0.4 MG SL tablet 1 under the tongue as needed for angina, may repeat q5mins for up three doses 100 tablet 11    ondansetron ODT (ZOFRAN-ODT) 8 MG disintegrating tablet Place 1 tablet on the tongue Every 8 (Eight) Hours As Needed for Nausea or Vomiting. 30 tablet 2    OneTouch Ultra test strip USE 1 STRIP TO CHECK GLUCOSE ONCE DAILY AS DIRECTED 100 each 0    pantoprazole (PROTONIX) 40 MG EC tablet TAKE 1 TABLET BY MOUTH TWICE DAILY 30 MINUTES BEFORE MEAL(S) 180 tablet 3    prednisoLONE acetate (PRED FORTE) 1 % ophthalmic suspension INSTILL 1 DROP INTO RIGHT EYE 4 TIMES DAILY      " pregabalin (Lyrica) 75 MG capsule Take 1 capsule by mouth 3 (Three) Times a Day. (Patient taking differently: Take 1 capsule by mouth Daily. Once daily around 7pm) 90 capsule 3    pregabalin (Lyrica) 75 MG capsule Take 1 capsule by mouth 3 (Three) Times a Day. 42 capsule 0    sildenafil (VIAGRA) 100 MG tablet Take 1 tablet by mouth Daily As Needed for Erectile Dysfunction. 15 tablet 11    tamsulosin (FLOMAX) 0.4 MG capsule 24 hr capsule Take 1 capsule by mouth once daily 90 capsule 0    Toujeo Max SoloStar 300 UNIT/ML solution pen-injector injection INJECT 40 UNITS UNDER THE SKIN EVERY NIGHT AT BEDTIME INTO THE APPROPRIATE AREA 12 mL 0    traZODone (DESYREL) 50 MG tablet Take 2 tablets by mouth Every Night for 30 days. 60 tablet 3    vitamin B-12 (CYANOCOBALAMIN) 100 MCG tablet Take 0.5 tablets by mouth 2 (two) times a day.      vitamin C (ASCORBIC ACID) 500 MG tablet Take 1 tablet by mouth 2 (two) times a day.      vitamin E 400 UNIT capsule Take 1 capsule by mouth Daily.      Zinc 40 MG tablet Take 1 tablet by mouth Daily.      atorvastatin (LIPITOR) 20 MG tablet Take 1 tablet by mouth once daily 90 tablet 1    Insulin Lispro (HumaLOG KwikPen) 200 UNIT/ML solution pen-injector Inject 16 Units under the skin into the appropriate area as directed 3 (Three) Times a Day Before Meals for 90 days. 24 mL 0    propranolol LA (INDERAL LA) 120 MG 24 hr capsule Take 1 capsule by mouth Daily for 30 days. 30 capsule 6     Facility-Administered Medications Prior to Visit   Medication Dose Route Frequency Provider Last Rate Last Admin    lidocaine (XYLOCAINE) 1 % injection 3 mL  3 mL Subcutaneous Once Elio Moore DO            Allergies:   Allergies   Allergen Reactions    Actos [Pioglitazone] Other (See Comments)     congestive heart failure    Avandia [Rosiglitazone] Other (See Comments)     congestive heart failure.    Darvon [Propoxyphene] Itching     Angioedema    itching    Talwin [Pentazocine] Rash      "Angioedema    itching       Objective   Objective     Physical Exam:  Vital Signs:   Vitals:    02/14/25 1117   BP: 128/82   Pulse: 86   SpO2: 96%   Weight: 90.4 kg (199 lb 6.4 oz)   Height: 167.6 cm (65.98\")     Body mass index is 32.2 kg/m².     Physical Exam  Nursing note reviewed  Const: NAD, A&Ox4, Pleasant, Cooperative  Eyes: EOMI, no conjunctivitis  ENT: No nasal discharge present, neck supple  Cardiac: Regular rate and rhythm, no cyanosis  Resp: Respiratory rate within normal limits, no increased work of breathing, no audible wheezing or retractions noted  GI: No distention or ascites  MSK: Motor and sensation grossly intact in bilateral upper extremities  Neurologic: CN II-XII grossly intact  Psych: Appropriate mood and behavior.  Skin: Warm, dry  Procedures/Radiology     Procedures  No radiology results for the last 7 days     Assessment & Plan   Assessment / Plan      Assessment/Plan:   Problems Addressed This Visit  Diagnoses and all orders for this visit:    1. Exocrine pancreatic insufficiency (Primary)    2. Epigastric pain    3. Swelling of left foot    4. Shortness of breath    5. Type 2 diabetes mellitus with hyperglycemia, without long-term current use of insulin  -     Insulin Lispro (HumaLOG KwikPen) 200 UNIT/ML solution pen-injector; Inject 16 Units under the skin into the appropriate area as directed 3 (Three) Times a Day Before Meals.  Dispense: 24 mL; Refill: 0    6. Erectile disorder due to medical condition in male  -     tadalafil (Cialis) 5 MG tablet; Take 1 tablet by mouth Daily As Needed for Erectile Dysfunction.  Dispense: 15 tablet; Refill: 11    7. Left leg swelling  -     Duplex Venous Lower Extremity - Left CAR; Future      Problem List Items Addressed This Visit          Genitourinary and Reproductive     Erectile disorder due to medical condition in male    Relevant Medications    tadalafil (Cialis) 5 MG tablet     Other Visit Diagnoses       Exocrine pancreatic insufficiency    " -  Primary    Epigastric pain        Swelling of left foot        Shortness of breath        Type 2 diabetes mellitus with hyperglycemia, without long-term current use of insulin        Relevant Medications    Insulin Lispro (HumaLOG KwikPen) 200 UNIT/ML solution pen-injector    Left leg swelling        Relevant Orders    Duplex Venous Lower Extremity - Left CAR (Completed)            There are no Patient Instructions on file for this visit.    Follow Up:   No follow-ups on file.    DO BAILEY Jones RD  [facility]  [address]  [facility] [id number]  Fax [phone]  Phone [phone]    Disclaimer to patients: The 21st Century Cares Act makes medical notes like these available to patients in the interest of transparency. However, please be advised that this is still a medical document. It is intended as [id number] communication. Many sections may include medical language or jargon, abbreviations, and additional verbiage that are unfamiliar or confusing. In some ways it may come across as blunt, direct, or may be summarized in order to clearly and concisely communicate the most crucial information to medical professionals. It may also include mentions of conditions that are unlikely but considered as part of the differential diagnosis, including serious disorders. These are not always discussed at length at the time of appointment because their likelihood is so low, but may be included in a medical note to make it clear what has been considered and/or ruled out as part of a work-up. Medical documents are intended to carry relevant information, facts as evident, and the personal clinical opinion of the physician. If you have any questions regarding this medical document, please bring them to the attention of the physician at your next scheduled appointment.

## 2025-02-14 NOTE — TELEPHONE ENCOUNTER
HUB RELAY: called to confirm patient's imaging appointment.   Appointment Date: 2/14/25   Arrival time: 2:30p   Appointment Time: 3;00p   Location: [address] [place] [address]

## 2025-02-21 ENCOUNTER — TELEPHONE (OUTPATIENT)
Dept: GASTROENTEROLOGY | Facility: CLINIC | Age: 70
End: 2025-02-21
Payer: MEDICARE

## 2025-02-21 NOTE — TELEPHONE ENCOUNTER
Name: Israel Reyna    Relationship: Self    Best Callback Number: [phone]    Patient would like to Schedule a Follow-Up. Unable to schedule within the 3 week timeframe.     Patient is having symptoms of lower pain in the stomach (waist line area). Please call patient. If not able to reach pt. It is okay to lvm. Pt is also due for colonoscopy he said he will schedule after he speak with Ely.

## 2025-02-24 ENCOUNTER — TELEPHONE (OUTPATIENT)
Dept: GASTROENTEROLOGY | Facility: CLINIC | Age: 70
End: 2025-02-24
Payer: MEDICARE

## 2025-02-24 NOTE — TELEPHONE ENCOUNTER
Caller: Israel Reyna    Relationship to patient: Self    Best call back number: [phone]     Patient is needing: PT CALLED IN TO CHECK ON THIS REQUEST. PT STATED THAT HE IS HAVING SEVER PAIN. PLEASE CALL BACK AND ADVISE

## 2025-02-24 NOTE — TELEPHONE ENCOUNTER
Hub staff attempted to follow warm transfer process and was unsuccessful     Caller: Israel Reyna    Relationship to patient: Self    Best call back number: [phone]    Patient is needing: PT IS RETURNING CALL TO [facility]. PLEASE CALL PT BACK

## 2025-02-25 DIAGNOSIS — E78.5 DYSLIPIDEMIA: ICD-10-CM

## 2025-02-25 RX ORDER — ATORVASTATIN CALCIUM 20 MG/1
20 TABLET, FILM COATED ORAL DAILY
Qty: 90 TABLET | Refills: 1 | Status: SHIPPED | OUTPATIENT
Start: 2025-02-25

## 2025-02-25 NOTE — TELEPHONE ENCOUNTER
Rx Refill Note  Requested Prescriptions     Pending Prescriptions Disp Refills    atorvastatin (LIPITOR) 20 MG tablet [Pharmacy Med Name: ATORVASTATIN CALCIUM 20MG TABS] 90 tablet 1     Sig: TAKE 1 TABLET BY MOUTH DAILY.      Last office visit with prescribing clinician: 2/14/2025   Last telemedicine visit with prescribing clinician: Visit date not found   Next office visit with prescribing clinician: Visit date not found                         Would you like a call back once the refill request has been completed: [] Yes [] No    If the office needs to give you a call back, can they leave a voicemail: [] Yes [] No    Kyara Godinez MA  02/25/25, 15:33 EST

## 2025-02-25 NOTE — TELEPHONE ENCOUNTER
Pt states he is having severe pain in lower part of abdomen. Bottom left and goes 4 inches across. It takes his breath. Pain starts off easy and gets worse. Only relief is a ice pack then 15 minutes later it subsides for a few hours. It is not all day long but happens randomly through out the day.   Dr. Elio Moore has been giving patient injections and it works for 3-4 days.   Upcoming office visit with Ely 4-1-2025

## 2025-03-03 ENCOUNTER — OFFICE VISIT (OUTPATIENT)
Dept: FAMILY MEDICINE CLINIC | Facility: CLINIC | Age: 70
End: 2025-03-03
Payer: MEDICARE

## 2025-03-03 ENCOUNTER — TELEPHONE (OUTPATIENT)
Dept: FAMILY MEDICINE CLINIC | Facility: CLINIC | Age: 70
End: 2025-03-03

## 2025-03-03 VITALS
DIASTOLIC BLOOD PRESSURE: 84 MMHG | OXYGEN SATURATION: 97 % | HEIGHT: 66 IN | SYSTOLIC BLOOD PRESSURE: 126 MMHG | BODY MASS INDEX: 30.25 KG/M2 | WEIGHT: 188.2 LBS | HEART RATE: 77 BPM

## 2025-03-03 DIAGNOSIS — R05.1 ACUTE COUGH: Primary | ICD-10-CM

## 2025-03-03 NOTE — TELEPHONE ENCOUNTER
Caller: Israel Reyna    Relationship: Self    Best call back number: [phone]     Which medication are you concerned about: TESSALON PEALS FOR COUGH    Who prescribed you this medication: LOLIS        What are your concerns: PATIENT WAS IN TODAY AND THE ABOVE WAS TO BE SENT TO [facility] IN [place]. PATIENT IS THERE AND NO MEDICATION SENT. SEND ASAP TODAY

## 2025-03-04 RX ORDER — BENZONATATE 100 MG/1
100 CAPSULE ORAL 3 TIMES DAILY PRN
Qty: 30 CAPSULE | Refills: 0 | Status: SHIPPED | OUTPATIENT
Start: 2025-03-04 | End: 2025-03-14

## 2025-03-05 ENCOUNTER — OFFICE VISIT (OUTPATIENT)
Dept: GASTROENTEROLOGY | Facility: CLINIC | Age: 70
End: 2025-03-05
Payer: MEDICARE

## 2025-03-05 VITALS
HEART RATE: 72 BPM | TEMPERATURE: 97.3 F | BODY MASS INDEX: 30.13 KG/M2 | HEIGHT: 67 IN | WEIGHT: 192 LBS | DIASTOLIC BLOOD PRESSURE: 84 MMHG | SYSTOLIC BLOOD PRESSURE: 173 MMHG

## 2025-03-05 DIAGNOSIS — R10.9 TRIGGER POINT OF ABDOMEN: Primary | ICD-10-CM

## 2025-03-05 DIAGNOSIS — Z86.0101 HISTORY OF ADENOMATOUS POLYP OF COLON: ICD-10-CM

## 2025-03-05 DIAGNOSIS — Z98.890 STATUS POST BALLOON DILATATION OF ESOPHAGEAL STRICTURE: ICD-10-CM

## 2025-03-05 DIAGNOSIS — K31.84 GASTROPARESIS: ICD-10-CM

## 2025-03-05 PROCEDURE — 1160F RVW MEDS BY RX/DR IN RCRD: CPT | Performed by: INTERNAL MEDICINE

## 2025-03-05 PROCEDURE — 99214 OFFICE O/P EST MOD 30 MIN: CPT | Performed by: INTERNAL MEDICINE

## 2025-03-05 PROCEDURE — 1159F MED LIST DOCD IN RCRD: CPT | Performed by: INTERNAL MEDICINE

## 2025-03-05 PROCEDURE — 3079F DIAST BP 80-89 MM HG: CPT | Performed by: INTERNAL MEDICINE

## 2025-03-05 PROCEDURE — 3077F SYST BP >= 140 MM HG: CPT | Performed by: INTERNAL MEDICINE

## 2025-03-07 DIAGNOSIS — R10.31 RIGHT LOWER QUADRANT ABDOMINAL PAIN: Primary | ICD-10-CM

## 2025-03-11 NOTE — PROGRESS NOTES
GASTROENTEROLOGY OFFICE NOTE  Israel Reyna  [id number]  1955      Chief Complaint   Patient presents with    Abdominal Pain, Gastroparesis        HISTORY OF PRESENT ILLNESS:  History of Present Illness  The patient is a 70-year-old male who presents for evaluation of abdominal pain, diarrhea, and blood in stool.    He has been experiencing severe abdominal pain, initially localized to the left lower quadrant but now extending across the entire lower abdomen. The pain is constant, rated at a 10 on the pain scale, and is exacerbated by coughing. He reports waking up at night due to the pain. He is uncertain about the dosage of amitriptyline he is taking, as his wife administers the medication according to the prescription. His primary care physician, Dr. Elio Moore, had previously administered numbing injections, but these were discontinued around mid-July 2024. He reports no issues with eating or swallowing. The application of ice provides temporary relief.    He also reports occasional blood in his stools. He expresses concern about potential findings during his upcoming colonoscopy. He underwent a colonoscopy in February 2022, during which six polyps measuring 5 to 12 mm were removed. A repeat colonoscopy was recommended in three years, which is now due.    He describes his bowel movements as irregular, with a lack of control over his sphincter muscles, necessitating the use of adult diapers. He experiences both full bowel movements and leakage, often having multiple bowel movements per day. He reports no constipation. He has been prescribed Creon, which he reports has helped to some extent. He admits to consuming dairy products, including cream in his coffee and cheese on his sandwich. He takes Imodium daily, sometimes up to 15 to 20 tablets, without resulting in constipation.    Supplemental Information  He reports that his blood sugar drops if he does not eat, and he takes glucose pills in the  middle of the night to manage this.      PAST MEDICAL HISTORY  Past Medical History:    Allergic    Anxiety    Bowel incontinence    w/ history of Stimulator Device     CAD (coronary artery disease)    Carotid artery occlusion    Cataract    Chest pain    CHF (congestive heart failure)    Cluster headache    Colon polyp    Dementia    Depression    Diabetes mellitus, type 2    Diabetic peripheral neuropathy    Dyslipidemia    Erectile dysfunction    Fatigue    GERD (gastroesophageal reflux disease)    Headache, tension-type    HL (hearing loss)    Im wearing hearing aids. Dr. Graves (sp)    Hypertension    Kidney stone    Memory loss    Mental status change    Migraine    Complex     Migraine headache    Myocardial infarction    Renal insufficiency    Shingles    Sleep apnea    SOB (shortness of breath)    Stroke    Was told tia go with hemiphlegic migraines    Vision loss        PAST SURGICAL HISTORY  Past Surgical History:    CARDIAC CATHETERIZATION    CARDIAC CATHETERIZATION    Procedure: Left Heart Cath;  Surgeon: Santiago Garner MD;  Location: [facility] CATH INVASIVE LOCATION;  Service:     COLONOSCOPY    Dr. Graves-recommended 3 year repeat    ENDOSCOPY    Dr. Graves    EYE SURGERY    Dr. Askew    GASTRIC STIMULATOR IMPLANT SURGERY    SMALL INTESTINE SURGERY    unspecified, x3 as an infant     TESTICLE SURGERY    UPPER GASTROINTESTINAL ENDOSCOPY        MEDICATIONS:    Current Outpatient Medications:     amLODIPine (NORVASC) 5 MG tablet, Take 1 tablet by mouth once daily, Disp: 90 tablet, Rfl: 0    atorvastatin (LIPITOR) 20 MG tablet, TAKE 1 TABLET BY MOUTH DAILY., Disp: 90 tablet, Rfl: 1    benzonatate (TESSALON) 100 MG capsule, Take 1 capsule by mouth 3 (Three) Times a Day As Needed for Cough for up to 10 days., Disp: 30 capsule, Rfl: 0    butalbital-acetaminophen-caffeine (FIORICET, ESGIC) -40 MG per tablet, TAKE 1 TABLET BY MOUTH ONCE DAILY AS NEEDED FOR HEADACHE, Disp: 30 tablet, Rfl: 0     "Continuous Glucose  (FreeStyle Mary 3 Chualar) device, USE DAILY TO CHECK BLOOD GLUCOSE, Disp: 1 each, Rfl: 0    Continuous Glucose Sensor (FreeStyle Mary 3 Sensor) misc, Use 1 each Every 14 (Fourteen) Days. Dx E11.65, Disp: 6 each, Rfl: 3    Creon 64631-493037 units capsule delayed-release particles capsule, Take 1 capsule by mouth 3 (Three) Times a Day With Meals., Disp: 270 capsule, Rfl: 0    dapagliflozin Propanediol (Farxiga) 10 MG tablet, Take 10 mg by mouth Daily., Disp: , Rfl:     difluprednate (DUREZOL) 0.05 % ophthalmic emulsion, , Disp: , Rfl:     diphenoxylate-atropine (Lomotil) 2.5-0.025 MG per tablet, Take 1 tablet by mouth 4 (Four) Times a Day As Needed for Diarrhea., Disp: 120 tablet, Rfl: 0    doxepin (SINEquan) 50 MG capsule, Take 1 capsule by mouth At Night As Needed for Sleep (anxiety)., Disp: 30 capsule, Rfl: 2    Dulaglutide (Trulicity) 1.5 MG/0.5ML solution pen-injector, Inject 1.5 mg under the skin into the appropriate area as directed 1 (One) Time Per Week., Disp: 6 mL, Rfl: 2    galcanezumab-gnlm (Emgality) 120 MG/ML auto-injector pen, Inject 1 mL under the skin into the appropriate area as directed Every 30 (Thirty) Days. Gets from Patient Assistance Program, Disp: 1.12 mL, Rfl: 11    glucose monitor monitoring kit, 1 each Daily., Disp: 1 each, Rfl: 0    HYDROcodone-acetaminophen (NORCO) 5-325 MG per tablet, Take 1 tablet by mouth Every 4 (Four) Hours As Needed for Moderate Pain., Disp: 10 tablet, Rfl: 0    Insulin Lispro (HumaLOG KwikPen) 200 UNIT/ML solution pen-injector, Inject 16 Units under the skin into the appropriate area as directed 3 (Three) Times a Day Before Meals., Disp: 24 mL, Rfl: 0    Insulin Pen Needle (MM Pen Needles) 31G X 6 MM misc, USE 1  ONCE DAILY, Disp: 100 each, Rfl: 1    Insulin Syringe 31G X 5/16\" 0.3 ML misc, 1 each Every Night., Disp: 90 each, Rfl: 3    Lancets Thin misc, 1 each Daily. Use daily as directed, Disp: 100 each, Rfl: 11    lisinopril " (PRINIVIL,ZESTRIL) 40 MG tablet, Take 1 tablet by mouth twice daily, Disp: 180 tablet, Rfl: 0    metFORMIN (GLUCOPHAGE) 1000 MG tablet, TAKE 1 TABLET BY MOUTH TWICE DAILY WITH MEALS, Disp: 180 tablet, Rfl: 0    naloxone (NARCAN) 4 MG/0.1ML nasal spray, Call 911. Don't prime. Kincaid in 1 nostril for overdose. Repeat in 2-3 minutes in other nostril if no or minimal breathing/responsiveness., Disp: 2 each, Rfl: 0    nitroglycerin (NITROSTAT) 0.4 MG SL tablet, 1 under the tongue as needed for angina, may repeat q5mins for up three doses, Disp: 100 tablet, Rfl: 11    ondansetron ODT (ZOFRAN-ODT) 8 MG disintegrating tablet, Place 1 tablet on the tongue Every 8 (Eight) Hours As Needed for Nausea or Vomiting., Disp: 30 tablet, Rfl: 2    OneTouch Ultra test strip, USE 1 STRIP TO CHECK GLUCOSE ONCE DAILY AS DIRECTED, Disp: 100 each, Rfl: 0    pantoprazole (PROTONIX) 40 MG EC tablet, TAKE 1 TABLET BY MOUTH TWICE DAILY 30 MINUTES BEFORE MEAL(S), Disp: 180 tablet, Rfl: 3    prednisoLONE acetate (PRED FORTE) 1 % ophthalmic suspension, INSTILL 1 DROP INTO RIGHT EYE 4 TIMES DAILY, Disp: , Rfl:     pregabalin (Lyrica) 75 MG capsule, Take 1 capsule by mouth 3 (Three) Times a Day. (Patient taking differently: Take 1 capsule by mouth Daily. Once daily around 7pm), Disp: 90 capsule, Rfl: 3    pregabalin (Lyrica) 75 MG capsule, Take 1 capsule by mouth 3 (Three) Times a Day., Disp: 42 capsule, Rfl: 0    sildenafil (VIAGRA) 100 MG tablet, Take 1 tablet by mouth Daily As Needed for Erectile Dysfunction., Disp: 15 tablet, Rfl: 11    tadalafil (Cialis) 5 MG tablet, Take 1 tablet by mouth Daily As Needed for Erectile Dysfunction., Disp: 15 tablet, Rfl: 11    tamsulosin (FLOMAX) 0.4 MG capsule 24 hr capsule, Take 1 capsule by mouth once daily, Disp: 90 capsule, Rfl: 0    Toujeo Max SoloStar 300 UNIT/ML solution pen-injector injection, INJECT 40 UNITS UNDER THE SKIN EVERY NIGHT AT BEDTIME INTO THE APPROPRIATE AREA, Disp: 12 mL, Rfl: 0     "traZODone (DESYREL) 50 MG tablet, Take 2 tablets by mouth Every Night for 30 days., Disp: 60 tablet, Rfl: 3    vitamin B-12 (CYANOCOBALAMIN) 100 MCG tablet, Take 0.5 tablets by mouth 2 (two) times a day., Disp: , Rfl:     vitamin C (ASCORBIC ACID) 500 MG tablet, Take 1 tablet by mouth 2 (two) times a day., Disp: , Rfl:     vitamin E 400 UNIT capsule, Take 1 capsule by mouth Daily., Disp: , Rfl:     Zinc 40 MG tablet, Take 1 tablet by mouth Daily., Disp: , Rfl:     propranolol LA (INDERAL LA) 120 MG 24 hr capsule, Take 1 capsule by mouth Daily for 30 days., Disp: 30 capsule, Rfl: 6    Current Facility-Administered Medications:     lidocaine (XYLOCAINE) 1 % injection 3 mL, 3 mL, Subcutaneous, Once, Elio Moore DO    ALLERGIES  is allergic to actos [pioglitazone], avandia [rosiglitazone], darvon [propoxyphene], and talwin [pentazocine].    FAMILY HISTORY:  Cancer-related family history includes Breast cancer in his sister; Cancer in his brother, father, and sister; Liver cancer in his maternal grandfather; Prostate cancer in his father. There is no history of Colon cancer.  Colon Cancer-related family history is negative for Colon cancer.    SOCIAL HISTORY  He  reports that he has never smoked. He has never been exposed to tobacco smoke. He has never used smokeless tobacco. He reports that he does not drink alcohol and does not use drugs.   He is retired.  He used to do customer care.  He is also a .  He has 1 son age 36.  Non-smoker/nondrinker      PHYSICAL EXAM   /84 (BP Location: Right arm, Patient Position: Sitting, Cuff Size: Large Adult)   Pulse 72   Temp 97.3 °F (36.3 °C) (Infrared)   Ht 170.2 cm (67\")   Wt 87.1 kg (192 lb)   BMI 30.07 kg/m²   General: Alert and oriented x 3. In no apparent or acute distress.  and No stigmata of chronic liver disease  HEENT: Anicteric sclerae. Normal oropharynx  Neck: Supple. Without lymphadenopathy  CV: Regular rate and rhythm, S1, S2  Lungs: Clear " to ausculation. Without rales, rhonchi and wheezing  Abdomen:  Soft,non-distended without palpable masses or hepatosplenomeagaly, areas of rebound tenderness or guarding.   Extremeties: without clubbing, cyanosis or edema  Neurologic:  Alert and oriented x 3 without focal motor or sensory deficits  Rectal exam: deferred         ASSESSMENT/PLAN  Assessment & Plan  1. Abdominal pain.  The etiology of the pain is attributed to anterior cutaneous nerve entrapment syndrome (ACNES), also known as myofascial pain. The pain is localized to the muscle wall of the abdomen and is not indicative of any internal malignancy. The presence of scar tissue may be a contributing factor. A referral to Dr. Alexander at the pain clinic will be initiated for further evaluation and management. He is advised to avoid applying pressure to the affected area.    2. Diarrhea.  The diarrhea is likely contributing to his incontinence. He reports taking multiple Imodium tablets daily without relief. He is advised to discontinue Creon as it is only effective for pancreatic insufficiency, which he does not have. Biopsies will be taken during the colonoscopy to investigate the possibility of microscopic colitis. He is instructed to eliminate dairy from his diet for 1 to 2 weeks prior to the colonoscopy to assess for lactose intolerance.    3. Blood in stool.  He reports a little bit of blood in his stools. A colonoscopy will be scheduled to investigate this symptom further.  Rule out colonic neoplasia    4. Health Maintenance.  He is due for a colonoscopy, as his last one was in February 2022, during which six adenomatous polyps were removed. The upcoming colonoscopy will include biopsies to check for microscopic colitis. He is advised to start the prep early to ensure clear results and to manage his blood sugar levels with glucose pills if needed.    5.  PROCEDURE  Colonoscopy in February 2022 revealed six polyps measuring 5 to 12 mm, which were  removed.  Polyps were adenomatous    6.  History of dysphagia solids requiring EGD with esophageal dilation  Status post esophageal dilation February 2022 at which time he was dilated to 20 mm.  This is not problematic at this time.    7.  History of gastroparesis  Small amount of retained food noted in the stomach on his EGD of 2022 after confirmed prolonged overnight fast.  Currently asymptomatic.      Nehemias Graves MD  3/11/2025   07:10 EDT      Patient or patient representative verbalized consent for the use of Ambient Listening during the visit with  Nehemias Graves MD for chart documentation. 3/11/2025  07:13 EDT

## 2025-03-13 RX ORDER — PROPRANOLOL HYDROCHLORIDE 120 MG/1
120 CAPSULE, EXTENDED RELEASE ORAL DAILY
Qty: 30 CAPSULE | Refills: 3 | Status: SHIPPED | OUTPATIENT
Start: 2025-03-13 | End: 2025-04-12

## 2025-03-13 NOTE — TELEPHONE ENCOUNTER
Caller: Israel Reyna    Relationship: Self    Best call back number: [phone] (home)       Requested Prescriptions:   Requested Prescriptions     Pending Prescriptions Disp Refills    propranolol LA (INDERAL LA) 120 MG 24 hr capsule 30 capsule 6     Sig: Take 1 capsule by mouth Daily for 30 days.        Pharmacy where request should be sent: [facility] [address] [place] [address] [phone] [facility] [phone]      Last office visit with prescribing clinician: 10/25/2024   Last telemedicine visit with prescribing clinician: 2/11/2025   Next office visit with prescribing clinician: 7/8/2025     Additional details provided by patient: PATIENT ONLY HAS 2 PILLS LEFT    PT WANTS IT SENT TO LOCAL PHARMACY DUE TO ITS HARD TO GET THROUGH HIS MAIL ORDER    Does the patient have less than a 3 day supply:  [x] Yes  [] No    Would you like a call back once the refill request has been completed: [] Yes [x] No    If the office needs to give you a call back, can they leave a voicemail: [x] Yes [] No    Vicente Arias Rep   03/13/25 11:21 EDT

## 2025-03-13 NOTE — TELEPHONE ENCOUNTER
Rx Refill Note  Requested Prescriptions     Pending Prescriptions Disp Refills    propranolol LA (INDERAL LA) 120 MG 24 hr capsule 30 capsule 6     Sig: Take 1 capsule by mouth Daily for 30 days.      Last filled: 8/9/24 for 7 mos total to mail order, needs local pharm    Last office visit with prescribing clinician: 10/25/2024      Next office visit with prescribing clinician: 7/8/2025     Hermilo Rod MA  03/13/25, 13:10 EDT

## 2025-03-14 NOTE — TELEPHONE ENCOUNTER
Patient receives Trulicty through patient assistance - [facility]   follow a renal diet call md with any problems or questions

## 2025-03-19 ENCOUNTER — TELEPHONE (OUTPATIENT)
Dept: FAMILY MEDICINE CLINIC | Facility: CLINIC | Age: 70
End: 2025-03-19
Payer: MEDICARE

## 2025-03-19 DIAGNOSIS — E11.65 TYPE 2 DIABETES MELLITUS WITH HYPERGLYCEMIA, WITHOUT LONG-TERM CURRENT USE OF INSULIN: ICD-10-CM

## 2025-03-19 RX ORDER — PEN NEEDLE, DIABETIC 31 G X1/4"
NEEDLE, DISPOSABLE MISCELLANEOUS
Qty: 200 EACH | Refills: 2 | Status: SHIPPED | OUTPATIENT
Start: 2025-03-19

## 2025-03-19 NOTE — PROGRESS NOTES
Subjective   Israel Reyna is a 70 y.o. male.     Chief Complaint   Patient presents with    Cough    Nasal Congestion    Fever     Ongoing since Thursday.    Fatigue       History of Present Illness     Israel Reyna presents today for   Chief Complaint   Patient presents with    Cough    Nasal Congestion    Fever     Ongoing since Thursday.    Fatigue       he reports symptoms ongoing for 4 days.  he has tried over-the-counter medications with minimal improvement.  he has tried rest and fluids. he has noticed associated symptoms of congestion and cough as well as a mild subjective fever. he feels as though his symptoms are worsening. he reports some possible sick contacts but none confirmed.    The following portions of the patient's history were reviewed and updated as appropriate: allergies, current medications, past family history, past medical history, past social history, past surgical history and problem list.    Active Ambulatory Problems     Diagnosis Date Noted    Bowel incontinence 09/26/2016    Classic migraine with aura 09/26/2016    Depression     Anxiety     Mental status change 09/26/2016    Disorientation 09/26/2016    Facial droop 09/26/2016    Difficulty walking 09/26/2016    Intractable migraine with aura without status migrainosus 10/06/2016    Chest pain 10/06/2016    CAD (coronary artery disease)     Hypertension     Dyslipidemia     Type 2 diabetes mellitus with hyperglycemia, with long-term current use of insulin     GERD (gastroesophageal reflux disease)     CHF (congestive heart failure)     Chest pain     SOB (shortness of breath)     Fatigue     Chronic migraine 04/11/2017    Other cerebral infarction 07/18/2017    Transient cerebral ischemia 07/18/2017    Neck pain 10/17/2017    Spondylosis of cervical region without myelopathy or radiculopathy 10/17/2017    Facial pain 03/22/2018    Nausea 06/01/2022    Erectile disorder due to medical condition in male 06/01/2022     Nephrolithiasis 12/26/2022    Right lower quadrant abdominal pain 05/24/2024    Trigger point of abdomen 05/24/2024    Gastroparesis 05/24/2024    History of adenomatous polyp of colon 05/24/2024    Status post balloon dilatation of esophageal stricture 05/24/2024    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 12/10/2024     Resolved Ambulatory Problems     Diagnosis Date Noted    Seizure disorder 09/26/2016    Migraine      Past Medical History:   Diagnosis Date    Allergic     Carotid artery occlusion     Cataract     Cluster headache     Colon polyp     Dementia     Diabetes mellitus, type 2     Diabetic peripheral neuropathy     Erectile dysfunction     Headache, tension-type     HL (hearing loss) July 2023    Kidney stone     Memory loss     Mental status change     Migraine headache     Myocardial infarction Several years ago    Renal insufficiency     Shingles     Sleep apnea     Stroke     Vision loss      Past Surgical History:   Procedure Laterality Date    CARDIAC CATHETERIZATION      CARDIAC CATHETERIZATION N/A 10/14/2016    Procedure: Left Heart Cath;  Surgeon: Santiago Garner MD;  Location: [facility] CATH INVASIVE LOCATION;  Service:     COLONOSCOPY  02/25/2022    Dr. Graves-recommended 3 year repeat    ENDOSCOPY  02/03/2022    Dr. Graves    EYE SURGERY  Retina    Dr. Askew    GASTRIC STIMULATOR IMPLANT SURGERY      SMALL INTESTINE SURGERY      unspecified, x3 as an infant     TESTICLE SURGERY      UPPER GASTROINTESTINAL ENDOSCOPY       Family History   Problem Relation Age of Onset    Heart failure Mother     Diabetes Mother     Alzheimer's disease Mother     Arthritis Mother     Heart disease Mother     Heart attack Father     Kidney disease Father     Cancer Father     Prostate cancer Father     Heart disease Father     Hypertension Father     Cancer Sister     Breast cancer Sister     Diabetes Sister     Heart disease Sister     Arrhythmia Sister     Stroke Sister     Diabetes  "Brother     Heart disease Brother     Cancer Brother     Liver disease Brother     Kidney disease Brother     Dementia Paternal Uncle     Alzheimer's disease Maternal Grandmother     Liver cancer Maternal Grandfather     Liver disease Maternal Grandfather     Stroke Sister     Colon cancer Neg Hx      Social History     Socioeconomic History    Marital status:    Tobacco Use    Smoking status: Never     Passive exposure: Never    Smokeless tobacco: Never   Vaping Use    Vaping status: Never Used   Substance and Sexual Activity    Alcohol use: Never    Drug use: Never    Sexual activity: Yes     Partners: Female       Review of Systems  Review of Systems -   General ROS: positive for  - fever and malaise  negative for - hot flashes, night sweats or weight loss  ENT ROS: positive for - headaches, nasal congestion, nasal discharge, sinus pain and sore throat  negative for - epistaxis, oral lesions, tinnitus or visual changes  Respiratory ROS: positive for - cough and sputum changes  negative for - hemoptysis, pleuritic pain, shortness of breath or wheezing  Cardiovascular ROS: no chest pain or dyspnea on exertion    Objective   Blood pressure 126/84, pulse 77, height 167.6 cm (65.98\"), weight 85.4 kg (188 lb 3.2 oz), SpO2 97%.  Nursing note reviewed  Physical Exam  Const: Mildly ill-appearing but in no acute distress, A&Ox4, Pleasant, Cooperative  Eyes: EOMI, no conjunctivitis  ENT: There is moderate nasal discharge present, nasal congestion noted.  There is slight congestion of the mucous membranes.  There is mild submandibular and anterior cervical lymphadenopathy consistent with symptoms.  Cardiac: Regular rate and rhythm, no cyanosis  Resp: Respiratory rate within normal limits, no increased work of breathing, no audible wheezing or retractions noted.  There are slight rhonchi noted, a cough is appreciated sporadically.  GI: No distention or ascites  Psych: Appropriate mood and behavior.  Skin: Warm, " dry  Procedures  Assessment & Plan     Problem List Items Addressed This Visit    None  Visit Diagnoses         Acute cough    -  Primary    Relevant Orders    POCT SARS-CoV-2 + Flu Antigen ASHOK (Completed)            Patient was encouraged to practice proper hygiene as well as use the symptomatic medications indicated above.  If no better they were encouraged to return to our office if needed.  Zinc lozenges may be effective in preventing the spread of viral upper respiratory infections including the common cold, and they were counseled on family member use and prophylactic use of these medications. he was encouraged to maintain adequate hydration with Pedialyte if possible.  They were cautioned on the risk of viral myocarditis, and encouraged to avoid exercise or significant exertion while febrile or while symptoms extend below the neck.    There are no Patient Instructions on file for this visit.     Ambulatory progress note signed and attested to by Elio Moore D.O.

## 2025-03-19 NOTE — TELEPHONE ENCOUNTER
Caller: Israel Reyna    Relationship: Self    Best call back number: [phone]     What medication are you requesting: INSULIN PEN NEEDLES - PATIENT'S CURRENT RX IS ONLY FOR 1 NEEDLE A DAY, BUT PATIENT TAKES INSULIN SHOTS 4 TIMES A DAY WITH 2 DIFFERENT INSULINS. IS REQUESTING HIS PEN NEEDLES BE WRITTEN FOR 4 NEEDLES DAILY    If a prescription is needed, what is your preferred pharmacy and phone number:  [facility] [address] [place] [address] [phone] [facility] [phone]  [phone]

## 2025-04-02 RX ORDER — DAPAGLIFLOZIN 10 MG/1
10 TABLET, FILM COATED ORAL DAILY
Qty: 90 TABLET | Refills: 3 | Status: SHIPPED | OUTPATIENT
Start: 2025-04-02

## 2025-04-02 NOTE — TELEPHONE ENCOUNTER
Rx Refill Note  Requested Prescriptions      No prescriptions requested or ordered in this encounter        Last office visit with prescribing clinician: 10/3/2024      Next office visit with prescribing clinician: 4/23/2025       Jessika Montenegro (Jodi)  04/02/25, 09:14 EDT     AZ&ME REQUEST REFILL ON FARXIGA

## 2025-04-09 DIAGNOSIS — I10 PRIMARY HYPERTENSION: ICD-10-CM

## 2025-04-09 RX ORDER — AMLODIPINE BESYLATE 5 MG/1
5 TABLET ORAL DAILY
Qty: 90 TABLET | Refills: 0 | Status: SHIPPED | OUTPATIENT
Start: 2025-04-09

## 2025-04-09 NOTE — TELEPHONE ENCOUNTER
Rx Refill Note  Requested Prescriptions     Pending Prescriptions Disp Refills    amLODIPine (NORVASC) 5 MG tablet [Pharmacy Med Name: amLODIPine Besylate 5 MG Oral Tablet] 90 tablet 0     Sig: Take 1 tablet by mouth once daily      Last office visit with prescribing clinician: 3/3/2025   Last telemedicine visit with prescribing clinician: Visit date not found   Next office visit with prescribing clinician: 10/10/2025                         Would you like a call back once the refill request has been completed: [] Yes [] No    If the office needs to give you a call back, can they leave a voicemail: [] Yes [] No    Kyara Godinez MA  04/09/25, 10:04 EDT

## 2025-04-14 ENCOUNTER — TELEPHONE (OUTPATIENT)
Dept: FAMILY MEDICINE CLINIC | Facility: CLINIC | Age: 70
End: 2025-04-14
Payer: MEDICARE

## 2025-04-14 NOTE — TELEPHONE ENCOUNTER
Out of breath after doing anything, sometimes dizzy    Bp was within normal range but slightly elevated at 130/80, oxygen level was 96%.     He wants to see doctor Oscar and not anyone else. He states that he has so many other issues he doesn't want to explain to a new doctor.     Made telehealth appointment for Wed 4/16/25 @ 7:30 am    Advised patient to please go to ER if anything worsens or changes

## 2025-04-16 ENCOUNTER — LAB (OUTPATIENT)
Dept: LAB | Facility: HOSPITAL | Age: 70
End: 2025-04-16
Payer: MEDICARE

## 2025-04-16 ENCOUNTER — TELEMEDICINE (OUTPATIENT)
Dept: FAMILY MEDICINE CLINIC | Facility: CLINIC | Age: 70
End: 2025-04-16
Payer: MEDICARE

## 2025-04-16 ENCOUNTER — HOSPITAL ENCOUNTER (OUTPATIENT)
Dept: CT IMAGING | Facility: HOSPITAL | Age: 70
Discharge: HOME | End: 2025-04-16
Payer: MEDICARE

## 2025-04-16 DIAGNOSIS — I25.118 CORONARY ARTERY DISEASE OF NATIVE ARTERY OF NATIVE HEART WITH STABLE ANGINA PECTORIS: ICD-10-CM

## 2025-04-16 DIAGNOSIS — R06.09 DYSPNEA ON EXERTION: Primary | ICD-10-CM

## 2025-04-16 DIAGNOSIS — R06.09 DYSPNEA ON EXERTION: ICD-10-CM

## 2025-04-16 DIAGNOSIS — I10 PRIMARY HYPERTENSION: ICD-10-CM

## 2025-04-16 DIAGNOSIS — E78.5 DYSLIPIDEMIA: ICD-10-CM

## 2025-04-16 DIAGNOSIS — R07.9 CHEST PAIN, UNSPECIFIED TYPE: ICD-10-CM

## 2025-04-16 DIAGNOSIS — R00.2 PALPITATION: ICD-10-CM

## 2025-04-16 LAB
ALBUMIN SERPL-MCNC: 4.1 G/DL (ref 3.5–5.2)
ALBUMIN/GLOB SERPL: 1.3 G/DL
ALP SERPL-CCNC: 76 U/L (ref 39–117)
ALT SERPL W P-5'-P-CCNC: 11 U/L (ref 1–41)
ANION GAP SERPL CALCULATED.3IONS-SCNC: 10.7 MMOL/L (ref 5–15)
AST SERPL-CCNC: 15 U/L (ref 1–40)
BASOPHILS # BLD AUTO: 0.08 10*3/MM3 (ref 0–0.2)
BASOPHILS NFR BLD AUTO: 0.8 % (ref 0–1.5)
BILIRUB SERPL-MCNC: 0.4 MG/DL (ref 0–1.2)
BUN SERPL-MCNC: 17 MG/DL (ref 8–23)
BUN/CREAT SERPL: 15.5 (ref 7–25)
CALCIUM SPEC-SCNC: 9.7 MG/DL (ref 8.6–10.5)
CHLORIDE SERPL-SCNC: 104 MMOL/L (ref 98–107)
CHOLEST SERPL-MCNC: 144 MG/DL (ref 0–200)
CO2 SERPL-SCNC: 25.3 MMOL/L (ref 22–29)
CREAT SERPL-MCNC: 1.1 MG/DL (ref 0.76–1.27)
DEPRECATED RDW RBC AUTO: 42.5 FL (ref 37–54)
EGFRCR SERPLBLD CKD-EPI 2021: 72.7 ML/MIN/1.73
EOSINOPHIL # BLD AUTO: 0.25 10*3/MM3 (ref 0–0.4)
EOSINOPHIL NFR BLD AUTO: 2.6 % (ref 0.3–6.2)
ERYTHROCYTE [DISTWIDTH] IN BLOOD BY AUTOMATED COUNT: 13.3 % (ref 12.3–15.4)
GLOBULIN UR ELPH-MCNC: 3.2 GM/DL
GLUCOSE SERPL-MCNC: 150 MG/DL (ref 65–99)
HCT VFR BLD AUTO: 38.3 % (ref 37.5–51)
HDLC SERPL-MCNC: 45 MG/DL (ref 40–60)
HGB BLD-MCNC: 12.3 G/DL (ref 13–17.7)
IMM GRANULOCYTES # BLD AUTO: 0.02 10*3/MM3 (ref 0–0.05)
IMM GRANULOCYTES NFR BLD AUTO: 0.2 % (ref 0–0.5)
LDLC SERPL CALC-MCNC: 74 MG/DL (ref 0–100)
LDLC/HDLC SERPL: 1.56 {RATIO}
LYMPHOCYTES # BLD AUTO: 2.04 10*3/MM3 (ref 0.7–3.1)
LYMPHOCYTES NFR BLD AUTO: 21.3 % (ref 19.6–45.3)
MCH RBC QN AUTO: 28.1 PG (ref 26.6–33)
MCHC RBC AUTO-ENTMCNC: 32.1 G/DL (ref 31.5–35.7)
MCV RBC AUTO: 87.4 FL (ref 79–97)
MONOCYTES # BLD AUTO: 0.71 10*3/MM3 (ref 0.1–0.9)
MONOCYTES NFR BLD AUTO: 7.4 % (ref 5–12)
NEUTROPHILS NFR BLD AUTO: 6.5 10*3/MM3 (ref 1.7–7)
NEUTROPHILS NFR BLD AUTO: 67.7 % (ref 42.7–76)
NRBC BLD AUTO-RTO: 0 /100 WBC (ref 0–0.2)
NT-PROBNP SERPL-MCNC: 100 PG/ML (ref 0–900)
PLATELET # BLD AUTO: 318 10*3/MM3 (ref 140–450)
PMV BLD AUTO: 10.2 FL (ref 6–12)
POTASSIUM SERPL-SCNC: 4.7 MMOL/L (ref 3.5–5.2)
PROT SERPL-MCNC: 7.3 G/DL (ref 6–8.5)
RBC # BLD AUTO: 4.38 10*6/MM3 (ref 4.14–5.8)
SODIUM SERPL-SCNC: 140 MMOL/L (ref 136–145)
TRIGL SERPL-MCNC: 145 MG/DL (ref 0–150)
VLDLC SERPL-MCNC: 25 MG/DL (ref 5–40)
WBC NRBC COR # BLD AUTO: 9.6 10*3/MM3 (ref 3.4–10.8)

## 2025-04-16 PROCEDURE — 80053 COMPREHEN METABOLIC PANEL: CPT

## 2025-04-16 PROCEDURE — 83880 ASSAY OF NATRIURETIC PEPTIDE: CPT

## 2025-04-16 PROCEDURE — 71250 CT THORAX DX C-: CPT

## 2025-04-16 PROCEDURE — 85025 COMPLETE CBC W/AUTO DIFF WBC: CPT

## 2025-04-16 PROCEDURE — 80061 LIPID PANEL: CPT

## 2025-04-16 NOTE — PATIENT INSTRUCTIONS
Try stopping the metformin for 1 week to see whether that helps the gastrointestinal symptoms (obviously a lot of reasons for your GI symptoms but there is a reasonable argument for it making the symptoms worse)  If stopping it helps, then speak with Dr. Bautista about alternatives

## 2025-04-16 NOTE — PROGRESS NOTES
"Established Patient Virtual Visit      Patient Name: Israel Reyna  : 1955   MRN: [id number]   Care Team: Patient Care Team:  Elio Moore DO as PCP - General (Family Medicine)  José Miguel Bautista MD as Consulting Physician (Endocrinology)    Chief Complaint:    Chief Complaint   Patient presents with    Follow-up     Chest pain, SOA, ongoing GI issues       History of Present Illness: Israel Reyna is a 70 y.o. male who is here today for chief complaint.    HPI    Gets SOA even bending over. Pains in his chest  Had a heart cath in [place] 20+ years ago with Dr. Samuels where they told him he had 30% blockage in \"widowmaker\", then had another at [facility] in 2016  Talked with gastroentereologist who recommended pain mgmt referral for stimulator. Patient does not want to have another stimulator even a pain one bc he has a bladder stimulator in currently and actually needs it removed (but insurance will not cover). Patient reports GI told him they're not sure Creon would help him, although he is currently taking it and it is extremely beneficial for his EPI symptoms.    You have chosen to receive care through a telehealth visit.  Do you consent to use a video/audio connection for your medical care today? Yes    Patient location: [address] [place] [address]  [facility] [address]   Provider Location: [facility] [place] [id number]    The following portions of the patient's history were reviewed and updated as appropriate: allergies, current medications, past family history, past medical history, past social history, past surgical history and problem list.    Subjective      Review of Systems:   Review of Systems - See HPI  Review of Systems -  General ROS: negative for - chills, fever or night sweats  Cardiovascular ROS: no chest pain or dyspnea on exertion  Gastrointestinal ROS: no abdominal pain, change in bowel habits, or black or bloody stools  Genito-Urinary ROS: no dysuria, trouble " voiding, or hematuria  Past Medical History:   Past Medical History:   Diagnosis Date    Allergic     Anxiety     Bowel incontinence     w/ history of Stimulator Device     CAD (coronary artery disease)     Carotid artery occlusion     Cataract     Chest pain     CHF (congestive heart failure)     Cluster headache     Colon polyp     Dementia     Depression     Diabetes mellitus, type 2     Diabetic peripheral neuropathy     Dyslipidemia     Erectile dysfunction     Fatigue     GERD (gastroesophageal reflux disease)     Headache, tension-type     HL (hearing loss) July 2023    Im wearing hearing aids. Dr. 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 Graves-recommended 3 year repeat    ENDOSCOPY  02/03/2022    Dr. Graves    EYE SURGERY  Retina    Dr. Askew    GASTRIC STIMULATOR IMPLANT SURGERY      SMALL INTESTINE SURGERY      unspecified, x3 as an infant     TESTICLE SURGERY      UPPER GASTROINTESTINAL ENDOSCOPY         Family History:   Family History   Problem Relation Age of Onset    Heart failure Mother     Diabetes Mother     Alzheimer's disease Mother     Arthritis Mother     Heart disease Mother     Heart attack Father     Kidney disease Father     Cancer Father     Prostate cancer Father     Heart disease Father     Hypertension Father     Cancer Sister     Breast cancer Sister     Diabetes Sister     Heart disease  Sister     Arrhythmia Sister     Stroke Sister     Diabetes Brother     Heart disease Brother     Cancer Brother     Liver disease Brother     Kidney disease Brother     Dementia Paternal Uncle     Alzheimer's disease Maternal Grandmother     Liver cancer Maternal Grandfather     Liver disease Maternal Grandfather     Stroke Sister     Colon cancer Neg Hx        Social History:   Social History     Socioeconomic History    Marital status:    Tobacco Use    Smoking status: Never     Passive exposure: Never    Smokeless tobacco: Never   Vaping Use    Vaping status: Never Used   Substance and Sexual Activity    Alcohol use: Never    Drug use: Never    Sexual activity: Yes     Partners: Female       Tobacco History:   Social History     Tobacco Use   Smoking Status Never    Passive exposure: Never   Smokeless Tobacco Never       Medications:   Outpatient Medications Prior to Visit   Medication Sig Dispense Refill    amLODIPine (NORVASC) 5 MG tablet Take 1 tablet by mouth once daily 90 tablet 0    atorvastatin (LIPITOR) 20 MG tablet TAKE 1 TABLET BY MOUTH DAILY. 90 tablet 1    butalbital-acetaminophen-caffeine (FIORICET, ESGIC) -40 MG per tablet TAKE 1 TABLET BY MOUTH ONCE DAILY AS NEEDED FOR HEADACHE 30 tablet 0    Continuous Glucose  (FreeStyle Mray 3 Clutier) device USE DAILY TO CHECK BLOOD GLUCOSE 1 each 0    Continuous Glucose Sensor (FreeStyle Mary 3 Sensor) [facility] Use 1 each Every 14 (Fourteen) Days. Dx E11.65 6 each 3    Creon 29675-730453 units capsule delayed-release particles capsule Take 1 capsule by mouth 3 (Three) Times a Day With Meals. 270 capsule 0    difluprednate (DUREZOL) 0.05 % ophthalmic emulsion       diphenoxylate-atropine (Lomotil) 2.5-0.025 MG per tablet Take 1 tablet by mouth 4 (Four) Times a Day As Needed for Diarrhea. 120 tablet 0    doxepin (SINEquan) 50 MG capsule Take 1 capsule by mouth At Night As Needed for Sleep (anxiety). 30 capsule 2    Dulaglutide (Trulicity) 1.5  "MG/0.5ML solution pen-injector Inject 1.5 mg under the skin into the appropriate area as directed 1 (One) Time Per Week. 6 mL 2    Farxiga 10 MG tablet Take 10 mg by mouth Daily. 90 tablet 3    galcanezumab-gnlm (Emgality) 120 MG/ML auto-injector pen Inject 1 mL under the skin into the appropriate area as directed Every 30 (Thirty) Days. Gets from Patient Assistance Program 1.12 mL 11    glucose monitor monitoring kit 1 each Daily. 1 each 0    Insulin Lispro (HumaLOG KwikPen) 200 UNIT/ML solution pen-injector Inject 16 Units under the skin into the appropriate area as directed 3 (Three) Times a Day Before Meals. 24 mL 0    Insulin Pen Needle (MM Pen Needles) 31G X 6 MM misc Use 4 times daily with insulin 200 each 2    Insulin Syringe 31G X 5/16\" 0.3 ML misc 1 each Every Night. 90 each 3    Lancets Thin misc 1 each Daily. Use daily as directed 100 each 11    lisinopril (PRINIVIL,ZESTRIL) 40 MG tablet Take 1 tablet by mouth twice daily 180 tablet 0    metFORMIN (GLUCOPHAGE) 1000 MG tablet TAKE 1 TABLET BY MOUTH TWICE DAILY WITH MEALS 180 tablet 0    naloxone (NARCAN) 4 MG/0.1ML nasal spray Call 911. Don't prime. [place] in 1 nostril for overdose. Repeat in 2-3 minutes in other nostril if no or minimal breathing/responsiveness. 2 each 0    nitroglycerin (NITROSTAT) 0.4 MG SL tablet 1 under the tongue as needed for angina, may repeat q5mins for up three doses 100 tablet 11    ondansetron ODT (ZOFRAN-ODT) 8 MG disintegrating tablet Place 1 tablet on the tongue Every 8 (Eight) Hours As Needed for Nausea or Vomiting. 30 tablet 2    OneTouch Ultra test strip USE 1 STRIP TO CHECK GLUCOSE ONCE DAILY AS DIRECTED 100 each 0    pantoprazole (PROTONIX) 40 MG EC tablet TAKE 1 TABLET BY MOUTH TWICE DAILY 30 MINUTES BEFORE MEAL(S) 180 tablet 3    prednisoLONE acetate (PRED FORTE) 1 % ophthalmic suspension INSTILL 1 DROP INTO RIGHT EYE 4 TIMES DAILY      pregabalin (Lyrica) 75 MG capsule Take 1 capsule by mouth 3 (Three) Times a Day. " (Patient taking differently: Take 1 capsule by mouth Daily. Once daily around 7pm) 90 capsule 3    pregabalin (Lyrica) 75 MG capsule Take 1 capsule by mouth 3 (Three) Times a Day. 42 capsule 0    propranolol LA (INDERAL LA) 120 MG 24 hr capsule Take 1 capsule by mouth Daily for 30 days. 30 capsule 3    sildenafil (VIAGRA) 100 MG tablet Take 1 tablet by mouth Daily As Needed for Erectile Dysfunction. 15 tablet 11    tadalafil (Cialis) 5 MG tablet Take 1 tablet by mouth Daily As Needed for Erectile Dysfunction. 15 tablet 11    tamsulosin (FLOMAX) 0.4 MG capsule 24 hr capsule Take 1 capsule by mouth once daily 90 capsule 0    Toujeo Max SoloStar 300 UNIT/ML solution pen-injector injection INJECT 40 UNITS UNDER THE SKIN EVERY NIGHT AT BEDTIME INTO THE APPROPRIATE AREA 12 mL 0    traZODone (DESYREL) 50 MG tablet Take 2 tablets by mouth Every Night for 30 days. 60 tablet 3    vitamin B-12 (CYANOCOBALAMIN) 100 MCG tablet Take 0.5 tablets by mouth 2 (two) times a day.      vitamin C (ASCORBIC ACID) 500 MG tablet Take 1 tablet by mouth 2 (two) times a day.      vitamin E 400 UNIT capsule Take 1 capsule by mouth Daily.      Zinc 40 MG tablet Take 1 tablet by mouth Daily.      HYDROcodone-acetaminophen (NORCO) 5-325 MG per tablet Take 1 tablet by mouth Every 4 (Four) Hours As Needed for Moderate Pain. 10 tablet 0     Facility-Administered Medications Prior to Visit   Medication Dose Route Frequency Provider Last Rate Last Admin    lidocaine (XYLOCAINE) 1 % injection 3 mL  3 mL Subcutaneous Once Elio Moore,             Allergies:   Allergies   Allergen Reactions    Actos [Pioglitazone] Other (See Comments)     congestive heart failure    Avandia [Rosiglitazone] Other (See Comments)     congestive heart failure.    Darvon [Propoxyphene] Itching     Angioedema    itching    Talwin [Pentazocine] Rash     Angioedema    itching       Objective   Objective     Physical Exam:  Vital Signs:  There were no vitals taken for  this visit.  Vitals obtained from patient if available  Physical Exam  Const: Non-toxic appearing, NAD, A&Ox4, Pleasant, Cooperative  Eyes: EOMI, no conjunctivitis  ENT: No copious nasal drainage noted  Cardiac: Regular rate by pulse  Resp: Respiratory rate observed to be within normal limits, no increased work of breathing observed, no audible wheezing or cough noted  Psych: Appropriate mood and behavior.  Assessment & Plan   Assessment / Plan      Assessment/Plan:   Problems Addressed This Visit  Problem List Items Addressed This Visit          Cardiac and Vasculature    Chest pain    Relevant Orders    CT Chest Without Contrast    Stress Test With Myocardial Perfusion One Day    proBNP    Lipid Panel    Comprehensive Metabolic Panel    CBC & Differential    Ambulatory Referral to [facility]    CAD (coronary artery disease)    Overview   EKG shows sinus rhythm within normal limits.      October 2009, cardiac catheterization by Dr. Graner with a 30% circumflex. Otherwise within normal limits with an LVEF of 60%.   Multiple repeat cardiac catheterizations most recently 3-4 years ago at the [facility] (incomplete database). No stenting noted.          Relevant Orders    CT Chest Without Contrast    Stress Test With Myocardial Perfusion One Day    proBNP    Lipid Panel    Comprehensive Metabolic Panel    CBC & Differential    Ambulatory Referral to [facility]    Hypertension    Dyslipidemia    Relevant Orders    Lipid Panel     Other Visit Diagnoses         Dyspnea on exertion    -  Primary    Relevant Orders    CT Chest Without Contrast    Stress Test With Myocardial Perfusion One Day    proBNP    Lipid Panel    Comprehensive Metabolic Panel    CBC & Differential    Ambulatory Referral to [facility]      Palpitation        Relevant Orders    Ambulatory Referral to [facility]            See patient diagnoses and orders  along with patient instructions for assessment, plan, and changes to care for patient.    This visit was conducted via telemedicine with live video and audio provided through "[employer]" at the point of care.    Patient Instructions   Try stopping the metformin for 1 week to see whether that helps the gastrointestinal symptoms (obviously a lot of reasons for your GI symptoms but there is a reasonable argument for it making the symptoms worse)  If stopping it helps, then speak with Dr. Bautista about alternatives    Return in about 2 weeks (around 4/30/2025) for f/u testing.    DO BAILEY Oliveros RD  [facility]  [address]  [facility] [address]  Fax [phone]  Phone [phone]    Disclaimer to patients: The 21st Century Cares Act makes medical notes like these available to patients in the interest of transparency. However, please be advised that this is still a medical document. It is intended as [id number] communication. Many sections may include medical language or jargon, abbreviations, and additional verbiage that are unfamiliar or confusing. In some ways it may come across as blunt, direct, or may be summarized in order to clearly and concisely communicate the most crucial information to medical professionals. It may also include mentions of conditions that are unlikely but considered as part of the differential diagnosis, including serious disorders. These are not always discussed at length at the time of appointment because their likelihood is so low, but may be included in a medical note to make it clear what has been considered and/or ruled out as part of a work-up. Medical documents are intended to carry relevant information, facts as evident, and the personal clinical opinion of the physician. If you have any questions regarding this medical document, please bring them to the attention of the physician at your next scheduled  appointment.

## 2025-04-19 NOTE — TELEPHONE ENCOUNTER
Contacted patient and relayed results, patient's phone was unclear. I called back, and was sent to [employer]. Letter has been sent.   
[employer] to call office back.    HUB CAN RELY MESSAGE        ----- Message from Elio Moore DO sent at 6/13/2022  8:57 AM EDT -----  Please call the patient regarding his radiology results.  His CT scan showed no pancreatitis or other acute abnormality.  He has a couple kidney stones on either side that are not obstructing and are unlikely to be the cause of any of his symptoms.  Prostate was mildly enlarged.  Otherwise unremarkable CT scan.  I recommend he follow-up with gastroenterology if his nausea persists even after holding the Trulicity.    
HUB READ MESSAGE AND PATIENT IS AWARE AND VOICED UNDERSTANDING.   
the EKG is unchanged from prior EKG

## 2025-04-21 ENCOUNTER — OFFICE VISIT (OUTPATIENT)
Dept: CARDIOLOGY | Facility: HOSPITAL | Age: 70
End: 2025-04-21
Payer: MEDICARE

## 2025-04-21 VITALS
BODY MASS INDEX: 29.39 KG/M2 | SYSTOLIC BLOOD PRESSURE: 142 MMHG | RESPIRATION RATE: 16 BRPM | DIASTOLIC BLOOD PRESSURE: 78 MMHG | HEIGHT: 67 IN | OXYGEN SATURATION: 96 % | WEIGHT: 187.25 LBS | HEART RATE: 62 BPM

## 2025-04-21 DIAGNOSIS — E11.65 TYPE 2 DIABETES MELLITUS WITH HYPERGLYCEMIA, WITHOUT LONG-TERM CURRENT USE OF INSULIN: ICD-10-CM

## 2025-04-21 DIAGNOSIS — R00.2 PALPITATION: ICD-10-CM

## 2025-04-21 DIAGNOSIS — I10 PRIMARY HYPERTENSION: ICD-10-CM

## 2025-04-21 DIAGNOSIS — R06.09 DYSPNEA ON EXERTION: ICD-10-CM

## 2025-04-21 DIAGNOSIS — I25.118 CORONARY ARTERY DISEASE INVOLVING NATIVE CORONARY ARTERY OF NATIVE HEART WITH OTHER FORM OF ANGINA PECTORIS: ICD-10-CM

## 2025-04-21 DIAGNOSIS — R07.89 OTHER CHEST PAIN: Primary | ICD-10-CM

## 2025-04-21 PROCEDURE — 1159F MED LIST DOCD IN RCRD: CPT | Performed by: NURSE PRACTITIONER

## 2025-04-21 PROCEDURE — 1160F RVW MEDS BY RX/DR IN RCRD: CPT | Performed by: NURSE PRACTITIONER

## 2025-04-21 PROCEDURE — 99214 OFFICE O/P EST MOD 30 MIN: CPT | Performed by: NURSE PRACTITIONER

## 2025-04-21 PROCEDURE — G2211 COMPLEX E/M VISIT ADD ON: HCPCS | Performed by: NURSE PRACTITIONER

## 2025-04-21 PROCEDURE — 3078F DIAST BP <80 MM HG: CPT | Performed by: NURSE PRACTITIONER

## 2025-04-21 PROCEDURE — 3077F SYST BP >= 140 MM HG: CPT | Performed by: NURSE PRACTITIONER

## 2025-04-21 NOTE — PATIENT INSTRUCTIONS
Please check blood pressure once per day using an upper arm blood pressure cuff  Do so 2- 3 hours after taking morning medications, and after sitting and resting for 10 to 15 minutes  Write down date, time, blood pressure, and pulse rate  Goal blood pressure is consistently less than 140/90  Please limit sodium intake to 1500 mg daily  Recommend participation in the DASH diet

## 2025-04-21 NOTE — PROGRESS NOTES
Chief Complaint  Chest Pain, Dizziness, and Shortness of Breath    Subjective      History of Present Illness {CC  Problem List  Visit  Diagnosis   Encounters  Notes  Medications  Labs  Result Review Imaging  Media :23}     Israel Reyna, 70 y.o. male with past medical history significant for anxiety, bowel incontinence, coronary artery disease, carotid artery disease, dementia, depression, type 2 diabetes, hyperlipidemia, GERD, hypertension, migraine headaches, sleep apnea, and TIA, who presents to [facility] for Chest Pain, Dizziness, and Shortness of Breath  Patient was evaluated via telemedicine by primary care on 4/16/2025 with a chief complaint of chest discomfort, and shortness of air.  Most recent twelve-lead EKG from 1/14/2025 showed normal sinus rhythm, rate 69, normal EKG.  CT scan of the chest on 4/16/2025 without contrast showed no acute abnormality, severe coronary atherosclerotic calcification, possible small pericardial effusion versus pericardial thickening, and small hiatal hernia.    Since recent evaluation with primary care, patient continues to have daily episodes of exertional chest pain or pressure.  He describes this as a left-sided chest pain with radiation into his left shoulder, and into his left face.  He describes this as a tightness at times, and a stabbing in other times.  Patient has tried nitroglycerin during episodes of chest discomfort which he believes provided mild improvement.  He also endorses consistent dyspnea on exertion.  Patient denies syncope recently.  He does endorse dizziness at times.  Patient also notices left lower extremity edema which improves with elevation.    Patient does routinely monitor blood pressure and heart rate at home which he states fluctuates.  Patient suspects his blood pressure to be around 130-150 systolic.  He does not remember what his heart rate is.      SPECT stress test 11/17/2023:    Myocardial  "perfusion imaging indicates a normal myocardial perfusion study with no evidence of ischemia.    Left ventricular ejection fraction is normal (Calculated EF = 62%).      Holter monitor 11/10/2023:    A relatively benign monitor study.    Symptomatic PACs and PVCs, limited burden of arrhythmia.      Objective     Vital Signs:   Vitals:    25 0944 25 1022   BP: 163/78 142/78   BP Location: Left arm Left arm   Patient Position: Sitting    Cuff Size: Adult    Pulse: 62    Resp: 16    SpO2: 96%    Weight: 84.9 kg (187 lb 4 oz)    Height: 170.2 cm (67\")      Body mass index is 29.33 kg/m².  Physical Exam  Vitals and nursing note reviewed.   Constitutional:       Appearance: Normal appearance.   HENT:      Head: Normocephalic.   Eyes:      Pupils: Pupils are equal, round, and reactive to light.   Cardiovascular:      Rate and Rhythm: Normal rate and regular rhythm.      Pulses: Normal pulses.      Heart sounds: Normal heart sounds. No murmur heard.  Pulmonary:      Effort: Pulmonary effort is normal.      Breath sounds: Normal breath sounds.   Abdominal:      General: Abdomen is protuberant. Bowel sounds are normal.      Palpations: Abdomen is soft.   Musculoskeletal:         General: Normal range of motion.      Cervical back: Normal range of motion.      Right lower le+ Edema present.      Left lower le+ Edema present.   Skin:     General: Skin is warm and dry.      Capillary Refill: Capillary refill takes less than 2 seconds.   Neurological:      Mental Status: He is alert and oriented to person, place, and time.   Psychiatric:         Mood and Affect: Mood normal.         Thought Content: Thought content normal.                Data Reviewed:{ Labs  Result Review  Imaging  Med Tab  Media :23}     Lab Results   Component Value Date    WBC 9.60 2025    HGB 12.3 (L) 2025    HCT 38.3 2025    MCV 87.4 2025     2025      Lab Results   Component Value Date    GLUCOSE " 150 (H) 04/16/2025    BUN 17 04/16/2025    CREATININE 1.10 04/16/2025     04/16/2025    K 4.7 04/16/2025     04/16/2025    CALCIUM 9.7 04/16/2025    PROTEINTOT 7.3 04/16/2025    ALBUMIN 4.1 04/16/2025    ALT 11 04/16/2025    AST 15 04/16/2025    ALKPHOS 76 04/16/2025    BILITOT 0.4 04/16/2025    GLOB 3.2 04/16/2025    AGRATIO 1.3 04/16/2025    BCR 15.5 04/16/2025    ANIONGAP 10.7 04/16/2025    EGFR 72.7 04/16/2025      Lab Results   Component Value Date    CKTOTAL 42 03/29/2023    TROPONINT 11 01/14/2025      Lab Results   Component Value Date    CHOL 144 04/16/2025    CHLPL 167 06/02/2015    TRIG 145 04/16/2025    HDL 45 04/16/2025    LDL 74 04/16/2025      Duplex Venous Lower Extremity - Left CAR (02/14/2025 13:40)  ECG 12 Lead Other; weakness (01/14/2025 20:45)  Duplex Carotid Ultrasound CAR (04/18/2024 11:50)  Stress Test With Myocardial Perfusion (1 Day) (11/17/2023 13:01)  Holter Monitor - 72 Hour Up To 15 Days (11/10/2023 13:56)    Assessment & Plan   Assessment and Plan {CC Problem List  Visit Diagnosis  ROS  Review (Popup)  Health Maintenance  Quality  BestPractice  Medications  SmartSets  SnapShot Encounters  Media :23}     1. Other chest pain  -Patient continues to have daily episodes of chest discomfort since time of evaluation with primary care  -Patient states his pain is worse with activity, and suspected to be better with rest and nitroglycerin  -Agree with recommendation undergo ischemic evaluation  -Recommend patient undergo PET stress test for evaluation of ischemic cause.  PET stress test preferred in patient with protuberant abdomen who is unable to complete a minimum of 5 METS on the treadmill  -Most recent CT scan of the chest with mention of coronary artery disease  -Also recommend obtaining updated echocardiogram to rule out structural or functional abnormalities  -Recommend patient establish care with cardiology physician for long-term monitoring and management of  hypertension, and coronary artery disease  - Stress Test With Pet Myocardial Perfusion; Future  - Adult Transthoracic Echo Complete W/ Cont if Necessary Per Protocol; Future  - Ambulatory Referral to Cardiology    2. Dyspnea on exertion  -While dyspnea could be secondary to deconditioning, in combination with chest discomfort would consider this as an anginal equivalent requiring further evaluation  -As mentioned above, plan for patient undergo PET stress test, and echocardiogram  - Stress Test With Pet Myocardial Perfusion; Future  - Adult Transthoracic Echo Complete W/ Cont if Necessary Per Protocol; Future    3. Coronary artery disease involving native coronary artery of native heart with other form of angina pectoris  -[facility] 2016  showed Left main normal, LAD 10 to 20% mid LAD with myocardial bridge. Left circumflex 30 to 40% stenosis. RCA no atherosclerosis   -Patient maintained on atorvastatin, amlodipine, lisinopril, and propranolol  -Could consider adding aspirin to regimen, will defer to cardiology MD  -Recent CT scan with mention of coronary calcifications  - Stress Test With Pet Myocardial Perfusion; Future  - Adult Transthoracic Echo Complete W/ Cont if Necessary Per Protocol; Future  - Ambulatory Referral to Cardiology    4. Primary hypertension  -Blood pressure noted to be elevated during today's evaluation. There was some improvement with manual recheck  -Discussed with patient the importance of ambulatory blood pressure monitoring. Ideally, this will be done once daily, 2-3 hours after taking medications, and after sitting and resting for 10-15 minutes  -Patient is aware his goal blood pressure is consistently less than 140/90 after medication  -Patient encouraged to bring blood pressure log to next evaluation  - Adult Transthoracic Echo Complete W/ Cont if Necessary Per Protocol; Future  - Ambulatory Referral to Cardiology    5. Palpitation  -Recommend to continue propanolol 120mg daily for now. Could  consider 14 day holter monitor pending results of stress test, echocardiogram, and ongoing/persistent symptoms  - Adult Transthoracic Echo Complete W/ Cont if Necessary Per Protocol; Future      At this time, patient may be seen by heart and valve as needed, or if symptoms change or worsen. Otherwise, recommend following up closely with cardiology MD.     Follow Up {Instructions Charge Capture  Follow-up Communications :23}     Return if symptoms worsen or fail to improve.    Patient was given instructions and counseling regarding his condition or for health maintenance advice. Please see specific information pulled into the AVS if appropriate. Patient was instructed to call the [facility] with any questions, concerns, or worsening symptoms.    Dictated Utilizing Dragon Dictation   Please note that portions of this note were completed with a voice recognition program. Part of this note may be an electronic transcription/translation of spoken language to printed text using the Dragon Dictation System.

## 2025-04-21 NOTE — TELEPHONE ENCOUNTER
Caller: Israel Reyna    Relationship: Self    Best call back number: [phone]    Which medication are you concerned about: TRULICITY    Who prescribed you this medication: DR BOOTHE    What are your concerns: PATIENT STATES NEW PRESCRIPTION NEEDS TO BE SENT TO [facility] [place] FOR HIM TO REC'V HIS TRULICITY. HE'S COMPLETELY OUT OF HIS MEDICINE. PLEASE SEND SCRIPT ASAP

## 2025-04-22 ENCOUNTER — HOSPITAL ENCOUNTER (EMERGENCY)
Facility: HOSPITAL | Age: 70
Discharge: HOME | End: 2025-04-22
Attending: EMERGENCY MEDICINE | Admitting: INTERNAL MEDICINE
Payer: MEDICARE

## 2025-04-22 ENCOUNTER — APPOINTMENT (OUTPATIENT)
Dept: GENERAL RADIOLOGY | Facility: HOSPITAL | Age: 70
End: 2025-04-22
Payer: MEDICARE

## 2025-04-22 ENCOUNTER — APPOINTMENT (OUTPATIENT)
Dept: CT IMAGING | Facility: HOSPITAL | Age: 70
End: 2025-04-22
Payer: MEDICARE

## 2025-04-22 VITALS
WEIGHT: 180 LBS | DIASTOLIC BLOOD PRESSURE: 82 MMHG | SYSTOLIC BLOOD PRESSURE: 178 MMHG | HEART RATE: 60 BPM | TEMPERATURE: 98.2 F | BODY MASS INDEX: 28.25 KG/M2 | OXYGEN SATURATION: 95 % | RESPIRATION RATE: 15 BRPM | HEIGHT: 67 IN

## 2025-04-22 DIAGNOSIS — Z02.89 REFERRED BY HEALTH CARE PROFESSIONAL: ICD-10-CM

## 2025-04-22 DIAGNOSIS — R07.9 ACUTE CHEST PAIN: ICD-10-CM

## 2025-04-22 DIAGNOSIS — R10.9 ACUTE ABDOMINAL PAIN: ICD-10-CM

## 2025-04-22 DIAGNOSIS — Z79.4 TYPE 2 DIABETES MELLITUS WITH HYPERGLYCEMIA, WITH LONG-TERM CURRENT USE OF INSULIN: ICD-10-CM

## 2025-04-22 DIAGNOSIS — I20.0 UNSTABLE ANGINA: Primary | ICD-10-CM

## 2025-04-22 DIAGNOSIS — E11.65 TYPE 2 DIABETES MELLITUS WITH HYPERGLYCEMIA, WITHOUT LONG-TERM CURRENT USE OF INSULIN: ICD-10-CM

## 2025-04-22 DIAGNOSIS — E11.65 TYPE 2 DIABETES MELLITUS WITH HYPERGLYCEMIA, WITH LONG-TERM CURRENT USE OF INSULIN: ICD-10-CM

## 2025-04-22 LAB
ALBUMIN SERPL-MCNC: 4.1 G/DL (ref 3.5–5.2)
ALBUMIN/GLOB SERPL: 1.2 G/DL
ALP SERPL-CCNC: 72 U/L (ref 39–117)
ALT SERPL W P-5'-P-CCNC: 11 U/L (ref 1–41)
ANION GAP SERPL CALCULATED.3IONS-SCNC: 10 MMOL/L (ref 5–15)
AST SERPL-CCNC: 14 U/L (ref 1–40)
BASOPHILS # BLD AUTO: 0.06 10*3/MM3 (ref 0–0.2)
BASOPHILS NFR BLD AUTO: 0.8 % (ref 0–1.5)
BILIRUB SERPL-MCNC: 0.4 MG/DL (ref 0–1.2)
BUN SERPL-MCNC: 18 MG/DL (ref 8–23)
BUN/CREAT SERPL: 15.8 (ref 7–25)
CALCIUM SPEC-SCNC: 9.8 MG/DL (ref 8.6–10.5)
CHLORIDE SERPL-SCNC: 106 MMOL/L (ref 98–107)
CO2 SERPL-SCNC: 26 MMOL/L (ref 22–29)
CREAT SERPL-MCNC: 1.14 MG/DL (ref 0.76–1.27)
DEPRECATED RDW RBC AUTO: 42.5 FL (ref 37–54)
EGFRCR SERPLBLD CKD-EPI 2021: 69.6 ML/MIN/1.73
EOSINOPHIL # BLD AUTO: 0.27 10*3/MM3 (ref 0–0.4)
EOSINOPHIL NFR BLD AUTO: 3.6 % (ref 0.3–6.2)
ERYTHROCYTE [DISTWIDTH] IN BLOOD BY AUTOMATED COUNT: 13.6 % (ref 12.3–15.4)
GLOBULIN UR ELPH-MCNC: 3.3 GM/DL
GLUCOSE BLDC GLUCOMTR-MCNC: 101 MG/DL (ref 70–130)
GLUCOSE SERPL-MCNC: 131 MG/DL (ref 65–99)
HCT VFR BLD AUTO: 38.7 % (ref 37.5–51)
HGB BLD-MCNC: 12.4 G/DL (ref 13–17.7)
HOLD SPECIMEN: NORMAL
IMM GRANULOCYTES # BLD AUTO: 0.03 10*3/MM3 (ref 0–0.05)
IMM GRANULOCYTES NFR BLD AUTO: 0.4 % (ref 0–0.5)
LIPASE SERPL-CCNC: 96 U/L (ref 13–60)
LV EF ANGIOGRAM EST: 65 %
LYMPHOCYTES # BLD AUTO: 2.03 10*3/MM3 (ref 0.7–3.1)
LYMPHOCYTES NFR BLD AUTO: 26.8 % (ref 19.6–45.3)
MCH RBC QN AUTO: 27.7 PG (ref 26.6–33)
MCHC RBC AUTO-ENTMCNC: 32 G/DL (ref 31.5–35.7)
MCV RBC AUTO: 86.6 FL (ref 79–97)
MONOCYTES # BLD AUTO: 0.72 10*3/MM3 (ref 0.1–0.9)
MONOCYTES NFR BLD AUTO: 9.5 % (ref 5–12)
NEUTROPHILS NFR BLD AUTO: 4.46 10*3/MM3 (ref 1.7–7)
NEUTROPHILS NFR BLD AUTO: 58.9 % (ref 42.7–76)
NRBC BLD AUTO-RTO: 0 /100 WBC (ref 0–0.2)
NT-PROBNP SERPL-MCNC: 121.8 PG/ML (ref 0–900)
PLATELET # BLD AUTO: 324 10*3/MM3 (ref 140–450)
PMV BLD AUTO: 9.8 FL (ref 6–12)
POTASSIUM SERPL-SCNC: 4.2 MMOL/L (ref 3.5–5.2)
PROT SERPL-MCNC: 7.4 G/DL (ref 6–8.5)
RBC # BLD AUTO: 4.47 10*6/MM3 (ref 4.14–5.8)
SODIUM SERPL-SCNC: 142 MMOL/L (ref 136–145)
TROPONIN T SERPL HS-MCNC: 10 NG/L
WBC NRBC COR # BLD AUTO: 7.57 10*3/MM3 (ref 3.4–10.8)
WHOLE BLOOD HOLD COAG: NORMAL
WHOLE BLOOD HOLD SPECIMEN: NORMAL

## 2025-04-22 PROCEDURE — 99285 EMERGENCY DEPT VISIT HI MDM: CPT

## 2025-04-22 PROCEDURE — C1894 INTRO/SHEATH, NON-LASER: HCPCS | Performed by: INTERNAL MEDICINE

## 2025-04-22 PROCEDURE — 25510000001 IOPAMIDOL PER 1 ML: Performed by: INTERNAL MEDICINE

## 2025-04-22 PROCEDURE — 71045 X-RAY EXAM CHEST 1 VIEW: CPT

## 2025-04-22 PROCEDURE — 25010000002 FENTANYL CITRATE (PF) 50 MCG/ML SOLUTION: Performed by: INTERNAL MEDICINE

## 2025-04-22 PROCEDURE — 25010000002 HEPARIN (PORCINE) PER 1000 UNITS: Performed by: INTERNAL MEDICINE

## 2025-04-22 PROCEDURE — 93458 L HRT ARTERY/VENTRICLE ANGIO: CPT | Performed by: INTERNAL MEDICINE

## 2025-04-22 PROCEDURE — 82948 REAGENT STRIP/BLOOD GLUCOSE: CPT

## 2025-04-22 PROCEDURE — 85025 COMPLETE CBC W/AUTO DIFF WBC: CPT | Performed by: EMERGENCY MEDICINE

## 2025-04-22 PROCEDURE — 84443 ASSAY THYROID STIM HORMONE: CPT | Performed by: INTERNAL MEDICINE

## 2025-04-22 PROCEDURE — 93005 ELECTROCARDIOGRAM TRACING: CPT

## 2025-04-22 PROCEDURE — 36415 COLL VENOUS BLD VENIPUNCTURE: CPT

## 2025-04-22 PROCEDURE — 25010000002 MIDAZOLAM PER 1 MG: Performed by: INTERNAL MEDICINE

## 2025-04-22 PROCEDURE — 25810000003 SODIUM CHLORIDE 0.9 % SOLUTION: Performed by: INTERNAL MEDICINE

## 2025-04-22 PROCEDURE — 93005 ELECTROCARDIOGRAM TRACING: CPT | Performed by: EMERGENCY MEDICINE

## 2025-04-22 PROCEDURE — 25010000002 NICARDIPINE 2.5 MG/ML SOLUTION: Performed by: INTERNAL MEDICINE

## 2025-04-22 PROCEDURE — 84484 ASSAY OF TROPONIN QUANT: CPT | Performed by: EMERGENCY MEDICINE

## 2025-04-22 PROCEDURE — 83880 ASSAY OF NATRIURETIC PEPTIDE: CPT | Performed by: EMERGENCY MEDICINE

## 2025-04-22 PROCEDURE — 99284 EMERGENCY DEPT VISIT MOD MDM: CPT

## 2025-04-22 PROCEDURE — 83690 ASSAY OF LIPASE: CPT | Performed by: EMERGENCY MEDICINE

## 2025-04-22 PROCEDURE — 80053 COMPREHEN METABOLIC PANEL: CPT | Performed by: EMERGENCY MEDICINE

## 2025-04-22 PROCEDURE — C1769 GUIDE WIRE: HCPCS | Performed by: INTERNAL MEDICINE

## 2025-04-22 PROCEDURE — 99284 EMERGENCY DEPT VISIT MOD MDM: CPT | Performed by: INTERNAL MEDICINE

## 2025-04-22 RX ORDER — IOPAMIDOL 755 MG/ML
INJECTION, SOLUTION INTRAVASCULAR
Status: DISCONTINUED | OUTPATIENT
Start: 2025-04-22 | End: 2025-04-22 | Stop reason: HOSPADM

## 2025-04-22 RX ORDER — SODIUM CHLORIDE 9 MG/ML
40 INJECTION, SOLUTION INTRAVENOUS AS NEEDED
Status: DISCONTINUED | OUTPATIENT
Start: 2025-04-22 | End: 2025-04-22 | Stop reason: HOSPADM

## 2025-04-22 RX ORDER — SODIUM CHLORIDE 0.9 % (FLUSH) 0.9 %
10 SYRINGE (ML) INJECTION EVERY 12 HOURS SCHEDULED
Status: DISCONTINUED | OUTPATIENT
Start: 2025-04-22 | End: 2025-04-22 | Stop reason: HOSPADM

## 2025-04-22 RX ORDER — SODIUM CHLORIDE 0.9 % (FLUSH) 0.9 %
10 SYRINGE (ML) INJECTION AS NEEDED
Status: DISCONTINUED | OUTPATIENT
Start: 2025-04-22 | End: 2025-04-22 | Stop reason: HOSPADM

## 2025-04-22 RX ORDER — ACETAMINOPHEN 325 MG/1
650 TABLET ORAL EVERY 4 HOURS PRN
Status: DISCONTINUED | OUTPATIENT
Start: 2025-04-22 | End: 2025-04-22 | Stop reason: HOSPADM

## 2025-04-22 RX ORDER — NITROGLYCERIN 0.4 MG/1
0.4 TABLET SUBLINGUAL
Status: DISCONTINUED | OUTPATIENT
Start: 2025-04-22 | End: 2025-04-22 | Stop reason: HOSPADM

## 2025-04-22 RX ORDER — HEPARIN SODIUM 1000 [USP'U]/ML
INJECTION, SOLUTION INTRAVENOUS; SUBCUTANEOUS
Status: DISCONTINUED | OUTPATIENT
Start: 2025-04-22 | End: 2025-04-22 | Stop reason: HOSPADM

## 2025-04-22 RX ORDER — FENTANYL CITRATE 50 UG/ML
INJECTION, SOLUTION INTRAMUSCULAR; INTRAVENOUS
Status: DISCONTINUED | OUTPATIENT
Start: 2025-04-22 | End: 2025-04-22 | Stop reason: HOSPADM

## 2025-04-22 RX ORDER — SODIUM CHLORIDE 0.9 % (FLUSH) 0.9 %
1-10 SYRINGE (ML) INJECTION AS NEEDED
Status: DISCONTINUED | OUTPATIENT
Start: 2025-04-22 | End: 2025-04-22 | Stop reason: HOSPADM

## 2025-04-22 RX ORDER — MIDAZOLAM HYDROCHLORIDE 1 MG/ML
INJECTION, SOLUTION INTRAMUSCULAR; INTRAVENOUS
Status: DISCONTINUED | OUTPATIENT
Start: 2025-04-22 | End: 2025-04-22 | Stop reason: HOSPADM

## 2025-04-22 RX ORDER — ASPIRIN 81 MG/1
324 TABLET, CHEWABLE ORAL ONCE
Status: DISCONTINUED | OUTPATIENT
Start: 2025-04-22 | End: 2025-04-22 | Stop reason: HOSPADM

## 2025-04-22 RX ORDER — ONDANSETRON 2 MG/ML
4 INJECTION INTRAMUSCULAR; INTRAVENOUS EVERY 6 HOURS PRN
Status: DISCONTINUED | OUTPATIENT
Start: 2025-04-22 | End: 2025-04-22 | Stop reason: HOSPADM

## 2025-04-22 NOTE — PROGRESS NOTES
I was initially contacted by the ED regarding admitting this patient as a CT scan of the abdomen/pelvis had been ordered for lower abdominal pain but not yet completed and patient was on his way to the cath lab.    I discussed with cardiology following the left heart catheterization as it appeared patient may be suitable for discharge home.  I reviewed the patient's chart and history with the patient and his wife.  The lower abdominal pain is chronic and has been managed by his PCP.  He had a CT scan of the abdomen/pelvis in January.  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  He was referred to pain management.  Patient also has a colonoscopy scheduled in May with Dr. Graves.  Since the lower abdominal pain is chronic and being managed as an outpatient, I do not feel that repeating a CT scan will be high yield at this point.  Patient and his wife are comfortable with the plan to discharge home and continue outpatient follow up as previously planned.    I discussed with cardiology and they will discharge the patient this afternoon once his TR band has been removed.

## 2025-04-22 NOTE — ED PROVIDER NOTES
Subjective   History of Present Illness  Pleasant patient presents to the ER with exertional difficulty breathing for last several weeks.  Worse recently.  He tells me he was seen by cardiology yesterday and is scheduled for an echo and a stress test but meanwhile symptoms are getting worse.  He has a history of coronary artery disease.  CHF.  He tells me he follows Dr. Garner.  He also has some left lower quadrant abdominal pain to but his main concern today is exertional difficulty breathing.  Denies any fevers or chills.  He has a history of hypertension hyperlipidemia.        Review of Systems    Past Medical History:   Diagnosis Date    Allergic     Anxiety     Bowel incontinence     w/ history of Stimulator Device     CAD (coronary artery disease)     Carotid artery occlusion     Cataract     Chest pain     CHF (congestive heart failure)     Cluster headache     Colon polyp     Dementia     Depression     Diabetes mellitus, type 2     Diabetic peripheral neuropathy     Dyslipidemia     Erectile dysfunction     Fatigue     GERD (gastroesophageal reflux disease)     Headache, tension-type     HL (hearing loss) July 2023    Im wearing hearing aids. Dr. 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    Darvon [Propoxyphene] Itching     Angioedema    itching    Talwin [Pentazocine] Rash     Angioedema    itching       Past Surgical History:   Procedure Laterality Date    CARDIAC CATHETERIZATION      CARDIAC CATHETERIZATION N/A 10/14/2016     Procedure: Left Heart Cath;  Surgeon: Santiago Garner MD;  Location:  [facility];  Service:     COLONOSCOPY  02/25/2022    Dr. Graves-recommended 3 year repeat    ENDOSCOPY  02/03/2022    Dr. Graves    EYE SURGERY  Retina    Dr. Askew    GASTRIC STIMULATOR IMPLANT SURGERY      SMALL INTESTINE SURGERY      unspecified, x3 as an infant     TESTICLE SURGERY      UPPER GASTROINTESTINAL ENDOSCOPY         Family History   Problem Relation Age of Onset    Heart failure Mother     Diabetes Mother     Alzheimer's disease Mother     Arthritis Mother     Heart disease Mother     Heart attack Father     Kidney disease Father     Cancer Father     Prostate cancer Father     Heart disease Father     Hypertension Father     Cancer Sister     Breast cancer Sister     Diabetes Sister     Heart disease Sister     Arrhythmia Sister     Stroke Sister     Diabetes Brother     Heart disease Brother     Cancer Brother     Liver disease Brother     Kidney disease Brother     Dementia Paternal Uncle     Alzheimer's disease Maternal Grandmother     Liver cancer Maternal Grandfather     Liver disease Maternal Grandfather     Stroke Sister     Colon cancer Neg Hx        Social History     Socioeconomic History    Marital status:    Tobacco Use    Smoking status: Never     Passive exposure: Never    Smokeless tobacco: Never   Vaping Use    Vaping status: Never Used   Substance and Sexual Activity    Alcohol use: Never    Drug use: Never    Sexual activity: Yes     Partners: Female           Objective   Physical Exam  Constitutional:       Appearance: He is well-developed.   HENT:      Head: Normocephalic and atraumatic.      Right Ear: External ear normal.      Left Ear: External ear normal.      Nose: Nose normal.   Eyes:      Conjunctiva/sclera: Conjunctivae normal.      Pupils: Pupils are equal, round, and reactive to light.   Cardiovascular:      Rate and Rhythm: Normal rate and regular rhythm.       Heart sounds: Normal heart sounds.   Pulmonary:      Effort: Pulmonary effort is normal.      Breath sounds: Normal breath sounds.   Abdominal:      General: Bowel sounds are normal.      Palpations: Abdomen is soft.   Musculoskeletal:         General: Normal range of motion.      Cervical back: Normal range of motion and neck supple.   Skin:     General: Skin is warm and dry.   Neurological:      Mental Status: He is alert and oriented to person, place, and time.   Psychiatric:         Behavior: Behavior normal.         Judgment: Judgment normal.         Procedures           ED Course  ED Course as of 04/22/25 1519   Tue Apr 22, 2025   1231 EKG interpreted by myself no acute process [JM]   1406 Differential diagnosis includes pulmonary embolism.  Diverticulitis.  Pneumonia.  CHF. [JM]   1406 CAT scans pending.  I have consulted cardiology. [JM]   1408 HS Troponin T: 10  Troponin and BNP are negative [RS]   1515 CTA chest was canceled by cardiology.  They plan on doing a heart cath. [JM]   1515 I will admit to the hospitalist.  CT of the abdomen pelvis for further evaluation of his left lower quadrant pain has not been completed yet. [JM]   1518 Patient is already left the floor for his heart cath prior to getting the CAT scan of his left lower abdominal pain. [JM]      ED Course User Index  [JM] Kit Foster APRN  [RS] Yang Alvarenga MD                  HEART Score: 5   Shared Decision Making  I discussed the findings with the patient/patient representative who is in agreement with the treatment plan and the final disposition.  Risks and benefits of discharge and/or observation/admission were discussed: Yes                          XR Chest 1 View   Final Result   Impression:   No acute cardiopulmonary findings.          Electronically Signed: Kadeem Bryson MD     4/22/2025 1:08 PM EDT     Workstation ID: [id number]      CT Abdomen Pelvis With Contrast    (Results Pending)                   Medical Decision  Making  Amount and/or Complexity of Data Reviewed  External Data Reviewed: labs, radiology and ECG.  Labs: ordered. Decision-making details documented in ED Course.  Radiology: ordered.  ECG/medicine tests: ordered.    Risk  OTC drugs.  Prescription drug management.  Decision regarding hospitalization.        Final diagnoses:   Acute chest pain   Acute abdominal pain   Referred by health care professional       ED Disposition  ED Disposition       ED Disposition   Decision to Admit    Condition   --    Comment   --               No follow-up provider specified.       Medication List        ASK your doctor about these medications      * Trulicity 1.5 MG/0.5ML solution pen-injector  Generic drug: Dulaglutide  Inject 1.5 mg under the skin into the appropriate area as directed 1 (One) Time Per Week.  Replaced by: Dulaglutide 1.5 MG/0.5ML solution auto-injector  Ask about: Which instructions should I use?     * Dulaglutide 1.5 MG/0.5ML solution auto-injector  Commonly known as: Trulicity  Inject 1.5 mg under the skin into the appropriate area as directed 1 (One) Time Per Week.  Replaces: Trulicity 1.5 MG/0.5ML solution pen-injector  Ask about: Which instructions should I use?           * This list has 2 medication(s) that are the same as other medications prescribed for you. Read the directions carefully, and ask your doctor or other care provider to review them with you.                   Where to Get Your Medications        These medications were sent to [facility] - [facility] [address] - [phone]  - [phone] [address] [facility] [address]      Phone: [phone]   Dulaglutide 1.5 MG/0.5ML solution auto-injector            Kit Foster APRN  04/22/25 8161

## 2025-04-22 NOTE — H&P
[facility]   History and physical note    Referring Provider: XAVIER MOMIN      Reason for Consultation: angina pectoris    Patient Care Team:  Elio Moore DO as PCP - General (Family Medicine)  José Miguel Bautista MD as Consulting Physician (Endocrinology)         PROBLEM LIST:  Coronary artery disease:   October 2009, cardiac catheterization by Dr. Garner with a 30% circumflex. Otherwise within normal limits with an LVEF of 60%.   Multiple repeat cardiac catheterizations most recently 3-4 years ago at the [facility] (incomplete database). No stenting noted.   CCS class II/NYHA class III dyspnea  Cardiac 06/02/2015: Essentially normal angiographic coronary arteries.  Normal left ventricular function.  Normal hemodynamics.  Cardiac catheter 10/16/2016: Nonflow limiting coronary artery disease.  Normal left ventricular function.  Normal hemodynamics.  11/2023 Normal MPS  Hypertension.   Dyslipidemia.   Type 2 diabetes   GERD.   Complex migraines.   Reported history of congestive heart failure.   Bowel incontinence with a history of stimulator device.   Bowel surgery x3 as an infant.   Remote testicular surgery      Past Surgical History:   Procedure Laterality Date    CARDIAC CATHETERIZATION      CARDIAC CATHETERIZATION N/A 10/14/2016    Procedure: Left Heart Cath;  Surgeon: Santiago Garner MD;  Location: [facility] CATH INVASIVE LOCATION;  Service:     COLONOSCOPY  02/25/2022    Dr. Graves-recommended 3 year repeat    ENDOSCOPY  02/03/2022    Dr. Graves    EYE SURGERY  Retina    Dr. Askew    GASTRIC STIMULATOR IMPLANT SURGERY      SMALL INTESTINE SURGERY      unspecified, x3 as an infant     TESTICLE SURGERY      UPPER GASTROINTESTINAL ENDOSCOPY           Allergies   Allergen Reactions    Actos [Pioglitazone] Other (See Comments)     congestive heart failure    Avandia [Rosiglitazone] Other (See Comments)     congestive heart failure.     Darvon [Propoxyphene] Itching     Angioedema    itching    Talwin [Pentazocine] Rash     Angioedema    itching           Current Facility-Administered Medications:     aspirin chewable tablet 324 mg, 324 mg, Oral, Once, Yang Alvarenga MD    lidocaine (XYLOCAINE) 1 % injection 3 mL, 3 mL, Subcutaneous, Once, Elio Moore,     sodium chloride 0.9 % flush 10 mL, 10 mL, Intravenous, PRN, Yang Alvarenga MD    Current Outpatient Medications:     amLODIPine (NORVASC) 5 MG tablet, Take 1 tablet by mouth once daily, Disp: 90 tablet, Rfl: 0    atorvastatin (LIPITOR) 20 MG tablet, TAKE 1 TABLET BY MOUTH DAILY., Disp: 90 tablet, Rfl: 1    butalbital-acetaminophen-caffeine (FIORICET, ESGIC) -40 MG per tablet, TAKE 1 TABLET BY MOUTH ONCE DAILY AS NEEDED FOR HEADACHE, Disp: 30 tablet, Rfl: 0    Continuous Glucose  (FreeStyle Mary 3 [place]) device, USE DAILY TO CHECK BLOOD GLUCOSE, Disp: 1 each, Rfl: 0    Continuous Glucose Sensor (FreeStyle Mary 3 Sensor) misc, Use 1 each Every 14 (Fourteen) Days. Dx E11.65, Disp: 6 each, Rfl: 3    Creon 93593-141011 units capsule delayed-release particles capsule, Take 1 capsule by mouth 3 (Three) Times a Day With Meals., Disp: 270 capsule, Rfl: 0    difluprednate (DUREZOL) 0.05 % ophthalmic emulsion, , Disp: , Rfl:     diphenoxylate-atropine (Lomotil) 2.5-0.025 MG per tablet, Take 1 tablet by mouth 4 (Four) Times a Day As Needed for Diarrhea., Disp: 120 tablet, Rfl: 0    doxepin (SINEquan) 50 MG capsule, Take 1 capsule by mouth At Night As Needed for Sleep (anxiety)., Disp: 30 capsule, Rfl: 2    Dulaglutide (Trulicity) 1.5 MG/0.5ML solution pen-injector, Inject 1.5 mg under the skin into the appropriate area as directed 1 (One) Time Per Week., Disp: 6 mL, Rfl: 2    Farxiga 10 MG tablet, Take 10 mg by mouth Daily., Disp: 90 tablet, Rfl: 3    galcanezumab-gnlm (Emgality) 120 MG/ML auto-injector pen, Inject 1 mL under the skin into the appropriate area as  "directed Every 30 (Thirty) Days. Gets from Patient Assistance Program, Disp: 1.12 mL, Rfl: 11    glucose monitor monitoring kit, 1 each Daily., Disp: 1 each, Rfl: 0    Insulin Lispro (HumaLOG KwikPen) 200 UNIT/ML solution pen-injector, Inject 16 Units under the skin into the appropriate area as directed 3 (Three) Times a Day Before Meals., Disp: 24 mL, Rfl: 0    Insulin Pen Needle (MM Pen Needles) 31G X 6 MM misc, Use 4 times daily with insulin, Disp: 200 each, Rfl: 2    Insulin Syringe 31G X 5/16\" 0.3 ML misc, 1 each Every Night., Disp: 90 each, Rfl: 3    Lancets Thin misc, 1 each Daily. Use daily as directed, Disp: 100 each, Rfl: 11    lisinopril (PRINIVIL,ZESTRIL) 40 MG tablet, Take 1 tablet by mouth twice daily, Disp: 180 tablet, Rfl: 0    metFORMIN (GLUCOPHAGE) 1000 MG tablet, TAKE 1 TABLET BY MOUTH TWICE DAILY WITH MEALS (Patient not taking: Reported on 4/21/2025), Disp: 180 tablet, Rfl: 0    naloxone (NARCAN) 4 MG/0.1ML nasal spray, Call 911. Don't prime. Flynn in 1 nostril for overdose. Repeat in 2-3 minutes in other nostril if no or minimal breathing/responsiveness., Disp: 2 each, Rfl: 0    nitroglycerin (NITROSTAT) 0.4 MG SL tablet, 1 under the tongue as needed for angina, may repeat q5mins for up three doses, Disp: 100 tablet, Rfl: 11    ondansetron ODT (ZOFRAN-ODT) 8 MG disintegrating tablet, Place 1 tablet on the tongue Every 8 (Eight) Hours As Needed for Nausea or Vomiting., Disp: 30 tablet, Rfl: 2    OneTouch Ultra test strip, USE 1 STRIP TO CHECK GLUCOSE ONCE DAILY AS DIRECTED, Disp: 100 each, Rfl: 0    pantoprazole (PROTONIX) 40 MG EC tablet, TAKE 1 TABLET BY MOUTH TWICE DAILY 30 MINUTES BEFORE MEAL(S), Disp: 180 tablet, Rfl: 3    prednisoLONE acetate (PRED FORTE) 1 % ophthalmic suspension, INSTILL 1 DROP INTO RIGHT EYE 4 TIMES DAILY, Disp: , Rfl:     pregabalin (Lyrica) 75 MG capsule, Take 1 capsule by mouth 3 (Three) Times a Day. (Patient taking differently: Take 1 capsule by mouth Every Night. " Once daily around 7pm), Disp: 90 capsule, Rfl: 3    pregabalin (Lyrica) 75 MG capsule, Take 1 capsule by mouth 3 (Three) Times a Day., Disp: 42 capsule, Rfl: 0    propranolol LA (INDERAL LA) 120 MG 24 hr capsule, Take 1 capsule by mouth Daily for 30 days., Disp: 30 capsule, Rfl: 3    sildenafil (VIAGRA) 100 MG tablet, Take 1 tablet by mouth Daily As Needed for Erectile Dysfunction., Disp: 15 tablet, Rfl: 11    tadalafil (Cialis) 5 MG tablet, Take 1 tablet by mouth Daily As Needed for Erectile Dysfunction., Disp: 15 tablet, Rfl: 11    tamsulosin (FLOMAX) 0.4 MG capsule 24 hr capsule, Take 1 capsule by mouth once daily, Disp: 90 capsule, Rfl: 0    Toujeo Max SoloStar 300 UNIT/ML solution pen-injector injection, INJECT 40 UNITS UNDER THE SKIN EVERY NIGHT AT BEDTIME INTO THE APPROPRIATE AREA, Disp: 12 mL, Rfl: 0    traZODone (DESYREL) 50 MG tablet, Take 2 tablets by mouth Every Night for 30 days., Disp: 60 tablet, Rfl: 3    vitamin B-12 (CYANOCOBALAMIN) 100 MCG tablet, Take 0.5 tablets by mouth 2 (two) times a day., Disp: , Rfl:     vitamin C (ASCORBIC ACID) 500 MG tablet, Take 1 tablet by mouth 2 (two) times a day., Disp: , Rfl:     vitamin E 400 UNIT capsule, Take 1 capsule by mouth Daily., Disp: , Rfl:     Zinc 40 MG tablet, Take 1 tablet by mouth Daily., Disp: , Rfl:          (Not in a hospital admission)        Subjective .   History of present illness:    Patient is a 70 year old  male who we are asked to see today for further evaluation of chest pain and dyspnea. Patient has no previous history of obstructive CAD. Patient notes over the last few months increasing dyspnea on exertion and recurrent chest pain. Patient notes a significant change in his functional capacity. He was seen by heart and valve yesterday and scheduled for stress test and echo. No reported syncope or near syncope. Patient has also been noting LE edema. No significant orthopnea. Last evening had some recurrent chest pain that  responded to SL NTG. Notes that today while on the phone he became very short of breath. He was then instructed to present to the ER for further evaluation. Given his worsening symptoms and concern for CAD.       Social History     Socioeconomic History    Marital status:    Tobacco Use    Smoking status: Never     Passive exposure: Never    Smokeless tobacco: Never   Vaping Use    Vaping status: Never Used   Substance and Sexual Activity    Alcohol use: Never    Drug use: Never    Sexual activity: Yes     Partners: Female     Family History   Problem Relation Age of Onset    Heart failure Mother     Diabetes Mother     Alzheimer's disease Mother     Arthritis Mother     Heart disease Mother     Heart attack Father     Kidney disease Father     Cancer Father     Prostate cancer Father     Heart disease Father     Hypertension Father     Cancer Sister     Breast cancer Sister     Diabetes Sister     Heart disease Sister     Arrhythmia Sister     Stroke Sister     Diabetes Brother     Heart disease Brother     Cancer Brother     Liver disease Brother     Kidney disease Brother     Dementia Paternal Uncle     Alzheimer's disease Maternal Grandmother     Liver cancer Maternal Grandfather     Liver disease Maternal Grandfather     Stroke Sister     Colon cancer Neg Hx          Review of Systems:  Review of Systems   Constitutional: Positive for malaise/fatigue. Negative for fever.   HENT:  Negative for nosebleeds.    Eyes:  Negative for redness and visual disturbance.   Cardiovascular:  Positive for chest pain, dyspnea on exertion and leg swelling. Negative for orthopnea, palpitations and paroxysmal nocturnal dyspnea.   Respiratory:  Positive for shortness of breath. Negative for cough, snoring, sputum production and wheezing.    Hematologic/Lymphatic: Negative for bleeding problem.   Skin:  Negative for flushing, itching and rash.   Musculoskeletal:  Positive for arthritis. Negative for falls, joint pain and  "muscle cramps.   Gastrointestinal:  Positive for heartburn. Negative for abdominal pain, diarrhea, nausea and vomiting.   Genitourinary:  Negative for hematuria.   Neurological:  Negative for excessive daytime sleepiness, dizziness, headaches, tremors and weakness.   Psychiatric/Behavioral:  Negative for substance abuse. The patient is nervous/anxious.               Objective   Vitals:  /86   Pulse 59   Temp 98.2 °F (36.8 °C) (Oral)   Resp 18   Ht 170.2 cm (67\")   Wt 81.6 kg (180 lb)   SpO2 99%   BMI 28.19 kg/m²        Vitals reviewed.   Constitutional:       Appearance: Well-developed and not in distress.   Neck:      Vascular: No JVD.      Trachea: No tracheal deviation.   Pulmonary:      Effort: Pulmonary effort is normal.      Breath sounds: Normal breath sounds.   Cardiovascular:      Normal rate. Regular rhythm.      Murmurs: There is no murmur.   Pulses:     Intact distal pulses.   Edema:     Peripheral edema absent.   Abdominal:      General: Bowel sounds are normal.      Palpations: Abdomen is soft.      Tenderness: There is no abdominal tenderness.   Musculoskeletal:         General: No deformity. Skin:     General: Skin is warm and dry.   Neurological:      Mental Status: Alert and oriented to person, place, and time.       I have examined the patient and agree with the above         Results Review:  I reviewed the patient's new clinical results.  Results from last 7 days   Lab Units 04/22/25  1302   WBC 10*3/mm3 7.57   HEMOGLOBIN g/dL 12.4*   HEMATOCRIT % 38.7   PLATELETS 10*3/mm3 324     Results from last 7 days   Lab Units 04/22/25  1302   SODIUM mmol/L 142   POTASSIUM mmol/L 4.2   CHLORIDE mmol/L 106   CO2 mmol/L 26.0   BUN mg/dL 18   CREATININE mg/dL 1.14   CALCIUM mg/dL 9.8   BILIRUBIN mg/dL 0.4   ALK PHOS U/L 72   ALT (SGPT) U/L 11   AST (SGOT) U/L 14   GLUCOSE mg/dL 131*     Results from last 7 days   Lab Units 04/22/25  1302   SODIUM mmol/L 142   POTASSIUM mmol/L 4.2   CHLORIDE " "mmol/L 106   CO2 mmol/L 26.0   BUN mg/dL 18   CREATININE mg/dL 1.14   GLUCOSE mg/dL 131*   CALCIUM mg/dL 9.8         Lab Results   Lab Value Date/Time    TROPONINT 10 04/22/2025 1302    TROPONINT 11 01/14/2025 2307    TROPONINT 9 01/14/2025 1957         Results from last 7 days   Lab Units 04/16/25  0959   CHOLESTEROL mg/dL 144   TRIGLYCERIDES mg/dL 145   HDL CHOL mg/dL 45   LDL CHOL mg/dL 74     Results from last 7 days   Lab Units 04/22/25  1302   PROBNP pg/mL 121.8           Tele:      EKG: SR WNL      Assessment & Plan     Progressive/unstable angina patient with progressive dyspnea on exertion, chest pain rating to back.  Also some abdominal pain which is atypical and pleuritic and has been on for several months.  Hypertension  Dyslipidemia  Diabetes   GERD/HH      Plan:    Given that the patient has had multiple emergency room visits, doctors offices, phone calls to doctors offices, feel that it is best to \"clear the air\", despite his normal troponins and proceed directly to cardiac catheterization.  Patient and wife agree given the patient's significant, rapidly progressive symptoms we will plan to proceed with LHC and potential intervention via right radial access. This was discussed with the patient who verbalized understanding and wishes to proceed. Further recommendations to follow.       LILIANE Dior obtained past medical, family history, social history, review of systems and functioned as a scribe for the remainder of the dictation for Dr. Garner.     I have seen and examined the patient, I have reviewed the note, discussed the case with the advance practice clinician, made necessary changes and I agree with the final note.    Santiago Garner MD  04/22/25  15:37 EDT      Dictated utilizing Dragon dictation    "

## 2025-04-23 ENCOUNTER — OFFICE VISIT (OUTPATIENT)
Dept: ENDOCRINOLOGY | Facility: CLINIC | Age: 70
End: 2025-04-23
Payer: MEDICARE

## 2025-04-23 ENCOUNTER — RESULTS FOLLOW-UP (OUTPATIENT)
Dept: ENDOCRINOLOGY | Facility: CLINIC | Age: 70
End: 2025-04-23

## 2025-04-23 VITALS
SYSTOLIC BLOOD PRESSURE: 126 MMHG | BODY MASS INDEX: 29.51 KG/M2 | HEIGHT: 67 IN | DIASTOLIC BLOOD PRESSURE: 80 MMHG | WEIGHT: 188 LBS | OXYGEN SATURATION: 96 % | HEART RATE: 67 BPM

## 2025-04-23 DIAGNOSIS — Z79.4 TYPE 2 DIABETES MELLITUS WITH HYPERGLYCEMIA, WITH LONG-TERM CURRENT USE OF INSULIN: Primary | ICD-10-CM

## 2025-04-23 DIAGNOSIS — I10 PRIMARY HYPERTENSION: ICD-10-CM

## 2025-04-23 DIAGNOSIS — E11.65 TYPE 2 DIABETES MELLITUS WITH HYPERGLYCEMIA, WITHOUT LONG-TERM CURRENT USE OF INSULIN: ICD-10-CM

## 2025-04-23 DIAGNOSIS — I25.118 CORONARY ARTERY DISEASE OF NATIVE ARTERY OF NATIVE HEART WITH STABLE ANGINA PECTORIS: ICD-10-CM

## 2025-04-23 DIAGNOSIS — E78.5 DYSLIPIDEMIA: ICD-10-CM

## 2025-04-23 DIAGNOSIS — E11.65 TYPE 2 DIABETES MELLITUS WITH HYPERGLYCEMIA, WITH LONG-TERM CURRENT USE OF INSULIN: Primary | ICD-10-CM

## 2025-04-23 LAB
ALBUMIN UR-MCNC: 33.4 MG/DL
CREAT UR-MCNC: 140.5 MG/DL
EXPIRATION DATE: ABNORMAL
EXPIRATION DATE: ABNORMAL
GLUCOSE BLDC GLUCOMTR-MCNC: 225 MG/DL (ref 70–130)
HBA1C MFR BLD: 7.8 % (ref 4.5–5.7)
Lab: ABNORMAL
Lab: ABNORMAL
MICROALBUMIN/CREAT UR: 237.7 MG/G (ref 0–29)
QT INTERVAL: 404 MS
QT INTERVAL: 432 MS
QTC INTERVAL: 404 MS
QTC INTERVAL: 427 MS
TSH SERPL DL<=0.05 MIU/L-ACNC: 1.35 UIU/ML (ref 0.27–4.2)

## 2025-04-23 PROCEDURE — 82043 UR ALBUMIN QUANTITATIVE: CPT | Performed by: INTERNAL MEDICINE

## 2025-04-23 PROCEDURE — 82570 ASSAY OF URINE CREATININE: CPT | Performed by: INTERNAL MEDICINE

## 2025-04-23 RX ORDER — INSULIN GLARGINE 300 U/ML
INJECTION, SOLUTION SUBCUTANEOUS
Qty: 15 ML | Refills: 3 | Status: SHIPPED | OUTPATIENT
Start: 2025-04-23

## 2025-04-23 RX ORDER — INSULIN LISPRO 200 [IU]/ML
18 INJECTION, SOLUTION SUBCUTANEOUS
Qty: 27 ML | Refills: 3 | Status: SHIPPED | OUTPATIENT
Start: 2025-04-23

## 2025-04-23 NOTE — PROGRESS NOTES
"     Office Note      Date: 2025  Patient Name: Israel Reyna  MRN: [id number]  : 1955    Chief Complaint   Patient presents with    Diabetes       History of Present Illness:   Israel Reyna is a 69 y.o. male who presents for Diabetes type 2. Diagnosed in: . Treated in past with oral agents. Metformin has been held for a couple of days due to diarrhea.  Current treatments: farxiga, trulicity and basal bolus insulin. Number of insulin shots per day: 3. Checks blood sugar 288 times a day. Has low blood sugar: occasional. Aspirin use: Yes - intermittently. Statin use: Yes. ACE-I/ARB use: Yes.  Change in health since last visit: heart cath yesterday.  Last eye exam: 2024.     Subjective      Diabetic Complications:  Eyes: Yes - retinopathy  Kidneys: No  Feet: No  Heart: Yes -      Diet and Exercise:  Meals per day: 2  Minutes of exercise per week: 0 mins.    Review of Systems:   Review of Systems   Constitutional: Negative.    Cardiovascular: Negative.    Gastrointestinal: Negative.    Endocrine: Negative.        The following portions of the patient's history were reviewed and updated as appropriate: allergies, current medications, past family history, past medical history, past social history, past surgical history, and problem list.    Objective     Visit Vitals  /80   Pulse 67   Ht 170.2 cm (67\")   Wt 85.3 kg (188 lb)   SpO2 96%   BMI 29.44 kg/m²       Physical Exam:  Physical Exam  Constitutional:       Appearance: Normal appearance.   Neurological:      Mental Status: He is alert.         Labs:    HbA1c  Lab Results   Component Value Date    HGBA1C 7.8 (A) 2025       CMP  Lab Results   Component Value Date    GLUCOSE 131 (H) 2025    BUN 18 2025    CREATININE 1.14 2025    EGFRIFNONA 66 2022    BCR 15.8 2025    K 4.2 2025    CO2 26.0 2025    CALCIUM 9.8 2025    AST 14 2025    ALT 11 2025        Lipid " "Panel  Lab Results   Component Value Date    HDL Cholesterol 45 04/16/2025    LDL Cholesterol  74 04/16/2025    LDL/HDL Ratio 1.56 04/16/2025    Triglycerides 145 04/16/2025        TSH  Lab Results   Component Value Date    TSH 1.450 01/13/2025        Hemoglobin A1C  No components found for: \"HGBA1C\"     Microalbumin/Creatinine  Lab Results   Component Value Date    MALBCRERATIO 101.6 (H) 10/03/2024    CREATINIURIN 88.5 11/08/2022    MICROALBUR 13.6 10/03/2024           Assessment / Plan      Assessment & Plan:  Diagnoses and all orders for this visit:    1. Type 2 diabetes mellitus with hyperglycemia, with long-term current use of insulin (Primary)  Assessment & Plan:  Diabetes is stable.   Ok to stay off metformin.  Try lower dose farxiga.  He says it was bothering his bowels.  We discussed trying 5mg of farxiga.  He will try cutting the tablets in 1/2.  Stay on trulicity.  Adjust insulin doses.  Diabetes will be reassessed in 6 months.    FreeStyle Mary 3 CGM was downloaded today.  Data was reviewed from 4/10/25 to 4/23/25.  This showed occ nocturnal hypoglycemia.  Will decrease basal insulin.  Having some postprandial spikes.  Will increase mealtime insulin.  Time in range was 68%.    Orders:  -     POC Glucose, Blood  -     POC Glycosylated Hemoglobin (Hb A1C)  -     TSH; Future  -     Microalbumin / Creatinine Urine Ratio - Urine, Clean Catch; Future    2. Primary hypertension  Assessment & Plan:  Hypertension is stable and controlled.  Continue current treatment regimen.  Blood pressure will be reassessed in 6 months.      3. Dyslipidemia  Assessment & Plan:  Continue statin.  Recent lipids improved.      4. Coronary artery disease of native artery of native heart with stable angina pectoris  Assessment & Plan:  Continue ASA, statin, GLP-1 RA and SGLT-2 inhibitor.      5. Type 2 diabetes mellitus with hyperglycemia, without long-term current use of insulin  -     Toujeo Max SoloStar 300 UNIT/ML solution " "pen-injector injection; Use once daily.  Titrate to fasting glucose <120.  Max daily dose 50u.  Dispense: 15 mL; Refill: 3  -     Insulin Lispro (HumaLOG KwikPen) 200 UNIT/ML solution pen-injector; Inject 18 Units under the skin into the appropriate area as directed 3 (Three) Times a Day Before Meals.  Dispense: 27 mL; Refill: 3      Current Outpatient Medications   Medication Instructions    amLODIPine (NORVASC) 5 mg, Oral, Daily    atorvastatin (LIPITOR) 20 mg, Oral, Daily    butalbital-acetaminophen-caffeine (FIORICET, ESGIC) -40 MG per tablet TAKE 1 TABLET BY MOUTH ONCE DAILY AS NEEDED FOR HEADACHE    Continuous Glucose  (FreeStyle Mary 3 [place]) device USE DAILY TO CHECK BLOOD GLUCOSE    Continuous Glucose Sensor (FreeStyle Mary 3 Sensor) misc 1 each, Not Applicable, Every 14 Days, Dx E11.65    Creon 50654-269004 units capsule delayed-release particles capsule 36,000 units of lipase, Oral, 3 Times Daily With Meals    difluprednate (DUREZOL) 0.05 % ophthalmic emulsion     diphenoxylate-atropine (Lomotil) 2.5-0.025 MG per tablet 1 tablet, Oral, 4 Times Daily PRN    doxepin (SINEQUAN) 50 mg, Oral, Nightly PRN    Dulaglutide (TRULICITY) 1.5 mg, Subcutaneous, Weekly    Emgality 120 mg, Subcutaneous, Every 30 Days, Gets from Patient Assistance Program    Farxiga 10 mg, Oral, Daily    glucose monitor monitoring kit 1 each, Not Applicable, Daily    HumaLOG KwikPen 18 Units, Subcutaneous, 3 Times Daily Before Meals    Insulin Pen Needle (MM Pen Needles) 31G X 6 MM misc Use 4 times daily with insulin    Insulin Syringe 31G X 5/16\" 0.3 ML misc 1 each, Not Applicable, Nightly    Lancets Thin misc 1 each, Not Applicable, Daily, Use daily as directed    lisinopril (PRINIVIL,ZESTRIL) 40 mg, Oral, 2 Times Daily    naloxone (NARCAN) 4 MG/0.1ML nasal spray Call 911. Don't prime. [place] in 1 nostril for overdose. Repeat in 2-3 minutes in other nostril if no or minimal breathing/responsiveness.    nitroglycerin " (NITROSTAT) 0.4 MG SL tablet 1 under the tongue as needed for angina, may repeat q5mins for up three doses    ondansetron ODT (ZOFRAN-ODT) 8 mg, Translingual, Every 8 Hours PRN    OneTouch Ultra test strip USE 1 STRIP TO CHECK GLUCOSE ONCE DAILY AS DIRECTED    pantoprazole (PROTONIX) 40 MG EC tablet TAKE 1 TABLET BY MOUTH TWICE DAILY 30 MINUTES BEFORE MEAL(S)    prednisoLONE acetate (PRED FORTE) 1 % ophthalmic suspension INSTILL 1 DROP INTO RIGHT EYE 4 TIMES DAILY    pregabalin (LYRICA) 75 mg, Oral, 3 Times Daily    pregabalin (LYRICA) 75 mg, Oral, 3 Times Daily    propranolol LA (INDERAL LA) 120 mg, Oral, Daily    sildenafil (VIAGRA) 100 mg, Oral, Daily PRN    tadalafil (CIALIS) 5 mg, Oral, Daily PRN    tamsulosin (FLOMAX) 0.4 mg, Oral, Daily    Toujeo Max SoloStar 300 UNIT/ML solution pen-injector injection Use once daily.  Titrate to fasting glucose <120.  Max daily dose 50u.    traZODone (DESYREL) 100 mg, Oral, Nightly    vitamin B-12 (CYANOCOBALAMIN) 50 mcg, 2 times daily    vitamin C (ASCORBIC ACID) 500 mg, 2 times daily    vitamin E 400 Units, Daily    Zinc 40 MG tablet 1 tablet, Daily      Return in about 6 months (around 10/23/2025) for Recheck with A1c, foot exam.    Electronically signed by: José Miguel Bautista MD  04/23/2025

## 2025-04-23 NOTE — ASSESSMENT & PLAN NOTE
Diabetes is stable.   Ok to stay off metformin.  Try lower dose farxiga.  He says it was bothering his bowels.  We discussed trying 5mg of farxiga.  He will try cutting the tablets in 1/2.  Stay on trulicity.  Adjust insulin doses.  Diabetes will be reassessed in 6 months.    FreeStyle Mary 3 CGM was downloaded today.  Data was reviewed from 4/10/25 to 4/23/25.  This showed occ nocturnal hypoglycemia.  Will decrease basal insulin.  Having some postprandial spikes.  Will increase mealtime insulin.  Time in range was 68%.

## 2025-04-25 ENCOUNTER — TELEPHONE (OUTPATIENT)
Dept: ENDOCRINOLOGY | Facility: CLINIC | Age: 70
End: 2025-04-25
Payer: MEDICARE

## 2025-04-25 ENCOUNTER — OFFICE VISIT (OUTPATIENT)
Dept: FAMILY MEDICINE CLINIC | Facility: CLINIC | Age: 70
End: 2025-04-25
Payer: MEDICARE

## 2025-04-25 VITALS
OXYGEN SATURATION: 95 % | WEIGHT: 189.2 LBS | SYSTOLIC BLOOD PRESSURE: 110 MMHG | HEIGHT: 67 IN | HEART RATE: 71 BPM | DIASTOLIC BLOOD PRESSURE: 62 MMHG | BODY MASS INDEX: 29.7 KG/M2

## 2025-04-25 DIAGNOSIS — R68.89 EXERCISE INTOLERANCE: ICD-10-CM

## 2025-04-25 DIAGNOSIS — E11.65 TYPE 2 DIABETES MELLITUS WITH HYPERGLYCEMIA, WITHOUT LONG-TERM CURRENT USE OF INSULIN: ICD-10-CM

## 2025-04-25 DIAGNOSIS — R06.09 DYSPNEA ON EXERTION: Primary | ICD-10-CM

## 2025-04-25 PROCEDURE — 3078F DIAST BP <80 MM HG: CPT | Performed by: FAMILY MEDICINE

## 2025-04-25 PROCEDURE — 3074F SYST BP LT 130 MM HG: CPT | Performed by: FAMILY MEDICINE

## 2025-04-25 PROCEDURE — 3051F HG A1C>EQUAL 7.0%<8.0%: CPT | Performed by: FAMILY MEDICINE

## 2025-04-25 PROCEDURE — 99213 OFFICE O/P EST LOW 20 MIN: CPT | Performed by: FAMILY MEDICINE

## 2025-04-25 PROCEDURE — 1126F AMNT PAIN NOTED NONE PRSNT: CPT | Performed by: FAMILY MEDICINE

## 2025-04-25 PROCEDURE — G2211 COMPLEX E/M VISIT ADD ON: HCPCS | Performed by: FAMILY MEDICINE

## 2025-04-25 RX ORDER — SODIUM, POTASSIUM,MAG SULFATES 17.5-3.13G
SOLUTION, RECONSTITUTED, ORAL ORAL
Qty: 354 ML | Refills: 0 | Status: SHIPPED | OUTPATIENT
Start: 2025-04-25

## 2025-04-25 NOTE — TELEPHONE ENCOUNTER
Pt needs us to refax rx for his trulicity to [facility] they said they did not get this from us   Please fax it to  [phone]    Margot call the pt back to let him know we got this done he is out of it  [phone]

## 2025-04-28 RX ORDER — SOD SULF/POT CHLORIDE/MAG SULF 1.479 G
12 TABLET ORAL TAKE AS DIRECTED
Qty: 24 TABLET | Refills: 0 | Status: SHIPPED | OUTPATIENT
Start: 2025-04-28

## 2025-05-06 RX ORDER — ACYCLOVIR 800 MG/1
1 TABLET ORAL
Qty: 6 EACH | Refills: 3 | Status: SHIPPED | OUTPATIENT
Start: 2025-05-06

## 2025-05-08 ENCOUNTER — LAB REQUISITION (OUTPATIENT)
Dept: LAB | Facility: HOSPITAL | Age: 70
End: 2025-05-08
Payer: MEDICARE

## 2025-05-08 ENCOUNTER — OUTSIDE FACILITY SERVICE (OUTPATIENT)
Dept: GASTROENTEROLOGY | Facility: CLINIC | Age: 70
End: 2025-05-08
Payer: MEDICARE

## 2025-05-08 DIAGNOSIS — K57.30 DIVERTICULOSIS OF LARGE INTESTINE WITHOUT PERFORATION OR ABSCESS WITHOUT BLEEDING: ICD-10-CM

## 2025-05-08 DIAGNOSIS — D12.5 BENIGN NEOPLASM OF SIGMOID COLON: ICD-10-CM

## 2025-05-08 DIAGNOSIS — K52.9 NONINFECTIVE GASTROENTERITIS AND COLITIS, UNSPECIFIED: ICD-10-CM

## 2025-05-08 DIAGNOSIS — D12.2 BENIGN NEOPLASM OF ASCENDING COLON: ICD-10-CM

## 2025-05-08 DIAGNOSIS — Z86.0100 PERSONAL HISTORY OF COLON POLYPS, UNSPECIFIED: ICD-10-CM

## 2025-05-08 PROCEDURE — 45385 COLONOSCOPY W/LESION REMOVAL: CPT | Performed by: INTERNAL MEDICINE

## 2025-05-08 PROCEDURE — 45380 COLONOSCOPY AND BIOPSY: CPT | Performed by: INTERNAL MEDICINE

## 2025-05-08 PROCEDURE — 88305 TISSUE EXAM BY PATHOLOGIST: CPT | Performed by: INTERNAL MEDICINE

## 2025-05-08 RX ORDER — COLESTIPOL HYDROCHLORIDE 1 G/1
2 TABLET ORAL 2 TIMES DAILY
Qty: 120 TABLET | Refills: 11 | Status: SHIPPED | OUTPATIENT
Start: 2025-05-08

## 2025-05-09 LAB — REF LAB TEST METHOD: NORMAL

## 2025-05-12 DIAGNOSIS — E11.65 TYPE 2 DIABETES MELLITUS WITH HYPERGLYCEMIA, WITHOUT LONG-TERM CURRENT USE OF INSULIN: ICD-10-CM

## 2025-05-13 RX ORDER — LISINOPRIL 40 MG/1
40 TABLET ORAL 2 TIMES DAILY
Qty: 180 TABLET | Refills: 0 | Status: SHIPPED | OUTPATIENT
Start: 2025-05-13

## 2025-05-15 ENCOUNTER — RESULTS FOLLOW-UP (OUTPATIENT)
Dept: LAB | Facility: HOSPITAL | Age: 70
End: 2025-05-15
Payer: MEDICARE

## 2025-05-15 NOTE — TELEPHONE ENCOUNTER
May 15, 2025    Israel Reyna  [address] [place] [address] [place] [address]      Dear Mr. Reyna:  This letter is in regard to your recent colonoscopy of May 8, 2025.      The polyp(s) removed proved to be adenomatous.  Specifically, the polyps removed from the ascending colon and sigmoid colon proved to be tubular adenomas.  Adenomatous polyps are benign polyps that have the potential for becoming malignant.  The majority of adenomatous polyps do not become malignant.     A repeat colonoscopy is recommended in 3 years to diminish your future risk for developing colon cancer.    Random colon biopsies returned negative for any evidence of microscopic colitis.  We have recommended a trial of colestipol/Colestid 1 to 4 g daily to help you with your diarrhea predominant irritable bowel syndrome.  If this is not resolving your diarrhea please make sure to follow-up with us so we can make further recommendations.     I hope this letter finds you well. Please call our office with any questions or concerns you may have.  Please also refer to the endoscopy report provided to you on the day of your procedure for further details.     Sincerely,  Pieter Graves M.D.     CC: Elio Moore, DO

## 2025-05-15 NOTE — LETTER
May 15, 2025    Israel Reyna  [address] [place] [address] [place] [address]      Dear Mr. Reyna:  This letter is in regard to your recent colonoscopy of May 8, 2025.      The polyp(s) removed proved to be adenomatous.  Specifically, the polyps removed from the ascending colon and sigmoid colon proved to be tubular adenomas.  Adenomatous polyps are benign polyps that have the potential for becoming malignant.  The majority of adenomatous polyps do not become malignant.     A repeat colonoscopy is recommended in 3 years to diminish your future risk for developing colon cancer.    Random colon biopsies returned negative for any evidence of microscopic colitis.  We have recommended a trial of colestipol/Colestid 1 to 4 g daily to help you with your diarrhea predominant irritable bowel syndrome.  If this is not resolving your diarrhea please make sure to follow-up with us so we can make further recommendations.     I hope this letter finds you well. Please call our office with any questions or concerns you may have.  Please also refer to the endoscopy report provided to you on the day of your procedure for further details.     Sincerely,  Pieter Graves M.D.     CC: Elio Moore, DO

## 2025-05-19 NOTE — PROGRESS NOTES
Established Patient Office Visit      Patient Name: Israel Reyna  : 1955   MRN: [id number]   Care Team: Patient Care Team:  Elio Moore DO as PCP - General (Family Medicine)  José Miguel Bautista MD as Consulting Physician (Endocrinology)    Chief Complaint:    Chief Complaint   Patient presents with    Hospital Follow Up Visit     Trulicity needs new rx, sample?       History of Present Illness: Israel Reyna is a 70 y.o. male who is here today for chief complaint.    HPI    Was admitted to the hospital for heart cath for unstable angina.  No intervention, symptoms are about the same.  Has follow-up scheduled with the cardiologist.    This patient is accompanied by their self who contributes to the history of their care.    The following portions of the patient's history were reviewed and updated as appropriate: allergies, current medications, past family history, past medical history, past social history, past surgical history and problem list.    Subjective      Review of Systems:   Review of Systems - See HPI    Past Medical History:   Past Medical History:   Diagnosis Date    Allergic     Angina pectoris     Not sure of date    Anxiety     Arthritis     Never diagnosed    Bowel incontinence     w/ history of Stimulator Device     CAD (coronary artery disease)     Carotid artery occlusion     Cataract     Chest pain     CHF (congestive heart failure)     Chronic diarrhea     Born with bowel defects.    Clotting disorder     Bleed from bowels at times    Cluster headache     Colon polyp     Dementia     Depression     Diabetes mellitus, type 2     Diabetic peripheral neuropathy     Dyslipidemia     Erectile dysfunction     Fatigue     GERD (gastroesophageal reflux disease)     Headache, tension-type     Heart murmur     HL (hearing loss) 2023    Im wearing hearing aids. Dr. 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    Kidney stone     Memory loss     Mental status change     Migraine     Complex     Migraine headache     Myocardial infarction Several years ago    Renal insufficiency     Shingles     Sinusitis     I have several sinus infection    Sleep apnea     SOB (shortness of breath)     Stroke     Was told tia go with hemiphlegic migraines    Urinary tract infection     I have them at different times    Vision loss        Past Surgical History:   Past Surgical History:   Procedure Laterality Date    CARDIAC CATHETERIZATION      CARDIAC CATHETERIZATION N/A 10/14/2016    Procedure: Left Heart Cath;  Surgeon: Santiago Garner MD;  Location:  [facility];  Service:     CARDIAC CATHETERIZATION N/A 4/22/2025    Procedure: Left Heart Cath - Right radial access;  Surgeon: Santiago Garner MD;  Location:  [facility];  Service: Cardiovascular;  Laterality: N/A;    COLONOSCOPY  02/25/2022    Dr. Graves-recommended 3 year repeat    ENDOSCOPY  02/03/2022    Dr. Graves    EYE SURGERY  Retina    Dr. Askew    GASTRIC STIMULATOR IMPLANT SURGERY      SMALL INTESTINE SURGERY      unspecified, x3 as an infant     TESTICLE SURGERY      UPPER GASTROINTESTINAL ENDOSCOPY         Family History:   Family History   Problem Relation Age of Onset    Heart failure Mother     Diabetes Mother     Alzheimer's disease Mother     Arthritis Mother     Heart disease Mother     Heart attack Father     Kidney disease Father     Cancer Father     Prostate cancer Father     Heart disease Father     Hypertension Father     Cancer Sister     Breast cancer Sister     Diabetes Sister     Heart disease Sister     Arrhythmia Sister     Stroke Sister     Diabetes Brother     Heart disease Brother     Cancer Brother     Liver disease Brother     Kidney disease Brother     Dementia Paternal Uncle     Alzheimer's disease Maternal Grandmother     Liver cancer Maternal Grandfather     Liver disease  Maternal Grandfather     Other Paternal Grandmother     Other Paternal Grandfather     Stroke Sister     Colon cancer Neg Hx        Social History:   Social History     Socioeconomic History    Marital status:    Tobacco Use    Smoking status: Never     Passive exposure: Never    Smokeless tobacco: Never   Vaping Use    Vaping status: Never Used   Substance and Sexual Activity    Alcohol use: Never    Drug use: Never    Sexual activity: Yes     Partners: Female       Tobacco History:   Social History     Tobacco Use   Smoking Status Never    Passive exposure: Never   Smokeless Tobacco Never       Medications:   Outpatient Medications Prior to Visit   Medication Sig Dispense Refill    amLODIPine (NORVASC) 5 MG tablet Take 1 tablet by mouth once daily 90 tablet 0    atorvastatin (LIPITOR) 20 MG tablet TAKE 1 TABLET BY MOUTH DAILY. 90 tablet 1    butalbital-acetaminophen-caffeine (FIORICET, ESGIC) -40 MG per tablet TAKE 1 TABLET BY MOUTH ONCE DAILY AS NEEDED FOR HEADACHE 30 tablet 0    Continuous Glucose  (FreeStyle Mary 3 [place]) device USE DAILY TO CHECK BLOOD GLUCOSE 1 each 0    Creon 44147-969053 units capsule delayed-release particles capsule Take 1 capsule by mouth 3 (Three) Times a Day With Meals. 270 capsule 0    difluprednate (DUREZOL) 0.05 % ophthalmic emulsion       diphenoxylate-atropine (Lomotil) 2.5-0.025 MG per tablet Take 1 tablet by mouth 4 (Four) Times a Day As Needed for Diarrhea. 120 tablet 0    doxepin (SINEquan) 50 MG capsule Take 1 capsule by mouth At Night As Needed for Sleep (anxiety). 30 capsule 2    Farxiga 10 MG tablet Take 10 mg by mouth Daily. 90 tablet 3    galcanezumab-gnlm (Emgality) 120 MG/ML auto-injector pen Inject 1 mL under the skin into the appropriate area as directed Every 30 (Thirty) Days. Gets from Patient Assistance Program 1.12 mL 11    glucose monitor monitoring kit 1 each Daily. 1 each 0    Insulin Lispro (HumaLOG KwikPen) 200 UNIT/ML solution  "pen-injector Inject 18 Units under the skin into the appropriate area as directed 3 (Three) Times a Day Before Meals. 27 mL 3    Insulin Pen Needle (MM Pen Needles) 31G X 6 MM misc Use 4 times daily with insulin 200 each 2    Insulin Syringe 31G X 5/16\" 0.3 ML misc 1 each Every Night. 90 each 3    Lancets Thin misc 1 each Daily. Use daily as directed 100 each 11    naloxone (NARCAN) 4 MG/0.1ML nasal spray Call 911. Don't prime. [place] in 1 nostril for overdose. Repeat in 2-3 minutes in other nostril if no or minimal breathing/responsiveness. 2 each 0    nitroglycerin (NITROSTAT) 0.4 MG SL tablet 1 under the tongue as needed for angina, may repeat q5mins for up three doses 100 tablet 11    ondansetron ODT (ZOFRAN-ODT) 8 MG disintegrating tablet Place 1 tablet on the tongue Every 8 (Eight) Hours As Needed for Nausea or Vomiting. 30 tablet 2    OneTouch Ultra test strip USE 1 STRIP TO CHECK GLUCOSE ONCE DAILY AS DIRECTED 100 each 0    pantoprazole (PROTONIX) 40 MG EC tablet TAKE 1 TABLET BY MOUTH TWICE DAILY 30 MINUTES BEFORE MEAL(S) 180 tablet 3    prednisoLONE acetate (PRED FORTE) 1 % ophthalmic suspension INSTILL 1 DROP INTO RIGHT EYE 4 TIMES DAILY      pregabalin (Lyrica) 75 MG capsule Take 1 capsule by mouth 3 (Three) Times a Day. (Patient taking differently: Take 1 capsule by mouth Every Night. Once daily around 7pm) 90 capsule 3    pregabalin (Lyrica) 75 MG capsule Take 1 capsule by mouth 3 (Three) Times a Day. 42 capsule 0    sildenafil (VIAGRA) 100 MG tablet Take 1 tablet by mouth Daily As Needed for Erectile Dysfunction. 15 tablet 11    tadalafil (Cialis) 5 MG tablet Take 1 tablet by mouth Daily As Needed for Erectile Dysfunction. 15 tablet 11    tamsulosin (FLOMAX) 0.4 MG capsule 24 hr capsule Take 1 capsule by mouth once daily 90 capsule 0    Toujeo Max SoloStar 300 UNIT/ML solution pen-injector injection Use once daily.  Titrate to fasting glucose <120.  Max daily dose 50u. 15 mL 3    vitamin B-12 " "(CYANOCOBALAMIN) 100 MCG tablet Take 0.5 tablets by mouth 2 (two) times a day.      vitamin C (ASCORBIC ACID) 500 MG tablet Take 1 tablet by mouth 2 (two) times a day.      vitamin E 400 UNIT capsule Take 1 capsule by mouth Daily.      Zinc 40 MG tablet Take 1 tablet by mouth Daily.      Continuous Glucose Sensor (FreeStyle Mary 3 Sensor) [facility] Use 1 each Every 14 (Fourteen) Days. Dx E11.65 6 each 3    Dulaglutide (Trulicity) 1.5 MG/0.5ML solution auto-injector Inject 1.5 mg under the skin into the appropriate area as directed 1 (One) Time Per Week. 6 mL 3    lisinopril (PRINIVIL,ZESTRIL) 40 MG tablet Take 1 tablet by mouth twice daily 180 tablet 0    propranolol LA (INDERAL LA) 120 MG 24 hr capsule Take 1 capsule by mouth Daily for 30 days. 30 capsule 3    traZODone (DESYREL) 50 MG tablet Take 2 tablets by mouth Every Night for 30 days. 60 tablet 3     Facility-Administered Medications Prior to Visit   Medication Dose Route Frequency Provider Last Rate Last Admin    lidocaine (XYLOCAINE) 1 % injection 3 mL  3 mL Subcutaneous Once Elio Moore,             Allergies:   Allergies   Allergen Reactions    Actos [Pioglitazone] Other (See Comments)     congestive heart failure    Avandia [Rosiglitazone] Other (See Comments)     congestive heart failure.    Darvon [Propoxyphene] Itching     Angioedema    itching    Talwin [Pentazocine] Rash     Angioedema    itching       Objective   Objective     Physical Exam:  Vital Signs:   Vitals:    04/25/25 0807   BP: 110/62   Pulse: 71   SpO2: 95%   Weight: 85.8 kg (189 lb 3.2 oz)   Height: 170.2 cm (67.01\")     Body mass index is 29.63 kg/m².     Physical Exam  Nursing note reviewed  Const: NAD, A&Ox4, Pleasant, Cooperative  Eyes: EOMI, no conjunctivitis  ENT: No nasal discharge present, neck supple  Cardiac: Regular rate and rhythm, no cyanosis  Resp: Respiratory rate within normal limits, no increased work of breathing, no audible wheezing or retractions noted  GI: No " distention or ascites  MSK: Motor and sensation grossly intact in bilateral upper extremities  Neurologic: CN II-XII grossly intact  Psych: Appropriate mood and behavior.  Skin: Warm, dry  Procedures/Radiology     Procedures  No radiology results for the last 7 days     Assessment & Plan   Assessment / Plan      Assessment/Plan:   Problems Addressed This Visit  Diagnoses and all orders for this visit:    1. Dyspnea on exertion (Primary)  -     Ambulatory Referral to Exercise Education    2. Exercise intolerance  -     Ambulatory Referral to Exercise Education      Problem List Items Addressed This Visit    None  Visit Diagnoses         Dyspnea on exertion    -  Primary    Relevant Orders    Ambulatory Referral to Exercise Education      Exercise intolerance        Relevant Orders    Ambulatory Referral to Exercise Education          Still needs to have echo completed and follow-up with cardiology, would like to refer him to exercise education to discuss options for slow gradual return to exercise which will help his breathing capacity    No orders of the defined types were placed in this encounter.       Patient Instructions   Referral to exercise counseling    Follow Up:   Return if symptoms worsen or fail to improve.      DO BAILEY Jones RD  [facility]  [address] [facility] [address]  Fax [phone]  Phone [phone]    Disclaimer to patients: The 21st Century Cares Act makes medical notes like these available to patients in the interest of transparency. However, please be advised that this is still a medical document. It is intended as [id number] communication. Many sections may include medical language or jargon, abbreviations, and additional verbiage that are unfamiliar or confusing. In some ways it may come across as blunt, direct, or may be summarized in order to clearly and concisely communicate the most crucial  information to medical professionals. It may also include mentions of conditions that are unlikely but considered as part of the differential diagnosis, including serious disorders. These are not always discussed at length at the time of appointment because their likelihood is so low, but may be included in a medical note to make it clear what has been considered and/or ruled out as part of a work-up. Medical documents are intended to carry relevant information, facts as evident, and the personal clinical opinion of the physician. If you have any questions regarding this medical document, please bring them to the attention of the physician at your next scheduled appointment.

## 2025-06-02 DIAGNOSIS — R11.0 NAUSEA: ICD-10-CM

## 2025-06-02 RX ORDER — PANTOPRAZOLE SODIUM 40 MG/1
TABLET, DELAYED RELEASE ORAL
Qty: 180 TABLET | Refills: 0 | Status: SHIPPED | OUTPATIENT
Start: 2025-06-02

## 2025-06-02 NOTE — TELEPHONE ENCOUNTER
Daily Note     Today's date: 2025  Patient name: Fabby Rahman  : 1956  MRN: [id number]  Referring provider: Tuan Gutierrez, *  Dx:   Encounter Diagnosis     ICD-10-CM    1. Chronic pain of left knee  M25.562     G89.29           Start Time: 1400  Stop Time: 1445  Total time in clinic (min): 45 minutes    Subjective: Patient reports that she did tweak her knee some Friday when playing pickle ball (running in for a short ball). She did feel it as bad as last time but still had residual pain.       Objective: See treatment diary below      Assessment: Tolerated treatment well. Patient did well with all exercises today as we worked in hopping in place, hopping A/P and fall into lunges. She performed all well but did have some mild pain in her knee to start that resolved with reps.       Plan: Continue per plan of care.      Precautions: standard       Manuals          assessment    5'         Knee flexion mob with IR    AB         Ankle TCJ mob G5 dist            STM medial HS/add   10'          Knee ext mob   G4          Hip IR MWM   3x10          Neuro Re-Ed             Standing hip ABD GTB  2x10 GMB  3x10 ea side           Knee ext machine 90-30 SL SL   20#  3x10           TKE BJB BJB  20x BTB  20x          STS   Alt stance - L leg back  3x8 L leg back  12x L leg back  2x10         Hops in place    3x20         Hops AP    3x10         lunges   Bosu  2x10 Fall to-2x10         Lunge @ counter   10x          education   10' 5'         Ther Ex             bike   5' L1 w/u                                                                                                     Ther Activity                                       Gait Training                                       Modalities                                                 Left VM letting patient know that Dr. Barnett  advised him to go to the ED if the pain is severe.

## 2025-07-01 ENCOUNTER — OFFICE VISIT (OUTPATIENT)
Dept: FAMILY MEDICINE CLINIC | Facility: CLINIC | Age: 70
End: 2025-07-01
Payer: MEDICARE

## 2025-07-01 ENCOUNTER — LAB (OUTPATIENT)
Dept: LAB | Facility: HOSPITAL | Age: 70
End: 2025-07-01
Payer: MEDICARE

## 2025-07-01 VITALS
WEIGHT: 189 LBS | DIASTOLIC BLOOD PRESSURE: 78 MMHG | OXYGEN SATURATION: 98 % | HEIGHT: 67 IN | SYSTOLIC BLOOD PRESSURE: 122 MMHG | BODY MASS INDEX: 29.66 KG/M2 | HEART RATE: 67 BPM

## 2025-07-01 DIAGNOSIS — R22.42 LOCALIZED SWELLING OF LEFT FOOT: Primary | ICD-10-CM

## 2025-07-01 DIAGNOSIS — Z12.5 SCREENING PSA (PROSTATE SPECIFIC ANTIGEN): ICD-10-CM

## 2025-07-01 DIAGNOSIS — E67.8 EXCESSIVE VITAMIN INTAKE: ICD-10-CM

## 2025-07-01 DIAGNOSIS — G62.9 NEUROPATHY: ICD-10-CM

## 2025-07-01 DIAGNOSIS — K90.9 INTESTINAL MALABSORPTION, UNSPECIFIED TYPE: ICD-10-CM

## 2025-07-01 LAB — PSA SERPL-MCNC: 3.48 NG/ML (ref 0–4)

## 2025-07-01 PROCEDURE — 84446 ASSAY OF VITAMIN E: CPT

## 2025-07-01 PROCEDURE — G0103 PSA SCREENING: HCPCS

## 2025-07-01 PROCEDURE — 82180 ASSAY OF ASCORBIC ACID: CPT

## 2025-07-01 PROCEDURE — 82525 ASSAY OF COPPER: CPT

## 2025-07-01 PROCEDURE — 84630 ASSAY OF ZINC: CPT

## 2025-07-01 PROCEDURE — 1126F AMNT PAIN NOTED NONE PRSNT: CPT | Performed by: FAMILY MEDICINE

## 2025-07-01 PROCEDURE — 99214 OFFICE O/P EST MOD 30 MIN: CPT | Performed by: FAMILY MEDICINE

## 2025-07-01 PROCEDURE — 3078F DIAST BP <80 MM HG: CPT | Performed by: FAMILY MEDICINE

## 2025-07-01 PROCEDURE — 3074F SYST BP LT 130 MM HG: CPT | Performed by: FAMILY MEDICINE

## 2025-07-01 PROCEDURE — 3051F HG A1C>EQUAL 7.0%<8.0%: CPT | Performed by: FAMILY MEDICINE

## 2025-07-01 PROCEDURE — G2211 COMPLEX E/M VISIT ADD ON: HCPCS | Performed by: FAMILY MEDICINE

## 2025-07-01 RX ORDER — PROPRANOLOL HYDROCHLORIDE 120 MG/1
120 CAPSULE, EXTENDED RELEASE ORAL DAILY
Qty: 30 CAPSULE | Refills: 3 | Status: SHIPPED | OUTPATIENT
Start: 2025-07-01

## 2025-07-01 NOTE — PATIENT INSTRUCTIONS
Stop the vitamin C, E and zinc supplements  It will take about 3-4 weeks for the Vitamin E and zinc to washout completely  Vitamin C will wash out quicker

## 2025-07-03 LAB — COPPER SERPL-MCNC: 116 UG/DL (ref 69–132)

## 2025-07-04 LAB — VIT C SERPL-MCNC: 0.4 MG/DL (ref 0.4–2)

## 2025-07-05 LAB — ZINC SERPL-MCNC: 73 UG/DL (ref 44–115)

## 2025-07-08 ENCOUNTER — OFFICE VISIT (OUTPATIENT)
Dept: NEUROLOGY | Facility: CLINIC | Age: 70
End: 2025-07-08
Payer: MEDICARE

## 2025-07-08 VITALS — DIASTOLIC BLOOD PRESSURE: 74 MMHG | HEART RATE: 64 BPM | OXYGEN SATURATION: 96 % | SYSTOLIC BLOOD PRESSURE: 126 MMHG

## 2025-07-08 DIAGNOSIS — G43.119 INTRACTABLE MIGRAINE WITH AURA WITHOUT STATUS MIGRAINOSUS: Primary | ICD-10-CM

## 2025-07-08 DIAGNOSIS — G47.00 INSOMNIA, UNSPECIFIED TYPE: ICD-10-CM

## 2025-07-08 LAB
A-TOCOPHEROL VIT E SERPL-MCNC: 11.5 MG/L (ref 9–29)
GAMMA TOCOPHEROL SERPL-MCNC: 2.8 MG/L (ref 0.5–4.9)

## 2025-07-08 PROCEDURE — 3078F DIAST BP <80 MM HG: CPT | Performed by: PSYCHIATRY & NEUROLOGY

## 2025-07-08 PROCEDURE — 1160F RVW MEDS BY RX/DR IN RCRD: CPT | Performed by: PSYCHIATRY & NEUROLOGY

## 2025-07-08 PROCEDURE — 99214 OFFICE O/P EST MOD 30 MIN: CPT | Performed by: PSYCHIATRY & NEUROLOGY

## 2025-07-08 PROCEDURE — 3074F SYST BP LT 130 MM HG: CPT | Performed by: PSYCHIATRY & NEUROLOGY

## 2025-07-08 PROCEDURE — 1159F MED LIST DOCD IN RCRD: CPT | Performed by: PSYCHIATRY & NEUROLOGY

## 2025-07-08 RX ORDER — PREGABALIN 75 MG/1
75 CAPSULE ORAL NIGHTLY
Qty: 30 CAPSULE | Refills: 5 | Status: SHIPPED | OUTPATIENT
Start: 2025-07-08 | End: 2025-08-07

## 2025-07-08 RX ORDER — TRAZODONE HYDROCHLORIDE 50 MG/1
100 TABLET ORAL NIGHTLY
Qty: 180 TABLET | Refills: 1 | Status: SHIPPED | OUTPATIENT
Start: 2025-07-08 | End: 2025-08-07

## 2025-07-08 NOTE — PROGRESS NOTES
"Subjective:    CC: Israel Reyna is in clinic today for follow up for history of chronic headaches, insomnia.    HPI:  Initial visit/ Prior w/u:  This is a 65 yo male who presents for re-evaluation of worsening migraines. Intractable migraines since 2013 followed by Dr. Chaidez normally.  He went to have COVID vaccine March 2021 Pfizer. He had immediate worsening headaches, chills, feeling terrible, weak and in bed x 4 days. He had his second injection April 2021 Pfizer with the same exact symptoms. He feels that his symptoms have progressively worsened since the COVID vaccines. Has noticed every time he eats anything at all he notices he cannot walk, he cannot speak, his left face will \"draw up\", twitch and be painful with episodes lasting 15 minutes to 2 hours and have been present for 2-3 months and seems to be worsening since COVID vaccine. He was last seen in our clinic 3/2020. He tells me the pain in his head, holocranial, can be anywhere throughout the head, \"feels like someone is repeatedly hitting me with a ball bat or stick and at times feels like someone is driving a nail in the back of my head. The ones that scare me the most are the headaches in the temples that feel like sharp, someone shooting a nail gun in my temples.\" Also has associated dizziness. Pain can radiate across his head and within 10 minutes and he loses control of his bowels. This is not a change and has been present since 8305-1249. No imaging of head since 2017. He cannot have MRI due to anal sphincter stimulator. Has chronic daily headaches. \"Drawing of face\" is daily. CTA head/neck 2019 normal. He feels when he has his pain there is \"moisture running down his head\". Takes topamax 25mg BID and gabapentin 300mg TID. Experiences severe pain with episodes. Cannot afford Botox. He is agreeable to CT head today. He is requesting migraine cocktail as well. He uses cane at times. Has been using fioricet PRN. He is scheduled for OP " migraine cocktail today at 3pm at [facility]. Last migraine cocktail was [id number]. Tells me one med gives him hiccups for 2-3 days after but unsure which.  His headaches do also have severe pain, throbbing, photophobia, phonophobia, nausea and occasional vomiting and do her daily with some episodes being worse than others.     Follow up with Sabiha Hanna, APRN: 4/12/2023:  Patient in clinic for regular follow up. At his last visit he was prescribed Depakote 500 mg BID for migraine prevention and Ubrelvy 100 mg for abortive treatment for migraines. He is unsure if he has started taking Depakote since his last visit. He does not believe that he had started Ubrelvy d/t cost but also was unsure. He stated that his spouse helps him to manage his medications and that he would need to check with her regarding what he is currently taking. He states that he continues to have both headaches and the previously described episodes including left sided paresthesias, left facial twitching/drawing up. His headaches vary in location and are located at the top of his head, occiput, bilateral temples, and at times will start near right temple and radiate towards left. Patient has had these episodes for many years and has previously completed detailed neurologic workup including cEEG which was negative. He reports that some headaches are associated with light and sound sensitivity along with nausea. He has near daily headaches of some kind everyday varying in presentation and severity. He has also appreciated dizziness over approximately the last month, he reports that it is worse while walking and that he has been using his cane more recently for stabilization, no recent falls. He recently saw his PCP d/t concern for fever and body aches who performed lab work including Hemoglobin A1c was 8.60, blood culture did not show any growth at 5 days, HIV was nonreactive, rheumatoid factor was less than 10, TSH 2.440, , CK 42,  CRP <0.30, CMP showed elevated glucose, sedimentation rate 38. It appears that at his last visit a CT head was discussed but had not yet been ordered.      Follow-up with Dr. Crain: 05/02/2023: Patient is in clinic for regular follow-up. Since the last visit, he has had a CT scan on 05/02/2023 to assess dizzy spells which did not reveal any acute intracranial abnormalities.  I reviewed the CTH personally.  He could not afford to have Ubrelvy filled. He has been unable to fill his Aimovig due to cost as well. He is still experiencing dizzy spells and fatigue. He also notes intermittent shooting pain in different parts of the head he has completed his Depakote and he has not refilled this medication. He is not certain if it provided any relief. When he wakes up in the morning, he has difficulty with ambulation and speech, facial weakness, and headache. He feels worse if he sits down to relax. His pain occasionally wakes him in the middle of the night. He has been prescribed oxygen 15 L for his head pain in the past, which helped if he used the oxygen when he felt the pain coming on.  He is not sleeping well and he is constantly dreaming when he is asleep. He is not certain if he is snoring. He does not feel refreshed when he wakes up. He has been tested for sleep apnea and provided with a CPAP. He could not tolerate the mask and he was only using the machine for 2 to 3 hours.     He is taking Norvasc for blood pressure control. He had an episode of dizziness and headache last week, for which he saw Dr. Moore. He had a systolic blood pressure reading of 150 mmHg. He is taking amlodipine and lisinopril. He will see Dr. Moore on 05/09/2023.     He is feeling discouraged. He denies depression or the desire to harm himself. He is tired of seeing doctors, having testing performed, and feeling like he is not seeing any improvement. His short term memory is poor. He has a strong family history of Alzheimer disease in his  mother, his maternal grandmother, his maternal great-grandfather, and 2 of his maternal uncles.     He is having issues with his bowels. His sphincter never closes so he cannot hold a bowel movement when he needs to go. He has a stimulator, which he would like to have removed. He cannot have an MRI with this device in situ. He has been advised he may need to consider a colostomy bag. He has not followed up with gastroenterology. He has had an upper endoscopy.    7/8/2023: He is in clinic for regular follow-up. Since his last visit in 05/2023, he has not started the new medicine because he has not been able to finish the paperwork. He is taking the verapamil. He is in constant pain in all different parts of his head. Sometimes he will have pain shoot through his head, causing him to jerk. He has trouble thinking, cannot concentrate, and has memory trouble. He went to the urgent care because his blood pressure at home was registering 220/100 mmHg and they suggested he have a CT with contrast. He did that and was told there were some small arteries being blocked and more testing was needed as this could be the start of vascular dementia. One of the doctors told him he really needed to have an MRI, but he cannot have that because of the stimulator. He would like to have the stimulator removed but he cannot afford it. There is one type of pain that feels like it flies from one side of his head to the other. With this pain he feels like there is moisture running down and he loses control of his bowels. There was one doctor who gave him a medication that he had to stop because he could not leave the bathroom. He was told he might have to have a colostomy and he does not want that. He never knows how his bowels are going to react. He is on gabapentin 300 3 times daily and has no side effects from it. He is having trouble affording all the medications. His family is starting to notice his problems with memory and  concentration.     10/13/2023: He is in clinic for regular follow-up.  Since his last visit in July 2023, he reports that he has noticed some concerns with his memory where he cannot remember if he took his medications or not.  Sometimes he forgets names of people he is known for a long time.  He is concerned because there is a strong family history of Alzheimer's dementia.  He did increase the dose of gabapentin to 600 mg 3 times daily but it did not help with frequency or intensity of migraines.  He reports that he still gets almost daily headaches and some of them will convert into migraine.  He continues to take verapamil 40 mg twice daily.  In addition, he is also taking high dose of gabapentin 600 mg 3 times daily but it did not help with headache control.  He reports having head pain occurring almost on a daily basis involving different parts of the head.  He is also reporting episodes of palpitations since the last visit.    Follow-up: 2/12/2024: He is in clinic for regular follow-up.  Since his last visit in October 2023, he has been taking Toprol long-acting 60 mg daily but reports no decrease in headache intensity or frequency.  He is also taking gabapentin 600 mg 3 times daily.  He reports that he has a headache on a daily basis and some days, it increases in intensity where he has to take Fioricet which sometimes helps and sometimes it does not.    Follow-up: 8/9/2024: He is in clinic for regular follow-up.  Since his last visit 6 months ago, he reports that he continues to have daily headache and some of these headaches convert into severe migraine.  He also reports chronic generalized pain, increase neck pain, depressed mood, inability to sleep at night and also reports significantly increased amount of stress.  He was given prescription of nortriptyline but he stopped taking it as it was not really beneficial.  Currently is on Inderal long-acting 80 mg daily.  He also stopped gabapentin as well  instruction after last visit and reports that symptoms seems to be worse since coming off of gabapentin.    Follow-up: 9/27/2024: He is in clinic for regular follow-up.  Since his last visit 6 weeks ago, he reports that he has been taking Lyrica 75 mg 3 times daily, has increased the dose of Inderal long-acting to 120 mg daily.  He did try Cymbalta 30 mg daily but caused to have significant side effects.    He reports that with Lyrica and higher dose of Inderal, he has not noticed much change as far as his headaches are concerned.  He denies any side effects with Lyrica use.  He continues to have almost daily headaches.  He also reports continued deterioration in his vision.  He follows up with ophthalmology.  It has becoming increasingly difficult for him to repeat.  He also had an episode of difficulty with cognition, difficulty with walking lasting for 15 to 20 hours couple of weeks ago.  It has been a while that he has had this type of episode.    Follow-up: 10/25/2024: He is in clinic for sooner than scheduled appointment.  He reports that he has been experiencing more cognitive issues, difficulty remembering names of people that he has met several times, episodes of confusion when he is out and about and sometimes feels confused as to what location he is at.  He does report a chronic daily headaches.  On the last visit, he was started on Emgality.  He has done first loading dose of Ajovy yesterday only.  He also reports anxiety and depressed mood related to the bowel issues that he has had for many years now.  He also reports very poor sleep quality.  He has no control of anal sphincter and that causes him to have sudden unexpected bowel movements.  This is affected his quality of life significantly.  He is concerned about dementia causing the symptoms as he has a strong family history of dementia in her mother, grandmother and grandfather.    Follow-up: 2/11/2025: This is a follow-up done through video.   Since his last visit 6 months ago, he reports that with continued Emgality injection every month, he has noticed about 50 to 60% reduction in headache frequency and intensity.  He is overall happy with the response.  He will take Fioricet as needed as an abortive treatment and it helps.  He continues to have problems with sleep and hardly gets 1 to 2 hours per night.    Follow-up: 7/6/2025: He is in clinic for regular follow-up.  Since his last visit, he continues to take Emgality once a month injection and reports that migraine frequency and intensity seems to be somewhat worse in last 2 months or so.  He has been taking trazodone 50 mg at bedtime and reports that it does help him sleep better at PERIOD with some mild seizures to take 2 tablets.  Urinary and bowel incontinence remains main concern which has affected his quality of life significantly.    The following portions of the patient's history were reviewed and updated as of 07/08/2025: allergies, social history, and problem list.       Current Outpatient Medications:     amLODIPine (NORVASC) 5 MG tablet, Take 1 tablet by mouth once daily, Disp: 90 tablet, Rfl: 0    atorvastatin (LIPITOR) 20 MG tablet, TAKE 1 TABLET BY MOUTH DAILY., Disp: 90 tablet, Rfl: 1    butalbital-acetaminophen-caffeine (FIORICET, ESGIC) -40 MG per tablet, TAKE 1 TABLET BY MOUTH ONCE DAILY AS NEEDED FOR HEADACHE, Disp: 30 tablet, Rfl: 0    colestipol (Colestid) 1 g tablet, Take 2 tablets by mouth 2 (Two) Times a Day. Take separate 2 hours from other medications, Disp: 120 tablet, Rfl: 11    Continuous Glucose  (FreeStyle Mary 3 [place]) device, USE DAILY TO CHECK BLOOD GLUCOSE, Disp: 1 each, Rfl: 0    Continuous Glucose Sensor (FreeStyle Mary 3 Sensor) misc, Use 1 each Every 14 (Fourteen) Days. Dx E11.65, Disp: 6 each, Rfl: 3    Creon 87371-785229 units capsule delayed-release particles capsule, Take 1 capsule by mouth 3 (Three) Times a Day With Meals., Disp: 270  "capsule, Rfl: 0    difluprednate (DUREZOL) 0.05 % ophthalmic emulsion, , Disp: , Rfl:     diphenoxylate-atropine (Lomotil) 2.5-0.025 MG per tablet, Take 1 tablet by mouth 4 (Four) Times a Day As Needed for Diarrhea., Disp: 120 tablet, Rfl: 0    doxepin (SINEquan) 50 MG capsule, Take 1 capsule by mouth At Night As Needed for Sleep (anxiety)., Disp: 30 capsule, Rfl: 2    Dulaglutide (Trulicity) 1.5 MG/0.5ML solution auto-injector, Inject 1.5 mg under the skin into the appropriate area as directed 1 (One) Time Per Week., Disp: 8 mL, Rfl: 2    Farxiga 10 MG tablet, Take 10 mg by mouth Daily., Disp: 90 tablet, Rfl: 3    galcanezumab-gnlm (Emgality) 120 MG/ML auto-injector pen, Inject 1 mL under the skin into the appropriate area as directed Every 30 (Thirty) Days. Gets from Patient Assistance Program, Disp: 1.12 mL, Rfl: 11    glucose monitor monitoring kit, 1 each Daily., Disp: 1 each, Rfl: 0    Insulin Lispro (HumaLOG KwikPen) 200 UNIT/ML solution pen-injector, Inject 18 Units under the skin into the appropriate area as directed 3 (Three) Times a Day Before Meals., Disp: 27 mL, Rfl: 3    Insulin Pen Needle (MM Pen Needles) 31G X 6 MM misc, Use 4 times daily with insulin, Disp: 200 each, Rfl: 2    Insulin Syringe 31G X 5/16\" 0.3 ML misc, 1 each Every Night., Disp: 90 each, Rfl: 3    Lancets Thin misc, 1 each Daily. Use daily as directed, Disp: 100 each, Rfl: 11    lisinopril (PRINIVIL,ZESTRIL) 40 MG tablet, Take 1 tablet by mouth twice daily, Disp: 180 tablet, Rfl: 0    naloxone (NARCAN) 4 MG/0.1ML nasal spray, Call 911. Don't prime. [place] in 1 nostril for overdose. Repeat in 2-3 minutes in other nostril if no or minimal breathing/responsiveness., Disp: 2 each, Rfl: 0    nitroglycerin (NITROSTAT) 0.4 MG SL tablet, 1 under the tongue as needed for angina, may repeat q5mins for up three doses, Disp: 100 tablet, Rfl: 11    NON FORMULARY, Compounded cream Ketamine Gabapentin Lidocaine Amitriptyline Bupivacaine Meloxicam 10% " 6% 5% 2% 2% 0.1% APPLY 1-2 GRAMS TO AFFECTED AREAS 3-4 TIMES DAILY, Disp: , Rfl:     ondansetron ODT (ZOFRAN-ODT) 8 MG disintegrating tablet, Place 1 tablet on the tongue Every 8 (Eight) Hours As Needed for Nausea or Vomiting., Disp: 30 tablet, Rfl: 2    OneTouch Ultra test strip, USE 1 STRIP TO CHECK GLUCOSE ONCE DAILY AS DIRECTED, Disp: 100 each, Rfl: 0    pantoprazole (PROTONIX) 40 MG EC tablet, TAKE 1 TABLET BY MOUTH TWICE DAILY 30 MINUTES BEFORE MEAL(S), Disp: 180 tablet, Rfl: 0    prednisoLONE acetate (PRED FORTE) 1 % ophthalmic suspension, INSTILL 1 DROP INTO RIGHT EYE 4 TIMES DAILY, Disp: , Rfl:     pregabalin (Lyrica) 75 MG capsule, Take 1 capsule by mouth Every Night for 30 days. Once daily around 7pm, Disp: 30 capsule, Rfl: 5    propranolol LA (INDERAL LA) 120 MG 24 hr capsule, Take 1 capsule by mouth Daily., Disp: 30 capsule, Rfl: 3    sildenafil (VIAGRA) 100 MG tablet, Take 1 tablet by mouth Daily As Needed for Erectile Dysfunction., Disp: 15 tablet, Rfl: 11    sodium-potassium-magnesium sulfates (Suprep Bowel Prep Kit) 17.5-3.13-1.6 GM/177ML solution oral solution, Take First Dose at 5 pm Take Second Dose at 10 pm, Nothing to eat or drink after midnight. May substitute for Golytely or Moviprep. Follow instructions provided by Office. Call [phone] with questions., Disp: 354 mL, Rfl: 0    Sutab 5510-252-631 MG tablet, Take 12 tablets by mouth Take As Directed. Take 12 tablets by mouth at 5 pm and 12 tablets by mouth at 10 pm. Follow prep instructions provided by office. Call [phone] for additional questions, Disp: 24 tablet, Rfl: 0    tadalafil (Cialis) 5 MG tablet, Take 1 tablet by mouth Daily As Needed for Erectile Dysfunction., Disp: 15 tablet, Rfl: 11    tamsulosin (FLOMAX) 0.4 MG capsule 24 hr capsule, Take 1 capsule by mouth once daily, Disp: 90 capsule, Rfl: 0    Toujeo Max SoloStar 300 UNIT/ML solution pen-injector injection, Use once daily.  Titrate to fasting glucose <120.  Max daily  dose 50u., Disp: 15 mL, Rfl: 3    traZODone (DESYREL) 50 MG tablet, Take 2 tablets by mouth Every Night for 30 days., Disp: 180 tablet, Rfl: 1    vitamin B-12 (CYANOCOBALAMIN) 100 MCG tablet, Take 0.5 tablets by mouth 2 (two) times a day., Disp: , Rfl:     Current Facility-Administered Medications:     lidocaine (XYLOCAINE) 1 % injection 3 mL, 3 mL, Subcutaneous, Once, Elio Moore,    Past Medical History:   Diagnosis Date    Allergic     Angina pectoris     Not sure of date    Anxiety     Arthritis     Never diagnosed    Bowel incontinence     w/ history of Stimulator Device     CAD (coronary artery disease)     Carotid artery occlusion     Cataract     Chest pain     CHF (congestive heart failure)     Chronic diarrhea     Born with bowel defects.    Clotting disorder     Bleed from bowels at times    Cluster headache     Colon polyp     Dementia     Depression     Diabetes mellitus, type 2     Diabetic peripheral neuropathy     Dyslipidemia     Erectile dysfunction     Fatigue     GERD (gastroesophageal reflux disease)     Headache, tension-type     Heart murmur     HL (hearing loss) July 2023    Im wearing hearing aids. Dr. 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    Kidney stone     Memory loss     Mental status change     Migraine     Complex     Migraine headache     Myocardial infarction Several years ago    Renal insufficiency     Shingles     Sinusitis     I have several sinus infection    Sleep apnea     SOB (shortness of breath)     Stroke     Was told tia go with hemiphlegic migraines    Urinary tract infection     I have them at different times    Vision loss       Past Surgical History:   Procedure Laterality Date    CARDIAC CATHETERIZATION      CARDIAC CATHETERIZATION N/A 10/14/2016    Procedure: Left Heart Cath;  Surgeon: Santiago Garner MD;  Location: [facility] CATH INVASIVE LOCATION;  Service:     CARDIAC  CATHETERIZATION N/A 4/22/2025    Procedure: Left Heart Cath - Right radial access;  Surgeon: Santiago Garner MD;  Location: [facility] CATH INVASIVE LOCATION;  Service: Cardiovascular;  Laterality: N/A;    COLONOSCOPY  02/25/2022    Dr. Graves-recommended 3 year repeat    ENDOSCOPY  02/03/2022    Dr. Graves    EYE SURGERY  Retina    Dr. Askew    GASTRIC STIMULATOR IMPLANT SURGERY      SMALL INTESTINE SURGERY      unspecified, x3 as an infant     TESTICLE SURGERY      UPPER GASTROINTESTINAL ENDOSCOPY        Family History   Problem Relation Age of Onset    Heart failure Mother     Diabetes Mother     Alzheimer's disease Mother     Arthritis Mother     Heart disease Mother     Heart attack Father     Kidney disease Father     Cancer Father     Prostate cancer Father     Heart disease Father     Hypertension Father     Cancer Sister     Breast cancer Sister     Diabetes Sister     Heart disease Sister     Arrhythmia Sister     Stroke Sister     Diabetes Brother     Heart disease Brother     Cancer Brother     Liver disease Brother     Kidney disease Brother     Dementia Paternal Uncle     Alzheimer's disease Maternal Grandmother     Liver cancer Maternal Grandfather     Liver disease Maternal Grandfather     Other Paternal Grandmother     Other Paternal Grandfather     Stroke Sister     Colon cancer Neg Hx         Review of Systems  Objective:    /74   Pulse 64   SpO2 96%     Neurology Exam:  General apperance: NAD.     Mental status: Alert, awake and oriented to time place and person.    Language and Speech: No aphasia or dysarthria.    CN II to XII: Intact.    Opthalmoscopic Exam: No papilledema.    Motor:  Right UE muscle strength 5/5. Normal tone.     Left UE muscle strength 5/5. Normal tone.      Right LE muscle strength 5/5. Normal tone.     Left LE muscle strength 5/5. Normal tone.      Sensory: Normal light touch, vibration and pinprick sensation bilaterally.    DTRs: 2+  bilaterally.    Babinski: Negative bilaterally.    Co-ordination: Normal finger-to-nose, heel to shin B/L.    Rhomberg: Negative.    Gait: Normal.    Cardiovascular: Regular rate and rhythm without murmur, gallop or rub.    Assessment and Plan:  1. Intractable migraine with aura without status migrainosus  2. Insomnia, unspecified type  Will increase trazodone dose to 100 mg at bedtime every day and see how he does.  It can be increased to 150 mg dose as well.  In addition, he is reporting some worsening in migraine frequency and intensity in last 2 months.  He is currently on Emgality.  I have given him samples of Qulipta to try for 4 weeks.  If it works then prescription will be sent.  He continues to experience severe bowel and bladder incontinence which has affected his quality of life significantly.  I have advised him to call office with any questions or concerns that he may have otherwise I will see him back in clinic in 5 months for follow-up.       I spent 30 minutes in patient care: Reviewing records prior to the visit, entering orders and documentation and spent more than hurtado 50% of this time face-to-face in management, instructions and education regarding above mentioned diagnosis and also on counseling and discussing about taking medication regularly, possible side effects with medication use, importance of good sleep hygiene, good hydration and regular exercise.    Return in about 5 months (around 12/8/2025).       Note to patient: The 21st Century Cures Act makes medical notes like these available to patients in the interest of transparency. However, be advised this is a medical document. It is intended as peer to peer communication. It is written in medical language and may contain abbreviations or verbiage that are unfamiliar. It may appear blunt or direct. Medical documents are intended to carry relevant information, facts as evident, and the clinical opinion of the physician.

## 2025-07-08 NOTE — PROGRESS NOTES
Established Patient Office Visit      Patient Name: Israel Reyna  : 1955   MRN: [id number]   Care Team: Patient Care Team:  Elio Moore DO as PCP - General (Family Medicine)  José Miguel Bautista MD as Consulting Physician (Endocrinology)    Chief Complaint:    Chief Complaint   Patient presents with    Leg Swelling     Left ankle is swelling, started in January. Patient claims he isn't feeling well.         History of Present Illness: Israel Reyna is a 70 y.o. male who is here today for Leg Swelling (Left ankle is swelling, started in January. Patient claims he isn't feeling well.  ).    HPI    History of Present Illness  The patient is a 70-year-old male with an extensive past medical history, including congenital conditions resulting in chronic diarrhea and fecal incontinence, coronary artery disease, uncontrolled type 2 diabetes on long-term insulin, congestive heart failure, multiple abdominal surgeries, chronic migraine, carotid artery disease, gastroesophageal reflux, sleep apnea, and hemiplegic migraines. He presents today for left ankle swelling.    He reports persistent swelling in his left ankle and foot, which occasionally extends up to his knee. He does not utilize compression stockings for management. He was seen in the hospital in 2025 for a heart catheterization due to unstable angina and was scheduled to follow up with his cardiologist afterward. He also sees specialists in gastroenterology, neurology, and endocrinology. He has a follow-up appointment with Dr. Tolbert, his neurologist, next week.    He experiences frequent urination, leading to penile irritation. This symptom has been present since 2024. He has been consuming sugar-free cranberry juice, suspecting a potential kidney issue. He is uncertain about his current use of Farxiga.    He reports feeling cold, even in a home environment maintained at 74 to 76 degrees. He is not on any anticoagulant  therapy.    He continues to experience abdominal pain, which has been present since 12/2024 and has now spread across his entire abdomen. He describes the pain as severe, sharp, and constant, sometimes preventing him from rising from a seated position. He has been advised to seek consultation at a pain clinic but expresses a desire to understand the cause of his pain rather than merely manage it. He reports a decline in bowel function and a general feeling of bodily shutdown. He recounts an incident where he experienced sudden bowel urgency while traveling, necessitating immediate return home. He is currently taking zinc and B12 supplements, which he believes he started during the COVID-19 pandemic.    He is uncertain about having undergone blood work for prostate cancer screening.    He is currently on Lyrica and propranolol but reports difficulty in obtaining refills for the latter.    FAMILY HISTORY  His father had prostate cancer in his early seventies.       The following portions of the patient's history were reviewed and updated as appropriate: allergies, current medications, past family history, past medical history, past social history, past surgical history and problem list.    Subjective      Review of Systems:   Review of Systems - See HPI    Past Medical History:   Past Medical History:   Diagnosis Date    Allergic     Angina pectoris     Not sure of date    Anxiety     Arthritis     Never diagnosed    Bowel incontinence     w/ history of Stimulator Device     CAD (coronary artery disease)     Carotid artery occlusion     Cataract     Chest pain     CHF (congestive heart failure)     Chronic diarrhea     Born with bowel defects.    Clotting disorder     Bleed from bowels at times    Cluster headache     Colon polyp     Dementia     Depression     Diabetes mellitus, type 2     Diabetic peripheral neuropathy     Dyslipidemia     Erectile dysfunction     Fatigue     GERD (gastroesophageal reflux disease)      Headache, tension-type     Heart murmur     HL (hearing loss) July 2023    Im wearing hearing aids. Dr. 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    Kidney stone     Memory loss     Mental status change     Migraine     Complex     Migraine headache     Myocardial infarction Several years ago    Renal insufficiency     Shingles     Sinusitis     I have several sinus infection    Sleep apnea     SOB (shortness of breath)     Stroke     Was told tia go with hemiphlegic migraines    Urinary tract infection     I have them at different times    Vision loss        Past Surgical History:   Past Surgical History:   Procedure Laterality Date    CARDIAC CATHETERIZATION      CARDIAC CATHETERIZATION N/A 10/14/2016    Procedure: Left Heart Cath;  Surgeon: Santiago Garner MD;  Location:  [facility];  Service:     CARDIAC CATHETERIZATION N/A 4/22/2025    Procedure: Left Heart Cath - Right radial access;  Surgeon: Santiago Garner MD;  Location:  [facility];  Service: Cardiovascular;  Laterality: N/A;    COLONOSCOPY  02/25/2022    Dr. Graves-recommended 3 year repeat    ENDOSCOPY  02/03/2022    Dr. Graves    EYE SURGERY  Retina    Dr. Askew    GASTRIC STIMULATOR IMPLANT SURGERY      SMALL INTESTINE SURGERY      unspecified, x3 as an infant     TESTICLE SURGERY      UPPER GASTROINTESTINAL ENDOSCOPY         Family History:   Family History   Problem Relation Age of Onset    Heart failure Mother     Diabetes Mother     Alzheimer's disease Mother     Arthritis Mother     Heart disease Mother     Heart attack Father     Kidney disease Father     Cancer Father     Prostate cancer Father     Heart disease Father     Hypertension Father     Cancer Sister     Breast cancer Sister     Diabetes Sister     Heart disease Sister     Arrhythmia Sister     Stroke Sister     Diabetes Brother     Heart disease Brother      Cancer Brother     Liver disease Brother     Kidney disease Brother     Dementia Paternal Uncle     Alzheimer's disease Maternal Grandmother     Liver cancer Maternal Grandfather     Liver disease Maternal Grandfather     Other Paternal Grandmother     Other Paternal Grandfather     Stroke Sister     Colon cancer Neg Hx        Social History:   Social History     Socioeconomic History    Marital status:    Tobacco Use    Smoking status: Never     Passive exposure: Never    Smokeless tobacco: Never   Vaping Use    Vaping status: Never Used   Substance and Sexual Activity    Alcohol use: Never    Drug use: Never    Sexual activity: Yes     Partners: Female       Tobacco History:   Social History     Tobacco Use   Smoking Status Never    Passive exposure: Never   Smokeless Tobacco Never       Medications:   Outpatient Medications Prior to Visit   Medication Sig Dispense Refill    amLODIPine (NORVASC) 5 MG tablet Take 1 tablet by mouth once daily 90 tablet 0    atorvastatin (LIPITOR) 20 MG tablet TAKE 1 TABLET BY MOUTH DAILY. 90 tablet 1    butalbital-acetaminophen-caffeine (FIORICET, ESGIC) -40 MG per tablet TAKE 1 TABLET BY MOUTH ONCE DAILY AS NEEDED FOR HEADACHE 30 tablet 0    colestipol (Colestid) 1 g tablet Take 2 tablets by mouth 2 (Two) Times a Day. Take separate 2 hours from other medications 120 tablet 11    Continuous Glucose  (FreeStyle Mary 3 [place]) device USE DAILY TO CHECK BLOOD GLUCOSE 1 each 0    Continuous Glucose Sensor (FreeStyle Mary 3 Sensor) [facility] Use 1 each Every 14 (Fourteen) Days. Dx E11.65 6 each 3    Creon 06843-459422 units capsule delayed-release particles capsule Take 1 capsule by mouth 3 (Three) Times a Day With Meals. 270 capsule 0    difluprednate (DUREZOL) 0.05 % ophthalmic emulsion       diphenoxylate-atropine (Lomotil) 2.5-0.025 MG per tablet Take 1 tablet by mouth 4 (Four) Times a Day As Needed for Diarrhea. 120 tablet 0    doxepin (SINEquan) 50 MG capsule Take  "1 capsule by mouth At Night As Needed for Sleep (anxiety). 30 capsule 2    Dulaglutide (Trulicity) 1.5 MG/0.5ML solution auto-injector Inject 1.5 mg under the skin into the appropriate area as directed 1 (One) Time Per Week. 8 mL 2    Farxiga 10 MG tablet Take 10 mg by mouth Daily. 90 tablet 3    galcanezumab-gnlm (Emgality) 120 MG/ML auto-injector pen Inject 1 mL under the skin into the appropriate area as directed Every 30 (Thirty) Days. Gets from Patient Assistance Program 1.12 mL 11    glucose monitor monitoring kit 1 each Daily. 1 each 0    Insulin Lispro (HumaLOG KwikPen) 200 UNIT/ML solution pen-injector Inject 18 Units under the skin into the appropriate area as directed 3 (Three) Times a Day Before Meals. 27 mL 3    Insulin Pen Needle (MM Pen Needles) 31G X 6 MM misc Use 4 times daily with insulin 200 each 2    Insulin Syringe 31G X 5/16\" 0.3 ML misc 1 each Every Night. 90 each 3    Lancets Thin misc 1 each Daily. Use daily as directed 100 each 11    lisinopril (PRINIVIL,ZESTRIL) 40 MG tablet Take 1 tablet by mouth twice daily 180 tablet 0    naloxone (NARCAN) 4 MG/0.1ML nasal spray Call 911. Don't prime. [place] in 1 nostril for overdose. Repeat in 2-3 minutes in other nostril if no or minimal breathing/responsiveness. 2 each 0    nitroglycerin (NITROSTAT) 0.4 MG SL tablet 1 under the tongue as needed for angina, may repeat q5mins for up three doses 100 tablet 11    ondansetron ODT (ZOFRAN-ODT) 8 MG disintegrating tablet Place 1 tablet on the tongue Every 8 (Eight) Hours As Needed for Nausea or Vomiting. 30 tablet 2    OneTouch Ultra test strip USE 1 STRIP TO CHECK GLUCOSE ONCE DAILY AS DIRECTED 100 each 0    pantoprazole (PROTONIX) 40 MG EC tablet TAKE 1 TABLET BY MOUTH TWICE DAILY 30 MINUTES BEFORE MEAL(S) 180 tablet 0    prednisoLONE acetate (PRED FORTE) 1 % ophthalmic suspension INSTILL 1 DROP INTO RIGHT EYE 4 TIMES DAILY      pregabalin (Lyrica) 75 MG capsule Take 1 capsule by mouth 3 (Three) Times a " Day. (Patient taking differently: Take 1 capsule by mouth Every Night. Once daily around 7pm) 90 capsule 3    pregabalin (Lyrica) 75 MG capsule Take 1 capsule by mouth 3 (Three) Times a Day. 42 capsule 0    sildenafil (VIAGRA) 100 MG tablet Take 1 tablet by mouth Daily As Needed for Erectile Dysfunction. 15 tablet 11    sodium-potassium-magnesium sulfates (Suprep Bowel Prep Kit) 17.5-3.13-1.6 GM/177ML solution oral solution Take First Dose at 5 pm Take Second Dose at 10 pm, Nothing to eat or drink after midnight. May substitute for Golytely or Moviprep. Follow instructions provided by Office. Call [phone] with questions. 354 mL 0    Sutab 4032-063-991 MG tablet Take 12 tablets by mouth Take As Directed. Take 12 tablets by mouth at 5 pm and 12 tablets by mouth at 10 pm. Follow prep instructions provided by office. Call [phone] for additional questions 24 tablet 0    tadalafil (Cialis) 5 MG tablet Take 1 tablet by mouth Daily As Needed for Erectile Dysfunction. 15 tablet 11    tamsulosin (FLOMAX) 0.4 MG capsule 24 hr capsule Take 1 capsule by mouth once daily 90 capsule 0    Toujeo Max SoloStar 300 UNIT/ML solution pen-injector injection Use once daily.  Titrate to fasting glucose <120.  Max daily dose 50u. 15 mL 3    vitamin B-12 (CYANOCOBALAMIN) 100 MCG tablet Take 0.5 tablets by mouth 2 (two) times a day.      propranolol LA (INDERAL LA) 120 MG 24 hr capsule Take 1 capsule by mouth Daily for 30 days. 30 capsule 3    vitamin C (ASCORBIC ACID) 500 MG tablet Take 1 tablet by mouth 2 (two) times a day.      vitamin E 400 UNIT capsule Take 1 capsule by mouth Daily.      Zinc 40 MG tablet Take 1 tablet by mouth Daily.      traZODone (DESYREL) 50 MG tablet Take 2 tablets by mouth Every Night for 30 days. 60 tablet 3     Facility-Administered Medications Prior to Visit   Medication Dose Route Frequency Provider Last Rate Last Admin    lidocaine (XYLOCAINE) 1 % injection 3 mL  3 mL Subcutaneous Once Elio Moore  "DO Yang            Allergies:   Allergies   Allergen Reactions    Actos [Pioglitazone] Other (See Comments)     congestive heart failure    Avandia [Rosiglitazone] Other (See Comments)     congestive heart failure.    Darvon [Propoxyphene] Itching     Angioedema    itching    Talwin [Pentazocine] Rash     Angioedema    itching       Objective   Objective     Physical Exam:  Vital Signs:   Vitals:    07/01/25 1043   BP: 122/78   Pulse: 67   SpO2: 98%   Weight: 85.7 kg (189 lb)   Height: 170.2 cm (67.01\")     Body mass index is 29.59 kg/m².     Physical Exam  Nursing note reviewed  Const: NAD, A&Ox4, Pleasant, Cooperative  Eyes: EOMI, no conjunctivitis  ENT: No nasal discharge present, neck supple  Cardiac: Regular rate and rhythm, no cyanosis  Resp: Respiratory rate within normal limits, no increased work of breathing, no audible wheezing or retractions noted  GI: No distention or ascites  MSK: Motor and sensation grossly intact in bilateral upper extremities  Neurologic: CN II-XII grossly intact  Psych: Appropriate mood and behavior.  Skin: Warm, dry  Procedures/Radiology     Procedures  No radiology results for the last 7 days     Assessment & Plan   Assessment / Plan      Assessment/Plan:   Problems Addressed This Visit  Diagnoses and all orders for this visit:    1. Localized swelling of left foot (Primary)    2. Screening PSA (prostate specific antigen)  -     PSA Screen; Future    3. Excessive vitamin intake  -     Vitamin E; Future  -     Zinc; Future  -     Copper, Serum; Future  -     Vitamin C; Future    4. Neuropathy  -     Vitamin E; Future  -     Zinc; Future  -     Copper, Serum; Future  -     Vitamin C; Future    5. Intestinal malabsorption, unspecified type  -     Vitamin E; Future  -     Zinc; Future  -     Copper, Serum; Future  -     Vitamin C; Future    Other orders  -     propranolol LA (INDERAL LA) 120 MG 24 hr capsule; Take 1 capsule by mouth Daily.  Dispense: 30 capsule; Refill: " 3      Problem List Items Addressed This Visit    None  Visit Diagnoses         Localized swelling of left foot    -  Primary      Screening PSA (prostate specific antigen)        Relevant Orders    PSA Screen (Completed)      Excessive vitamin intake        Relevant Orders    Vitamin E (Completed)    Zinc (Completed)    Copper, Serum (Completed)    Vitamin C (Completed)      Neuropathy        Relevant Orders    Vitamin E (Completed)    Zinc (Completed)    Copper, Serum (Completed)    Vitamin C (Completed)      Intestinal malabsorption, unspecified type        Relevant Orders    Vitamin E (Completed)    Zinc (Completed)    Copper, Serum (Completed)    Vitamin C (Completed)            New Medications Ordered This Visit   Medications    propranolol LA (INDERAL LA) 120 MG 24 hr capsule     Sig: Take 1 capsule by mouth Daily.     Dispense:  30 capsule     Refill:  3        Assessment & Plan  1. Left ankle swelling.  - The left ankle swelling is suspected to be due to vein insufficiency rather than a clot, as an ultrasound in 02/2025 showed no clot.  - Physical exam reveals visible swelling in the left ankle.  - Advised to use compression stockings to help with the swelling.  - No repeat scan is deemed necessary at this time.    2. Chronic diarrhea and fecal incontinence.  - History of congenital conditions resulting in chronic diarrhea and fecal incontinence.  - Reports worsening bowel control, with recent episodes of incontinence.  - Discussed the chronic nature of the condition and its impact on quality of life.  - No new treatments or referrals indicated at this time.    3. Coronary artery disease.  - Hospitalized in 04/2025 for a heart catheterization due to unstable angina.  - Follow-up with cardiologist was scheduled post-hospitalization.  - Reviewed recent hospitalization and ongoing management with cardiology.  - No new treatments or referrals indicated at this time.    4. Uncontrolled type 2 diabetes.  - On  long-term insulin therapy.  - Reports blood sugar dropping to around 60.  - Discussed the importance of carrying snacks to prevent hypoglycemia.  - No changes to current diabetes management plan.    5. Congestive heart failure.  - History of congestive heart failure.  - No specific symptoms or changes reported during this visit.  - Ongoing management with cardiology.  - No new treatments or referrals indicated at this time.    6. Chronic migraine.  - Currently taking propranolol for migraine management.  - Reports difficulty getting propranolol prescription filled.  - Refills for propranolol have been sent to the pharmacy.  - Follow-up with neurologist scheduled for next week.    7. Carotid artery disease.  - History of carotid artery disease.  - No specific symptoms or changes reported during this visit.  - Ongoing management with cardiology.  - No new treatments or referrals indicated at this time.    8. Gastroesophageal reflux disease (GERD).  - History of GERD.  - No specific symptoms or changes reported during this visit.  - Ongoing management with gastroenterology.  - No new treatments or referrals indicated at this time.    9. Sleep apnea.  - History of sleep apnea.  - No specific symptoms or changes reported during this visit.  - Ongoing management with appropriate specialists.  - No new treatments or referrals indicated at this time.    10. Hemiplegic migraines.  - Follow-up with Dr. Tolbert, his neurologist, scheduled for next week.  - No specific symptoms or changes reported during this visit.  - Ongoing management with neurology.  - No new treatments or referrals indicated at this time.    11. Anemia.  - Slightly anemic, which is a chronic condition for him.  - Discussed the chronic nature of his anemia.  - No new treatments or referrals indicated at this time.    12. Abdominal pain.  - Still having lower abdominal wall pain.  - Previously responded well to trigger point injections.  - A compounded pain  cream will be sent to the pharmacy, and he will be called once it is ready.  - No new treatments or referrals indicated at this time.    13. Medication management.  - Advised to stop taking vitamin E and zinc due to potential toxicity.  - Blood work will be done today to check vitamin levels and prostate cancer screening.  - Discussed the potential side effects of vitamin E and zinc toxicity.  - No new treatments or referrals indicated at this time.       Patient Instructions   Stop the vitamin C, E and zinc supplements  It will take about 3-4 weeks for the Vitamin E and zinc to washout completely  Vitamin C will wash out quicker    Follow Up:   Return in about 1 month (around 8/1/2025) for f/u vitamin excess.    [facility]       DO BAILEY Oliveros  [address]  [facility]  [address]  [facility] [address]  Fax [phone]  Phone [phone]    Patient or patient representative verbalized consent for the use of Ambient Listening during the visit with  Elio Moore DO for chart documentation. 7/8/2025 10:24 EDT     Disclaimer to patients: The 21st Century Cares Act makes medical notes like these available to patients in the interest of transparency. However, please be advised that this is still a medical document. It is intended as [id number] communication. Many sections may include medical language or jargon, abbreviations, and additional verbiage that are unfamiliar or confusing. In some ways it may come across as blunt, direct, or may be summarized in order to clearly and concisely communicate the most crucial information to medical professionals. It may also include mentions of conditions that are unlikely but considered as part of the differential diagnosis, including serious disorders. These are not always discussed at length at the time of appointment because their likelihood is so low, but may be included in a medical note to make it clear what  has been considered and/or ruled out as part of a work-up. Medical documents are intended to carry relevant information, facts as evident, and the personal clinical opinion of the physician. If you have any questions regarding this medical document, please bring them to the attention of the physician at your next scheduled appointment.

## 2025-07-14 DIAGNOSIS — I10 PRIMARY HYPERTENSION: ICD-10-CM

## 2025-07-14 RX ORDER — AMLODIPINE BESYLATE 5 MG/1
5 TABLET ORAL DAILY
Qty: 90 TABLET | Refills: 0 | Status: SHIPPED | OUTPATIENT
Start: 2025-07-14

## 2025-07-28 ENCOUNTER — TELEPHONE (OUTPATIENT)
Dept: NEUROLOGY | Facility: CLINIC | Age: 70
End: 2025-07-28

## 2025-07-30 ENCOUNTER — SPECIALTY PHARMACY (OUTPATIENT)
Dept: NEUROLOGY | Facility: CLINIC | Age: 70
End: 2025-07-30
Payer: MEDICARE

## 2025-08-01 ENCOUNTER — SPECIALTY PHARMACY (OUTPATIENT)
Dept: NEUROLOGY | Facility: CLINIC | Age: 70
End: 2025-08-01
Payer: MEDICARE

## 2025-08-04 ENCOUNTER — OFFICE VISIT (OUTPATIENT)
Dept: FAMILY MEDICINE CLINIC | Facility: CLINIC | Age: 70
End: 2025-08-04
Payer: MEDICARE

## 2025-08-04 VITALS
OXYGEN SATURATION: 98 % | HEIGHT: 67 IN | WEIGHT: 180 LBS | BODY MASS INDEX: 28.25 KG/M2 | SYSTOLIC BLOOD PRESSURE: 128 MMHG | HEART RATE: 74 BPM | DIASTOLIC BLOOD PRESSURE: 82 MMHG

## 2025-08-04 DIAGNOSIS — I10 PRIMARY HYPERTENSION: Primary | ICD-10-CM

## 2025-08-04 DIAGNOSIS — L02.214 ABSCESS OF LEFT GROIN: ICD-10-CM

## 2025-08-04 DIAGNOSIS — L02.412 ABSCESS OF AXILLA, LEFT: ICD-10-CM

## 2025-08-04 DIAGNOSIS — R06.09 DYSPNEA ON EXERTION: ICD-10-CM

## 2025-08-04 DIAGNOSIS — J30.0 VASOMOTOR RHINITIS: ICD-10-CM

## 2025-08-04 DIAGNOSIS — D64.9 ANEMIA, UNSPECIFIED TYPE: ICD-10-CM

## 2025-08-04 DIAGNOSIS — R68.89 EXERCISE INTOLERANCE: ICD-10-CM

## 2025-08-04 DIAGNOSIS — I25.118 CORONARY ARTERY DISEASE OF NATIVE ARTERY OF NATIVE HEART WITH STABLE ANGINA PECTORIS: ICD-10-CM

## 2025-08-04 DIAGNOSIS — G62.9 NEUROPATHY: ICD-10-CM

## 2025-08-04 DIAGNOSIS — E83.42: ICD-10-CM

## 2025-08-04 DIAGNOSIS — R53.83 LETHARGY: ICD-10-CM

## 2025-08-04 RX ORDER — IPRATROPIUM BROMIDE 42 UG/1
2 SPRAY, METERED NASAL 4 TIMES DAILY
Qty: 15 ML | Refills: 1 | Status: SHIPPED | OUTPATIENT
Start: 2025-08-04 | End: 2025-08-09

## 2025-08-04 RX ORDER — CEPHALEXIN 500 MG/1
500 CAPSULE ORAL 2 TIMES DAILY
Qty: 14 CAPSULE | Refills: 0 | Status: SHIPPED | OUTPATIENT
Start: 2025-08-04 | End: 2025-08-08 | Stop reason: SDUPTHER

## 2025-08-05 ENCOUNTER — TELEPHONE (OUTPATIENT)
Dept: FAMILY MEDICINE CLINIC | Facility: CLINIC | Age: 70
End: 2025-08-05
Payer: MEDICARE

## 2025-08-06 ENCOUNTER — LAB (OUTPATIENT)
Dept: LAB | Facility: HOSPITAL | Age: 70
End: 2025-08-06
Payer: MEDICARE

## 2025-08-06 DIAGNOSIS — R68.89 EXERCISE INTOLERANCE: ICD-10-CM

## 2025-08-06 DIAGNOSIS — G62.9 NEUROPATHY: ICD-10-CM

## 2025-08-06 DIAGNOSIS — E83.42: ICD-10-CM

## 2025-08-06 DIAGNOSIS — R53.83 LETHARGY: ICD-10-CM

## 2025-08-06 DIAGNOSIS — I10 PRIMARY HYPERTENSION: ICD-10-CM

## 2025-08-06 DIAGNOSIS — D64.9 ANEMIA, UNSPECIFIED TYPE: ICD-10-CM

## 2025-08-06 DIAGNOSIS — R06.09 DYSPNEA ON EXERTION: ICD-10-CM

## 2025-08-06 DIAGNOSIS — I25.118 CORONARY ARTERY DISEASE OF NATIVE ARTERY OF NATIVE HEART WITH STABLE ANGINA PECTORIS: ICD-10-CM

## 2025-08-06 LAB
DEPRECATED RDW RBC AUTO: 41.6 FL (ref 37–54)
ERYTHROCYTE [DISTWIDTH] IN BLOOD BY AUTOMATED COUNT: 13.5 % (ref 12.3–15.4)
HCT VFR BLD AUTO: 40 % (ref 37.5–51)
HGB BLD-MCNC: 12.8 G/DL (ref 13–17.7)
MCH RBC QN AUTO: 27.5 PG (ref 26.6–33)
MCHC RBC AUTO-ENTMCNC: 32 G/DL (ref 31.5–35.7)
MCV RBC AUTO: 85.8 FL (ref 79–97)
PLATELET # BLD AUTO: 292 10*3/MM3 (ref 140–450)
PMV BLD AUTO: 9.5 FL (ref 6–12)
RBC # BLD AUTO: 4.66 10*6/MM3 (ref 4.14–5.8)
WBC NRBC COR # BLD AUTO: 8.11 10*3/MM3 (ref 3.4–10.8)

## 2025-08-06 PROCEDURE — 84155 ASSAY OF PROTEIN SERUM: CPT

## 2025-08-06 PROCEDURE — 83521 IG LIGHT CHAINS FREE EACH: CPT

## 2025-08-06 PROCEDURE — 84165 PROTEIN E-PHORESIS SERUM: CPT

## 2025-08-06 PROCEDURE — 83540 ASSAY OF IRON: CPT

## 2025-08-06 PROCEDURE — 84466 ASSAY OF TRANSFERRIN: CPT

## 2025-08-06 PROCEDURE — 82784 ASSAY IGA/IGD/IGG/IGM EACH: CPT

## 2025-08-06 PROCEDURE — 85027 COMPLETE CBC AUTOMATED: CPT

## 2025-08-06 PROCEDURE — 86335 IMMUNFIX E-PHORSIS/URINE/CSF: CPT

## 2025-08-06 PROCEDURE — 83735 ASSAY OF MAGNESIUM: CPT

## 2025-08-06 PROCEDURE — 86334 IMMUNOFIX E-PHORESIS SERUM: CPT

## 2025-08-07 LAB
IRON 24H UR-MRATE: 50 MCG/DL (ref 59–158)
IRON SATN MFR SERPL: 11 % (ref 20–50)
TIBC SERPL-MCNC: 451 MCG/DL (ref 298–536)
TRANSFERRIN SERPL-MCNC: 303 MG/DL (ref 200–360)

## 2025-08-08 ENCOUNTER — LAB (OUTPATIENT)
Dept: LAB | Facility: HOSPITAL | Age: 70
End: 2025-08-08
Payer: MEDICARE

## 2025-08-08 DIAGNOSIS — I10 PRIMARY HYPERTENSION: ICD-10-CM

## 2025-08-08 DIAGNOSIS — I25.118 CORONARY ARTERY DISEASE OF NATIVE ARTERY OF NATIVE HEART WITH STABLE ANGINA PECTORIS: ICD-10-CM

## 2025-08-08 DIAGNOSIS — D64.9 ANEMIA, UNSPECIFIED TYPE: ICD-10-CM

## 2025-08-08 DIAGNOSIS — R68.89 EXERCISE INTOLERANCE: ICD-10-CM

## 2025-08-08 DIAGNOSIS — R53.83 LETHARGY: ICD-10-CM

## 2025-08-08 DIAGNOSIS — R06.09 DYSPNEA ON EXERTION: ICD-10-CM

## 2025-08-08 DIAGNOSIS — G62.9 NEUROPATHY: ICD-10-CM

## 2025-08-08 LAB
KAPPA LC FREE SER-MCNC: 41.1 MG/L (ref 3.3–19.4)
KAPPA LC FREE/LAMBDA FREE SER: 1.41 {RATIO} (ref 0.26–1.65)
LAMBDA LC FREE SERPL-MCNC: 29.2 MG/L (ref 5.7–26.3)

## 2025-08-08 PROCEDURE — 82570 ASSAY OF URINE CREATININE: CPT

## 2025-08-08 PROCEDURE — 84166 PROTEIN E-PHORESIS/URINE/CSF: CPT

## 2025-08-08 PROCEDURE — 86335 IMMUNFIX E-PHORSIS/URINE/CSF: CPT

## 2025-08-08 PROCEDURE — 84156 ASSAY OF PROTEIN URINE: CPT

## 2025-08-08 RX ORDER — FERROUS GLUCONATE 324(38)MG
324 TABLET ORAL EVERY OTHER DAY
Qty: 90 TABLET | Refills: 0 | Status: SHIPPED | OUTPATIENT
Start: 2025-08-08

## 2025-08-08 RX ORDER — CEPHALEXIN 500 MG/1
500 CAPSULE ORAL 2 TIMES DAILY
Qty: 14 CAPSULE | Refills: 0 | Status: SHIPPED | OUTPATIENT
Start: 2025-08-08 | End: 2025-08-15

## 2025-08-11 LAB
ALBUMIN SERPL ELPH-MCNC: 3.4 G/DL (ref 2.9–4.4)
ALBUMIN/GLOB SERPL: 1.1 {RATIO} (ref 0.7–1.7)
ALPHA1 GLOB SERPL ELPH-MCNC: 0.2 G/DL (ref 0–0.4)
ALPHA2 GLOB SERPL ELPH-MCNC: 1.1 G/DL (ref 0.4–1)
B-GLOBULIN SERPL ELPH-MCNC: 1.1 G/DL (ref 0.7–1.3)
GAMMA GLOB SERPL ELPH-MCNC: 1 G/DL (ref 0.4–1.8)
GLOBULIN SER-MCNC: 3.4 G/DL (ref 2.2–3.9)
IGA SERPL-MCNC: 310 MG/DL (ref 61–437)
IGG SERPL-MCNC: 967 MG/DL (ref 603–1613)
IGM SERPL-MCNC: 160 MG/DL (ref 20–172)
INTERPRETATION SERPL IEP-IMP: ABNORMAL
INTERPRETATION UR IFE-IMP: NORMAL
LABORATORY COMMENT REPORT: ABNORMAL
M PROTEIN SERPL ELPH-MCNC: ABNORMAL G/DL
PROT SERPL-MCNC: 6.8 G/DL (ref 6–8.5)

## 2025-08-12 LAB
ALBUMIN 24H MFR UR ELPH: 54.7 %
ALPHA1 GLOB 24H MFR UR ELPH: 2.9 %
ALPHA2 GLOB 24H MFR UR ELPH: 8.7 %
B-GLOBULIN MFR UR ELPH: 17.2 %
CREAT 24H UR-MRATE: 918 MG/24 HR (ref 1000–2000)
CREAT UR-MCNC: 57.4 MG/DL
GAMMA GLOB 24H MFR UR ELPH: 16.5 %
INTERPRETATION UR IFE-IMP: ABNORMAL
M PROTEIN 24H MFR UR ELPH: ABNORMAL %
PROT UR-MCNC: 18.1 MG/DL
SERVICE CMNT-IMP: ABNORMAL

## 2025-08-13 LAB — MAGNESIUM RBC-MCNC: 5.1 MG/DL (ref 3.7–7)

## 2025-08-14 ENCOUNTER — SPECIALTY PHARMACY (OUTPATIENT)
Dept: NEUROLOGY | Facility: CLINIC | Age: 70
End: 2025-08-14
Payer: MEDICARE

## 2025-08-21 ENCOUNTER — EXTERNAL PBMM DATA (OUTPATIENT)
Dept: PHARMACY | Facility: OTHER | Age: 70
End: 2025-08-21
Payer: MEDICARE

## 2025-08-21 ENCOUNTER — TELEMEDICINE (OUTPATIENT)
Dept: FAMILY MEDICINE CLINIC | Facility: CLINIC | Age: 70
End: 2025-08-21
Payer: MEDICARE

## 2025-08-21 DIAGNOSIS — R53.83 LETHARGY: ICD-10-CM

## 2025-08-21 DIAGNOSIS — E11.65 TYPE 2 DIABETES MELLITUS WITH HYPERGLYCEMIA, WITHOUT LONG-TERM CURRENT USE OF INSULIN: ICD-10-CM

## 2025-08-21 DIAGNOSIS — R68.89 EXERCISE INTOLERANCE: ICD-10-CM

## 2025-08-21 DIAGNOSIS — R76.8 ELEVATED SERUM IMMUNOGLOBULIN FREE LIGHT CHAINS: Primary | ICD-10-CM

## 2025-08-21 DIAGNOSIS — G62.9 NEUROPATHY: ICD-10-CM

## 2025-08-21 DIAGNOSIS — D50.0 IRON DEFICIENCY ANEMIA DUE TO CHRONIC BLOOD LOSS: ICD-10-CM

## 2025-08-21 DIAGNOSIS — E78.5 DYSLIPIDEMIA: ICD-10-CM

## 2025-08-21 RX ORDER — ATORVASTATIN CALCIUM 20 MG/1
20 TABLET, FILM COATED ORAL DAILY
Qty: 90 TABLET | Refills: 1 | Status: SHIPPED | OUTPATIENT
Start: 2025-08-21

## 2025-08-21 RX ORDER — LISINOPRIL 40 MG/1
40 TABLET ORAL 2 TIMES DAILY
Qty: 180 TABLET | Refills: 1 | Status: SHIPPED | OUTPATIENT
Start: 2025-08-21

## 2025-08-25 ENCOUNTER — TELEPHONE (OUTPATIENT)
Dept: FAMILY MEDICINE CLINIC | Facility: CLINIC | Age: 70
End: 2025-08-25
Payer: MEDICARE

## 2025-08-27 DIAGNOSIS — E11.65 TYPE 2 DIABETES MELLITUS WITH HYPERGLYCEMIA, WITHOUT LONG-TERM CURRENT USE OF INSULIN: ICD-10-CM

## 2025-08-27 RX ORDER — INSULIN GLARGINE 300 U/ML
INJECTION, SOLUTION SUBCUTANEOUS
Qty: 15 ML | Refills: 3 | Status: SHIPPED | OUTPATIENT
Start: 2025-08-27

## 2025-08-27 RX ORDER — INSULIN LISPRO 200 [IU]/ML
INJECTION, SOLUTION SUBCUTANEOUS
Qty: 24 ML | Refills: 0 | Status: SHIPPED | OUTPATIENT
Start: 2025-08-27 | End: 2025-08-29 | Stop reason: SDUPTHER

## 2025-08-29 DIAGNOSIS — E11.65 TYPE 2 DIABETES MELLITUS WITH HYPERGLYCEMIA, WITHOUT LONG-TERM CURRENT USE OF INSULIN: ICD-10-CM

## 2025-08-29 RX ORDER — INSULIN LISPRO 200 [IU]/ML
18 INJECTION, SOLUTION SUBCUTANEOUS
Qty: 24 ML | Refills: 1 | Status: SHIPPED | OUTPATIENT
Start: 2025-08-29

## (undated) DEVICE — CATH DIAG EXPO .052 PIG 6F 110CM

## (undated) DEVICE — MODEL BT2000 P/N 700287-012KIT CONTENTS: MANIFOLD WITH SALINE AND CONTRAST PORTS, SALINE TUBING WITH SPIKE AND HAND SYRINGE, TRANSDUCER: Brand: BT2000 AUTOMATED MANIFOLD KIT

## (undated) DEVICE — GLIDESHEATH BASIC HYDROPHILIC COATED INTRODUCER SHEATH: Brand: GLIDESHEATH

## (undated) DEVICE — TR BAND RADIAL ARTERY COMPRESSION DEVICE: Brand: TR BAND

## (undated) DEVICE — PK CATH CARD 10

## (undated) DEVICE — MODEL AT P65, P/N 701554-001KIT CONTENTS: HAND CONTROLLER, 3-WAY HIGH-PRESSURE STOPCOCK WITH ROTATING END AND PREMIUM HIGH-PRESSURE TUBING: Brand: ANGIOTOUCH® KIT

## (undated) DEVICE — NDL ANGIOGR ADV THN SMOTH SGLWALL 21G 1.5

## (undated) DEVICE — CATH DIAG IMPULSE FL3.5 6F 100CM

## (undated) DEVICE — GW PERIPH GUIDERIGHT STD/EXCHNG/J/TIP SS 0.035IN 5X260CM

## (undated) DEVICE — CATH DIAG EXPO .056 FR4 6F 100CM

## (undated) DEVICE — ADULT, W/LG. BACK PAD, RADIOTRANSPARENT ELEMENT AND LEAD WIRE COMPATIBLE W/: Brand: DEFIBRILLATION ELECTRODES